# Patient Record
Sex: FEMALE | Race: WHITE | Employment: UNEMPLOYED | URBAN - METROPOLITAN AREA
[De-identification: names, ages, dates, MRNs, and addresses within clinical notes are randomized per-mention and may not be internally consistent; named-entity substitution may affect disease eponyms.]

---

## 2017-02-13 RX ORDER — MONTELUKAST SODIUM 10 MG/1
TABLET ORAL
Qty: 90 TABLET | Refills: 0 | Status: SHIPPED | OUTPATIENT
Start: 2017-02-13 | End: 2017-05-15

## 2017-03-06 ENCOUNTER — LAB ENCOUNTER (OUTPATIENT)
Dept: LAB | Age: 37
End: 2017-03-06
Attending: INTERNAL MEDICINE
Payer: COMMERCIAL

## 2017-03-06 ENCOUNTER — OFFICE VISIT (OUTPATIENT)
Dept: INTERNAL MEDICINE CLINIC | Facility: CLINIC | Age: 37
End: 2017-03-06

## 2017-03-06 VITALS
HEIGHT: 67.5 IN | OXYGEN SATURATION: 98 % | DIASTOLIC BLOOD PRESSURE: 86 MMHG | SYSTOLIC BLOOD PRESSURE: 128 MMHG | RESPIRATION RATE: 22 BRPM | WEIGHT: 293 LBS | HEART RATE: 98 BPM | TEMPERATURE: 99 F | BODY MASS INDEX: 45.45 KG/M2

## 2017-03-06 DIAGNOSIS — G43.709 CHRONIC MIGRAINE WITHOUT AURA WITHOUT STATUS MIGRAINOSUS, NOT INTRACTABLE: ICD-10-CM

## 2017-03-06 DIAGNOSIS — R73.03 PREDIABETES: ICD-10-CM

## 2017-03-06 DIAGNOSIS — E66.01 MORBID OBESITY WITH BMI OF 50.0-59.9, ADULT (HCC): ICD-10-CM

## 2017-03-06 DIAGNOSIS — Z00.00 ROUTINE GENERAL MEDICAL EXAMINATION AT A HEALTH CARE FACILITY: ICD-10-CM

## 2017-03-06 DIAGNOSIS — J45.30 MILD PERSISTENT ASTHMA WITHOUT COMPLICATION: ICD-10-CM

## 2017-03-06 DIAGNOSIS — K21.9 GASTROESOPHAGEAL REFLUX DISEASE WITHOUT ESOPHAGITIS: ICD-10-CM

## 2017-03-06 DIAGNOSIS — Z12.31 ENCOUNTER FOR SCREENING MAMMOGRAM FOR BREAST CANCER: ICD-10-CM

## 2017-03-06 DIAGNOSIS — J30.9 ALLERGIC RHINITIS, UNSPECIFIED ALLERGIC RHINITIS TRIGGER, UNSPECIFIED RHINITIS SEASONALITY: ICD-10-CM

## 2017-03-06 DIAGNOSIS — Z00.00 ROUTINE GENERAL MEDICAL EXAMINATION AT A HEALTH CARE FACILITY: Primary | ICD-10-CM

## 2017-03-06 DIAGNOSIS — F31.62 BIPOLAR DISORDER, CURRENT EPISODE MIXED, MODERATE (HCC): ICD-10-CM

## 2017-03-06 PROBLEM — F31.9 BIPOLAR AFFECTIVE DISORDER (HCC): Status: ACTIVE | Noted: 2017-03-06

## 2017-03-06 LAB
25-HYDROXYVITAMIN D (TOTAL): 9.1 NG/ML (ref 30–100)
ALBUMIN SERPL-MCNC: 3.3 G/DL (ref 3.5–4.8)
ALP LIVER SERPL-CCNC: 84 U/L (ref 37–98)
ALT SERPL-CCNC: 18 U/L (ref 14–54)
AST SERPL-CCNC: 11 U/L (ref 15–41)
BASOPHILS # BLD AUTO: 0.06 X10(3) UL (ref 0–0.1)
BASOPHILS NFR BLD AUTO: 0.6 %
BILIRUB SERPL-MCNC: 0.2 MG/DL (ref 0.1–2)
BILIRUB UR QL STRIP.AUTO: NEGATIVE
BUN BLD-MCNC: 13 MG/DL (ref 8–20)
CALCIUM BLD-MCNC: 8.9 MG/DL (ref 8.3–10.3)
CHLORIDE: 108 MMOL/L (ref 101–111)
CHOLEST SMN-MCNC: 159 MG/DL (ref ?–200)
CO2: 27 MMOL/L (ref 22–32)
COLOR UR AUTO: YELLOW
CREAT BLD-MCNC: 1.04 MG/DL (ref 0.55–1.02)
EOSINOPHIL # BLD AUTO: 0.07 X10(3) UL (ref 0–0.3)
EOSINOPHIL NFR BLD AUTO: 0.8 %
ERYTHROCYTE [DISTWIDTH] IN BLOOD BY AUTOMATED COUNT: 13.2 % (ref 11.5–16)
EST. AVERAGE GLUCOSE BLD GHB EST-MCNC: 114 MG/DL (ref 68–126)
GLUCOSE BLD-MCNC: 91 MG/DL (ref 70–99)
GLUCOSE UR STRIP.AUTO-MCNC: NEGATIVE MG/DL
HBA1C MFR BLD HPLC: 5.6 % (ref ?–5.7)
HCT VFR BLD AUTO: 39 % (ref 34–50)
HDLC SERPL-MCNC: 47 MG/DL (ref 45–?)
HDLC SERPL: 3.38 {RATIO} (ref ?–4.44)
HGB BLD-MCNC: 12.3 G/DL (ref 12–16)
IMMATURE GRANULOCYTE COUNT: 0.03 X10(3) UL (ref 0–1)
IMMATURE GRANULOCYTE RATIO %: 0.3 %
KETONES UR STRIP.AUTO-MCNC: NEGATIVE MG/DL
LDLC SERPL CALC-MCNC: 86 MG/DL (ref ?–130)
LEUKOCYTE ESTERASE UR QL STRIP.AUTO: NEGATIVE
LYMPHOCYTES # BLD AUTO: 2.64 X10(3) UL (ref 0.9–4)
LYMPHOCYTES NFR BLD AUTO: 28.6 %
M PROTEIN MFR SERPL ELPH: 7.4 G/DL (ref 6.1–8.3)
MCH RBC QN AUTO: 28.1 PG (ref 27–33.2)
MCHC RBC AUTO-ENTMCNC: 31.5 G/DL (ref 31–37)
MCV RBC AUTO: 89.2 FL (ref 81–100)
MONOCYTES # BLD AUTO: 0.59 X10(3) UL (ref 0.1–0.6)
MONOCYTES NFR BLD AUTO: 6.4 %
NEUTROPHIL ABS PRELIM: 5.85 X10 (3) UL (ref 1.3–6.7)
NEUTROPHILS # BLD AUTO: 5.85 X10(3) UL (ref 1.3–6.7)
NEUTROPHILS NFR BLD AUTO: 63.3 %
NITRITE UR QL STRIP.AUTO: NEGATIVE
NONHDLC SERPL-MCNC: 112 MG/DL (ref ?–130)
PH UR STRIP.AUTO: 5 [PH] (ref 4.5–8)
PLATELET # BLD AUTO: 249 10(3)UL (ref 150–450)
POTASSIUM SERPL-SCNC: 4.2 MMOL/L (ref 3.6–5.1)
PROT UR STRIP.AUTO-MCNC: NEGATIVE MG/DL
RBC # BLD AUTO: 4.37 X10(6)UL (ref 3.8–5.1)
RBC UR QL AUTO: NEGATIVE
RED CELL DISTRIBUTION WIDTH-SD: 42.8 FL (ref 35.1–46.3)
SODIUM SERPL-SCNC: 142 MMOL/L (ref 136–144)
SP GR UR STRIP.AUTO: 1.03 (ref 1–1.03)
TRIGLYCERIDES: 131 MG/DL (ref ?–150)
TSI SER-ACNC: 3.87 MIU/ML (ref 0.35–5.5)
UROBILINOGEN UR STRIP.AUTO-MCNC: <2 MG/DL
VLDL: 26 MG/DL (ref 5–40)
WBC # BLD AUTO: 9.2 X10(3) UL (ref 4–13)

## 2017-03-06 PROCEDURE — 80053 COMPREHEN METABOLIC PANEL: CPT

## 2017-03-06 PROCEDURE — 80061 LIPID PANEL: CPT

## 2017-03-06 PROCEDURE — 84443 ASSAY THYROID STIM HORMONE: CPT

## 2017-03-06 PROCEDURE — 99395 PREV VISIT EST AGE 18-39: CPT | Performed by: INTERNAL MEDICINE

## 2017-03-06 PROCEDURE — 36415 COLL VENOUS BLD VENIPUNCTURE: CPT

## 2017-03-06 PROCEDURE — 82306 VITAMIN D 25 HYDROXY: CPT

## 2017-03-06 PROCEDURE — 87624 HPV HI-RISK TYP POOLED RSLT: CPT | Performed by: INTERNAL MEDICINE

## 2017-03-06 PROCEDURE — 81003 URINALYSIS AUTO W/O SCOPE: CPT

## 2017-03-06 PROCEDURE — 83036 HEMOGLOBIN GLYCOSYLATED A1C: CPT

## 2017-03-06 PROCEDURE — 85025 COMPLETE CBC W/AUTO DIFF WBC: CPT

## 2017-03-06 RX ORDER — LORAZEPAM 1 MG/1
TABLET ORAL
Refills: 0 | Status: ON HOLD | COMMUNITY
Start: 2017-01-14 | End: 2017-04-27

## 2017-03-06 RX ORDER — CLONAZEPAM 1 MG/1
TABLET ORAL
Refills: 0 | COMMUNITY
Start: 2017-02-13 | End: 2017-07-24

## 2017-03-06 RX ORDER — SUMATRIPTAN 25 MG/1
25 TABLET, FILM COATED ORAL EVERY 2 HOUR PRN
Qty: 9 TABLET | Refills: 2 | Status: SHIPPED | OUTPATIENT
Start: 2017-03-06 | End: 2021-06-03 | Stop reason: ALTCHOICE

## 2017-03-06 RX ORDER — LURASIDONE HYDROCHLORIDE 40 MG/1
40 TABLET, FILM COATED ORAL NIGHTLY
Refills: 0 | COMMUNITY
Start: 2017-02-13 | End: 2017-07-24

## 2017-03-06 RX ORDER — FLUOXETINE HYDROCHLORIDE 20 MG/1
CAPSULE ORAL
Refills: 0 | Status: ON HOLD | COMMUNITY
Start: 2017-02-23 | End: 2017-04-27

## 2017-03-06 RX ORDER — FLUOXETINE HYDROCHLORIDE 40 MG/1
CAPSULE ORAL
Refills: 0 | Status: ON HOLD | COMMUNITY
Start: 2017-02-13 | End: 2017-04-27

## 2017-03-06 NOTE — PROGRESS NOTES
Patient presents with:  CPX: pap/fasting      HPI: The pt presents today for WWE + pap + CBE. Doing well. Due for updated wellness labs. She is compliant w/ all prescription medications and denies any significant SEs.     ROS: No CP, SOB, palps, abd pain Status: Former Smoker                   Packs/Day: 0.00  Years: 7         Types: Cigarettes      Quit date: 12/15/2008    Alcohol Use: No                Family History   Problem Relation Age of Onset   • Heart Disease Father    • Cancer Maternal Grandmothe pap results.   3. Morbid obesity - I advised the patient to strive to live an active and healthy lifestyle, one that encompasses strong  foundations of a mineral-dense and macronutritent-balanced diet, regular and dynamic exercise, maintenance of ideal body

## 2017-03-08 DIAGNOSIS — E55.9 VITAMIN D DEFICIENCY: Primary | ICD-10-CM

## 2017-03-08 RX ORDER — ERGOCALCIFEROL 1.25 MG/1
50000 CAPSULE ORAL
Qty: 12 CAPSULE | Refills: 0 | Status: SHIPPED | OUTPATIENT
Start: 2017-03-08 | End: 2017-07-24

## 2017-03-08 NOTE — PROGRESS NOTES
Script for Vitamin D sent to her pharmacy for very low levels on recent blood testing. Patient notified via [address] Jose Floyd. Ozzy Altamirano MD  Diplomate, American Board of Internal Medicine  [facility]  [address], [address], [place], [place]

## 2017-03-10 LAB — HPV I/H RISK 1 DNA SPEC QL NAA+PROBE: NEGATIVE

## 2017-03-13 ENCOUNTER — HOSPITAL ENCOUNTER (OUTPATIENT)
Dept: MAMMOGRAPHY | Age: 37
Discharge: HOME | End: 2017-03-13
Attending: INTERNAL MEDICINE
Payer: COMMERCIAL

## 2017-03-13 DIAGNOSIS — Z12.31 ENCOUNTER FOR SCREENING MAMMOGRAM FOR BREAST CANCER: ICD-10-CM

## 2017-03-13 PROCEDURE — 77063 BREAST TOMOSYNTHESIS BI: CPT

## 2017-03-13 PROCEDURE — 77067 SCR MAMMO BI INCL CAD: CPT

## 2017-03-14 ENCOUNTER — TELEPHONE (OUTPATIENT)
Dept: INTERNAL MEDICINE CLINIC | Facility: CLINIC | Age: 37
End: 2017-03-14

## 2017-03-14 DIAGNOSIS — B96.89 BV (BACTERIAL VAGINOSIS): Primary | ICD-10-CM

## 2017-03-14 DIAGNOSIS — N76.0 BV (BACTERIAL VAGINOSIS): Primary | ICD-10-CM

## 2017-03-14 RX ORDER — METRONIDAZOLE 7.5 MG/G
GEL VAGINAL
Qty: 1 TUBE | Refills: 0 | Status: ON HOLD | OUTPATIENT
Start: 2017-03-14 | End: 2017-04-24

## 2017-03-14 NOTE — TELEPHONE ENCOUNTER
1. Call patient. 2. Tell her the pap was normal.  3. Tell her that the report suggested that she has a bacterial infection of the vagina called bacterial vaginosis. Have her start taking Metrogel intravaginally for 5 nights.   I've sent a script to her ph

## 2017-03-20 PROBLEM — F33.2 MAJOR DEPRESSIVE DISORDER, RECURRENT EPISODE, SEVERE (HCC): Status: ACTIVE | Noted: 2017-03-20

## 2017-03-27 RX ORDER — PANTOPRAZOLE SODIUM 40 MG/1
40 TABLET, DELAYED RELEASE ORAL
Qty: 180 TABLET | Refills: 0 | Status: SHIPPED | OUTPATIENT
Start: 2017-03-27 | End: 2017-07-07

## 2017-03-27 NOTE — TELEPHONE ENCOUNTER
From: Marcelo Segura  To: Zora Garibay MD  Sent: 3/27/2017 4:39 PM CDT  Subject: Medication Renewal Request    Original authorizing provider: MD Marcelo Chase would like a refill of the following medications:  PANTOPRAZOLE SODIUM 40 MG Oral T

## 2017-04-24 PROBLEM — F32.9 MDD (MAJOR DEPRESSIVE DISORDER): Status: ACTIVE | Noted: 2017-04-24

## 2017-05-15 RX ORDER — MONTELUKAST SODIUM 10 MG/1
TABLET ORAL
Qty: 90 TABLET | Refills: 0 | Status: SHIPPED | OUTPATIENT
Start: 2017-05-15 | End: 2017-08-17

## 2017-07-07 RX ORDER — PANTOPRAZOLE SODIUM 40 MG/1
TABLET, DELAYED RELEASE ORAL
Qty: 180 TABLET | Refills: 0 | Status: SHIPPED | OUTPATIENT
Start: 2017-07-07 | End: 2017-10-04

## 2017-07-13 RX ORDER — ALBUTEROL SULFATE 90 UG/1
1-2 AEROSOL, METERED RESPIRATORY (INHALATION) EVERY 4 HOURS PRN
Qty: 1 INHALER | Refills: 1 | Status: CANCELLED
Start: 2017-07-13

## 2017-07-14 ENCOUNTER — TELEPHONE (OUTPATIENT)
Dept: INTERNAL MEDICINE CLINIC | Facility: CLINIC | Age: 37
End: 2017-07-14

## 2017-07-14 RX ORDER — ALBUTEROL SULFATE 90 UG/1
1-2 AEROSOL, METERED RESPIRATORY (INHALATION) EVERY 4 HOURS PRN
Qty: 1 INHALER | Refills: 1 | Status: SHIPPED | OUTPATIENT
Start: 2017-07-14 | End: 2019-05-02

## 2017-07-14 NOTE — TELEPHONE ENCOUNTER
From: Stephany Hidalgo  Sent: 7/13/2017 9:48 PM CDT  Subject: Medication Renewal Request    Stephany Hidalgo would like a refill of the following medications:  Albuterol Sulfate HFA (PROAIR HFA) 108 (90 BASE) MCG/ACT Inhalation Aero Soln Brenda Jones MD]

## 2017-07-14 NOTE — TELEPHONE ENCOUNTER
Pt asking for inhaler refill     Albuterol Sulfate HFA     [facility] [address] [place] [address]

## 2017-07-14 NOTE — TELEPHONE ENCOUNTER
LOV: 5/1/2015 at the [facility]    LF: 5/1/2015    She is not a pt here.  Her PCP is Dr. Chris Richardson    Please approve or deny rx refill request.  Thank you

## 2017-07-24 ENCOUNTER — APPOINTMENT (OUTPATIENT)
Dept: LAB | Age: 37
End: 2017-07-24
Attending: INTERNAL MEDICINE
Payer: COMMERCIAL

## 2017-07-24 ENCOUNTER — TELEPHONE (OUTPATIENT)
Dept: INTERNAL MEDICINE CLINIC | Facility: CLINIC | Age: 37
End: 2017-07-24

## 2017-07-24 DIAGNOSIS — E55.9 VITAMIN D DEFICIENCY: ICD-10-CM

## 2017-07-24 PROBLEM — F32.9 MDD (MAJOR DEPRESSIVE DISORDER): Status: RESOLVED | Noted: 2017-04-24 | Resolved: 2017-07-24

## 2017-07-24 LAB — 25-HYDROXYVITAMIN D (TOTAL): 26.6 NG/ML (ref 30–100)

## 2017-07-24 PROCEDURE — 36415 COLL VENOUS BLD VENIPUNCTURE: CPT | Performed by: INTERNAL MEDICINE

## 2017-07-24 PROCEDURE — 82306 VITAMIN D 25 HYDROXY: CPT | Performed by: INTERNAL MEDICINE

## 2017-07-24 NOTE — PROGRESS NOTES
Patient presents with:  Establish Care: mental health and medication      HPI: The patient presents today with the hopes of me taking over medical management of her mental health disorders. Specifically, she has MDD, Bipolar disorder, and FADY.   She is on clear  Lungs - CTAB  CV - RRR, nl s1, s2  Abd - soft, NABS, NT, ND  Ext - no c/c/e  Psych - nl mood/affect      A/P:  Severe episode of recurrent major depressive disorder, without psychotic features (hcc)  (primary encounter diagnosis)  Bipolar disorder,

## 2017-07-24 NOTE — TELEPHONE ENCOUNTER
I spoke with pt and she clarified that she was RX clonazepam 1 mg BID, but taking 1 mg QAM and 0.5 mg QHS. Dr Carroll Tobar, please advise dosing instructions.  TY

## 2017-07-24 NOTE — TELEPHONE ENCOUNTER
[facility] called in stating that pt was RX clonazepam 1 mg BID and new RX is for 0.5 mg qhs prn. Pharmacist wants to make sure that RX is for correct dose.      I contacted pt to confirm what dose she was preciously taking as she reported 0.5 mg qhs at OV

## 2017-07-24 NOTE — TELEPHONE ENCOUNTER
I'd like her to go ahead and move forward with the Clonazepam 0.5 mg q HS PRN by itself, since she's been making such good progress. I'm confident she'll do well on this routine. Edi Joy.  Cyrus Ash MD  Diplomate, [address] [place] [address] Board of Internal Medicine  [facility]

## 2017-07-25 NOTE — TELEPHONE ENCOUNTER
LMTCB for pt. Pharmacy notified that pt is to take clonazepam 0.5 mg qhs prn and RX was sent for correct dosing.

## 2017-08-17 RX ORDER — MONTELUKAST SODIUM 10 MG/1
TABLET ORAL
Qty: 90 TABLET | Refills: 0 | Status: SHIPPED | OUTPATIENT
Start: 2017-08-17 | End: 2017-11-11

## 2017-08-28 ENCOUNTER — TELEPHONE (OUTPATIENT)
Dept: INTERNAL MEDICINE CLINIC | Facility: CLINIC | Age: 37
End: 2017-08-28

## 2017-08-28 DIAGNOSIS — F41.1 GAD (GENERALIZED ANXIETY DISORDER): ICD-10-CM

## 2017-08-28 DIAGNOSIS — F31.62 BIPOLAR DISORDER, CURRENT EPISODE MIXED, MODERATE (HCC): ICD-10-CM

## 2017-08-28 NOTE — TELEPHONE ENCOUNTER
From: Sivakumar Toledo  Sent: 8/28/2017 9:19 AM CDT  Subject: Medication Renewal Request    Sivakumar Toledo would like a refill of the following medications:  ClonazePAM 0.5 MG Oral Tab Yany Valentine MD]  Lisdexamfetamine Dimesylate 30 MG Oral Cap Yany Valentine,

## 2017-08-28 NOTE — TELEPHONE ENCOUNTER
Called patient to notify of today's missed appointment. Notified of 24-hour cancellation and stated that this will be considered a NO SHOW.  Explained that after a second no show, there will be a $50 charge

## 2017-08-29 RX ORDER — CLONAZEPAM 0.5 MG/1
0.5 TABLET ORAL NIGHTLY PRN
Qty: 30 TABLET | Refills: 0 | Status: SHIPPED | OUTPATIENT
Start: 2017-08-29 | End: 2018-02-26 | Stop reason: ALTCHOICE

## 2017-08-31 ENCOUNTER — TELEPHONE (OUTPATIENT)
Dept: INTERNAL MEDICINE CLINIC | Facility: CLINIC | Age: 37
End: 2017-08-31

## 2017-09-08 ENCOUNTER — TELEPHONE (OUTPATIENT)
Dept: INTERNAL MEDICINE CLINIC | Facility: CLINIC | Age: 37
End: 2017-09-08

## 2017-09-08 NOTE — TELEPHONE ENCOUNTER
Call patient. I've been thinking about the complexity of her case and have spoken with both Dr. Josiane Green and Dr. Reshma Armenta. I want Crys to go back to seeing Dr. Reshma Armenta.   Her case is just so complex that it is absolutely in her best interest to continue seeing

## 2017-09-08 NOTE — TELEPHONE ENCOUNTER
I spoke w/ Dr. Divina Mathew this afternoon. Tianna Noel. Halina Alonso MD  Diplomate, American Board of Internal Medicine  [address] [facility]  [address] [place] [address],[address], [address], [facility], [address]  T: [phone]; F: Jenniferet[address] 5

## 2017-09-25 PROBLEM — F33.2 MAJOR DEPRESSIVE DISORDER, RECURRENT EPISODE, SEVERE (HCC): Status: RESOLVED | Noted: 2017-03-20 | Resolved: 2017-09-25

## 2017-09-25 PROBLEM — F41.1 GAD (GENERALIZED ANXIETY DISORDER): Status: ACTIVE | Noted: 2017-09-25

## 2017-09-25 PROBLEM — E66.01 MORBID OBESITY WITH BMI OF 45.0-49.9, ADULT (HCC): Status: ACTIVE | Noted: 2017-09-25

## 2017-09-25 PROBLEM — F33.1 MDD (MAJOR DEPRESSIVE DISORDER), RECURRENT EPISODE, MODERATE (HCC): Status: ACTIVE | Noted: 2017-09-25

## 2017-09-25 NOTE — PROGRESS NOTES
Patient presents with: Follow - Up: MDD, Bipolar, FADY      HPI: The patient presents today for 2-month f/u (she missed last month's appointment) for her history of MDD, Bipolar, and FADY.   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 , Disp: 60 tablet, Rfl: 2  •  Albuterol Sulfate HFA (PROAIR HFA) 108 (90 Base) MCG/ACT Inhalation Aero Soln, Inhale 1-2 puffs use of prescription medication. 4. Continue CBT/mindfulness/support and RTC in 3 months. Patient verbally agrees to and understands the plan as outlined above.   Patient was also afforded the time and opportunity to ask questions, which were then answered

## 2017-10-05 RX ORDER — PANTOPRAZOLE SODIUM 40 MG/1
TABLET, DELAYED RELEASE ORAL
Qty: 180 TABLET | Refills: 0 | Status: SHIPPED | OUTPATIENT
Start: 2017-10-05 | End: 2017-12-28

## 2017-10-15 ENCOUNTER — HOSPITAL ENCOUNTER (OUTPATIENT)
Age: 37
Discharge: HOME | End: 2017-10-15
Payer: COMMERCIAL

## 2017-10-15 ENCOUNTER — APPOINTMENT (OUTPATIENT)
Dept: GENERAL RADIOLOGY | Age: 37
End: 2017-10-15
Attending: PHYSICIAN ASSISTANT
Payer: COMMERCIAL

## 2017-10-15 VITALS
BODY MASS INDEX: 45.99 KG/M2 | SYSTOLIC BLOOD PRESSURE: 123 MMHG | DIASTOLIC BLOOD PRESSURE: 88 MMHG | WEIGHT: 293 LBS | RESPIRATION RATE: 20 BRPM | TEMPERATURE: 97 F | HEART RATE: 94 BPM | OXYGEN SATURATION: 97 % | HEIGHT: 67 IN

## 2017-10-15 DIAGNOSIS — M25.40 JOINT EFFUSION: ICD-10-CM

## 2017-10-15 DIAGNOSIS — M25.561 ACUTE PAIN OF RIGHT KNEE: Primary | ICD-10-CM

## 2017-10-15 PROCEDURE — 99213 OFFICE O/P EST LOW 20 MIN: CPT

## 2017-10-15 PROCEDURE — 73560 X-RAY EXAM OF KNEE 1 OR 2: CPT | Performed by: PHYSICIAN ASSISTANT

## 2017-10-15 NOTE — ED PROVIDER NOTES
Patient Seen in: [facility]    History   Patient presents with:  Knee Pain    Stated Complaint: RT KNEE PAIN X 1 WK    HPI    CHIEF COMPLAINT: Right knee pain     HISTORY OF PRESENT ILLNESS: Patient is a 56-year-old female who p OTHER      Comment: basal carcinoma removed from upper lid of left               eye  No date: TONSILLECTOMY    Family History   Problem Relation Age of Onset   • Heart Disease Father    • Cancer Maternal Grandmother      Lung CA   • Cancer Maternal Grandf laxity noted. Bilateral LE: +5/5 strength . 2+ patellar reflexes. Normal sensation. Skin:  warm and dry, no rashes.          ED Course   Labs Reviewed - No data to display  Xr Knee (1 Or 2 Views), Right (cpt=73560)    Result Date: 10/15/2017  PROCEDURE: [facility]  [phone]    In 1 week        Medications Prescribed:  Discharge Medication List as of 10/15/2017 12:01 PM

## 2017-10-15 NOTE — ED INITIAL ASSESSMENT (HPI)
Right knee- pain since last Sunday. Pt states she was walking in [facility] felt knee popped. takes aleve for  Pain also applied ice. Today felt knee popped again while waking down the stairs,  Feels tight.

## 2017-10-28 DIAGNOSIS — F31.62 BIPOLAR DISORDER, CURRENT EPISODE MIXED, MODERATE (HCC): ICD-10-CM

## 2017-10-29 DIAGNOSIS — F31.62 BIPOLAR DISORDER, CURRENT EPISODE MIXED, MODERATE (HCC): ICD-10-CM

## 2017-10-30 RX ORDER — LAMOTRIGINE 100 MG/1
TABLET ORAL
Qty: 60 TABLET | Refills: 2 | Status: SHIPPED | OUTPATIENT
Start: 2017-10-30 | End: 2018-01-19

## 2017-10-30 RX ORDER — LURASIDONE HYDROCHLORIDE 20 MG/1
TABLET, FILM COATED ORAL
Qty: 30 TABLET | Refills: 2 | Status: SHIPPED | OUTPATIENT
Start: 2017-10-30 | End: 2018-01-19

## 2017-11-02 DIAGNOSIS — F33.2 SEVERE EPISODE OF RECURRENT MAJOR DEPRESSIVE DISORDER, WITHOUT PSYCHOTIC FEATURES (HCC): ICD-10-CM

## 2017-11-02 RX ORDER — SERTRALINE HYDROCHLORIDE 100 MG/1
100 TABLET, FILM COATED ORAL EVERY MORNING
Qty: 30 TABLET | Refills: 2 | Status: SHIPPED | OUTPATIENT
Start: 2017-11-02 | End: 2018-01-19

## 2017-11-11 RX ORDER — MONTELUKAST SODIUM 10 MG/1
TABLET ORAL
Qty: 90 TABLET | Refills: 0 | Status: SHIPPED | OUTPATIENT
Start: 2017-11-11 | End: 2018-02-04

## 2017-11-11 NOTE — TELEPHONE ENCOUNTER
Requesting Rita   LOV: 7-24-17  RTC: 1 month   Last Relevant Labs: 3-6-17  Filled: 8-17-17#90 with 0 refills    No future appointments.   Due for OV

## 2017-12-08 ENCOUNTER — HOSPITAL ENCOUNTER (OUTPATIENT)
Age: 37
Discharge: HOME | End: 2017-12-08
Attending: FAMILY MEDICINE
Payer: COMMERCIAL

## 2017-12-08 VITALS
OXYGEN SATURATION: 97 % | RESPIRATION RATE: 16 BRPM | HEART RATE: 85 BPM | DIASTOLIC BLOOD PRESSURE: 78 MMHG | TEMPERATURE: 99 F | SYSTOLIC BLOOD PRESSURE: 122 MMHG

## 2017-12-08 DIAGNOSIS — H61.21 RIGHT EAR IMPACTED CERUMEN: ICD-10-CM

## 2017-12-08 DIAGNOSIS — H92.01 RIGHT EAR PAIN: Primary | ICD-10-CM

## 2017-12-08 PROCEDURE — 99213 OFFICE O/P EST LOW 20 MIN: CPT

## 2017-12-08 PROCEDURE — 69209 REMOVE IMPACTED EAR WAX UNI: CPT

## 2017-12-08 RX ORDER — FLUTICASONE PROPIONATE 50 MCG
2 SPRAY, SUSPENSION (ML) NASAL DAILY
Qty: 16 G | Refills: 0 | Status: SHIPPED | OUTPATIENT
Start: 2017-12-08 | End: 2018-01-07

## 2017-12-09 NOTE — ED INITIAL ASSESSMENT (HPI)
Right ear pain- started yesterday. Pt took a flight Monday. Took aleve 2 tabs  X 1 per day  and allegra.

## 2017-12-09 NOTE — ED PROVIDER NOTES
Patient Seen in: [facility]    History   Patient presents with:  Ear Pain    Stated Complaint: right ear pain    HPI    44year old female presents right ear pain. Patients states she has right ear pain since yesterday.  States s External ear normal.   Left Ear: External ear normal.   Nose: Nose normal.   Mouth/Throat: Oropharynx is clear and moist.   EAC with cerumen impaction. Eyes: Conjunctivae and EOM are normal. Pupils are equal, round, and reactive to light.    Neck: Normal

## 2017-12-29 RX ORDER — PANTOPRAZOLE SODIUM 40 MG/1
TABLET, DELAYED RELEASE ORAL
Qty: 180 TABLET | Refills: 0 | Status: SHIPPED | OUTPATIENT
Start: 2017-12-29 | End: 2018-03-24

## 2017-12-29 NOTE — TELEPHONE ENCOUNTER
Pantoprazole 40 mg filled 10-5-17 180 with 0 refills     LOV 9-25-17    Follow up in 3 months     No upcoming apt on file     Labs 3-6-17

## 2018-01-19 DIAGNOSIS — F33.2 SEVERE EPISODE OF RECURRENT MAJOR DEPRESSIVE DISORDER, WITHOUT PSYCHOTIC FEATURES (HCC): ICD-10-CM

## 2018-01-19 DIAGNOSIS — F31.62 BIPOLAR DISORDER, CURRENT EPISODE MIXED, MODERATE (HCC): ICD-10-CM

## 2018-01-19 RX ORDER — SERTRALINE HYDROCHLORIDE 100 MG/1
TABLET, FILM COATED ORAL
Qty: 30 TABLET | Refills: 0 | Status: SHIPPED | OUTPATIENT
Start: 2018-01-19 | End: 2018-03-26

## 2018-01-19 RX ORDER — LAMOTRIGINE 100 MG/1
TABLET ORAL
Qty: 60 TABLET | Refills: 0 | Status: SHIPPED | OUTPATIENT
Start: 2018-01-19 | End: 2018-03-24

## 2018-01-19 RX ORDER — LURASIDONE HYDROCHLORIDE 20 MG/1
TABLET, FILM COATED ORAL
Qty: 30 TABLET | Refills: 0 | Status: SHIPPED | OUTPATIENT
Start: 2018-01-19 | End: 2018-03-26

## 2018-01-19 NOTE — TELEPHONE ENCOUNTER
Lamotrigine 100 mg 1 tab bid filled 10-30-17 60 with 2 refills     Latuda 20 mg 1 tab nightly filled 10-30-17 30 with 2 refills     sertraline 100 mg 1 tab daily filled  11-2-17 30 with 2 refills     LOV 9-25-17      Return in about 3 months (around 12/25/

## 2018-02-05 RX ORDER — MONTELUKAST SODIUM 10 MG/1
TABLET ORAL
Qty: 90 TABLET | Refills: 0 | Status: SHIPPED | OUTPATIENT
Start: 2018-02-05 | End: 2018-03-24

## 2018-02-06 NOTE — TELEPHONE ENCOUNTER
Singulair 10 mg 1 tab nightly filled 11-11-17 90 with 0 refills     LOV 7-24-17     RTC 1 month for f/u    Last ACT 21 done 3-6-17

## 2018-02-17 ENCOUNTER — HOSPITAL ENCOUNTER (OUTPATIENT)
Age: 38
Discharge: HOME | End: 2018-02-17
Payer: COMMERCIAL

## 2018-02-17 VITALS
DIASTOLIC BLOOD PRESSURE: 86 MMHG | OXYGEN SATURATION: 100 % | RESPIRATION RATE: 16 BRPM | SYSTOLIC BLOOD PRESSURE: 142 MMHG | TEMPERATURE: 98 F | HEART RATE: 82 BPM

## 2018-02-17 DIAGNOSIS — M62.830 PARASPINAL MUSCLE SPASM: ICD-10-CM

## 2018-02-17 DIAGNOSIS — S29.012A STRAIN OF RHOMBOID MUSCLE, INITIAL ENCOUNTER: Primary | ICD-10-CM

## 2018-02-17 PROCEDURE — 99214 OFFICE O/P EST MOD 30 MIN: CPT

## 2018-02-17 PROCEDURE — 99213 OFFICE O/P EST LOW 20 MIN: CPT

## 2018-02-17 RX ORDER — CYCLOBENZAPRINE HCL 5 MG
5 TABLET ORAL 3 TIMES DAILY PRN
Qty: 15 TABLET | Refills: 0 | Status: SHIPPED | OUTPATIENT
Start: 2018-02-17 | End: 2018-02-24

## 2018-02-17 NOTE — ED PROVIDER NOTES
Patient Seen in: [facility]    History   Patient presents with:  Shoulder Pain    Stated Complaint: Left shoulder pain    79-year-old female who presents to the immediate care with complaints of left scapular/shoulder pain since HENT: Negative. Respiratory: Negative. Cardiovascular: Negative. Gastrointestinal: Negative. Musculoskeletal: Positive for back pain. Skin: Negative. Hematological: Negative. Psychiatric/Behavioral: Negative.     All other systems review ------------------------------------------------------------       MDM   I discussed the diagnosis and need for followup with their primary care physician for further evaluation and care.  Patient states they understand diagnosis, followup plan and agree wi

## 2018-02-17 NOTE — ED INITIAL ASSESSMENT (HPI)
Pt. Woke up Tuesday with left shoulder pain, with a little headache and nausea from the pain, unable to sleep well, No known injury, thought she slept on it funny

## 2018-02-26 ENCOUNTER — OFFICE VISIT (OUTPATIENT)
Dept: INTERNAL MEDICINE CLINIC | Facility: CLINIC | Age: 38
End: 2018-02-26

## 2018-02-26 VITALS
SYSTOLIC BLOOD PRESSURE: 128 MMHG | TEMPERATURE: 98 F | WEIGHT: 293 LBS | BODY MASS INDEX: 45.99 KG/M2 | HEART RATE: 88 BPM | RESPIRATION RATE: 16 BRPM | HEIGHT: 67 IN | DIASTOLIC BLOOD PRESSURE: 80 MMHG

## 2018-02-26 DIAGNOSIS — S29.012A STRAIN OF RHOMBOID MUSCLE, INITIAL ENCOUNTER: ICD-10-CM

## 2018-02-26 DIAGNOSIS — M75.42 IMPINGEMENT SYNDROME OF LEFT SHOULDER: Primary | ICD-10-CM

## 2018-02-26 DIAGNOSIS — J45.909 UNCOMPLICATED ASTHMA, UNSPECIFIED ASTHMA SEVERITY, UNSPECIFIED WHETHER PERSISTENT: ICD-10-CM

## 2018-02-26 PROCEDURE — 99214 OFFICE O/P EST MOD 30 MIN: CPT | Performed by: PHYSICIAN ASSISTANT

## 2018-02-26 RX ORDER — CYCLOBENZAPRINE HCL 10 MG
10 TABLET ORAL 3 TIMES DAILY PRN
COMMUNITY
End: 2018-04-09 | Stop reason: ALTCHOICE

## 2018-02-26 NOTE — PATIENT INSTRUCTIONS
Shoulder Impingement:  - may take 2 aleve twice a day WITH FOOD -- do this for 3-5 days, then as needed  - ice (10-15 minutes at a time, 3 times a day)  - start home exercises    Call if no improvement in the next 1-2 weeks (sooner if symptoms changing or

## 2018-03-24 DIAGNOSIS — F31.62 BIPOLAR DISORDER, CURRENT EPISODE MIXED, MODERATE (HCC): ICD-10-CM

## 2018-03-24 NOTE — TELEPHONE ENCOUNTER
Lamotrigine 100 mg 1 tab bid filled     Montelukast 10 mg 1 tab daily filled     Pantoprazole 40 mg 1 tab bid filled     LOv 9-25-17     RTC in 3 months.     Labs 3-6-17

## 2018-03-26 DIAGNOSIS — F33.2 SEVERE EPISODE OF RECURRENT MAJOR DEPRESSIVE DISORDER, WITHOUT PSYCHOTIC FEATURES (HCC): ICD-10-CM

## 2018-03-26 DIAGNOSIS — F31.62 BIPOLAR DISORDER, CURRENT EPISODE MIXED, MODERATE (HCC): ICD-10-CM

## 2018-03-26 RX ORDER — MONTELUKAST SODIUM 10 MG/1
TABLET ORAL
Qty: 90 TABLET | Refills: 0 | Status: SHIPPED | OUTPATIENT
Start: 2018-03-26 | End: 2018-08-14

## 2018-03-26 RX ORDER — PANTOPRAZOLE SODIUM 40 MG/1
TABLET, DELAYED RELEASE ORAL
Qty: 180 TABLET | Refills: 0 | Status: SHIPPED | OUTPATIENT
Start: 2018-03-26 | End: 2018-06-20

## 2018-03-26 RX ORDER — LAMOTRIGINE 100 MG/1
TABLET ORAL
Qty: 180 TABLET | Refills: 0 | Status: SHIPPED | OUTPATIENT
Start: 2018-03-26 | End: 2018-06-20

## 2018-03-27 RX ORDER — SERTRALINE HYDROCHLORIDE 100 MG/1
TABLET, FILM COATED ORAL
Qty: 30 TABLET | Refills: 0 | Status: SHIPPED | OUTPATIENT
Start: 2018-03-27 | End: 2018-04-23

## 2018-03-27 RX ORDER — LURASIDONE HYDROCHLORIDE 20 MG/1
TABLET, FILM COATED ORAL
Qty: 30 TABLET | Refills: 0 | Status: SHIPPED | OUTPATIENT
Start: 2018-03-27 | End: 2018-04-23

## 2018-03-27 NOTE — TELEPHONE ENCOUNTER
Latuda 20 mg 1 tab daily filled 1-19-18 90 with 0 refills     Sertaline 100 mg 1 tab daily filled 1-19-18 30 with 0 refills     LOV 9-25-17     her history of MDD, Bipolar, and FADY    MDD - Stable and improved on prescription medication.   2. Bipolar disord

## 2018-04-09 ENCOUNTER — OFFICE VISIT (OUTPATIENT)
Dept: INTERNAL MEDICINE CLINIC | Facility: CLINIC | Age: 38
End: 2018-04-09

## 2018-04-09 VITALS
SYSTOLIC BLOOD PRESSURE: 130 MMHG | DIASTOLIC BLOOD PRESSURE: 88 MMHG | TEMPERATURE: 99 F | BODY MASS INDEX: 45.99 KG/M2 | HEIGHT: 67 IN | WEIGHT: 293 LBS | HEART RATE: 80 BPM | RESPIRATION RATE: 12 BRPM

## 2018-04-09 DIAGNOSIS — Z30.09 BIRTH CONTROL COUNSELING: Primary | ICD-10-CM

## 2018-04-09 DIAGNOSIS — N92.1 MENOMETRORRHAGIA: ICD-10-CM

## 2018-04-09 PROCEDURE — 99213 OFFICE O/P EST LOW 20 MIN: CPT | Performed by: INTERNAL MEDICINE

## 2018-04-09 RX ORDER — NORGESTIMATE AND ETHINYL ESTRADIOL 7DAYSX3 28
1 KIT ORAL DAILY
Qty: 28 TABLET | Refills: 5 | Status: SHIPPED | OUTPATIENT
Start: 2018-04-09 | End: 2018-05-07

## 2018-04-09 NOTE — PROGRESS NOTES
Patient presents with: Other: birth control desire      HPI: The pt presents today to desire getting on birth control. She'd like to do this from a convenience factor. Menses are prolonged and irregular. Chronic hx. Worse when she's stressed out.   Not visit.        Smoking status: Former Smoker                                                              Packs/day: 0.00      Years: 7.00         Types: Cigarettes     Quit date: 12/15/2008  Smokeless tobacco: Never Used                      Alcohol use: No

## 2018-04-09 NOTE — PATIENT INSTRUCTIONS
How Birth Control Works  Birth control prevents pregnancy by preventing conception. Some methods prevent an egg from maturing. Some keep the sperm and egg from meeting. And some methods work in both ways.   Preventing ovulation  Certain hormones help prev

## 2018-04-23 DIAGNOSIS — F33.2 SEVERE EPISODE OF RECURRENT MAJOR DEPRESSIVE DISORDER, WITHOUT PSYCHOTIC FEATURES (HCC): ICD-10-CM

## 2018-04-23 DIAGNOSIS — F31.62 BIPOLAR DISORDER, CURRENT EPISODE MIXED, MODERATE (HCC): ICD-10-CM

## 2018-04-23 RX ORDER — LURASIDONE HYDROCHLORIDE 20 MG/1
TABLET, FILM COATED ORAL
Qty: 30 TABLET | Refills: 0 | Status: SHIPPED | OUTPATIENT
Start: 2018-04-23 | End: 2018-05-20

## 2018-04-23 RX ORDER — SERTRALINE HYDROCHLORIDE 100 MG/1
TABLET, FILM COATED ORAL
Qty: 30 TABLET | Refills: 0 | Status: SHIPPED | OUTPATIENT
Start: 2018-04-23 | End: 2018-05-20

## 2018-04-23 NOTE — TELEPHONE ENCOUNTER
Medication(s) to Refill:   Pending Prescriptions Disp Refills    LATUDA 20 MG Oral Tab [Pharmacy Med Name: LATUDA 20MG TABLETS] 30 tablet 0     Sig: TAKE 1 TABLET(20 MG) BY MOUTH EVERY NIGHT      SERTRALINE  MG Oral Tab [Pharmacy Med Name: Michael De Oliveira

## 2018-05-20 DIAGNOSIS — F33.2 SEVERE EPISODE OF RECURRENT MAJOR DEPRESSIVE DISORDER, WITHOUT PSYCHOTIC FEATURES (HCC): ICD-10-CM

## 2018-05-20 DIAGNOSIS — F31.62 BIPOLAR DISORDER, CURRENT EPISODE MIXED, MODERATE (HCC): ICD-10-CM

## 2018-05-21 RX ORDER — SERTRALINE HYDROCHLORIDE 100 MG/1
TABLET, FILM COATED ORAL
Qty: 30 TABLET | Refills: 5 | Status: SHIPPED | OUTPATIENT
Start: 2018-05-21 | End: 2018-10-24

## 2018-05-21 RX ORDER — LURASIDONE HYDROCHLORIDE 20 MG/1
TABLET, FILM COATED ORAL
Qty: 30 TABLET | Refills: 5 | Status: SHIPPED | OUTPATIENT
Start: 2018-05-21 | End: 2018-10-15 | Stop reason: ALTCHOICE

## 2018-05-21 NOTE — TELEPHONE ENCOUNTER
Medication(s) to Refill:   Pending Prescriptions Disp Refills    SERTRALINE  MG Oral Tab [Pharmacy Med Name: SERTRALINE 100MG TABLETS] 30 tablet 0     Sig: TAKE 1 TABLET(100 MG) BY MOUTH EVERY MORNING      LATUDA 20 MG Oral Tab [Pharmacy Med Name: Chloe Gouty 20MG TABLETS] 30 tablet 0     Sig: TAKE 1 TABLET(20 MG) BY MOUTH EVERY NIGHT             Reason for Medication Refill being sent to Provider / Reason Protocol Failed:  [] 90 day refill has already been granted  [] Blood Pressure out of range  [] Labs Abnormal  [] Medication not previously prescribed by Provider  [x] Non-Protocol Medication  [] Prescription needs to be printed at site and faxed to pharmacy  [] Controlled Substance - needs to be printed at the site, site to notify patient when ready    Last Time Medication was Filled:    SERTRALINE  MG Oral Tab 30 tablet 0 4/23/2018     LATUDA 20 MG Oral Tab 30 tablet 0 4/23/2018         Last Office Visit with PCP: 4/9/2018      Future Appointments:  No future appointments.       Last Blood Pressures:  BP Readings from Last 2 Encounters:  04/09/18 : 130/88  02/26/18 : 128/80

## 2018-06-04 ENCOUNTER — HOSPITAL ENCOUNTER (OUTPATIENT)
Age: 38
Discharge: HOME | End: 2018-06-04
Attending: FAMILY MEDICINE
Payer: COMMERCIAL

## 2018-06-04 VITALS
OXYGEN SATURATION: 98 % | RESPIRATION RATE: 20 BRPM | SYSTOLIC BLOOD PRESSURE: 159 MMHG | TEMPERATURE: 98 F | DIASTOLIC BLOOD PRESSURE: 95 MMHG | HEART RATE: 95 BPM

## 2018-06-04 DIAGNOSIS — H92.02 LEFT EAR PAIN: ICD-10-CM

## 2018-06-04 DIAGNOSIS — J01.90 ACUTE NON-RECURRENT SINUSITIS, UNSPECIFIED LOCATION: Primary | ICD-10-CM

## 2018-06-04 PROCEDURE — 87081 CULTURE SCREEN ONLY: CPT | Performed by: FAMILY MEDICINE

## 2018-06-04 PROCEDURE — 99214 OFFICE O/P EST MOD 30 MIN: CPT

## 2018-06-04 PROCEDURE — 87430 STREP A AG IA: CPT | Performed by: FAMILY MEDICINE

## 2018-06-04 RX ORDER — AMOXICILLIN AND CLAVULANATE POTASSIUM 875; 125 MG/1; MG/1
1 TABLET, FILM COATED ORAL 2 TIMES DAILY
Qty: 20 TABLET | Refills: 0 | Status: SHIPPED | OUTPATIENT
Start: 2018-06-04 | End: 2018-06-14

## 2018-06-04 RX ORDER — FLUTICASONE PROPIONATE 50 MCG
2 SPRAY, SUSPENSION (ML) NASAL DAILY
Qty: 16 G | Refills: 0 | Status: SHIPPED | OUTPATIENT
Start: 2018-06-04 | End: 2018-07-04

## 2018-06-04 NOTE — ED INITIAL ASSESSMENT (HPI)
Complains of head and nose congestion for the last three days ago. Also complaining of chest congestion.

## 2018-06-04 NOTE — ED PROVIDER NOTES
Patient Seen in: [facility] [place]    History   Patient presents with:  Nasal Congestion    Stated Complaint: ear pain sore throat congestion x 3 days    HPI    43 old female for sinus pressure, congestion, left ear pain and sore throa 20   LMP 05/31/2018 (Exact Date)   SpO2 98%         Physical Exam   Constitutional: She is oriented to person, place, and time. She appears well-developed and well-nourished. HENT:   Head: Normocephalic and atraumatic.    Right Ear: Tympanic membrane, ext MCG/ACT Nasal Suspension  2 sprays by Nasal route daily. , Normal, Disp-16 g, R-0

## 2018-06-20 DIAGNOSIS — F31.62 BIPOLAR DISORDER, CURRENT EPISODE MIXED, MODERATE (HCC): ICD-10-CM

## 2018-06-21 RX ORDER — PANTOPRAZOLE SODIUM 40 MG/1
40 TABLET, DELAYED RELEASE ORAL
Qty: 180 TABLET | Refills: 0 | Status: SHIPPED | OUTPATIENT
Start: 2018-06-21 | End: 2018-09-22

## 2018-06-21 RX ORDER — LAMOTRIGINE 100 MG/1
100 TABLET ORAL 2 TIMES DAILY
Qty: 180 TABLET | Refills: 0 | Status: SHIPPED | OUTPATIENT
Start: 2018-06-21 | End: 2018-09-22

## 2018-06-21 NOTE — TELEPHONE ENCOUNTER
Refill requested: Pantoprazole 40 mg + Lamotrigine 100 mg     Failed protocol      Last refill: 3/26/18 Lamotrigine 100 mg #180 NR + Pantoprazole #180 NR    Relevant Labs: NA   BP Readings from Last 3 Encounters:  06/04/18 : (!) 159/95  04/09/18 : 130/88

## 2018-08-14 RX ORDER — MONTELUKAST SODIUM 10 MG/1
10 TABLET ORAL EVERY EVENING
Qty: 90 TABLET | Refills: 0 | Status: SHIPPED | OUTPATIENT
Start: 2018-08-14 | End: 2019-01-14

## 2018-08-14 NOTE — TELEPHONE ENCOUNTER
Refill requested: Montelukast 10 mg     Passed protocol      Last refill: 3/26/18  #90 NR    Relevant Labs: AAP 2/26/18    Last OV / RTC advised: 4/9/18 MG RTC PRN  Appt Scheduled: No      *Refill passed protocol - sent to pharmacy*

## 2018-08-16 ENCOUNTER — TELEPHONE (OUTPATIENT)
Dept: INTERNAL MEDICINE CLINIC | Facility: CLINIC | Age: 38
End: 2018-08-16

## 2018-08-16 NOTE — TELEPHONE ENCOUNTER
[facility] but also sent email with contact information for Dr. Desire Kimbrough ,[address] [place] [address], [address] [place] [address]

## 2018-08-16 NOTE — TELEPHONE ENCOUNTER
I would have patient be seen by Psychiatry in the acute clinic with Dr. Alejandra Ho. I'll forward this message to Baylee Guerrero to help with assistance. Aron Ferreira. Mike Mahajan MD  Diplomate, American Board of Internal Medicine  [facility]  [address]  [facility]

## 2018-08-16 NOTE — TELEPHONE ENCOUNTER
To discuss with Siddharth Rivers about getting patient into the short-term med clinic or back in with another [facility] Provider within [address].   She kind of left on her own and returned to see me to manage her medications, but she clearly needs to be man

## 2018-08-20 PROBLEM — E66.01 MORBID OBESITY WITH BMI OF 45.0-49.9, ADULT (HCC): Status: RESOLVED | Noted: 2017-09-25 | Resolved: 2018-08-20

## 2018-08-20 NOTE — PROGRESS NOTES
Patient presents with:  Medication Follow-Up: bipolar medicaion/asthma med       HPI: Crys presents today for 2 issues:  1. Bipolar - Uncontrolled. She's been off of medication. Has been manic. Has racked up thousands of $$$ in credit card debt.   She w Wt Readings from Last 6 Encounters:  08/20/18 : (!) 353 lb 8 oz  04/09/18 : (!) 341 lb  02/26/18 : (!) 326 lb 4 oz  10/27/17 : (!) 302 lb  10/15/17 : (!) 305 lb  09/25/17 : (!) 306 lb  Gen - A&Ox3, NAD, morbidly obese  HEENT - PERRL, EOMI, OP is clear [place] [place], [address] [place] [address]  T: [phone]; F: Walker Galeas  49. for this Visit:  Signed Prescriptions Disp Refills    Fluticasone Propionate HFA (FLOVENT HFA) 110 MCG/ACT Inhalation Aerosol 3 Can 1      Sig: Inhale 1 puff into the lungs 2 (two) t

## 2018-08-31 NOTE — TELEPHONE ENCOUNTER
LATUDA 20 MG Oral Tab   Patient called stating that Dr Mague Fuentes discussed with her changing her to 40 mg for this medication.   She asked if we can call in refill at this dosage to [facility]

## 2018-08-31 NOTE — TELEPHONE ENCOUNTER
LOV 8-20-18      PLAN = Get back on prescription medication with the Latuda at the 40 mg dose and get back into Psychiatry.

## 2018-09-22 DIAGNOSIS — F31.62 BIPOLAR DISORDER, CURRENT EPISODE MIXED, MODERATE (HCC): ICD-10-CM

## 2018-09-24 RX ORDER — PANTOPRAZOLE SODIUM 40 MG/1
40 TABLET, DELAYED RELEASE ORAL
Qty: 180 TABLET | Refills: 0 | Status: SHIPPED | OUTPATIENT
Start: 2018-09-24 | End: 2018-12-22

## 2018-09-24 RX ORDER — LAMOTRIGINE 100 MG/1
100 TABLET ORAL 2 TIMES DAILY
Qty: 180 TABLET | Refills: 0 | Status: SHIPPED | OUTPATIENT
Start: 2018-09-24 | End: 2018-12-22

## 2018-09-24 NOTE — TELEPHONE ENCOUNTER
Refill requested:   Requested Prescriptions     Pending Prescriptions Disp Refills   • lamoTRIgine 100 MG Oral Tab [Pharmacy Med Name: LAMOTRIGINE 100MG TABLETS] 180 tablet 0     Sig: Take 1 tablet (100 mg total) by mouth 2 (two) times daily.    • Pantopraz

## 2018-10-02 RX ORDER — LURASIDONE HYDROCHLORIDE 40 MG/1
TABLET, FILM COATED ORAL
Qty: 30 TABLET | Refills: 0 | Status: SHIPPED | OUTPATIENT
Start: 2018-10-02 | End: 2018-10-15

## 2018-10-02 NOTE — TELEPHONE ENCOUNTER
Call pt. I'll refill the medication, but she was supposed to get back in to see Psychiatry after her last visit with me. Did she make an appointment? Kevin Dowling. Serene Cadena MD  Diplomate, American Board of Internal Medicine  [facility]  [address]  [facility],[address] Floor, [address], [facility], [address] [place] [address]  T: [phone]; F: [address]

## 2018-10-02 NOTE — TELEPHONE ENCOUNTER
Latuda 40 mg 1 tab daily filled 8-31-18 30 with 0 refills     LOV 8-20-18     PLAN = Get back on prescription medication with the Latuda at the 40 mg dose and get back into Psychiatry    RTC PRN or at next regularly scheduled appt w/ me    Next apt 10-22-18

## 2018-10-06 ENCOUNTER — HOSPITAL ENCOUNTER (OUTPATIENT)
Age: 38
Discharge: HOME | End: 2018-10-06
Attending: FAMILY MEDICINE
Payer: COMMERCIAL

## 2018-10-06 VITALS
SYSTOLIC BLOOD PRESSURE: 120 MMHG | OXYGEN SATURATION: 98 % | DIASTOLIC BLOOD PRESSURE: 68 MMHG | HEART RATE: 83 BPM | TEMPERATURE: 98 F | RESPIRATION RATE: 20 BRPM

## 2018-10-06 DIAGNOSIS — R42 DIZZINESS: Primary | ICD-10-CM

## 2018-10-06 PROCEDURE — 96360 HYDRATION IV INFUSION INIT: CPT

## 2018-10-06 PROCEDURE — 99214 OFFICE O/P EST MOD 30 MIN: CPT

## 2018-10-06 PROCEDURE — 85025 COMPLETE CBC W/AUTO DIFF WBC: CPT | Performed by: FAMILY MEDICINE

## 2018-10-06 PROCEDURE — 81002 URINALYSIS NONAUTO W/O SCOPE: CPT | Performed by: FAMILY MEDICINE

## 2018-10-06 PROCEDURE — 80047 BASIC METABLC PNL IONIZED CA: CPT

## 2018-10-06 RX ORDER — MECLIZINE HCL 12.5 MG/1
25 TABLET ORAL ONCE
Status: COMPLETED | OUTPATIENT
Start: 2018-10-06 | End: 2018-10-06

## 2018-10-06 RX ORDER — MECLIZINE HCL 12.5 MG/1
25 TABLET ORAL 3 TIMES DAILY PRN
Qty: 30 TABLET | Refills: 0 | Status: SHIPPED | OUTPATIENT
Start: 2018-10-06 | End: 2018-10-22

## 2018-10-06 RX ORDER — SODIUM CHLORIDE 9 MG/ML
1000 INJECTION, SOLUTION INTRAVENOUS ONCE
Status: COMPLETED | OUTPATIENT
Start: 2018-10-06 | End: 2018-10-06

## 2018-10-06 NOTE — ED PROVIDER NOTES
Patient Seen in: [facility]    History   Patient presents with:  Dizziness (neurologic)    Stated Complaint: DIZZINESS X 1 WK    HPI    This 39-year-old female presents to the office with complaint of dizziness which started int Systems    Positive for stated complaint: DIZZINESS X 1 WK  Other systems are as noted in HPI. Constitutional and vital signs reviewed. All other systems reviewed and negative except as noted above.     Physical Exam     ED Triage Vitals [10/06/18 160 exam.  Patient drinks very little water during the day. I encouraged her water intake. She is given Antivert 25 mg 1 tablet every 6 hours as needed for dizziness. Symptomatic treatment is discussed.   She is to go the emergency room for any worsening sym

## 2018-10-06 NOTE — ED INITIAL ASSESSMENT (HPI)
Pt has had on and off dizziness for about 2 weks, but this last week it has been constant and pt states it is making her nauseated.   It does not change or help to change positions, and she has no fever, some mild sneezing

## 2018-10-09 NOTE — TELEPHONE ENCOUNTER
Pt has appointment scheduled with Psychiatry on 10/15 at 11am.  Requesting refill of Rick Yancey, enough to get her through until appointment to [facility].

## 2018-10-22 ENCOUNTER — OFFICE VISIT (OUTPATIENT)
Dept: INTERNAL MEDICINE CLINIC | Facility: CLINIC | Age: 38
End: 2018-10-22
Payer: COMMERCIAL

## 2018-10-22 VITALS
SYSTOLIC BLOOD PRESSURE: 120 MMHG | OXYGEN SATURATION: 98 % | WEIGHT: 293 LBS | HEART RATE: 97 BPM | HEIGHT: 67 IN | RESPIRATION RATE: 16 BRPM | BODY MASS INDEX: 45.99 KG/M2 | TEMPERATURE: 100 F | DIASTOLIC BLOOD PRESSURE: 72 MMHG

## 2018-10-22 DIAGNOSIS — E55.9 VITAMIN D DEFICIENCY: ICD-10-CM

## 2018-10-22 DIAGNOSIS — Z23 FLU VACCINE NEED: ICD-10-CM

## 2018-10-22 DIAGNOSIS — Z00.00 ROUTINE GENERAL MEDICAL EXAMINATION AT A HEALTH CARE FACILITY: Primary | ICD-10-CM

## 2018-10-22 DIAGNOSIS — G47.30 SLEEP DISORDER BREATHING: ICD-10-CM

## 2018-10-22 DIAGNOSIS — Z12.31 SCREENING MAMMOGRAM, ENCOUNTER FOR: ICD-10-CM

## 2018-10-22 DIAGNOSIS — E53.8 VITAMIN B12 DEFICIENCY: ICD-10-CM

## 2018-10-22 PROCEDURE — 90471 IMMUNIZATION ADMIN: CPT | Performed by: INTERNAL MEDICINE

## 2018-10-22 PROCEDURE — 99395 PREV VISIT EST AGE 18-39: CPT | Performed by: INTERNAL MEDICINE

## 2018-10-22 PROCEDURE — 90686 IIV4 VACC NO PRSV 0.5 ML IM: CPT | Performed by: INTERNAL MEDICINE

## 2018-10-22 RX ORDER — TRAZODONE HYDROCHLORIDE 100 MG/1
100 TABLET ORAL NIGHTLY PRN
COMMUNITY
End: 2019-01-07

## 2018-10-22 RX ORDER — LORAZEPAM 2 MG/1
2 TABLET ORAL DAILY PRN
COMMUNITY
End: 2018-11-12

## 2018-10-22 NOTE — PROGRESS NOTES
Patient presents with:  CPX      HPI: Gilmer Antonio presents today for female CPX. Stable health. Due for wellness labs. Desires updated B12 and Vitamin D levels as well b/c of previous deficiency. Due for flu shot and mammo.   Compliant w/ prescription medicat Disp: 3 Can, Rfl: 1  •  Montelukast Sodium 10 MG Oral Tab, Take 1 tablet (10 mg total) by mouth every evening., Disp: 90 tablet, Rfl: 0  •  SERTRALINE  MG Oral Tab, TAKE 1 TABLET(100 MG) BY MOUTH EVERY MORNING, Disp: 30 tablet, Rfl: 5  •  Albuterol medical examination at a health care facility  (primary encounter diagnosis)  Flu vaccine need  Screening mammogram, encounter for  Vitamin d deficiency  Vitamin b12 deficiency  Sleep disorder breathing    1. Female CPX - Stable health.   Check wellness lab

## 2018-10-23 ENCOUNTER — LAB ENCOUNTER (OUTPATIENT)
Dept: LAB | Age: 38
End: 2018-10-23
Attending: INTERNAL MEDICINE
Payer: COMMERCIAL

## 2018-10-23 DIAGNOSIS — E53.8 VITAMIN B12 DEFICIENCY: ICD-10-CM

## 2018-10-23 DIAGNOSIS — Z00.00 ROUTINE GENERAL MEDICAL EXAMINATION AT A HEALTH CARE FACILITY: ICD-10-CM

## 2018-10-23 DIAGNOSIS — E55.9 VITAMIN D DEFICIENCY: ICD-10-CM

## 2018-10-23 PROCEDURE — 81001 URINALYSIS AUTO W/SCOPE: CPT | Performed by: INTERNAL MEDICINE

## 2018-10-23 PROCEDURE — 82306 VITAMIN D 25 HYDROXY: CPT | Performed by: INTERNAL MEDICINE

## 2018-10-23 PROCEDURE — 83036 HEMOGLOBIN GLYCOSYLATED A1C: CPT | Performed by: INTERNAL MEDICINE

## 2018-10-23 PROCEDURE — 80050 GENERAL HEALTH PANEL: CPT | Performed by: INTERNAL MEDICINE

## 2018-10-23 PROCEDURE — 36415 COLL VENOUS BLD VENIPUNCTURE: CPT | Performed by: INTERNAL MEDICINE

## 2018-10-23 PROCEDURE — 82607 VITAMIN B-12: CPT | Performed by: INTERNAL MEDICINE

## 2018-10-23 PROCEDURE — 80061 LIPID PANEL: CPT | Performed by: INTERNAL MEDICINE

## 2018-10-24 DIAGNOSIS — F33.2 SEVERE EPISODE OF RECURRENT MAJOR DEPRESSIVE DISORDER, WITHOUT PSYCHOTIC FEATURES (HCC): ICD-10-CM

## 2018-10-25 RX ORDER — SERTRALINE HYDROCHLORIDE 100 MG/1
100 TABLET, FILM COATED ORAL EVERY MORNING
Qty: 90 TABLET | Refills: 0 | Status: SHIPPED | OUTPATIENT
Start: 2018-10-25 | End: 2019-01-07

## 2018-11-07 ENCOUNTER — TELEPHONE (OUTPATIENT)
Dept: INTERNAL MEDICINE CLINIC | Facility: CLINIC | Age: 38
End: 2018-11-07

## 2018-11-12 ENCOUNTER — NURSE ONLY (OUTPATIENT)
Dept: INTERNAL MEDICINE CLINIC | Facility: CLINIC | Age: 38
End: 2018-11-12
Payer: COMMERCIAL

## 2018-11-12 PROBLEM — G47.00 INSOMNIA: Status: ACTIVE | Noted: 2018-11-12

## 2018-11-12 PROBLEM — F41.1 ANXIETY STATE: Status: ACTIVE | Noted: 2018-11-12

## 2018-11-12 PROCEDURE — 96372 THER/PROPH/DIAG INJ SC/IM: CPT | Performed by: INTERNAL MEDICINE

## 2018-11-12 RX ORDER — CYANOCOBALAMIN 1000 UG/ML
1000 INJECTION INTRAMUSCULAR; SUBCUTANEOUS ONCE
Status: COMPLETED | OUTPATIENT
Start: 2018-11-12 | End: 2018-11-12

## 2018-11-12 RX ADMIN — CYANOCOBALAMIN 1000 MCG: 1000 INJECTION INTRAMUSCULAR; SUBCUTANEOUS at 15:52:00

## 2018-11-13 ENCOUNTER — MED REC SCAN ONLY (OUTPATIENT)
Dept: INTERNAL MEDICINE CLINIC | Facility: CLINIC | Age: 38
End: 2018-11-13

## 2018-11-15 ENCOUNTER — HOSPITAL ENCOUNTER (OUTPATIENT)
Age: 38
Discharge: HOME | End: 2018-11-15
Attending: FAMILY MEDICINE
Payer: COMMERCIAL

## 2018-11-15 VITALS
OXYGEN SATURATION: 98 % | TEMPERATURE: 98 F | SYSTOLIC BLOOD PRESSURE: 137 MMHG | HEART RATE: 94 BPM | RESPIRATION RATE: 20 BRPM | DIASTOLIC BLOOD PRESSURE: 98 MMHG

## 2018-11-15 DIAGNOSIS — H92.01 ACUTE PAIN OF RIGHT EAR: Primary | ICD-10-CM

## 2018-11-15 DIAGNOSIS — L04.0 ACUTE CERVICAL ADENITIS: ICD-10-CM

## 2018-11-15 PROCEDURE — 99214 OFFICE O/P EST MOD 30 MIN: CPT

## 2018-11-15 PROCEDURE — 99213 OFFICE O/P EST LOW 20 MIN: CPT

## 2018-11-15 RX ORDER — AMOXICILLIN AND CLAVULANATE POTASSIUM 875; 125 MG/1; MG/1
1 TABLET, FILM COATED ORAL 2 TIMES DAILY
Qty: 20 TABLET | Refills: 0 | Status: SHIPPED | OUTPATIENT
Start: 2018-11-15 | End: 2018-11-25

## 2018-11-15 RX ORDER — OFLOXACIN 3 MG/ML
5 SOLUTION AURICULAR (OTIC) 2 TIMES DAILY
Qty: 1 BOTTLE | Refills: 0 | Status: SHIPPED | OUTPATIENT
Start: 2018-11-15 | End: 2018-11-22

## 2018-11-15 NOTE — ED INITIAL ASSESSMENT (HPI)
Patient presents with right ear pain x 2 days. Left side painful behind her ear. Radiates to neck and head. Denies numbness or tingling or neuro deficit. No drainage noted. No fever. Did get flu shot.

## 2018-11-15 NOTE — ED PROVIDER NOTES
Patient Seen in: [facility]    History   Patient presents with:  Ear Problem Pain (neurosensory)    Stated Complaint: ear pain/neck pain    HPI    41-year-old female presented chief complaint of bilateral ear pain more in the ri 11/03/2018 (Exact Date)   SpO2 98%         Physical Exam    Patient is alert oriented ×3 in no acute distress   conjunctiva clear no icterus  Right ear canal has mild erythema, some tragal tenderness. Normal tympanic membrane.   There is submandibular, ant Tab  Take 1 tablet by mouth 2 (two) times daily for 10 days. , Normal, Disp-20 tablet, R-0

## 2018-11-19 ENCOUNTER — NURSE ONLY (OUTPATIENT)
Dept: INTERNAL MEDICINE CLINIC | Facility: CLINIC | Age: 38
End: 2018-11-19
Payer: COMMERCIAL

## 2018-11-19 PROCEDURE — 96372 THER/PROPH/DIAG INJ SC/IM: CPT | Performed by: INTERNAL MEDICINE

## 2018-11-19 RX ORDER — CYANOCOBALAMIN 1000 UG/ML
1000 INJECTION INTRAMUSCULAR; SUBCUTANEOUS ONCE
Status: COMPLETED | OUTPATIENT
Start: 2018-11-19 | End: 2018-11-19

## 2018-11-19 RX ADMIN — CYANOCOBALAMIN 1000 MCG: 1000 INJECTION INTRAMUSCULAR; SUBCUTANEOUS at 16:05:00

## 2018-11-24 ENCOUNTER — OFFICE VISIT (OUTPATIENT)
Dept: SLEEP CENTER | Facility: HOSPITAL | Age: 38
End: 2018-11-24
Attending: INTERNAL MEDICINE
Payer: COMMERCIAL

## 2018-11-24 DIAGNOSIS — G47.30 SLEEP DISORDER BREATHING: ICD-10-CM

## 2018-11-24 PROCEDURE — 95810 POLYSOM 6/> YRS 4/> PARAM: CPT

## 2018-11-26 ENCOUNTER — NURSE ONLY (OUTPATIENT)
Dept: INTERNAL MEDICINE CLINIC | Facility: CLINIC | Age: 38
End: 2018-11-26
Payer: COMMERCIAL

## 2018-11-26 DIAGNOSIS — E53.8 VITAMIN B12 DEFICIENCY: Primary | ICD-10-CM

## 2018-11-26 PROCEDURE — 96372 THER/PROPH/DIAG INJ SC/IM: CPT | Performed by: INTERNAL MEDICINE

## 2018-11-26 RX ORDER — CYANOCOBALAMIN 1000 UG/ML
1000 INJECTION INTRAMUSCULAR; SUBCUTANEOUS ONCE
Status: COMPLETED | OUTPATIENT
Start: 2018-11-26 | End: 2018-11-26

## 2018-11-26 RX ADMIN — CYANOCOBALAMIN 1000 MCG: 1000 INJECTION INTRAMUSCULAR; SUBCUTANEOUS at 13:59:00

## 2018-11-28 NOTE — PROCEDURES
[address]       Accredited by the [facility] ([facility])    PATIENT'S NAME:        Jonathon Willams  ATTENDING PHYSICIAN:   Judith Whtifield M.D. REFERRING PHYSICIAN:   Cristofer Liz M.D.   PATIENT A patient slept exclusively in the lateral position. Most of these events occurred during rapid eye movement sleep, with a REM AHI of 84 events and non-REM AHI of 3.3.   The patient had brief oxyhemoglobin desaturation to 85%; however, she spent less than 1%

## 2018-12-03 ENCOUNTER — NURSE ONLY (OUTPATIENT)
Dept: INTERNAL MEDICINE CLINIC | Facility: CLINIC | Age: 38
End: 2018-12-03
Payer: COMMERCIAL

## 2018-12-03 DIAGNOSIS — E53.8 VITAMIN B12 DEFICIENCY: Primary | ICD-10-CM

## 2018-12-03 PROCEDURE — 96372 THER/PROPH/DIAG INJ SC/IM: CPT | Performed by: INTERNAL MEDICINE

## 2018-12-03 RX ORDER — CYANOCOBALAMIN 1000 UG/ML
1000 INJECTION INTRAMUSCULAR; SUBCUTANEOUS ONCE
Status: COMPLETED | OUTPATIENT
Start: 2018-12-03 | End: 2018-12-03

## 2018-12-03 RX ADMIN — CYANOCOBALAMIN 1000 MCG: 1000 INJECTION INTRAMUSCULAR; SUBCUTANEOUS at 13:45:00

## 2018-12-21 ENCOUNTER — HOSPITAL ENCOUNTER (OUTPATIENT)
Age: 38
Discharge: HOME | End: 2018-12-21
Attending: FAMILY MEDICINE
Payer: COMMERCIAL

## 2018-12-21 VITALS
WEIGHT: 293 LBS | RESPIRATION RATE: 18 BRPM | OXYGEN SATURATION: 100 % | DIASTOLIC BLOOD PRESSURE: 88 MMHG | HEART RATE: 97 BPM | TEMPERATURE: 98 F | HEIGHT: 67 IN | BODY MASS INDEX: 45.99 KG/M2 | SYSTOLIC BLOOD PRESSURE: 141 MMHG

## 2018-12-21 DIAGNOSIS — J06.9 VIRAL UPPER RESPIRATORY TRACT INFECTION: ICD-10-CM

## 2018-12-21 DIAGNOSIS — K12.2 UVULITIS: Primary | ICD-10-CM

## 2018-12-21 PROCEDURE — 99213 OFFICE O/P EST LOW 20 MIN: CPT

## 2018-12-21 PROCEDURE — 99214 OFFICE O/P EST MOD 30 MIN: CPT

## 2018-12-21 PROCEDURE — 87430 STREP A AG IA: CPT | Performed by: FAMILY MEDICINE

## 2018-12-21 PROCEDURE — 87081 CULTURE SCREEN ONLY: CPT | Performed by: FAMILY MEDICINE

## 2018-12-21 RX ORDER — MOMETASONE 50 UG/1
SPRAY, METERED NASAL
Qty: 17 G | Refills: 0 | Status: SHIPPED | OUTPATIENT
Start: 2018-12-21 | End: 2019-01-14

## 2018-12-21 RX ORDER — AZITHROMYCIN 250 MG/1
250 TABLET, FILM COATED ORAL DAILY
Qty: 1 PACKAGE | Refills: 0 | Status: SHIPPED | OUTPATIENT
Start: 2018-12-21 | End: 2019-01-14

## 2018-12-21 RX ORDER — PREDNISONE 20 MG/1
TABLET ORAL
Qty: 10 TABLET | Refills: 0 | Status: SHIPPED | OUTPATIENT
Start: 2018-12-21 | End: 2019-01-14

## 2018-12-21 NOTE — ED PROVIDER NOTES
Patient Seen in: [facility]    History   Patient presents with:  Sore Throat    Stated Complaint: sinus issue 1 wk    HPI    This 50-year-old female presents to the office with a 3-day history of sinus congestion and postnasal d Current:/88   Pulse 97   Temp 98.2 °F (36.8 °C) (Oral)   Resp 18   Ht 170.2 cm (5' 7\")   Wt (!) 158.8 kg   LMP 12/03/2018   SpO2 100%   BMI 54.82 kg/m²         Physical Exam    General: WH/obese/WD, in no respiratory distress or drooling noted medications    predniSONE 20 MG Oral Tab  Take 2 tablets once daily with breakfast for 5 days. Qty: 10 tablet Refills: 0    Mometasone Furoate 50 MCG/ACT Nasal Suspension  Use 1 spray to each nostril once daily after shower.   Qty: 17 g Refills: 0    azith

## 2018-12-21 NOTE — ED INITIAL ASSESSMENT (HPI)
2 days of sinus pressure and congestion with post nasal drip  Fever first night  Runny nose  Sore throat  One bout of emesis  Non productive cough

## 2018-12-22 DIAGNOSIS — F31.62 BIPOLAR DISORDER, CURRENT EPISODE MIXED, MODERATE (HCC): ICD-10-CM

## 2018-12-23 PROBLEM — M25.562 CHRONIC PAIN OF LEFT KNEE: Status: ACTIVE | Noted: 2018-12-23

## 2018-12-23 PROBLEM — G89.29 CHRONIC PAIN OF LEFT KNEE: Status: ACTIVE | Noted: 2018-12-23

## 2018-12-24 RX ORDER — PANTOPRAZOLE SODIUM 40 MG/1
TABLET, DELAYED RELEASE ORAL
Qty: 180 TABLET | Refills: 1 | Status: SHIPPED | OUTPATIENT
Start: 2018-12-24 | End: 2019-06-15

## 2018-12-24 RX ORDER — LAMOTRIGINE 100 MG/1
TABLET ORAL
Qty: 180 TABLET | Refills: 1 | Status: SHIPPED | OUTPATIENT
Start: 2018-12-24 | End: 2019-05-02

## 2018-12-24 NOTE — TELEPHONE ENCOUNTER
Lamotrigine last filled 9/24/18 #180    Pantoprazole last filled 9/24/18 #180    LOV 10/22/18     RTC - 1 year or PRN

## 2019-01-07 ENCOUNTER — NURSE ONLY (OUTPATIENT)
Dept: INTERNAL MEDICINE CLINIC | Facility: CLINIC | Age: 39
End: 2019-01-07
Payer: COMMERCIAL

## 2019-01-07 DIAGNOSIS — E53.8 VITAMIN B12 DEFICIENCY: Primary | ICD-10-CM

## 2019-01-07 PROCEDURE — 96372 THER/PROPH/DIAG INJ SC/IM: CPT | Performed by: INTERNAL MEDICINE

## 2019-01-07 RX ORDER — CYANOCOBALAMIN 1000 UG/ML
1000 INJECTION INTRAMUSCULAR; SUBCUTANEOUS ONCE
Status: COMPLETED | OUTPATIENT
Start: 2019-01-07 | End: 2019-01-07

## 2019-01-07 RX ADMIN — CYANOCOBALAMIN 1000 MCG: 1000 INJECTION INTRAMUSCULAR; SUBCUTANEOUS at 13:42:00

## 2019-01-12 NOTE — PROGRESS NOTES
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 Disp: 12 capsule Rfl: 0   Fluticasone Propionate HFA (FLOVENT HFA) 110 MCG/ACT Inhalation Aerosol Inhale 1 puff into the lungs 2 (two) times daily.  Disp: 3 Can Rfl: 1   Albuterol Sulfate HFA (PROAIR HFA) 108 (90 B EDUCATION INITIAL, DIET (INTERNAL)    Prediabetes  - reviewed lab in EMR, HgbA1c 5.8  - Metformin add on good option for weight loss and insulin resistance without impacting mood  -     ELECTROCARDIOGRAM, COMPLETE  -     TSH+FREE T4; Future  -     COMP MET (3 days minimum). See journal options below. 2.  Complete fasting (10-12 hours, water only) labs at [facility] [place] lab site prior to next office visit.   3.  Fill your prescribed medication and take as discussed and prescribed:  Start Metformin ER 1 tab daily with little time commitment. This Kaela can also help work on behavior change and improve sleep. Check out [email or web address] org blog for continued daily support and education along this weight loss journey!                 Return in about 1 month (around 2

## 2019-01-14 ENCOUNTER — OFFICE VISIT (OUTPATIENT)
Dept: INTERNAL MEDICINE CLINIC | Facility: CLINIC | Age: 39
End: 2019-01-14
Payer: COMMERCIAL

## 2019-01-14 VITALS
BODY MASS INDEX: 45.99 KG/M2 | DIASTOLIC BLOOD PRESSURE: 70 MMHG | HEIGHT: 67 IN | HEART RATE: 100 BPM | SYSTOLIC BLOOD PRESSURE: 110 MMHG | WEIGHT: 293 LBS | RESPIRATION RATE: 14 BRPM

## 2019-01-14 DIAGNOSIS — E55.9 VITAMIN D DEFICIENCY: ICD-10-CM

## 2019-01-14 DIAGNOSIS — G43.109 MIGRAINE AURA WITHOUT HEADACHE: ICD-10-CM

## 2019-01-14 DIAGNOSIS — R73.03 PREDIABETES: ICD-10-CM

## 2019-01-14 DIAGNOSIS — F31.60 BIPOLAR DISORDER, MIXED (HCC): ICD-10-CM

## 2019-01-14 DIAGNOSIS — E66.01 MORBID OBESITY WITH BMI OF 50.0-59.9, ADULT (HCC): ICD-10-CM

## 2019-01-14 DIAGNOSIS — Z51.81 ENCOUNTER FOR THERAPEUTIC DRUG MONITORING: Primary | ICD-10-CM

## 2019-01-14 DIAGNOSIS — E53.8 VITAMIN B12 DEFICIENCY: ICD-10-CM

## 2019-01-14 PROCEDURE — 99204 OFFICE O/P NEW MOD 45 MIN: CPT | Performed by: NURSE PRACTITIONER

## 2019-01-14 PROCEDURE — 93000 ELECTROCARDIOGRAM COMPLETE: CPT | Performed by: NURSE PRACTITIONER

## 2019-01-14 RX ORDER — METFORMIN HYDROCHLORIDE 750 MG/1
1500 TABLET, EXTENDED RELEASE ORAL
Qty: 60 TABLET | Refills: 5 | Status: SHIPPED | OUTPATIENT
Start: 2019-01-14 | End: 2019-04-08 | Stop reason: SINTOL

## 2019-01-14 NOTE — PATIENT INSTRUCTIONS
Welcome to the [facility]...your Lifestyle Renovation begins now! Thank you for placing your trust in our health care team, I look forward to working with you along this journey to better health!     Next steps:     1.  Sched begin with 1 day and progress as able with long-term goal of 30 minutes 5 days a week at a minimum. Meditation daily can help manage and control stress. Chronic stress can make weight loss difficult.   Exercising is one way to help with stress, but medit

## 2019-02-01 DIAGNOSIS — E55.9 VITAMIN D DEFICIENCY: ICD-10-CM

## 2019-02-01 NOTE — TELEPHONE ENCOUNTER
Vitamin d 50,000 filled 11-6-18 12 cap with 0 refills     Labs 1-21-19     1,25-DihydroxyVitamin D 19.9 - 79.3 pg/mL 30.9      improved Vitamin D- continue weekly supplement and please place refill if needed.

## 2019-02-04 ENCOUNTER — NURSE ONLY (OUTPATIENT)
Dept: INTERNAL MEDICINE CLINIC | Facility: CLINIC | Age: 39
End: 2019-02-04
Payer: COMMERCIAL

## 2019-02-04 DIAGNOSIS — E53.8 VITAMIN B12 DEFICIENCY: Primary | ICD-10-CM

## 2019-02-04 PROCEDURE — 96372 THER/PROPH/DIAG INJ SC/IM: CPT | Performed by: INTERNAL MEDICINE

## 2019-02-04 RX ORDER — CYANOCOBALAMIN 1000 UG/ML
1000 INJECTION INTRAMUSCULAR; SUBCUTANEOUS ONCE
Status: COMPLETED | OUTPATIENT
Start: 2019-02-04 | End: 2019-02-04

## 2019-02-04 RX ADMIN — CYANOCOBALAMIN 1000 MCG: 1000 INJECTION INTRAMUSCULAR; SUBCUTANEOUS at 09:12:00

## 2019-02-05 RX ORDER — ERGOCALCIFEROL 1.25 MG/1
CAPSULE ORAL
Qty: 12 CAPSULE | Refills: 0 | OUTPATIENT
Start: 2019-02-05

## 2019-02-11 ENCOUNTER — OFFICE VISIT (OUTPATIENT)
Dept: INTERNAL MEDICINE CLINIC | Facility: CLINIC | Age: 39
End: 2019-02-11
Payer: COMMERCIAL

## 2019-02-11 VITALS
HEIGHT: 67 IN | WEIGHT: 293 LBS | HEART RATE: 80 BPM | BODY MASS INDEX: 45.99 KG/M2 | RESPIRATION RATE: 14 BRPM | SYSTOLIC BLOOD PRESSURE: 118 MMHG | DIASTOLIC BLOOD PRESSURE: 74 MMHG

## 2019-02-11 DIAGNOSIS — F31.60 BIPOLAR DISORDER, MIXED (HCC): ICD-10-CM

## 2019-02-11 DIAGNOSIS — R73.03 PREDIABETES: ICD-10-CM

## 2019-02-11 DIAGNOSIS — E66.01 MORBID OBESITY WITH BMI OF 50.0-59.9, ADULT (HCC): ICD-10-CM

## 2019-02-11 DIAGNOSIS — E55.9 VITAMIN D DEFICIENCY: ICD-10-CM

## 2019-02-11 DIAGNOSIS — G43.109 MIGRAINE AURA WITHOUT HEADACHE: ICD-10-CM

## 2019-02-11 DIAGNOSIS — Z51.81 ENCOUNTER FOR THERAPEUTIC DRUG MONITORING: Primary | ICD-10-CM

## 2019-02-11 PROCEDURE — 99214 OFFICE O/P EST MOD 30 MIN: CPT | Performed by: NURSE PRACTITIONER

## 2019-02-11 RX ORDER — TOPIRAMATE 25 MG/1
25 TABLET ORAL NIGHTLY
Refills: 0 | COMMUNITY
Start: 2019-01-21 | End: 2019-02-11

## 2019-02-11 RX ORDER — ERGOCALCIFEROL 1.25 MG/1
50000 CAPSULE ORAL WEEKLY
Qty: 12 CAPSULE | Refills: 1 | Status: SHIPPED | OUTPATIENT
Start: 2019-02-11 | End: 2019-08-12 | Stop reason: ALTCHOICE

## 2019-02-11 RX ORDER — TOPIRAMATE 25 MG/1
25 TABLET ORAL 2 TIMES DAILY
Qty: 60 TABLET | Refills: 1 | Status: SHIPPED | OUTPATIENT
Start: 2019-02-11 | End: 2020-12-12

## 2019-02-11 RX ORDER — PHENTERMINE HYDROCHLORIDE 15 MG/1
15 CAPSULE ORAL EVERY MORNING
Qty: 30 CAPSULE | Refills: 0 | Status: SHIPPED | OUTPATIENT
Start: 2019-02-11 | End: 2019-04-08

## 2019-02-11 NOTE — PROGRESS NOTES
Evelina Nava is a 35year old female presents today for 1 month follow-up on medical weight loss program for the treatment of overweight, obesity, or morbid obesity with associated prediabetes, Vitamin D deficiency, SASHA.    S:  Current weight Wt Readings PERRLA  LUNGS: CTA in all fields, breathing non labored  CARDIO: RRR without murmur, normal S1 and S2 without clicks or gallops, no pedal edema.   GI: rotund, soft, +BS, no masses, HSM or tenderness  NEURO/MS: motor and sensory grossly intact  PSYCH: pleasa Vitamin D - maintain weekly Vitamin D supplementation. Medication Plan: Continue Metformin ER, move to breakfast for better tolerance or 1 with breakfast and 1 with dinner. Add phentermine in AM and increase Topamax to 1 tab twice a day.     Agreed upon of a main dish. Or eat only half of the main dish and take the rest home. · If you want a dessert, try fresh fruit, nonfat yogurt, or sorbet. Or share a dessert. Fast food tips  · Choose grilled chicken sandwiches and plain hamburgers.  Add vegetables to friends. · Make a meal out of a low-fat appetizer (such as shrimp cocktail or fresh pasta), bread, and salad as your main meal. Ask for an appetizer-size portion of an entree.     Foods to avoid  · Donuts, muffins, and pastries  · Coconut, vegetables with

## 2019-02-11 NOTE — PATIENT INSTRUCTIONS
Continue making lifestyle changes that focus on good nutrition, regular exercise and stress management. Today we reviewed your labs with findings of: low Vitamin D - maintain weekly Vitamin D supplementation.     Medication Plan: Continue Metformin charlie ROMERO salad.  · Look for fish, chicken, turkey, or meat that is broiled, roasted, poached, or steamed. · Order 1 or 2 low-fat appetizers or soup and a salad instead of a main dish. Or eat only half of the main dish and take the rest home.   · If you want a desse toppings, use salsa and lettuce, rather than sour cream and cheese. · For dessert, enjoy fruit, sorbet, or low-fat frozen yogurt. Share a rich dessert with friends.   · Make a meal out of a low-fat appetizer (such as shrimp cocktail or fresh pasta), bread,

## 2019-03-04 ENCOUNTER — NURSE ONLY (OUTPATIENT)
Dept: INTERNAL MEDICINE CLINIC | Facility: CLINIC | Age: 39
End: 2019-03-04
Payer: COMMERCIAL

## 2019-03-04 DIAGNOSIS — E53.8 B12 DEFICIENCY: Primary | ICD-10-CM

## 2019-03-04 DIAGNOSIS — E53.8 VITAMIN B12 DEFICIENCY: ICD-10-CM

## 2019-03-04 PROCEDURE — 96372 THER/PROPH/DIAG INJ SC/IM: CPT | Performed by: INTERNAL MEDICINE

## 2019-03-04 RX ORDER — CYANOCOBALAMIN 1000 UG/ML
1000 INJECTION INTRAMUSCULAR; SUBCUTANEOUS ONCE
Status: COMPLETED | OUTPATIENT
Start: 2019-03-04 | End: 2019-03-04

## 2019-03-04 RX ADMIN — CYANOCOBALAMIN 1000 MCG: 1000 INJECTION INTRAMUSCULAR; SUBCUTANEOUS at 09:01:00

## 2019-04-08 ENCOUNTER — OFFICE VISIT (OUTPATIENT)
Dept: INTERNAL MEDICINE CLINIC | Facility: CLINIC | Age: 39
End: 2019-04-08
Payer: COMMERCIAL

## 2019-04-08 VITALS
SYSTOLIC BLOOD PRESSURE: 124 MMHG | HEART RATE: 93 BPM | RESPIRATION RATE: 16 BRPM | TEMPERATURE: 99 F | HEIGHT: 67 IN | WEIGHT: 293 LBS | BODY MASS INDEX: 45.99 KG/M2 | OXYGEN SATURATION: 98 % | DIASTOLIC BLOOD PRESSURE: 86 MMHG

## 2019-04-08 DIAGNOSIS — R41.3 MEMORY CHANGES: Primary | ICD-10-CM

## 2019-04-08 DIAGNOSIS — F31.60 BIPOLAR DISORDER, MIXED (HCC): ICD-10-CM

## 2019-04-08 DIAGNOSIS — G43.109 MIGRAINE WITH AURA AND WITHOUT STATUS MIGRAINOSUS, NOT INTRACTABLE: ICD-10-CM

## 2019-04-08 DIAGNOSIS — E53.8 VITAMIN B12 DEFICIENCY: ICD-10-CM

## 2019-04-08 DIAGNOSIS — J45.30 MILD PERSISTENT ASTHMA WITHOUT COMPLICATION: ICD-10-CM

## 2019-04-08 DIAGNOSIS — F33.1 MDD (MAJOR DEPRESSIVE DISORDER), RECURRENT EPISODE, MODERATE (HCC): ICD-10-CM

## 2019-04-08 DIAGNOSIS — F41.1 GAD (GENERALIZED ANXIETY DISORDER): ICD-10-CM

## 2019-04-08 PROCEDURE — 99214 OFFICE O/P EST MOD 30 MIN: CPT | Performed by: PHYSICIAN ASSISTANT

## 2019-04-08 RX ORDER — SERTRALINE HYDROCHLORIDE 100 MG/1
1 TABLET, FILM COATED ORAL DAILY
Refills: 0 | COMMUNITY
Start: 2019-02-25 | End: 2019-11-25 | Stop reason: DRUGHIGH

## 2019-04-08 NOTE — PROGRESS NOTES
Gracie Khan is a 35year old female. HPI:   Patient presents to discuss memory issues. Feels like she's been forgetting things over the past year. Sig worsened in the past two month (right around the time she started topamax).   Difficulty rememberi Yes      Alcohol/week: 0.6 oz      Types: 1 Standard drinks or equivalent per week      Comment: social    Drug use: Yes      Types: Cannabis      Comment: 2-3 x per month       Family History   Problem Relation Age of Onset   • Heart Disease Father    • C likely coming from her mood and/or meds (including topamax). Will f/u with psych today to discuss. # Bipolar disorder, FADY, MDD: mood stable. Follows with psych for med mgmt and therapist weekly.   # Migraine HA with aura: much improved on topamax but ma

## 2019-04-08 NOTE — PATIENT INSTRUCTIONS
Repeat B12 level today. Discuss changes in memory and meds (specifically topamax) with Chrystal today.

## 2019-05-02 DIAGNOSIS — F31.62 BIPOLAR DISORDER, CURRENT EPISODE MIXED, MODERATE (HCC): ICD-10-CM

## 2019-05-02 RX ORDER — LAMOTRIGINE 100 MG/1
TABLET ORAL
Qty: 180 TABLET | Refills: 0 | Status: SHIPPED | OUTPATIENT
Start: 2019-05-02 | End: 2021-01-12

## 2019-05-02 NOTE — TELEPHONE ENCOUNTER
Proair inhaler filled 7-14-17 1 inhaler with 1 refill     Lamotrigine 100 mg 1 tab bid filled 12-24-18 180 with 1 refill     LOV 4-8-19     return here in 3-6 months with Dr. Perry Rasmussen upcoming apt on file     Labs 10-23-18     4/19 21 ACT score

## 2019-05-31 ENCOUNTER — HOSPITAL ENCOUNTER (OUTPATIENT)
Age: 39
Discharge: HOME | End: 2019-05-31
Attending: FAMILY MEDICINE
Payer: COMMERCIAL

## 2019-05-31 VITALS
SYSTOLIC BLOOD PRESSURE: 138 MMHG | WEIGHT: 293 LBS | RESPIRATION RATE: 24 BRPM | HEART RATE: 108 BPM | OXYGEN SATURATION: 98 % | DIASTOLIC BLOOD PRESSURE: 98 MMHG | TEMPERATURE: 99 F | BODY MASS INDEX: 47.09 KG/M2 | HEIGHT: 66 IN

## 2019-05-31 DIAGNOSIS — J02.9 VIRAL PHARYNGITIS: ICD-10-CM

## 2019-05-31 DIAGNOSIS — J06.9 UPPER RESPIRATORY TRACT INFECTION, UNSPECIFIED TYPE: Primary | ICD-10-CM

## 2019-05-31 PROCEDURE — 99214 OFFICE O/P EST MOD 30 MIN: CPT

## 2019-05-31 PROCEDURE — 87430 STREP A AG IA: CPT | Performed by: FAMILY MEDICINE

## 2019-05-31 PROCEDURE — 87081 CULTURE SCREEN ONLY: CPT | Performed by: FAMILY MEDICINE

## 2019-05-31 RX ORDER — PREDNISONE 20 MG/1
20 TABLET ORAL 2 TIMES DAILY
Qty: 10 TABLET | Refills: 0 | Status: SHIPPED | OUTPATIENT
Start: 2019-05-31 | End: 2019-06-05

## 2019-06-02 NOTE — ED PROVIDER NOTES
Patient Seen in: [facility] In [facility]    History   Patient presents with:  Cough/URI  Sore Throat    Stated Complaint: Shortness of Breath, Cough,Asthmatic    HPI    35year old female with history of Asthma presents for cough and congest 05/01/2019 (Exact Date)   SpO2 98%   BMI 58.11 kg/m²         Physical Exam   Constitutional: She is oriented to person, place, and time. She appears well-developed and well-nourished. HENT:   Head: Normocephalic and atraumatic.    Right Ear: Hearing, tymp days., Normal, Disp-10 tablet, R-0

## 2019-06-03 NOTE — ED NOTES
Called to patient, message left to voicemail to call [facility] back if with any questions.  (this is to notify of final strep culture result.)

## 2019-06-04 ENCOUNTER — OFFICE VISIT (OUTPATIENT)
Dept: INTERNAL MEDICINE CLINIC | Facility: CLINIC | Age: 39
End: 2019-06-04
Payer: COMMERCIAL

## 2019-06-04 VITALS
BODY MASS INDEX: 45.99 KG/M2 | OXYGEN SATURATION: 98 % | SYSTOLIC BLOOD PRESSURE: 120 MMHG | RESPIRATION RATE: 20 BRPM | HEIGHT: 67 IN | WEIGHT: 293 LBS | HEART RATE: 86 BPM | DIASTOLIC BLOOD PRESSURE: 86 MMHG | TEMPERATURE: 98 F

## 2019-06-04 DIAGNOSIS — J06.9 ACUTE URI: ICD-10-CM

## 2019-06-04 DIAGNOSIS — J45.901 PERSISTENT ASTHMA WITH ACUTE EXACERBATION, UNSPECIFIED ASTHMA SEVERITY: Primary | ICD-10-CM

## 2019-06-04 PROCEDURE — 99214 OFFICE O/P EST MOD 30 MIN: CPT | Performed by: PHYSICIAN ASSISTANT

## 2019-06-04 RX ORDER — ALBUTEROL SULFATE 2.5 MG/3ML
2.5 SOLUTION RESPIRATORY (INHALATION) EVERY 4 HOURS PRN
Qty: 1 BOX | Refills: 0 | Status: SHIPPED | OUTPATIENT
Start: 2019-06-04 | End: 2021-06-28

## 2019-06-04 RX ORDER — CODEINE PHOSPHATE AND GUAIFENESIN 10; 100 MG/5ML; MG/5ML
5 SOLUTION ORAL EVERY 6 HOURS PRN
Qty: 118 ML | Refills: 0 | Status: SHIPPED | OUTPATIENT
Start: 2019-06-04 | End: 2019-08-12 | Stop reason: ALTCHOICE

## 2019-06-04 NOTE — PATIENT INSTRUCTIONS
Upper Respiratory Infection / Asthma Exacerbation:  - continue Advair 1 puff twice a day  - start albuterol nebulizer treatment -- 3 mL every 4-6 hours as needed  - finish course of prednisone  - may use Mucinex-DM during the day and Cheratussin at night

## 2019-06-04 NOTE — PROGRESS NOTES
HPI:  Thomas Wiggins is a 35year old female who presents for f/u from recent [address] [place] [address] visit. Seen for acute URI symptoms and asthma exacerbation. Rx prednisone 20 mg BID x 5 days.   Currently c/o hoarse voice, sore throat, cough (mostly dry), chest congestion, headaches, dizziness, LH    EXAM:  /86 (BP Location: Left arm, Patient Position: Sitting, Cuff Size: large)   Pulse 86   Temp 98.3 °F (36.8 °C) (Oral)   Resp 20   Ht 67\"   Wt (!) 361 lb 8 oz   LMP 06/01/2019 (Exact Date)   SpO2 98%   Breastfeeding?

## 2019-06-12 ENCOUNTER — HOSPITAL ENCOUNTER (OUTPATIENT)
Age: 39
Discharge: HOME | End: 2019-06-12
Attending: FAMILY MEDICINE
Payer: COMMERCIAL

## 2019-06-12 VITALS
TEMPERATURE: 99 F | HEART RATE: 90 BPM | SYSTOLIC BLOOD PRESSURE: 126 MMHG | RESPIRATION RATE: 20 BRPM | DIASTOLIC BLOOD PRESSURE: 75 MMHG | OXYGEN SATURATION: 98 %

## 2019-06-12 DIAGNOSIS — R04.0 EPISTAXIS: ICD-10-CM

## 2019-06-12 DIAGNOSIS — R42 DIZZINESS: Primary | ICD-10-CM

## 2019-06-12 PROCEDURE — 99214 OFFICE O/P EST MOD 30 MIN: CPT

## 2019-06-12 PROCEDURE — 93005 ELECTROCARDIOGRAM TRACING: CPT

## 2019-06-12 PROCEDURE — 80047 BASIC METABLC PNL IONIZED CA: CPT

## 2019-06-12 PROCEDURE — 93010 ELECTROCARDIOGRAM REPORT: CPT

## 2019-06-12 PROCEDURE — 96374 THER/PROPH/DIAG INJ IV PUSH: CPT

## 2019-06-12 PROCEDURE — 96361 HYDRATE IV INFUSION ADD-ON: CPT

## 2019-06-12 PROCEDURE — 85025 COMPLETE CBC W/AUTO DIFF WBC: CPT | Performed by: FAMILY MEDICINE

## 2019-06-12 RX ORDER — SODIUM CHLORIDE 9 MG/ML
1000 INJECTION, SOLUTION INTRAVENOUS ONCE
Status: COMPLETED | OUTPATIENT
Start: 2019-06-12 | End: 2019-06-12

## 2019-06-12 RX ORDER — MECLIZINE HYDROCHLORIDE 25 MG/1
25 TABLET ORAL 3 TIMES DAILY PRN
Qty: 30 TABLET | Refills: 0 | Status: SHIPPED | OUTPATIENT
Start: 2019-06-12

## 2019-06-12 RX ORDER — MECLIZINE HCL 12.5 MG/1
25 TABLET ORAL ONCE
Status: COMPLETED | OUTPATIENT
Start: 2019-06-12 | End: 2019-06-12

## 2019-06-12 RX ORDER — ONDANSETRON 2 MG/ML
4 INJECTION INTRAMUSCULAR; INTRAVENOUS ONCE
Status: COMPLETED | OUTPATIENT
Start: 2019-06-12 | End: 2019-06-12

## 2019-06-12 NOTE — ED PROVIDER NOTES
Patient Seen in: [facility] In [facility]    History   Patient presents with:  Dizziness    Stated Complaint: dizzy, nausea, hot flash, nose bleeds, x1day     HPI    This 65-year-old female with a known history for asthma presents the office nose bleeds, x1day   Other systems are as noted in HPI. Constitutional and vital signs reviewed. All other systems reviewed and negative except as noted above.     Physical Exam     ED Triage Vitals [06/12/19 1816]   BP (!) 149/100   Pulse 101   Resp MDM     The patient is given a liter of 0.9 normal saline, Zofran 4 mg IV and Antivert 25 mg orally while in the office. Patient is feeling better after her medications and fluids.     I discussed the results of the laboratory and EKG with the patien hour, go to the closest emergency room.

## 2019-06-12 NOTE — ED INITIAL ASSESSMENT (HPI)
Complains of feeling dizzy on and off since last night. States today, felt flushed and felt hot with no activity. Was [facility] Exacerbation and completed course of Z-pack and Prednisone today.

## 2019-06-17 RX ORDER — PANTOPRAZOLE SODIUM 40 MG/1
TABLET, DELAYED RELEASE ORAL
Qty: 180 TABLET | Refills: 0 | Status: SHIPPED | OUTPATIENT
Start: 2019-06-17 | End: 2020-01-02

## 2019-06-17 NOTE — TELEPHONE ENCOUNTER
No protocol     Last refill:  12/24/2018 Pantroprazole 40 mg #180 1R    LOV:  6/4/2019 Mehran Whiting RTC October     No FOV scheduled

## 2019-08-12 ENCOUNTER — TELEPHONE (OUTPATIENT)
Dept: INTERNAL MEDICINE CLINIC | Facility: CLINIC | Age: 39
End: 2019-08-12

## 2019-08-12 ENCOUNTER — OFFICE VISIT (OUTPATIENT)
Dept: INTERNAL MEDICINE CLINIC | Facility: CLINIC | Age: 39
End: 2019-08-12
Payer: COMMERCIAL

## 2019-08-12 VITALS
TEMPERATURE: 99 F | SYSTOLIC BLOOD PRESSURE: 129 MMHG | WEIGHT: 293 LBS | HEIGHT: 66.5 IN | HEART RATE: 86 BPM | OXYGEN SATURATION: 98 % | BODY MASS INDEX: 46.53 KG/M2 | DIASTOLIC BLOOD PRESSURE: 83 MMHG | RESPIRATION RATE: 24 BRPM

## 2019-08-12 DIAGNOSIS — J45.40 MODERATE PERSISTENT ASTHMA WITHOUT COMPLICATION: Primary | ICD-10-CM

## 2019-08-12 PROBLEM — Z51.81 ENCOUNTER FOR THERAPEUTIC DRUG MONITORING: Status: RESOLVED | Noted: 2019-01-14 | Resolved: 2019-08-12

## 2019-08-12 PROCEDURE — 99214 OFFICE O/P EST MOD 30 MIN: CPT | Performed by: INTERNAL MEDICINE

## 2019-08-12 RX ORDER — BUDESONIDE AND FORMOTEROL FUMARATE DIHYDRATE 80; 4.5 UG/1; UG/1
2 AEROSOL RESPIRATORY (INHALATION) 2 TIMES DAILY
Qty: 10.2 INHALER | Refills: 1 | Status: SHIPPED | OUTPATIENT
Start: 2019-08-12 | End: 2019-11-25

## 2019-08-12 RX ORDER — ALBUTEROL SULFATE 90 UG/1
AEROSOL, METERED RESPIRATORY (INHALATION)
Qty: 8.5 G | Refills: 2 | Status: SHIPPED | OUTPATIENT
Start: 2019-08-12 | End: 2020-07-02

## 2019-08-12 NOTE — TELEPHONE ENCOUNTER
Pt dropped off Physician Results Form to be completed by Dr. Hugo Meadows. Needs to be completed ASAP (by the end of August). Placed in [employer].

## 2019-09-06 RX ORDER — BUDESONIDE AND FORMOTEROL FUMARATE DIHYDRATE 80; 4.5 UG/1; UG/1
AEROSOL RESPIRATORY (INHALATION)
Qty: 10.2 INHALER | Refills: 0 | OUTPATIENT
Start: 2019-09-06

## 2019-09-06 NOTE — TELEPHONE ENCOUNTER
Last OV: 8/12/19 with Dr. Sada Shine  Last refill date: 8/12/19     #/refills: #10.2 Inhaler, 1 refill  When pt was asked to return for OV: 3 months  Upcoming appt: 10/28/19 with Dr. Sada Shine  Last labs 1/21/19    Tushar 78 and spoke with Joe Wilkins.   Sta

## 2019-09-13 ENCOUNTER — HOSPITAL ENCOUNTER (OUTPATIENT)
Age: 39
Discharge: HOME | End: 2019-09-13
Attending: FAMILY MEDICINE
Payer: COMMERCIAL

## 2019-09-13 VITALS
OXYGEN SATURATION: 96 % | SYSTOLIC BLOOD PRESSURE: 155 MMHG | TEMPERATURE: 99 F | HEIGHT: 67 IN | RESPIRATION RATE: 18 BRPM | WEIGHT: 293 LBS | BODY MASS INDEX: 45.99 KG/M2 | HEART RATE: 107 BPM | DIASTOLIC BLOOD PRESSURE: 92 MMHG

## 2019-09-13 DIAGNOSIS — J01.90 ACUTE NON-RECURRENT SINUSITIS, UNSPECIFIED LOCATION: Primary | ICD-10-CM

## 2019-09-13 PROCEDURE — 99213 OFFICE O/P EST LOW 20 MIN: CPT

## 2019-09-13 PROCEDURE — 99212 OFFICE O/P EST SF 10 MIN: CPT

## 2019-09-13 RX ORDER — FLUTICASONE PROPIONATE 50 MCG
2 SPRAY, SUSPENSION (ML) NASAL DAILY
Qty: 16 G | Refills: 0 | Status: SHIPPED | OUTPATIENT
Start: 2019-09-13 | End: 2019-10-13

## 2019-09-13 NOTE — ED PROVIDER NOTES
Patient Seen in: [facility]    History   Patient presents with:  Cough/URI  Ear Problem Pain (neurosensory)    Stated Complaint: SINUS PRESSURE,EAR PAIN TODAY    HPI    35year old female with history of sinus pressure and left 155/92   Pulse 107   Temp 99 °F (37.2 °C) (Oral)   Resp 18   Ht 170.2 cm (5' 7\")   Wt (!) 167.4 kg   LMP 09/03/2019   SpO2 96%   BMI 57.79 kg/m²         Physical Exam   Constitutional: She is oriented to person, place, and time.  She appears well-developed

## 2019-09-13 NOTE — ED INITIAL ASSESSMENT (HPI)
Pt here w/ left sinus pain, left ear pain. Onset yesterday. Was concerned because last time both ears got really infected.  Fever of 100 this am.

## 2019-11-05 ENCOUNTER — PATIENT MESSAGE (OUTPATIENT)
Dept: INTERNAL MEDICINE CLINIC | Facility: CLINIC | Age: 39
End: 2019-11-05

## 2019-11-05 NOTE — TELEPHONE ENCOUNTER
[facility] office on patient's VM. Please inform patient we have disregarded the nasal spray request for Fluticasone Propionate 50 mcg Nasal Spray and informed [address] via fax.   Fluticasone Nasal Spray was prescribed to patient 9/13/19 at

## 2019-11-05 NOTE — TELEPHONE ENCOUNTER
From: Kar Oconnor  To: Trevor Phelps MD  Sent: 11/5/2019 9:55 AM CST  Subject: Prescription Question    Also regarding the [address]  home pharmacy. I just noticed they sent request for nasal spray. Not sure where that came from.  Please do not approve that as I

## 2019-11-25 ENCOUNTER — OFFICE VISIT (OUTPATIENT)
Dept: INTERNAL MEDICINE CLINIC | Facility: CLINIC | Age: 39
End: 2019-11-25
Payer: COMMERCIAL

## 2019-11-25 VITALS
DIASTOLIC BLOOD PRESSURE: 86 MMHG | WEIGHT: 293 LBS | SYSTOLIC BLOOD PRESSURE: 112 MMHG | HEART RATE: 84 BPM | BODY MASS INDEX: 44.92 KG/M2 | HEIGHT: 67.75 IN | TEMPERATURE: 99 F

## 2019-11-25 DIAGNOSIS — Z23 NEED FOR PNEUMOCOCCAL VACCINATION: ICD-10-CM

## 2019-11-25 DIAGNOSIS — Z00.00 ROUTINE GENERAL MEDICAL EXAMINATION AT A HEALTH CARE FACILITY: Primary | ICD-10-CM

## 2019-11-25 DIAGNOSIS — Z12.31 ENCOUNTER FOR SCREENING MAMMOGRAM FOR MALIGNANT NEOPLASM OF BREAST: ICD-10-CM

## 2019-11-25 DIAGNOSIS — Z23 FLU VACCINE NEED: ICD-10-CM

## 2019-11-25 PROCEDURE — 90471 IMMUNIZATION ADMIN: CPT | Performed by: INTERNAL MEDICINE

## 2019-11-25 PROCEDURE — 90686 IIV4 VACC NO PRSV 0.5 ML IM: CPT | Performed by: INTERNAL MEDICINE

## 2019-11-25 PROCEDURE — 99396 PREV VISIT EST AGE 40-64: CPT | Performed by: INTERNAL MEDICINE

## 2019-11-25 PROCEDURE — 90732 PPSV23 VACC 2 YRS+ SUBQ/IM: CPT | Performed by: INTERNAL MEDICINE

## 2019-11-25 PROCEDURE — 90472 IMMUNIZATION ADMIN EACH ADD: CPT | Performed by: INTERNAL MEDICINE

## 2019-11-25 RX ORDER — LORAZEPAM 1 MG/1
TABLET ORAL
Refills: 0 | COMMUNITY
Start: 2019-11-04 | End: 2021-01-12

## 2019-11-25 RX ORDER — BUDESONIDE AND FORMOTEROL FUMARATE DIHYDRATE 80; 4.5 UG/1; UG/1
2 AEROSOL RESPIRATORY (INHALATION) 2 TIMES DAILY
Qty: 10.2 INHALER | Refills: 2 | Status: SHIPPED | OUTPATIENT
Start: 2019-11-25 | End: 2020-07-02

## 2019-11-25 RX ORDER — TRAZODONE HYDROCHLORIDE 100 MG/1
TABLET ORAL
Refills: 0 | COMMUNITY
Start: 2019-09-06 | End: 2021-04-23 | Stop reason: DRUGHIGH

## 2019-11-25 NOTE — PROGRESS NOTES
Patient presents with:  CPX      HPI: Maria Luisa Manzano presents today for female CPX + CBE. Stable health. Continues to work w/ her Psychiatrist and therapist.  Due for wellness labs. Health goals continue to center around longevity, vitality, and QOL.     Review o albuterol sulfate (2.5 MG/3ML) 0.083% Inhalation Nebu Soln, Take 3 mL (2.5 mg total) by nebulization every 4 (four) hours as needed for Wheezing or Shortness of Breath., Disp: 1 Box, Rfl: 0  •  lurasidone HCl (LATUDA) 40 MG Oral Tab, Take 40 mg by mouth da 10/22/2018        PE:  /86 (BP Location: Left arm, Patient Position: Sitting, Cuff Size: adult)   Pulse 84   Temp 98.5 °F (36.9 °C) (Oral)   Ht 67.75\"   Wt (!) 369 lb 8 oz (167.6 kg)   LMP 11/04/2019   BMI 56.60 kg/m²   Wt Readings from Last 6 Encou (Pneumovax) (94633) (Dx V03.82/Z23)      Meds & Refills for this Visit:  Requested Prescriptions     Signed Prescriptions Disp Refills   • Budesonide-Formoterol Fumarate (SYMBICORT) 80-4.5 MCG/ACT Inhalation Aerosol 10.2 Inhaler 2     Sig: Inhale 2 puffs i

## 2020-01-02 ENCOUNTER — PATIENT MESSAGE (OUTPATIENT)
Dept: INTERNAL MEDICINE CLINIC | Facility: CLINIC | Age: 40
End: 2020-01-02

## 2020-01-02 RX ORDER — FAMOTIDINE 20 MG/1
20 TABLET ORAL 2 TIMES DAILY
Qty: 180 TABLET | Refills: 0 | Status: SHIPPED | OUTPATIENT
Start: 2020-01-02 | End: 2020-04-07

## 2020-01-02 RX ORDER — PANTOPRAZOLE SODIUM 40 MG/1
40 TABLET, DELAYED RELEASE ORAL
Qty: 90 TABLET | Refills: 0 | Status: SHIPPED | OUTPATIENT
Start: 2020-01-02 | End: 2020-04-07

## 2020-01-02 NOTE — TELEPHONE ENCOUNTER
From: Isai Jefferson  To: Shelby Gomez MD  Sent: 1/2/2020 10:03 AM CST  Subject: Prescription Question    My [employer] insurance will still not cover the Pantoprazole at my current dosage of 40mg twice a day.     You had also suggested omeprazole at 40mg twice a

## 2020-01-21 ENCOUNTER — HOSPITAL ENCOUNTER (OUTPATIENT)
Age: 40
Discharge: HOME | End: 2020-01-21
Attending: FAMILY MEDICINE
Payer: COMMERCIAL

## 2020-01-21 VITALS
RESPIRATION RATE: 20 BRPM | TEMPERATURE: 98 F | SYSTOLIC BLOOD PRESSURE: 154 MMHG | OXYGEN SATURATION: 97 % | HEART RATE: 103 BPM | DIASTOLIC BLOOD PRESSURE: 90 MMHG

## 2020-01-21 DIAGNOSIS — J04.0 ACUTE LARYNGITIS: ICD-10-CM

## 2020-01-21 DIAGNOSIS — J22 LOWER RESPIRATORY INFECTION (E.G., BRONCHITIS, PNEUMONIA, PNEUMONITIS, PULMONITIS): Primary | ICD-10-CM

## 2020-01-21 DIAGNOSIS — J45.901 ASTHMA EXACERBATION, MILD: ICD-10-CM

## 2020-01-21 DIAGNOSIS — R09.81 SINUS CONGESTION: ICD-10-CM

## 2020-01-21 PROCEDURE — 99213 OFFICE O/P EST LOW 20 MIN: CPT

## 2020-01-21 PROCEDURE — 99214 OFFICE O/P EST MOD 30 MIN: CPT

## 2020-01-21 RX ORDER — AMOXICILLIN AND CLAVULANATE POTASSIUM 875; 125 MG/1; MG/1
1 TABLET, FILM COATED ORAL 2 TIMES DAILY
Qty: 20 TABLET | Refills: 0 | Status: SHIPPED | OUTPATIENT
Start: 2020-01-21 | End: 2020-01-31

## 2020-01-21 RX ORDER — PREDNISONE 20 MG/1
40 TABLET ORAL DAILY
Qty: 10 TABLET | Refills: 0 | Status: SHIPPED | OUTPATIENT
Start: 2020-01-21 | End: 2020-01-26

## 2020-01-21 RX ORDER — BENZONATATE 200 MG/1
200 CAPSULE ORAL 3 TIMES DAILY PRN
Qty: 15 CAPSULE | Refills: 0 | Status: SHIPPED | OUTPATIENT
Start: 2020-01-21 | End: 2020-07-15 | Stop reason: ALTCHOICE

## 2020-01-21 NOTE — ED INITIAL ASSESSMENT (HPI)
Pt c/o one week of cold symptoms. Became much worse last night. Hoarse voice, strong cough, body aches. Afebrile.

## 2020-01-21 NOTE — ED PROVIDER NOTES
Patient presents with:  Cough/URI      HPI:   Tonia Moreira is a 38year old female who presents with complaints of chest congestion, productive cough with colored sputum for  1  weeks. Started with a dry hacking cough.  Cough is dry during the day becomes (LATUDA) 40 MG Oral Tab Take 40 mg by mouth daily with breakfast.     • LAMOTRIGINE 100 MG Oral Tab TAKE 1 TABLET(100 MG) BY MOUTH TWICE DAILY 180 tablet 0   • topiramate 25 MG Oral Tab Take 1 tablet (25 mg total) by mouth 2 (two) times daily.  60 tablet 1 Used    Alcohol use: Yes      Alcohol/week: 1.0 standard drinks      Types: 1 Standard drinks or equivalent per week      Comment: social    Drug use: Not Currently        Nursing note reviewed and I agree with documentation.     The patient's Medication Amy Lose Signed Prescriptions Disp Refills   • Amoxicillin-Pot Clavulanate (AUGMENTIN) 875-125 MG Oral Tab 20 tablet 0     Sig: Take 1 tablet by mouth 2 (two) times daily for 10 days.    • predniSONE 20 MG Oral Tab 10 tablet 0     Sig: Take 2 tablets (40 mg tota patient indicates understanding of these issues and agrees to the plan. All results reviewed and discussed with patient. See AVS for detailed discharge instructions for your condition today.     Follow Up with:  Rose Mary Olivier MD  [address]

## 2020-01-30 ENCOUNTER — OFFICE VISIT (OUTPATIENT)
Dept: INTERNAL MEDICINE CLINIC | Facility: CLINIC | Age: 40
End: 2020-01-30
Payer: COMMERCIAL

## 2020-01-30 VITALS
OXYGEN SATURATION: 98 % | BODY MASS INDEX: 45.99 KG/M2 | SYSTOLIC BLOOD PRESSURE: 120 MMHG | TEMPERATURE: 98 F | DIASTOLIC BLOOD PRESSURE: 83 MMHG | WEIGHT: 293 LBS | HEIGHT: 67 IN | HEART RATE: 107 BPM | RESPIRATION RATE: 20 BRPM

## 2020-01-30 DIAGNOSIS — J45.41 MODERATE PERSISTENT ASTHMA WITH ACUTE EXACERBATION: Primary | ICD-10-CM

## 2020-01-30 PROCEDURE — 99214 OFFICE O/P EST MOD 30 MIN: CPT | Performed by: PHYSICIAN ASSISTANT

## 2020-01-30 RX ORDER — PREDNISONE 10 MG/1
TABLET ORAL
Qty: 20 TABLET | Refills: 0 | Status: SHIPPED | OUTPATIENT
Start: 2020-01-30 | End: 2020-03-18 | Stop reason: ALTCHOICE

## 2020-01-30 RX ORDER — CODEINE PHOSPHATE AND GUAIFENESIN 10; 100 MG/5ML; MG/5ML
5 SOLUTION ORAL NIGHTLY PRN
Qty: 118 ML | Refills: 0 | Status: SHIPPED | OUTPATIENT
Start: 2020-01-30 | End: 2020-03-18 | Stop reason: ALTCHOICE

## 2020-01-30 NOTE — PROGRESS NOTES
HPI:  Ewa Person is a 38year old female who presents for f/u from recent IC visit. Dx with lower resp tract infection and acute asthma exacerbation. Treated with augmentin, prednisone, tessalon.   Cough is no longer productive but very dry and persist SYSTEMS:  10 point ROS performed and negative other than that noted in HPI.     EXAM:  /83   Pulse 107   Temp 98.1 °F (36.7 °C) (Oral)   Resp 20   Ht 67\"   Wt (!) 375 lb 8 oz (170.3 kg)   LMP 01/07/2020 (Exact Date)   SpO2 98%   BMI 58.81 kg/m²   GEN

## 2020-01-30 NOTE — PATIENT INSTRUCTIONS
Cough:  - start cheratussin - 5 mL before bed  - start prednisone:   -- take 4 tabs daily x 2 days, then 3 tabs daily x 2 days, then 2 tabs daily x 2 days, then 1 tab daily x 2 days  - continue albuterol inhaler / neb as needed    Call if no improvement by

## 2020-03-17 ENCOUNTER — TELEPHONE (OUTPATIENT)
Dept: INTERNAL MEDICINE CLINIC | Facility: CLINIC | Age: 40
End: 2020-03-17

## 2020-03-17 NOTE — TELEPHONE ENCOUNTER
Pt calling LL back. I spoke with LL via telephone to see if I could assist this pt. Tamiko James stated that she will call pt directly in 10-15 mins. Pt notified that LL will call her back and verbalized understanding.      Tamiko James, please call pt at [phone]

## 2020-03-18 ENCOUNTER — OFFICE VISIT (OUTPATIENT)
Dept: INTERNAL MEDICINE CLINIC | Facility: CLINIC | Age: 40
End: 2020-03-18

## 2020-03-18 VITALS
SYSTOLIC BLOOD PRESSURE: 110 MMHG | HEIGHT: 67 IN | HEART RATE: 110 BPM | BODY MASS INDEX: 45.99 KG/M2 | WEIGHT: 293 LBS | TEMPERATURE: 98 F | DIASTOLIC BLOOD PRESSURE: 80 MMHG | OXYGEN SATURATION: 98 % | RESPIRATION RATE: 24 BRPM

## 2020-03-18 DIAGNOSIS — S81.801A OPEN WOUND OF RIGHT LOWER LEG, INITIAL ENCOUNTER: Primary | ICD-10-CM

## 2020-03-18 DIAGNOSIS — J45.41 MODERATE PERSISTENT ASTHMA WITH ACUTE EXACERBATION: ICD-10-CM

## 2020-03-18 DIAGNOSIS — R05.9 COUGH: ICD-10-CM

## 2020-03-18 PROCEDURE — 99214 OFFICE O/P EST MOD 30 MIN: CPT | Performed by: PHYSICIAN ASSISTANT

## 2020-03-18 RX ORDER — CODEINE PHOSPHATE AND GUAIFENESIN 10; 100 MG/5ML; MG/5ML
5 SOLUTION ORAL NIGHTLY PRN
Qty: 118 ML | Refills: 0 | Status: SHIPPED | OUTPATIENT
Start: 2020-03-18 | End: 2020-07-15 | Stop reason: ALTCHOICE

## 2020-03-18 NOTE — TELEPHONE ENCOUNTER
Spoke with patient on 3/17/20 re upcoming appt. Coming in for suture removal.  States one edge of laceration seems to be opening a bit. Denies fever, chills, sweats. Does have mild cough but feels this is c/w her usual asthma.   No SOB, chest tightness,

## 2020-03-18 NOTE — PATIENT INSTRUCTIONS
Wound:   - change dressing every 2-3 days (continue to pack with absorbent dressing in the wound and foam padding OR absorbent gauze pad over the top, then covered by tegaderm)  - keep dressing clean and dry  - send [employer] with an update
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
## 2020-03-23 ENCOUNTER — TELEPHONE (OUTPATIENT)
Dept: INTERNAL MEDICINE CLINIC | Facility: CLINIC | Age: 40
End: 2020-03-23

## 2020-03-23 DIAGNOSIS — S81.801D UNSPECIFIED OPEN WOUND, RIGHT LOWER LEG, SUBSEQUENT ENCOUNTER: Primary | ICD-10-CM

## 2020-03-23 PROCEDURE — 99441 PHONE E/M BY PHYS 5-10 MIN: CPT | Performed by: PHYSICIAN ASSISTANT

## 2020-03-23 NOTE — TELEPHONE ENCOUNTER
Virtual/Telephone Check-In    [employer] verbally consents to a [employer] on 03/23/20. Patient understands and accepts financial responsibility for any deductible, co-insurance and/or co-pays associated with this service.     [employer]

## 2020-04-07 RX ORDER — PANTOPRAZOLE SODIUM 40 MG/1
TABLET, DELAYED RELEASE ORAL
Qty: 90 TABLET | Refills: 3 | Status: SHIPPED | OUTPATIENT
Start: 2020-04-07 | End: 2020-07-10

## 2020-04-07 RX ORDER — FAMOTIDINE 20 MG/1
TABLET ORAL
Qty: 180 TABLET | Refills: 3 | Status: SHIPPED | OUTPATIENT
Start: 2020-04-07 | End: 2020-07-09

## 2020-04-07 NOTE — TELEPHONE ENCOUNTER
Pantoprazole last filled 1/21/2020 #90    Famotidine last filled 1/2/20 #180     LOV 3/18/2020 - Wound check     Last labs 12/9/2019

## 2020-07-03 RX ORDER — FAMOTIDINE 20 MG/1
20 TABLET, FILM COATED ORAL 2 TIMES DAILY
Qty: 180 TABLET | Refills: 3 | OUTPATIENT
Start: 2020-07-03

## 2020-07-03 RX ORDER — PANTOPRAZOLE SODIUM 40 MG/1
40 TABLET, DELAYED RELEASE ORAL
Qty: 90 TABLET | Refills: 3 | OUTPATIENT
Start: 2020-07-03

## 2020-07-03 NOTE — TELEPHONE ENCOUNTER
LOV: 3/18/2020 with Rica Jean-Baptiste PA-C  RTC: 3 months  Last Relevant Labs: 12/9/19   Filled: 11/25/19  #10.2 Inhaler with 2 refills (Symbicort 80-4.5 mcg Inhaler)    Proair Inhaler filled 8/12/19 with #8.5 g, 2 refills    Famotidine and Pantoprazole refilled 4/7/2020 to [address] with #3-month supply, 3 refills. Patient wants medications refilled through [facility]. No future appointments.

## 2020-07-04 RX ORDER — ALBUTEROL SULFATE 90 UG/1
AEROSOL, METERED RESPIRATORY (INHALATION)
Qty: 8.5 G | Refills: 2 | Status: SHIPPED | OUTPATIENT
Start: 2020-07-04 | End: 2020-07-09

## 2020-07-04 RX ORDER — FAMOTIDINE 20 MG/1
20 TABLET, FILM COATED ORAL 2 TIMES DAILY
Qty: 180 TABLET | Refills: 2 | OUTPATIENT
Start: 2020-07-04

## 2020-07-04 RX ORDER — PANTOPRAZOLE SODIUM 40 MG/1
40 TABLET, DELAYED RELEASE ORAL
Qty: 90 TABLET | Refills: 2 | OUTPATIENT
Start: 2020-07-04

## 2020-07-04 RX ORDER — BUDESONIDE AND FORMOTEROL FUMARATE DIHYDRATE 80; 4.5 UG/1; UG/1
2 AEROSOL RESPIRATORY (INHALATION) 2 TIMES DAILY
Qty: 10.2 INHALER | Refills: 2 | Status: SHIPPED | OUTPATIENT
Start: 2020-07-04 | End: 2020-07-21

## 2020-07-07 ENCOUNTER — PATIENT MESSAGE (OUTPATIENT)
Dept: INTERNAL MEDICINE CLINIC | Facility: CLINIC | Age: 40
End: 2020-07-07

## 2020-07-09 ENCOUNTER — PATIENT MESSAGE (OUTPATIENT)
Dept: INTERNAL MEDICINE CLINIC | Facility: CLINIC | Age: 40
End: 2020-07-09

## 2020-07-09 ENCOUNTER — TELEPHONE (OUTPATIENT)
Dept: INTERNAL MEDICINE CLINIC | Facility: CLINIC | Age: 40
End: 2020-07-09

## 2020-07-09 RX ORDER — ALBUTEROL SULFATE 90 UG/1
AEROSOL, METERED RESPIRATORY (INHALATION)
Qty: 3 INHALER | Refills: 1 | Status: SHIPPED | OUTPATIENT
Start: 2020-07-09

## 2020-07-09 NOTE — TELEPHONE ENCOUNTER
From: Emrey Murillo  To: Fela Aguilar MD  Sent: 7/7/2020 1:58 PM CDT  Subject: Prescription Question    I sent a request to refill 4 medications.  My mail order pharmacy is only showing 2 (the inhalers) can you please tell me if the two heartburn/acid reflux

## 2020-07-09 NOTE — TELEPHONE ENCOUNTER
From: Isai Jefferson  To:  Shelby Gomez MD  Sent: 7/9/2020 12:49 PM CDT  Subject: Prescription Question    I still do not see a confirmation from my [address] mail order pharmacy nor a message back from anyone in [address] [place] [address] regarding the refill request for the two he

## 2020-07-10 RX ORDER — PANTOPRAZOLE SODIUM 40 MG/1
40 TABLET, DELAYED RELEASE ORAL
Qty: 90 TABLET | Refills: 3 | Status: SHIPPED | OUTPATIENT
Start: 2020-07-10 | End: 2021-02-11

## 2020-07-10 RX ORDER — FAMOTIDINE 20 MG/1
20 TABLET ORAL 2 TIMES DAILY
Qty: 180 TABLET | Refills: 3 | Status: SHIPPED | OUTPATIENT
Start: 2020-07-10 | End: 2021-02-11

## 2020-07-10 NOTE — TELEPHONE ENCOUNTER
Pantoprazole 40 mg  1 tab daily filed 4-7-20 90 with 3 refills   Famotidine 20 mg 1 tab bid filed 4-7-20 180 with 3 refills     LOV 11-25-19   RTC 1 year or PRN.     No upcoming apt on file   Labs 12-9-19     New mail order

## 2020-07-13 NOTE — TELEPHONE ENCOUNTER
Pt. Called and stated she has been trying to get refills on her medications for several days and has not received a response. Pt. Also stated the pharmacy was trying to reach us in order to get clarification on the albuterol inhaler.     Pt informed [employer]

## 2020-07-15 ENCOUNTER — OFFICE VISIT (OUTPATIENT)
Dept: INTERNAL MEDICINE CLINIC | Facility: CLINIC | Age: 40
End: 2020-07-15
Payer: COMMERCIAL

## 2020-07-15 ENCOUNTER — LAB ENCOUNTER (OUTPATIENT)
Dept: LAB | Age: 40
End: 2020-07-15
Attending: PHYSICIAN ASSISTANT
Payer: COMMERCIAL

## 2020-07-15 VITALS
BODY MASS INDEX: 45.99 KG/M2 | WEIGHT: 293 LBS | SYSTOLIC BLOOD PRESSURE: 106 MMHG | HEIGHT: 67 IN | RESPIRATION RATE: 18 BRPM | TEMPERATURE: 99 F | HEART RATE: 110 BPM | OXYGEN SATURATION: 98 % | DIASTOLIC BLOOD PRESSURE: 70 MMHG

## 2020-07-15 DIAGNOSIS — G47.33 OSA (OBSTRUCTIVE SLEEP APNEA): ICD-10-CM

## 2020-07-15 DIAGNOSIS — R53.83 FATIGUE, UNSPECIFIED TYPE: ICD-10-CM

## 2020-07-15 DIAGNOSIS — E66.01 MORBID OBESITY WITH BMI OF 50.0-59.9, ADULT (HCC): ICD-10-CM

## 2020-07-15 DIAGNOSIS — Z11.59 NEED FOR HEPATITIS C SCREENING TEST: ICD-10-CM

## 2020-07-15 DIAGNOSIS — R73.03 PREDIABETES: ICD-10-CM

## 2020-07-15 DIAGNOSIS — Z86.39 HISTORY OF VITAMIN D DEFICIENCY: ICD-10-CM

## 2020-07-15 DIAGNOSIS — F31.60 BIPOLAR DISORDER, MIXED (HCC): ICD-10-CM

## 2020-07-15 DIAGNOSIS — J45.40 MODERATE PERSISTENT ASTHMA WITHOUT COMPLICATION: ICD-10-CM

## 2020-07-15 DIAGNOSIS — R53.83 FATIGUE, UNSPECIFIED TYPE: Primary | ICD-10-CM

## 2020-07-15 LAB
ALBUMIN SERPL-MCNC: 3.4 G/DL (ref 3.4–5)
ALBUMIN/GLOB SERPL: 0.8 {RATIO} (ref 1–2)
ALP LIVER SERPL-CCNC: 79 U/L (ref 37–98)
ALT SERPL-CCNC: 32 U/L (ref 13–56)
ANION GAP SERPL CALC-SCNC: 4 MMOL/L (ref 0–18)
AST SERPL-CCNC: 19 U/L (ref 15–37)
BASOPHILS # BLD AUTO: 0.08 X10(3) UL (ref 0–0.2)
BASOPHILS NFR BLD AUTO: 0.8 %
BILIRUB SERPL-MCNC: 0.3 MG/DL (ref 0.1–2)
BUN BLD-MCNC: 8 MG/DL (ref 7–18)
BUN/CREAT SERPL: 6.7 (ref 10–20)
CALCIUM BLD-MCNC: 8.8 MG/DL (ref 8.5–10.1)
CHLORIDE SERPL-SCNC: 109 MMOL/L (ref 98–112)
CHOLEST SMN-MCNC: 177 MG/DL (ref ?–200)
CO2 SERPL-SCNC: 26 MMOL/L (ref 21–32)
CREAT BLD-MCNC: 1.19 MG/DL (ref 0.55–1.02)
DEPRECATED RDW RBC AUTO: 44.8 FL (ref 35.1–46.3)
EOSINOPHIL # BLD AUTO: 0.09 X10(3) UL (ref 0–0.7)
EOSINOPHIL NFR BLD AUTO: 0.9 %
ERYTHROCYTE [DISTWIDTH] IN BLOOD BY AUTOMATED COUNT: 14.1 % (ref 11–15)
EST. AVERAGE GLUCOSE BLD GHB EST-MCNC: 126 MG/DL (ref 68–126)
FOLATE SERPL-MCNC: 11 NG/ML (ref 8.7–?)
GLOBULIN PLAS-MCNC: 4.4 G/DL (ref 2.8–4.4)
GLUCOSE BLD-MCNC: 88 MG/DL (ref 70–99)
HBA1C MFR BLD HPLC: 6 % (ref ?–5.7)
HCT VFR BLD AUTO: 40.3 % (ref 35–48)
HCV AB SERPL QL IA: NONREACTIVE
HDLC SERPL-MCNC: 43 MG/DL (ref 40–59)
HGB BLD-MCNC: 12.3 G/DL (ref 12–16)
IMM GRANULOCYTES # BLD AUTO: 0.06 X10(3) UL (ref 0–1)
IMM GRANULOCYTES NFR BLD: 0.6 %
LDLC SERPL CALC-MCNC: 109 MG/DL (ref ?–100)
LYMPHOCYTES # BLD AUTO: 2.95 X10(3) UL (ref 1–4)
LYMPHOCYTES NFR BLD AUTO: 30.5 %
M PROTEIN MFR SERPL ELPH: 7.8 G/DL (ref 6.4–8.2)
MCH RBC QN AUTO: 26.7 PG (ref 26–34)
MCHC RBC AUTO-ENTMCNC: 30.5 G/DL (ref 31–37)
MCV RBC AUTO: 87.4 FL (ref 80–100)
MONOCYTES # BLD AUTO: 0.55 X10(3) UL (ref 0.1–1)
MONOCYTES NFR BLD AUTO: 5.7 %
NEUTROPHILS # BLD AUTO: 5.94 X10 (3) UL (ref 1.5–7.7)
NEUTROPHILS # BLD AUTO: 5.94 X10(3) UL (ref 1.5–7.7)
NEUTROPHILS NFR BLD AUTO: 61.5 %
NONHDLC SERPL-MCNC: 134 MG/DL (ref ?–130)
OSMOLALITY SERPL CALC.SUM OF ELEC: 286 MOSM/KG (ref 275–295)
PATIENT FASTING Y/N/NP: YES
PATIENT FASTING Y/N/NP: YES
PLATELET # BLD AUTO: 281 10(3)UL (ref 150–450)
POTASSIUM SERPL-SCNC: 4.1 MMOL/L (ref 3.5–5.1)
RBC # BLD AUTO: 4.61 X10(6)UL (ref 3.8–5.3)
SODIUM SERPL-SCNC: 139 MMOL/L (ref 136–145)
T4 FREE SERPL-MCNC: 0.8 NG/DL (ref 0.8–1.7)
TRIGL SERPL-MCNC: 126 MG/DL (ref 30–149)
TSI SER-ACNC: 6.4 MIU/ML (ref 0.36–3.74)
VIT B12 SERPL-MCNC: 378 PG/ML (ref 193–986)
VIT D+METAB SERPL-MCNC: 26.2 NG/ML (ref 30–100)
VLDLC SERPL CALC-MCNC: 25 MG/DL (ref 0–30)
WBC # BLD AUTO: 9.7 X10(3) UL (ref 4–11)

## 2020-07-15 PROCEDURE — 80050 GENERAL HEALTH PANEL: CPT | Performed by: PHYSICIAN ASSISTANT

## 2020-07-15 PROCEDURE — 84439 ASSAY OF FREE THYROXINE: CPT | Performed by: PHYSICIAN ASSISTANT

## 2020-07-15 PROCEDURE — 82746 ASSAY OF FOLIC ACID SERUM: CPT | Performed by: PHYSICIAN ASSISTANT

## 2020-07-15 PROCEDURE — 99214 OFFICE O/P EST MOD 30 MIN: CPT | Performed by: PHYSICIAN ASSISTANT

## 2020-07-15 PROCEDURE — 36415 COLL VENOUS BLD VENIPUNCTURE: CPT | Performed by: PHYSICIAN ASSISTANT

## 2020-07-15 PROCEDURE — 82306 VITAMIN D 25 HYDROXY: CPT | Performed by: PHYSICIAN ASSISTANT

## 2020-07-15 PROCEDURE — 86803 HEPATITIS C AB TEST: CPT | Performed by: PHYSICIAN ASSISTANT

## 2020-07-15 PROCEDURE — 82607 VITAMIN B-12: CPT | Performed by: PHYSICIAN ASSISTANT

## 2020-07-15 PROCEDURE — 80061 LIPID PANEL: CPT | Performed by: PHYSICIAN ASSISTANT

## 2020-07-15 PROCEDURE — 83036 HEMOGLOBIN GLYCOSYLATED A1C: CPT | Performed by: PHYSICIAN ASSISTANT

## 2020-07-15 RX ORDER — GUANFACINE 1 MG/1
1 TABLET, EXTENDED RELEASE ORAL NIGHTLY
COMMUNITY
Start: 2020-07-14 | End: 2021-01-12

## 2020-07-15 NOTE — PROGRESS NOTES
Kar Oconnor is a 38year old female. HPI:   Patient presents to discuss weight and overall energy. Sleeping well (8-9 hrs) but c/o very poor energy. Feels like she can't keep her eyes open.   States symptoms are c/w when her B12 levels have been low Comment: occ    Drug use: Yes      Frequency: 1.0 times per week      Types: Cannabis      Comment: edibles once a week      Family History   Problem Relation Age of Onset   • Heart Disease Father    • Cancer Maternal Grandmother         Lung CA   • Cancer metformin (GI upset). # Hx of vit D deficiency: check level now. # Pre-DM: check a1c now. # Migraine HAs: stable on Topamax. # Bipolar disorder: mood stable, follows with psych. # Hep C screening: AB ordered.     The patient indicates understanding of

## 2020-07-15 NOTE — PATIENT INSTRUCTIONS
Keep up the great work with exercise - aim for at least 30 minutes each day. Focus on a plant based diet (fruits, veggies, whole grains, beans, legumes, etc).     Pending lab results we may start Saxenda:  - start at 0.6 mg injected once a day  - may inc

## 2020-07-21 ENCOUNTER — OFFICE VISIT (OUTPATIENT)
Dept: INTERNAL MEDICINE CLINIC | Facility: CLINIC | Age: 40
End: 2020-07-21
Payer: COMMERCIAL

## 2020-07-21 VITALS
OXYGEN SATURATION: 98 % | BODY MASS INDEX: 45.99 KG/M2 | HEIGHT: 67 IN | HEART RATE: 100 BPM | SYSTOLIC BLOOD PRESSURE: 112 MMHG | RESPIRATION RATE: 16 BRPM | DIASTOLIC BLOOD PRESSURE: 66 MMHG | WEIGHT: 293 LBS | TEMPERATURE: 99 F

## 2020-07-21 DIAGNOSIS — E53.8 VITAMIN B12 DEFICIENCY: ICD-10-CM

## 2020-07-21 DIAGNOSIS — E66.01 MORBID OBESITY WITH BMI OF 50.0-59.9, ADULT (HCC): Primary | ICD-10-CM

## 2020-07-21 DIAGNOSIS — E55.9 VITAMIN D DEFICIENCY: ICD-10-CM

## 2020-07-21 DIAGNOSIS — E03.9 HYPOTHYROIDISM, UNSPECIFIED TYPE: ICD-10-CM

## 2020-07-21 PROCEDURE — 3078F DIAST BP <80 MM HG: CPT | Performed by: PHYSICIAN ASSISTANT

## 2020-07-21 PROCEDURE — 3074F SYST BP LT 130 MM HG: CPT | Performed by: PHYSICIAN ASSISTANT

## 2020-07-21 PROCEDURE — 3008F BODY MASS INDEX DOCD: CPT | Performed by: PHYSICIAN ASSISTANT

## 2020-07-21 PROCEDURE — 99214 OFFICE O/P EST MOD 30 MIN: CPT | Performed by: PHYSICIAN ASSISTANT

## 2020-07-21 RX ORDER — CYANOCOBALAMIN (VITAMIN B-12) 1000 MCG
1000 TABLET ORAL DAILY
COMMUNITY
Start: 2020-07-21

## 2020-07-21 RX ORDER — PEN NEEDLE, DIABETIC 30 GX3/16"
1 NEEDLE, DISPOSABLE MISCELLANEOUS DAILY
Qty: 90 EACH | Refills: 2 | Status: SHIPPED | OUTPATIENT
Start: 2020-07-21 | End: 2020-08-20

## 2020-07-21 RX ORDER — FLUTICASONE PROPIONATE AND SALMETEROL 250; 50 UG/1; UG/1
1 POWDER RESPIRATORY (INHALATION) EVERY 12 HOURS SCHEDULED
Qty: 180 EACH | Refills: 1 | Status: SHIPPED | OUTPATIENT
Start: 2020-07-21 | End: 2021-12-27

## 2020-07-21 RX ORDER — LEVOTHYROXINE SODIUM 0.05 MG/1
50 TABLET ORAL
Qty: 90 TABLET | Refills: 0 | Status: SHIPPED | OUTPATIENT
Start: 2020-07-21 | End: 2021-05-19 | Stop reason: DRUGHIGH

## 2020-07-21 NOTE — PATIENT INSTRUCTIONS
Vitamin D:  - 5,000 IU once a day Monday-Friday  - 10,000 IU daily on Sat & Sun    Vitamin B12:  - 1,000 mcg daily    Hypothyroidism:  - start levothyroxine 50 mcg - take 1 tablet on an empty stomach (at least 2 hours after a meal and 1 hour before eating)

## 2020-07-21 NOTE — PROGRESS NOTES
Huyen Snow is a 38year old female. HPI:   Patient presents for f/u of recent labs:  TSH slightly elevated  B12 low normal  Vit D low    Weight:  Cut out meat, dairy, eggs one week ago. Now vegan. Feeling great.   Less joint pain, overall better en 112/66   Pulse 100   Temp 99 °F (37.2 °C) (Oral)   Resp 16   Ht 67\"   Wt (!) 370 lb (167.8 kg)   LMP 03/06/2020 (Exact Date)   SpO2 98%   BMI 57.95 kg/m²   GENERAL: well developed, well nourished, in no acute distress  SKIN: no rashes, no suspicious lesio

## 2020-07-28 ENCOUNTER — TELEPHONE (OUTPATIENT)
Dept: INTERNAL MEDICINE CLINIC | Facility: CLINIC | Age: 40
End: 2020-07-28

## 2020-07-28 NOTE — TELEPHONE ENCOUNTER
Your information has been submitted to [employer]. [employer] is reviewing the PA request and you will receive an electronic response.  You may check for the updated outcome later by reopening this request. The standard fax determination will also be sen

## 2020-08-29 ENCOUNTER — OFFICE VISIT (OUTPATIENT)
Dept: INTERNAL MEDICINE CLINIC | Facility: CLINIC | Age: 40
End: 2020-08-29
Payer: COMMERCIAL

## 2020-08-29 VITALS
RESPIRATION RATE: 16 BRPM | TEMPERATURE: 98 F | HEIGHT: 67 IN | WEIGHT: 293 LBS | DIASTOLIC BLOOD PRESSURE: 78 MMHG | SYSTOLIC BLOOD PRESSURE: 100 MMHG | HEART RATE: 104 BPM | BODY MASS INDEX: 45.99 KG/M2

## 2020-08-29 DIAGNOSIS — Z51.81 THERAPEUTIC DRUG MONITORING: ICD-10-CM

## 2020-08-29 DIAGNOSIS — G47.33 OSA (OBSTRUCTIVE SLEEP APNEA): ICD-10-CM

## 2020-08-29 DIAGNOSIS — E66.01 MORBID OBESITY WITH BMI OF 50.0-59.9, ADULT (HCC): Primary | ICD-10-CM

## 2020-08-29 DIAGNOSIS — Z78.9 VEGAN DIET: ICD-10-CM

## 2020-08-29 DIAGNOSIS — E55.9 VITAMIN D DEFICIENCY: ICD-10-CM

## 2020-08-29 DIAGNOSIS — E53.8 VITAMIN B12 DEFICIENCY: ICD-10-CM

## 2020-08-29 DIAGNOSIS — E03.9 HYPOTHYROIDISM, UNSPECIFIED TYPE: ICD-10-CM

## 2020-08-29 PROCEDURE — 3008F BODY MASS INDEX DOCD: CPT | Performed by: PHYSICIAN ASSISTANT

## 2020-08-29 PROCEDURE — 3074F SYST BP LT 130 MM HG: CPT | Performed by: PHYSICIAN ASSISTANT

## 2020-08-29 PROCEDURE — 99214 OFFICE O/P EST MOD 30 MIN: CPT | Performed by: PHYSICIAN ASSISTANT

## 2020-08-29 PROCEDURE — 3078F DIAST BP <80 MM HG: CPT | Performed by: PHYSICIAN ASSISTANT

## 2020-08-29 NOTE — PATIENT INSTRUCTIONS
Continue Saxenda 3 mg daily. Continue to focus on a plant based diet including beans, legumes, tofu, fruits, veggies, whole grains. Exercise 20-30 minutes each day. Follow up visit in 1 month.     Please schedule a lab appointment for right after y

## 2020-08-29 NOTE — PROGRESS NOTES
Kittie Ahumada is a 38year old female. HPI:   Patient presents for f/u of weight. Began [place] one month ago. Tolerating med well, has been able to titrate up to 3 mg dose. Total of 16 lb weight loss on one month of Saxenda. Diet: vegan.   Marceil Rangel Maternal Aunt 65        in her 63's   • Stroke Neg         REVIEW OF SYSTEMS:   GENERAL HEALTH: denies fever, chills, night sweats  SKIN: denies any rashes  RESPIRATORY: denies SOB, LUCERO  CARDIOVASCULAR: denies chest pain, palpitations  GI: denies abdominal lifestyle changes. # Migraine HAs: stable on Topamax. # Bipolar disorder: mood stable, follows with psych.

## 2020-10-17 ENCOUNTER — OFFICE VISIT (OUTPATIENT)
Dept: INTERNAL MEDICINE CLINIC | Facility: CLINIC | Age: 40
End: 2020-10-17
Payer: COMMERCIAL

## 2020-10-17 VITALS
DIASTOLIC BLOOD PRESSURE: 78 MMHG | OXYGEN SATURATION: 99 % | HEIGHT: 67 IN | HEART RATE: 92 BPM | BODY MASS INDEX: 45.99 KG/M2 | TEMPERATURE: 98 F | RESPIRATION RATE: 18 BRPM | SYSTOLIC BLOOD PRESSURE: 118 MMHG | WEIGHT: 293 LBS

## 2020-10-17 DIAGNOSIS — G89.29 CHRONIC PAIN OF RIGHT KNEE: ICD-10-CM

## 2020-10-17 DIAGNOSIS — M25.561 CHRONIC PAIN OF RIGHT KNEE: ICD-10-CM

## 2020-10-17 DIAGNOSIS — Z51.81 THERAPEUTIC DRUG MONITORING: ICD-10-CM

## 2020-10-17 DIAGNOSIS — E03.9 HYPOTHYROIDISM, UNSPECIFIED TYPE: ICD-10-CM

## 2020-10-17 DIAGNOSIS — E66.01 MORBID OBESITY WITH BMI OF 50.0-59.9, ADULT (HCC): ICD-10-CM

## 2020-10-17 DIAGNOSIS — R73.03 PREDIABETES: ICD-10-CM

## 2020-10-17 DIAGNOSIS — E53.8 LOW SERUM VITAMIN B12: ICD-10-CM

## 2020-10-17 DIAGNOSIS — J45.40 MODERATE PERSISTENT ASTHMA WITHOUT COMPLICATION: Primary | ICD-10-CM

## 2020-10-17 PROCEDURE — 3074F SYST BP LT 130 MM HG: CPT | Performed by: PHYSICIAN ASSISTANT

## 2020-10-17 PROCEDURE — 90471 IMMUNIZATION ADMIN: CPT | Performed by: PHYSICIAN ASSISTANT

## 2020-10-17 PROCEDURE — 99214 OFFICE O/P EST MOD 30 MIN: CPT | Performed by: PHYSICIAN ASSISTANT

## 2020-10-17 PROCEDURE — 3078F DIAST BP <80 MM HG: CPT | Performed by: PHYSICIAN ASSISTANT

## 2020-10-17 PROCEDURE — 3008F BODY MASS INDEX DOCD: CPT | Performed by: PHYSICIAN ASSISTANT

## 2020-10-17 PROCEDURE — 90686 IIV4 VACC NO PRSV 0.5 ML IM: CPT | Performed by: PHYSICIAN ASSISTANT

## 2020-10-17 NOTE — PATIENT INSTRUCTIONS
Blood work today. Continue Saxenda 3 mg daily.     Continue to focus on a plant based diet including beans, legumes, tofu, fruits, veggies, whole grains.     Exercise 30 minutes each day.     Enroll in [address] [place] [address] -- [employer] Kaela   [email or web address]

## 2020-10-17 NOTE — PROGRESS NOTES
Ewa Person is a 38year old female. HPI:   Patient presents for f/u of weight. Began Saxenda 2.5 months ago. Tolerating med well, no SEs. Has gained 7 lbs in the past 6 weeks. Total of 9 lb weight loss on 2.5 months of Saxenda.     Attributes rec History   Problem Relation Age of Onset   • Heart Disease Father    • Cancer Maternal Grandmother         Lung CA   • Cancer Maternal Grandfather         Prostate CA   • Cancer Paternal Grandfather         Lung CA   • Diabetes Mother    • Obesity Mother and AAP updated on 10/17/20. # Hx of vit D deficiency: on 5,000 IU M-F and 10,000 IU Sat & Sun. # Low B12: on B12 1,000 mcg daily. Recheck now. # R knee pain: improved with cortisone shot 9/2020 - follows with ortho. Cont to focus on wt loss.   # Vegan

## 2020-10-28 ENCOUNTER — LAB ENCOUNTER (OUTPATIENT)
Dept: LAB | Age: 40
End: 2020-10-28
Attending: PHYSICIAN ASSISTANT
Payer: COMMERCIAL

## 2020-10-28 DIAGNOSIS — E53.8 LOW SERUM VITAMIN B12: ICD-10-CM

## 2020-10-28 DIAGNOSIS — J45.40 MODERATE PERSISTENT ASTHMA WITHOUT COMPLICATION: ICD-10-CM

## 2020-10-28 DIAGNOSIS — Z51.81 THERAPEUTIC DRUG MONITORING: ICD-10-CM

## 2020-10-28 DIAGNOSIS — E66.01 MORBID OBESITY WITH BMI OF 50.0-59.9, ADULT (HCC): ICD-10-CM

## 2020-10-28 DIAGNOSIS — R73.03 PREDIABETES: ICD-10-CM

## 2020-10-28 DIAGNOSIS — E03.9 HYPOTHYROIDISM, UNSPECIFIED TYPE: ICD-10-CM

## 2020-10-28 PROCEDURE — 80048 BASIC METABOLIC PNL TOTAL CA: CPT | Performed by: PHYSICIAN ASSISTANT

## 2020-10-28 PROCEDURE — 84443 ASSAY THYROID STIM HORMONE: CPT | Performed by: PHYSICIAN ASSISTANT

## 2020-10-28 PROCEDURE — 83036 HEMOGLOBIN GLYCOSYLATED A1C: CPT | Performed by: PHYSICIAN ASSISTANT

## 2020-10-28 PROCEDURE — 36415 COLL VENOUS BLD VENIPUNCTURE: CPT | Performed by: PHYSICIAN ASSISTANT

## 2020-10-28 PROCEDURE — 82607 VITAMIN B-12: CPT | Performed by: PHYSICIAN ASSISTANT

## 2020-10-28 PROCEDURE — 80061 LIPID PANEL: CPT | Performed by: PHYSICIAN ASSISTANT

## 2020-12-12 ENCOUNTER — OFFICE VISIT (OUTPATIENT)
Dept: INTERNAL MEDICINE CLINIC | Facility: CLINIC | Age: 40
End: 2020-12-12
Payer: COMMERCIAL

## 2020-12-12 VITALS
RESPIRATION RATE: 16 BRPM | DIASTOLIC BLOOD PRESSURE: 76 MMHG | HEART RATE: 89 BPM | SYSTOLIC BLOOD PRESSURE: 118 MMHG | OXYGEN SATURATION: 99 % | BODY MASS INDEX: 45.99 KG/M2 | TEMPERATURE: 98 F | HEIGHT: 67 IN | WEIGHT: 293 LBS

## 2020-12-12 DIAGNOSIS — E66.01 MORBID OBESITY WITH BMI OF 50.0-59.9, ADULT (HCC): ICD-10-CM

## 2020-12-12 DIAGNOSIS — Z00.00 ANNUAL PHYSICAL EXAM: Primary | ICD-10-CM

## 2020-12-12 DIAGNOSIS — Z12.31 VISIT FOR SCREENING MAMMOGRAM: ICD-10-CM

## 2020-12-12 DIAGNOSIS — G43.109 MIGRAINE WITH AURA AND WITHOUT STATUS MIGRAINOSUS, NOT INTRACTABLE: ICD-10-CM

## 2020-12-12 DIAGNOSIS — Z51.81 THERAPEUTIC DRUG MONITORING: ICD-10-CM

## 2020-12-12 PROCEDURE — 99396 PREV VISIT EST AGE 40-64: CPT | Performed by: PHYSICIAN ASSISTANT

## 2020-12-12 PROCEDURE — 3008F BODY MASS INDEX DOCD: CPT | Performed by: PHYSICIAN ASSISTANT

## 2020-12-12 PROCEDURE — 99214 OFFICE O/P EST MOD 30 MIN: CPT | Performed by: PHYSICIAN ASSISTANT

## 2020-12-12 PROCEDURE — 3074F SYST BP LT 130 MM HG: CPT | Performed by: PHYSICIAN ASSISTANT

## 2020-12-12 PROCEDURE — 3078F DIAST BP <80 MM HG: CPT | Performed by: PHYSICIAN ASSISTANT

## 2020-12-12 RX ORDER — PEN NEEDLE, DIABETIC 30 GX3/16"
1 NEEDLE, DISPOSABLE MISCELLANEOUS DAILY
Qty: 90 EACH | Refills: 2 | Status: SHIPPED | OUTPATIENT
Start: 2020-12-12 | End: 2021-01-11

## 2020-12-12 RX ORDER — TOPIRAMATE 50 MG/1
50 TABLET, FILM COATED ORAL 2 TIMES DAILY
Qty: 180 TABLET | Refills: 1 | Status: SHIPPED | OUTPATIENT
Start: 2020-12-12 | End: 2021-06-03

## 2020-12-12 RX ORDER — LIRAGLUTIDE 6 MG/ML
3 INJECTION, SOLUTION SUBCUTANEOUS DAILY
Qty: 5 PEN | Refills: 2 | Status: SHIPPED | OUTPATIENT
Start: 2020-12-12 | End: 2021-01-11

## 2020-12-12 RX ORDER — TOPIRAMATE 25 MG/1
TABLET ORAL
COMMUNITY
Start: 2020-12-12 | End: 2020-12-12

## 2020-12-12 NOTE — PROGRESS NOTES
Michael Fenton is a 38year old female who presents for a complete physical exam.   HPI:   Pt presents for f/u of weight. Began Saxenda 4.5 months ago.  Tolerating med well, no SEs. Weight has not changed in the last two months.   Total of 9 lb weight loss (two) times daily.  180 tablet 3   • Pantoprazole Sodium 40 MG Oral Tab EC Take 1 tablet (40 mg total) by mouth every morning before breakfast. 90 tablet 3   • Albuterol Sulfate HFA (PROAIR HFA) 108 (90 Base) MCG/ACT Inhalation Aero Soln SHAKE WELL AND INHA Maternal Grandfather         Prostate CA   • Cancer Paternal Grandfather         Lung CA   • Diabetes Mother    • Obesity Mother    • Depression Mother    • Breast Cancer Maternal Aunt 72        in her 63's   • Stroke Neg       Social History:  Social Hist breathing rate and effort, clear to auscultation bilaterally  CARDIO: RRR, normal S1 & S2, no murmur  GI: soft, non-tender, non-distended, no hepatoplenomegaly  : deferred  BREAST: skin unremarkable, no lumps palpated, no nipple changes or discharge, or ordered. # Cervical CA screening: pap cytology normal and HPV neg 3/2017 - repeat in 5 years. # Colon CA screening: c-scope at age 38. # Bone density screening: counseled on dietary ca, vit d, regular WB exercise.   # Vaccines: rec yearly flu shot, TdaP

## 2020-12-12 NOTE — PATIENT INSTRUCTIONS
Please use [employer] or call Carlos Mari at [phone] to set up the following tests:  - mammogram    Weight:  - continue Saxenda 3 mg daily  - 150 minutes of moderate to intense physical activity each week (aim for at least 30 minut

## 2021-01-28 ENCOUNTER — TELEPHONE (OUTPATIENT)
Dept: INTERNAL MEDICINE CLINIC | Facility: CLINIC | Age: 41
End: 2021-01-28

## 2021-02-11 NOTE — TELEPHONE ENCOUNTER
Last refill:  7/10/2020 Famotidine 20 mg #180 3R  7/10/2020 Pantoprazole 40 mg #90 3R    LOV:   12/12/2020 Judith Batista RTC 1 month  The patient is asked to return here in 1 month for f/u of weight and migraines.     No FOV scheduled -

## 2021-02-13 RX ORDER — PANTOPRAZOLE SODIUM 40 MG/1
40 TABLET, DELAYED RELEASE ORAL
Qty: 90 TABLET | Refills: 0 | Status: SHIPPED | OUTPATIENT
Start: 2021-02-13 | End: 2021-06-02

## 2021-02-13 RX ORDER — FAMOTIDINE 20 MG/1
20 TABLET ORAL 2 TIMES DAILY
Qty: 180 TABLET | Refills: 0 | Status: SHIPPED | OUTPATIENT
Start: 2021-02-13

## 2021-03-21 ENCOUNTER — IMMUNIZATION (OUTPATIENT)
Dept: LAB | Age: 41
End: 2021-03-21
Attending: HOSPITALIST
Payer: COMMERCIAL

## 2021-03-21 DIAGNOSIS — Z23 NEED FOR VACCINATION: Primary | ICD-10-CM

## 2021-03-21 PROCEDURE — 0001A SARSCOV2 VAC 30MCG/0.3ML IM: CPT

## 2021-04-11 ENCOUNTER — IMMUNIZATION (OUTPATIENT)
Dept: LAB | Age: 41
End: 2021-04-11
Attending: HOSPITALIST
Payer: COMMERCIAL

## 2021-04-11 DIAGNOSIS — Z23 NEED FOR VACCINATION: Primary | ICD-10-CM

## 2021-04-11 PROCEDURE — 0002A SARSCOV2 VAC 30MCG/0.3ML IM: CPT

## 2021-04-17 ENCOUNTER — LAB ENCOUNTER (OUTPATIENT)
Dept: LAB | Age: 41
End: 2021-04-17
Attending: PHYSICIAN ASSISTANT
Payer: COMMERCIAL

## 2021-04-17 ENCOUNTER — HOSPITAL ENCOUNTER (OUTPATIENT)
Dept: MAMMOGRAPHY | Age: 41
Discharge: HOME | End: 2021-04-17
Attending: PHYSICIAN ASSISTANT
Payer: COMMERCIAL

## 2021-04-17 DIAGNOSIS — F31.62 BIPOLAR DISORDER, CURRENT EPISODE MIXED, MODERATE (HCC): ICD-10-CM

## 2021-04-17 DIAGNOSIS — Z12.31 VISIT FOR SCREENING MAMMOGRAM: ICD-10-CM

## 2021-04-17 PROCEDURE — 77063 BREAST TOMOSYNTHESIS BI: CPT | Performed by: PHYSICIAN ASSISTANT

## 2021-04-17 PROCEDURE — 36415 COLL VENOUS BLD VENIPUNCTURE: CPT

## 2021-04-17 PROCEDURE — 77067 SCR MAMMO BI INCL CAD: CPT | Performed by: PHYSICIAN ASSISTANT

## 2021-04-17 PROCEDURE — 80178 ASSAY OF LITHIUM: CPT

## 2021-05-11 NOTE — PROGRESS NOTES
Ryan Edwards is a 42year old female. HPI:   Patient presents for f/u of several issues. Mood - Stopped lithium 4/24 when she was in the ED for 2nd kidney stone. This had recently been increased by psych d/t a hypomanic episode per pt.   Sertraline Pantoprazole Sodium 40 MG Oral Tab EC Take 1 tablet (40 mg total) by mouth every morning before breakfast. 90 tablet 0   • topiramate 50 MG Oral Tab Take 1 tablet (50 mg total) by mouth 2 (two) times daily.  180 tablet 1   • fluticasone-salmeterol 250-50 MC 1.00        Years: 13.00        Pack years: 15        Types: Cigarettes        Quit date: 12/15/2008        Years since quittin.4      Smokeless tobacco: Never Used    Vaping Use      Vaping Use: Never used    Alcohol use:  Yes      Alcohol/week: 0.0 - feels her depression was improved on higher dose of sertraline -- discussed increasing to 75 mg daily but monitor for manic type symptoms as she is now off of lithium 2/2 recurrent kidney stones. Also on latuda & lamictal per psych.   Cont to follow with t

## 2021-05-13 ENCOUNTER — LAB ENCOUNTER (OUTPATIENT)
Dept: LAB | Age: 41
End: 2021-05-13
Attending: PHYSICIAN ASSISTANT
Payer: COMMERCIAL

## 2021-05-13 DIAGNOSIS — E03.9 HYPOTHYROIDISM, UNSPECIFIED TYPE: ICD-10-CM

## 2021-05-13 DIAGNOSIS — F33.1 MDD (MAJOR DEPRESSIVE DISORDER), RECURRENT EPISODE, MODERATE (HCC): ICD-10-CM

## 2021-05-13 DIAGNOSIS — N92.1 MENORRHAGIA WITH IRREGULAR CYCLE: ICD-10-CM

## 2021-05-13 PROCEDURE — 80048 BASIC METABOLIC PNL TOTAL CA: CPT | Performed by: PHYSICIAN ASSISTANT

## 2021-05-13 PROCEDURE — 83540 ASSAY OF IRON: CPT | Performed by: PHYSICIAN ASSISTANT

## 2021-05-13 PROCEDURE — 84439 ASSAY OF FREE THYROXINE: CPT | Performed by: PHYSICIAN ASSISTANT

## 2021-05-13 PROCEDURE — 85025 COMPLETE CBC W/AUTO DIFF WBC: CPT | Performed by: PHYSICIAN ASSISTANT

## 2021-05-13 PROCEDURE — 83550 IRON BINDING TEST: CPT | Performed by: PHYSICIAN ASSISTANT

## 2021-05-13 PROCEDURE — 82728 ASSAY OF FERRITIN: CPT | Performed by: PHYSICIAN ASSISTANT

## 2021-05-13 PROCEDURE — 84443 ASSAY THYROID STIM HORMONE: CPT | Performed by: PHYSICIAN ASSISTANT

## 2021-05-17 DIAGNOSIS — E03.9 HYPOTHYROIDISM, UNSPECIFIED TYPE: Primary | ICD-10-CM

## 2021-05-17 DIAGNOSIS — E61.1 IRON DEFICIENCY: ICD-10-CM

## 2021-05-17 RX ORDER — FERROUS SULFATE 325(65) MG
325 TABLET ORAL DAILY
Refills: 0 | COMMUNITY
Start: 2021-05-17 | End: 2021-09-20

## 2021-05-17 RX ORDER — LEVOTHYROXINE SODIUM 0.07 MG/1
75 TABLET ORAL
Qty: 90 TABLET | Refills: 0 | Status: SHIPPED | OUTPATIENT
Start: 2021-05-17 | End: 2021-09-08

## 2021-05-19 ENCOUNTER — OFFICE VISIT (OUTPATIENT)
Dept: INTERNAL MEDICINE CLINIC | Facility: CLINIC | Age: 41
End: 2021-05-19
Payer: COMMERCIAL

## 2021-05-19 VITALS
RESPIRATION RATE: 18 BRPM | WEIGHT: 293 LBS | SYSTOLIC BLOOD PRESSURE: 118 MMHG | HEART RATE: 92 BPM | DIASTOLIC BLOOD PRESSURE: 70 MMHG | OXYGEN SATURATION: 98 % | BODY MASS INDEX: 45.99 KG/M2 | HEIGHT: 67 IN

## 2021-05-19 VITALS — WEIGHT: 293 LBS | BODY MASS INDEX: 45.99 KG/M2 | HEIGHT: 67 IN

## 2021-05-19 DIAGNOSIS — R73.03 PREDIABETES: ICD-10-CM

## 2021-05-19 DIAGNOSIS — E66.01 MORBID OBESITY WITH BMI OF 50.0-59.9, ADULT (HCC): ICD-10-CM

## 2021-05-19 DIAGNOSIS — E66.01 CLASS 3 SEVERE OBESITY DUE TO EXCESS CALORIES WITH SERIOUS COMORBIDITY AND BODY MASS INDEX (BMI) OF 50.0 TO 59.9 IN ADULT (HCC): Primary | ICD-10-CM

## 2021-05-19 DIAGNOSIS — E53.8 VITAMIN B12 DEFICIENCY: ICD-10-CM

## 2021-05-19 DIAGNOSIS — M25.561 BILATERAL CHRONIC KNEE PAIN: ICD-10-CM

## 2021-05-19 DIAGNOSIS — G89.29 BILATERAL CHRONIC KNEE PAIN: ICD-10-CM

## 2021-05-19 DIAGNOSIS — G47.33 OSA (OBSTRUCTIVE SLEEP APNEA): ICD-10-CM

## 2021-05-19 DIAGNOSIS — M25.562 BILATERAL CHRONIC KNEE PAIN: ICD-10-CM

## 2021-05-19 DIAGNOSIS — Z51.81 THERAPEUTIC DRUG MONITORING: Primary | ICD-10-CM

## 2021-05-19 DIAGNOSIS — E55.9 VITAMIN D DEFICIENCY: ICD-10-CM

## 2021-05-19 PROCEDURE — 3074F SYST BP LT 130 MM HG: CPT | Performed by: INTERNAL MEDICINE

## 2021-05-19 PROCEDURE — 3008F BODY MASS INDEX DOCD: CPT | Performed by: INTERNAL MEDICINE

## 2021-05-19 PROCEDURE — 3078F DIAST BP <80 MM HG: CPT | Performed by: INTERNAL MEDICINE

## 2021-05-19 PROCEDURE — 97802 MEDICAL NUTRITION INDIV IN: CPT | Performed by: DIETITIAN, REGISTERED

## 2021-05-19 PROCEDURE — 99204 OFFICE O/P NEW MOD 45 MIN: CPT | Performed by: INTERNAL MEDICINE

## 2021-05-19 PROCEDURE — 3008F BODY MASS INDEX DOCD: CPT | Performed by: DIETITIAN, REGISTERED

## 2021-05-19 RX ORDER — SEMAGLUTIDE 1.34 MG/ML
0.5 INJECTION, SOLUTION SUBCUTANEOUS WEEKLY
Qty: 1 PEN | Refills: 1 | Status: SHIPPED | OUTPATIENT
Start: 2021-05-19 | End: 2021-05-31

## 2021-05-19 NOTE — PROGRESS NOTES
HISTORY OF PRESENT ILLNESS  Patient presents with:  New Patient: Having knee issues, have tried multiple trends out there.  Even went vegan on her own and dropped 50lbs and then gained that all back      Pro Summers is a 42year old female new to our [facility] disorders:negative    Depression/anxiety: YES   Glaucoma: negative   Kidney stones: negative   Eating disorder: negative   Migraines: negative   Seizures: negative   Joint-related conditions: YES   Liver disease: negative   Renal disease: negative   Diabet 27.6 05/13/2021    MCHC 31.6 05/13/2021    RDW 14.2 05/13/2021    .0 05/13/2021     Lab Results   Component Value Date    GLU 97 05/13/2021    BUN 10 05/13/2021    BUNCREA 9.0 (L) 05/13/2021    CREATSERUM 1.11 (H) 05/13/2021    ANIONGAP 7 05/13/2021 total) by mouth daily with breakfast., Disp: 90 tablet, Rfl: 0  lamoTRIgine 100 MG Oral Tab, Take 1 tablet (100 mg total) by mouth 2 (two) times daily. , Disp: 180 tablet, Rfl: 0  guanFACINE HCl ER 1 MG Oral Tablet 24 Hr, Take 1 tablet (1 mg total) by mouth orders for this visit:    Therapeutic drug monitoring  -     LIPID PANEL;  Future  -     VITAMIN D, 25-HYDROXY; Future  -     VITAMIN B12; Future  -     DIETITIAN EDUCATION INITIAL, DIET (INTERNAL)    Morbid obesity with BMI of 50.0-59.9, adult ([facility] [address]  -

## 2021-05-19 NOTE — PROGRESS NOTES
Patient teaching on Ozempic  performed. Patient demonstrated back, all questions were answered and patient verbalized understanding.  MT

## 2021-05-19 NOTE — PROGRESS NOTES
INITIAL OUTPATIENT NUTRITION CONSULTATION    Nutrition Assessment    Medical Diagnosis: Obesity, SASHA    Physical Findings: knee pain, impaired sleep    Client Age and Gender: 42year old female    Marital Status and Occupation: Single, employed in [employer] Final   12/09/2019 13 6 - 29 U/L Final         Height:  Ht Readings from Last 1 Encounters:  05/19/21 : 5' 7\" (1.702 m)      Weight:   Wt Readings from Last 2 Encounters:  05/19/21 : (!) 357 lb (161.9 kg)  05/19/21 : (!) 357 lb (161.9 kg)      BMI Jim Alexis knee pain, reviewed low impact exercise options. Patient agreed to goals below, will f/u 6 weeks.     Goals:   • Plate method at meals  • Eliminate soda  • 64 oz fluids  • Sit and Be Fit or walk away the pounds or bike or slow walking or swimming    Monitor

## 2021-05-24 ENCOUNTER — TELEPHONE (OUTPATIENT)
Dept: INTERNAL MEDICINE CLINIC | Facility: CLINIC | Age: 41
End: 2021-05-24

## 2021-05-24 NOTE — TELEPHONE ENCOUNTER
PA request for Ozempic 0.5 mg from [employer]  Call [phone]  ID [id number]  Morbid obesity with BMI of 50.0-59.9, adult (HCC)  E66.01,      Prediabetes  R73.03     SASHA (obstructive sleep apnea)           Details  Code: G47.33        I called [employer] and gregory

## 2021-05-27 NOTE — TELEPHONE ENCOUNTER
Insurance denied coverage of Ozempic since she does not have DM Type 2. How do you want to proceed?     Case #[id number]

## 2021-05-29 NOTE — TELEPHONE ENCOUNTER
Requesting Pantoprazole Sodium 40 MG Oral Tab EC  LOV: 5/11/21  RTC: 4-6 weeks  Last Relevant Labs: 5/13/21  Filled: 2/13/21 #90 with 0 refills    Future Appointments   Date Time Provider Gris Brewster   6/3/2021 11:00 AM RUTH Newman Konrad

## 2021-05-31 RX ORDER — LIRAGLUTIDE 6 MG/ML
3 INJECTION, SOLUTION SUBCUTANEOUS DAILY
Qty: 15 ML | Refills: 0 | Status: SHIPPED | OUTPATIENT
Start: 2021-05-31 | End: 2021-06-28

## 2021-05-31 NOTE — TELEPHONE ENCOUNTER
Trial of saxenda  Saxenda dosing     Week 1- 0.6mg daily. Week 2- 1.2mg daily. Week 3- 1.8mg daily. Week 4- 2.4mg daily. Week 5- 3mg daily.

## 2021-06-02 RX ORDER — PANTOPRAZOLE SODIUM 40 MG/1
TABLET, DELAYED RELEASE ORAL
Qty: 90 TABLET | Refills: 0 | Status: SHIPPED | OUTPATIENT
Start: 2021-06-02 | End: 2021-10-19

## 2021-06-05 ENCOUNTER — LAB ENCOUNTER (OUTPATIENT)
Dept: LAB | Age: 41
End: 2021-06-05
Attending: INTERNAL MEDICINE
Payer: COMMERCIAL

## 2021-06-05 DIAGNOSIS — Z51.81 THERAPEUTIC DRUG MONITORING: ICD-10-CM

## 2021-06-05 PROCEDURE — 82607 VITAMIN B-12: CPT

## 2021-06-05 PROCEDURE — 80061 LIPID PANEL: CPT

## 2021-06-05 PROCEDURE — 82306 VITAMIN D 25 HYDROXY: CPT

## 2021-06-05 PROCEDURE — 36415 COLL VENOUS BLD VENIPUNCTURE: CPT

## 2021-06-26 ENCOUNTER — LAB ENCOUNTER (OUTPATIENT)
Dept: LAB | Age: 41
End: 2021-06-26
Attending: EMERGENCY RESPONSE ATTENDANT
Payer: COMMERCIAL

## 2021-06-26 DIAGNOSIS — E03.9 HYPOTHYROIDISM, UNSPECIFIED TYPE: ICD-10-CM

## 2021-06-26 DIAGNOSIS — E61.1 IRON DEFICIENCY: ICD-10-CM

## 2021-06-26 PROCEDURE — 82728 ASSAY OF FERRITIN: CPT

## 2021-06-26 PROCEDURE — 85025 COMPLETE CBC W/AUTO DIFF WBC: CPT

## 2021-06-26 PROCEDURE — 84443 ASSAY THYROID STIM HORMONE: CPT

## 2021-06-26 PROCEDURE — 83540 ASSAY OF IRON: CPT

## 2021-06-26 PROCEDURE — 83550 IRON BINDING TEST: CPT

## 2021-06-26 PROCEDURE — 36415 COLL VENOUS BLD VENIPUNCTURE: CPT

## 2021-06-28 PROBLEM — F90.9 ATTENTION DEFICIT HYPERACTIVITY DISORDER (ADHD): Status: ACTIVE | Noted: 2021-06-28

## 2021-08-25 ENCOUNTER — DOCUMENTATION ONLY (OUTPATIENT)
Dept: SURGERY | Facility: CLINIC | Age: 41
End: 2021-08-25

## 2021-08-25 ENCOUNTER — OFFICE VISIT (OUTPATIENT)
Dept: SURGERY | Facility: CLINIC | Age: 41
End: 2021-08-25
Payer: COMMERCIAL

## 2021-08-25 VITALS
DIASTOLIC BLOOD PRESSURE: 81 MMHG | HEART RATE: 96 BPM | HEIGHT: 67 IN | BODY MASS INDEX: 45.99 KG/M2 | SYSTOLIC BLOOD PRESSURE: 117 MMHG | WEIGHT: 293 LBS | OXYGEN SATURATION: 100 %

## 2021-08-25 DIAGNOSIS — G47.33 OSA (OBSTRUCTIVE SLEEP APNEA): Primary | ICD-10-CM

## 2021-08-25 DIAGNOSIS — R73.03 PREDIABETES: ICD-10-CM

## 2021-08-25 DIAGNOSIS — E66.01 MORBID OBESITY WITH BMI OF 50.0-59.9, ADULT (HCC): ICD-10-CM

## 2021-08-25 DIAGNOSIS — K21.9 GASTROESOPHAGEAL REFLUX DISEASE WITHOUT ESOPHAGITIS: ICD-10-CM

## 2021-08-25 NOTE — PROGRESS NOTES
Oriented pt to the bariatric program; provided/reviewed bariatric packet of info, time line, referrals, etc; pt agreed and verbalized understanding; attended seminar on 7/19/21.

## 2021-08-27 NOTE — H&P
[address], [address] [facility] [place]  [address] [facility] [address]  Dept: [phone]    8/26/2021  Bariatric Surgery Patient Evaluation    Chief Complaint:  morbid obesity     History of Present Neg        Social History:  Social History    Socioeconomic History      Marital status: Single      Spouse name: Not on file      Number of children: Not on file      Years of education: Not on file      Highest education level: Not on file    Tobacco Use Levothyroxine Sodium 75 MCG Oral Tab, Take 1 tablet (75 mcg total) by mouth before breakfast., Disp: 90 tablet, Rfl: 0  •  Ferrous Sulfate 325 (65 Fe) MG Oral Tab, Take 1 tablet (325 mg total) by mouth daily. , Disp: , Rfl: 0  •  Ondansetron HCl (ZOFRAN) 4 (Approximate)   SpO2 100%   Breastfeeding No   BMI 57.01 kg/m²   BMI:  Body mass index is 57.01 kg/m².   General: no acute distress, well-nourished  HENT: normocephalic, atraumatic  Lung: breathing comfortably, symmetrical expansion, no wheezing  Heart: reg complete.       Herman Mclaughlin MD, 08/26/21, 8:35 PM

## 2021-09-07 NOTE — TELEPHONE ENCOUNTER
Name from pharmacy: LEVOTHYROXINE 0.075MG (75MCG) TABS         Will file in chart as: LEVOTHYROXINE 75 MCG Oral Tab    Sig: TAKE 1 TABLET(75 MCG) BY MOUTH EVERY MORNING BEFORE BREAKFAST    Disp:  90 tablet    Refills:  0 (Pharmacy requested: Not specified

## 2021-09-08 RX ORDER — LEVOTHYROXINE SODIUM 0.07 MG/1
TABLET ORAL
Qty: 90 TABLET | Refills: 1 | Status: SHIPPED | OUTPATIENT
Start: 2021-09-08

## 2021-09-25 ENCOUNTER — PATIENT MESSAGE (OUTPATIENT)
Dept: INTERNAL MEDICINE CLINIC | Facility: CLINIC | Age: 41
End: 2021-09-25

## 2021-09-27 NOTE — TELEPHONE ENCOUNTER
From: Sue Zee  To: DIANE Odom  Sent: 9/25/2021 10:39 AM CDT  Subject: Flu shot    Hi! My flu shot is due Oct 1st but there are no appts for you. Can a nurse give the shot or another doctor?

## 2021-09-29 ENCOUNTER — OFFICE VISIT (OUTPATIENT)
Dept: SURGERY | Facility: CLINIC | Age: 41
End: 2021-09-29
Payer: COMMERCIAL

## 2021-09-29 ENCOUNTER — TELEPHONE (OUTPATIENT)
Dept: SURGERY | Facility: CLINIC | Age: 41
End: 2021-09-29

## 2021-09-29 VITALS
SYSTOLIC BLOOD PRESSURE: 128 MMHG | HEIGHT: 67 IN | WEIGHT: 293 LBS | HEART RATE: 90 BPM | DIASTOLIC BLOOD PRESSURE: 80 MMHG | BODY MASS INDEX: 45.99 KG/M2 | OXYGEN SATURATION: 100 %

## 2021-09-29 DIAGNOSIS — K21.9 GASTROESOPHAGEAL REFLUX DISEASE WITHOUT ESOPHAGITIS: ICD-10-CM

## 2021-09-29 DIAGNOSIS — E66.01 MORBID OBESITY WITH BMI OF 50.0-59.9, ADULT (HCC): ICD-10-CM

## 2021-09-29 DIAGNOSIS — R73.03 PREDIABETES: ICD-10-CM

## 2021-09-29 DIAGNOSIS — G47.33 OSA (OBSTRUCTIVE SLEEP APNEA): Primary | ICD-10-CM

## 2021-09-29 NOTE — PROGRESS NOTES
[address]  [facility]  Dept: [phone]    9/29/2021   Bariatric Surgery Patient Evaluation    Chief Complaint:  morbid obesity     History of Preschantal drinks      Drug use: Not Currently        Types: Cannabis, Ecstasy      Sexual activity: Yes        Partners: Male    Other Topics      Concerns:        Caffeine Concern: Yes          1 LARGE CAR DONUT COFFEE DAILY AND 1 CAN OF RED BULL DAILY         E 10,000 IU on Sat & Sun, Disp: , Rfl:   •  Cyanocobalamin (B-12) 1000 MCG Oral Tab, Take 1,000 mcg by mouth daily. , Disp: , Rfl:   •  Albuterol Sulfate HFA (PROAIR HFA) 108 (90 Base) MCG/ACT Inhalation Aero Soln, SHAKE WELL AND INHALE 1 TO 2 PUFFS INTO THE -11/1/2021  Cardiology -scheduled in November  Pulmonology -10/1/2021  Psych -11/9/2021  Dorsey Lesch -Dr. Renny Fortune -reviewed from May and June 2021, low iron        Plan: The patient appears to be an excellent candidate for bariatric surgery.   She is in

## 2021-09-30 ENCOUNTER — OFFICE VISIT (OUTPATIENT)
Dept: INTERNAL MEDICINE CLINIC | Facility: CLINIC | Age: 41
End: 2021-09-30
Payer: COMMERCIAL

## 2021-09-30 VITALS
HEIGHT: 67 IN | HEART RATE: 80 BPM | SYSTOLIC BLOOD PRESSURE: 128 MMHG | DIASTOLIC BLOOD PRESSURE: 78 MMHG | WEIGHT: 293 LBS | BODY MASS INDEX: 45.99 KG/M2 | RESPIRATION RATE: 16 BRPM

## 2021-09-30 DIAGNOSIS — E55.9 VITAMIN D DEFICIENCY: ICD-10-CM

## 2021-09-30 DIAGNOSIS — R73.03 PREDIABETES: ICD-10-CM

## 2021-09-30 DIAGNOSIS — E53.8 VITAMIN B12 DEFICIENCY: ICD-10-CM

## 2021-09-30 DIAGNOSIS — Z51.81 THERAPEUTIC DRUG MONITORING: Primary | ICD-10-CM

## 2021-09-30 DIAGNOSIS — E03.9 HYPOTHYROIDISM, UNSPECIFIED TYPE: ICD-10-CM

## 2021-09-30 DIAGNOSIS — E66.01 MORBID OBESITY WITH BMI OF 50.0-59.9, ADULT (HCC): ICD-10-CM

## 2021-09-30 DIAGNOSIS — G47.33 OSA (OBSTRUCTIVE SLEEP APNEA): ICD-10-CM

## 2021-09-30 PROCEDURE — 3078F DIAST BP <80 MM HG: CPT | Performed by: INTERNAL MEDICINE

## 2021-09-30 PROCEDURE — 3008F BODY MASS INDEX DOCD: CPT | Performed by: INTERNAL MEDICINE

## 2021-09-30 PROCEDURE — 99214 OFFICE O/P EST MOD 30 MIN: CPT | Performed by: INTERNAL MEDICINE

## 2021-09-30 PROCEDURE — 3074F SYST BP LT 130 MM HG: CPT | Performed by: INTERNAL MEDICINE

## 2021-09-30 RX ORDER — NAPROXEN SODIUM 220 MG
1-2 TABLET ORAL DAILY PRN
COMMUNITY
End: 2021-09-30

## 2021-09-30 NOTE — PATIENT INSTRUCTIONS
Outpatient Encounter Medications as of 9/30/2021   Medication Sig Note   • prazosin 1 MG Oral Cap Take 1 capsule (1 mg total) by mouth daily.  9/30/2021: Preop: Hold day of surgery   Postop: Ok to resume after surgery, no change needed    • lamoTRIgine 100 surgery, no change needed   • fluticasone-salmeterol 250-50 MCG/DOSE Inhalation Aerosol Powder, Breath Activated Inhale 1 puff into the lungs every 12 (twelve) hours.  9/30/2021: No change needed   • Vitamin D3, Cholecalciferol, 125 MCG (5000 UT) Oral Cap T

## 2021-09-30 NOTE — PROGRESS NOTES
HISTORY OF PRESENT ILLNESS  Patient presents with:  Weight Check: up 8 lb, med check, pre-op      Rozinandgael Parks is a 42year old female here for follow up in medical weight loss program.     Denies chest pain, shortness of breath, dizziness, blurred vision 05/13/2021    BUN 10 05/13/2021    BUNCREA 9.0 (L) 05/13/2021    CREATSERUM 1.11 (H) 05/13/2021    ANIONGAP 7 05/13/2021    GFR 68 03/06/2017    GFRNAA 61 05/13/2021    GFRAA 71 05/13/2021    CA 8.6 05/13/2021    OSMOCALC 291 05/13/2021    ALKPHO 79 07/15/ Tab, Take 1 tablet (1 mg total) by mouth daily. Can take up to 2 additional tablets (1mg) daily due to anxiety. , Disp: 45 tablet, Rfl: 0  PANTOPRAZOLE SODIUM 40 MG Oral Tab EC, TAKE 1 TABLET(40 MG) BY MOUTH EVERY MORNING BEFORE BREAKFAST, Disp: 90 tablet, psychologist as recommended. · Discussed the role of sleep and stress in weight management. · Counseled on comprehensive weight loss plan including attention to nutrition, exercise and behavior/stress management for success.  See patient instruction below mouth daily. Can take up to 2 additional tablets (1mg) daily due to anxiety.  9/30/2021: Preop: Hold day of surgery   Postop: Ok to resume after surgery, no change needed    • PANTOPRAZOLE SODIUM 40 MG Oral Tab EC TAKE 1 TABLET(40 MG) BY MOUTH EVERY MORNING

## 2021-10-05 ENCOUNTER — IMMUNIZATION (OUTPATIENT)
Dept: INTERNAL MEDICINE CLINIC | Facility: CLINIC | Age: 41
End: 2021-10-05
Payer: COMMERCIAL

## 2021-10-05 DIAGNOSIS — Z23 NEED FOR VACCINATION: Primary | ICD-10-CM

## 2021-10-05 PROCEDURE — 90471 IMMUNIZATION ADMIN: CPT | Performed by: INTERNAL MEDICINE

## 2021-10-05 PROCEDURE — 90686 IIV4 VACC NO PRSV 0.5 ML IM: CPT | Performed by: INTERNAL MEDICINE

## 2021-10-16 ENCOUNTER — IMMUNIZATION (OUTPATIENT)
Dept: LAB | Facility: HOSPITAL | Age: 41
End: 2021-10-16
Attending: EMERGENCY MEDICINE
Payer: COMMERCIAL

## 2021-10-16 DIAGNOSIS — Z23 NEED FOR VACCINATION: Primary | ICD-10-CM

## 2021-10-16 PROCEDURE — 0003A SARSCOV2 VAC 30MCG/0.3ML IM: CPT

## 2021-10-19 RX ORDER — PANTOPRAZOLE SODIUM 40 MG/1
TABLET, DELAYED RELEASE ORAL
Qty: 90 TABLET | Refills: 0 | Status: SHIPPED | OUTPATIENT
Start: 2021-10-19 | End: 2022-01-17

## 2021-10-19 NOTE — TELEPHONE ENCOUNTER
Medication(s) to Refill:   Requested Prescriptions     Pending Prescriptions Disp Refills   • PANTOPRAZOLE 40 MG Oral Tab EC [Pharmacy Med Name: PANTOPRAZOLE 40MG TABLETS] 90 tablet 0     Sig: TAKE 1 TABLET(40 MG) BY MOUTH EVERY MORNING BEFORE BREAKFAST

## 2021-11-01 ENCOUNTER — OFFICE VISIT (OUTPATIENT)
Dept: INTERNAL MEDICINE CLINIC | Facility: CLINIC | Age: 41
End: 2021-11-01
Payer: COMMERCIAL

## 2021-11-01 VITALS — WEIGHT: 293 LBS | HEIGHT: 67 IN | BODY MASS INDEX: 45.99 KG/M2

## 2021-11-01 DIAGNOSIS — E66.01 CLASS 3 SEVERE OBESITY DUE TO EXCESS CALORIES WITH SERIOUS COMORBIDITY AND BODY MASS INDEX (BMI) OF 50.0 TO 59.9 IN ADULT (HCC): Primary | ICD-10-CM

## 2021-11-01 PROCEDURE — 97802 MEDICAL NUTRITION INDIV IN: CPT | Performed by: DIETITIAN, REGISTERED

## 2021-11-01 PROCEDURE — 3008F BODY MASS INDEX DOCD: CPT | Performed by: DIETITIAN, REGISTERED

## 2021-11-01 NOTE — PATIENT INSTRUCTIONS
Goals: 1. Track in [employer] or [employer]  2. Eliminate caffeine  3. 60g protein, <150g CHO per day  4.  Continue Sit and Be Fit and walking

## 2021-11-01 NOTE — PROGRESS NOTES
[address] [facility] BARIATRIC AND WEIGHT LOSS CLINIC  [address] [place] [address]  Dept: [phone]  Loc: [phone]    11/01/21    Bariatric Initial Nutrition Assessment    Crys Mcdowell is a 42year old female.     Referri topamax, vyvanse, metformin    Patient has received these behavioral treatments for weight loss: None    Patient is being followed by Dr. Roz Cevallos for medical weight loss.     Patient has completed medical weight management Yes    Patient has support from: GLOBULIN 4.4 07/15/2020    ALBGLOBRAT 1.2 12/09/2019     05/13/2021    K 4.5 05/13/2021     05/13/2021    CO2 23.0 05/13/2021     No results found for: MG  No results found for: PHOS    Thyroid:    Lab Results   Component Value Date    TSH 1.84 tablet nightly, increase to 2 tablets nightly for PMS., Disp: 40 tablet, Rfl: 2  •  topiramate 50 MG Oral Tab, Take 2 tablets (100 mg total) by mouth 2 (two) times daily. , Disp: 180 tablet, Rfl: 1  •  lurasidone (LATUDA) 60 MG Oral Tab, Take 1 tablet (60 m Estimated Kcal Needs (Maria Guadalupe Guard): 2250 kcal/day   Estimated Protein Needs:  60+ grams/day  Estimated Fluid Needs: Aim for 64 ozs per day- currently drinks 64 oz per day    Diet history:       Breakfast      AM Snack       Lunch     PM Snack control, Reviewed Bariatric Diet Stages 1-4, Discussed goals and Obtain MVI  2. Coordination of care: attend support group prior to surgery; reminder for importance of multidisciplinary consultations.   3.  Materials given: [facility] Bariatric Manual and Goals

## 2021-11-05 ENCOUNTER — APPOINTMENT (OUTPATIENT)
Dept: CT IMAGING | Age: 41
End: 2021-11-05
Attending: EMERGENCY MEDICINE
Payer: COMMERCIAL

## 2021-11-05 ENCOUNTER — HOSPITAL ENCOUNTER (OUTPATIENT)
Age: 41
Discharge: HOME | End: 2021-11-05
Attending: EMERGENCY MEDICINE
Payer: COMMERCIAL

## 2021-11-05 VITALS
WEIGHT: 293 LBS | SYSTOLIC BLOOD PRESSURE: 141 MMHG | BODY MASS INDEX: 45.99 KG/M2 | RESPIRATION RATE: 20 BRPM | TEMPERATURE: 97 F | DIASTOLIC BLOOD PRESSURE: 85 MMHG | HEART RATE: 105 BPM | OXYGEN SATURATION: 100 % | HEIGHT: 67 IN

## 2021-11-05 DIAGNOSIS — N94.6 DYSMENORRHEA: Primary | ICD-10-CM

## 2021-11-05 PROCEDURE — 81002 URINALYSIS NONAUTO W/O SCOPE: CPT | Performed by: EMERGENCY MEDICINE

## 2021-11-05 PROCEDURE — 81025 URINE PREGNANCY TEST: CPT

## 2021-11-05 PROCEDURE — 80047 BASIC METABLC PNL IONIZED CA: CPT

## 2021-11-05 PROCEDURE — 99214 OFFICE O/P EST MOD 30 MIN: CPT

## 2021-11-05 PROCEDURE — 36415 COLL VENOUS BLD VENIPUNCTURE: CPT

## 2021-11-05 PROCEDURE — 74176 CT ABD & PELVIS W/O CONTRAST: CPT | Performed by: EMERGENCY MEDICINE

## 2021-11-05 PROCEDURE — 85025 COMPLETE CBC W/AUTO DIFF WBC: CPT | Performed by: EMERGENCY MEDICINE

## 2021-11-05 RX ORDER — DICYCLOMINE HCL 20 MG
20 TABLET ORAL 4 TIMES DAILY PRN
Qty: 30 TABLET | Refills: 0 | Status: SHIPPED | OUTPATIENT
Start: 2021-11-05 | End: 2021-12-05

## 2021-11-05 RX ORDER — TRAMADOL HYDROCHLORIDE 50 MG/1
TABLET ORAL EVERY 6 HOURS PRN
Qty: 10 TABLET | Refills: 0 | Status: SHIPPED | OUTPATIENT
Start: 2021-11-05 | End: 2021-11-12

## 2021-11-05 RX ORDER — DICYCLOMINE HCL 20 MG
20 TABLET ORAL ONCE
Status: COMPLETED | OUTPATIENT
Start: 2021-11-05 | End: 2021-11-05

## 2021-11-05 NOTE — ED INITIAL ASSESSMENT (HPI)
Patient reports irregular menstrual cycle for past 17 days, today developed severe cramps. Pain is across lower abdomen and into lower back. Reports bleeding light today. Normal menstrual cycle last month. OTC meds not working. No urinary complaints.  No fe

## 2021-11-06 NOTE — ED PROVIDER NOTES
Patient Seen in: [facility]      History   Patient presents with:  Arturo    Stated Complaint: Stomach cramps    Subjective:   HPI    50-year-old female presents to the immediate care with complaints of menstrual cramps.   Patient states t Vitals [11/05/21 1827]   /85   Pulse 105   Resp 20   Temp 97.3 °F (36.3 °C)   Temp src Temporal   SpO2 100 %   O2 Device None (Room air)       Current:/85   Pulse 105   Temp 97.3 °F (36.3 °C) (Temporal)   Resp 20   Ht 170.2 cm (5' 7\")   Wt Reba Lara ) components:    WBC IC 11.4 (*)     All other components within normal limits   POCT ISTAT CHEM8 CARTRIDGE - Abnormal; Notable for the following components:    ISTAT TCO2 19 (*)     All other components within normal limits   POCT PREGNANCY URINE - Normal pelvic process. Dictated by (CST): Andrew García MD on 11/05/2021 at 7:32 PM     Finalized by (CST): Andrew García MD on 11/05/2021 at 7:35 PM            MDM      Patient had some baseline blood work obtained.   She had driven herself to the immediate car

## 2021-11-10 ENCOUNTER — OFFICE VISIT (OUTPATIENT)
Dept: INTERNAL MEDICINE CLINIC | Facility: CLINIC | Age: 41
End: 2021-11-10
Payer: COMMERCIAL

## 2021-11-10 VITALS
OXYGEN SATURATION: 98 % | HEART RATE: 105 BPM | WEIGHT: 293 LBS | DIASTOLIC BLOOD PRESSURE: 70 MMHG | SYSTOLIC BLOOD PRESSURE: 134 MMHG | RESPIRATION RATE: 16 BRPM | BODY MASS INDEX: 58 KG/M2 | TEMPERATURE: 98 F

## 2021-11-10 DIAGNOSIS — H66.91 RIGHT OTITIS MEDIA, UNSPECIFIED OTITIS MEDIA TYPE: Primary | ICD-10-CM

## 2021-11-10 PROCEDURE — 3075F SYST BP GE 130 - 139MM HG: CPT | Performed by: INTERNAL MEDICINE

## 2021-11-10 PROCEDURE — 99213 OFFICE O/P EST LOW 20 MIN: CPT | Performed by: INTERNAL MEDICINE

## 2021-11-10 PROCEDURE — 3078F DIAST BP <80 MM HG: CPT | Performed by: INTERNAL MEDICINE

## 2021-11-10 RX ORDER — AMOXICILLIN AND CLAVULANATE POTASSIUM 875; 125 MG/1; MG/1
1 TABLET, FILM COATED ORAL 2 TIMES DAILY
Qty: 14 TABLET | Refills: 0 | Status: SHIPPED | OUTPATIENT
Start: 2021-11-10 | End: 2021-11-17

## 2021-11-10 NOTE — PATIENT INSTRUCTIONS
Keep taking the meclizine    Take the antibiotics for  7 days    Let me know if your symptoms do not improve

## 2021-11-10 NOTE — PROGRESS NOTES
Patient Office Visit    ASSESSMENT AND PLAN:   (H66.91) Right otitis media, unspecified otitis media type  (primary encounter diagnosis)  Plan: amoxicillin clavulanate 875-125 MG Oral Tab  Note: will start antibiotics as above.  Continue meclizine as needed were      Prescribed   - since then symptoms are worsening, she     Is having a crackling sound from the     Right ear   -  She is also having some sinus congestion     On the right side   - dizziness has also not improved much   - no fevers or chills mg total) by mouth 4 (four) times daily as needed. , Disp: 30 tablet, Rfl: 0  traMADol 50 MG Oral Tab, Take 1-2 tablets ( mg total) by mouth every 6 (six) hours as needed for Pain., Disp: 10 tablet, Rfl: 0  PANTOPRAZOLE 40 MG Oral Tab EC, TAKE 1 TABLE INTO THE LUNGS EVERY 4 HOURS AS NEEDED FOR WHEEZING( COUGH), Disp: 3 Inhaler, Rfl: 1  Meclizine HCl 25 MG Oral Tab, Take 1 tablet (25 mg total) by mouth 3 (three) times daily as needed for Dizziness. , Disp: 30 tablet, Rfl: 0    No current facility-administ

## 2021-11-24 ENCOUNTER — TELEPHONE (OUTPATIENT)
Dept: SURGERY | Facility: CLINIC | Age: 41
End: 2021-11-24

## 2021-11-24 ENCOUNTER — OFFICE VISIT (OUTPATIENT)
Dept: SURGERY | Facility: CLINIC | Age: 41
End: 2021-11-24
Payer: COMMERCIAL

## 2021-11-24 VITALS
HEIGHT: 67 IN | BODY MASS INDEX: 45.99 KG/M2 | WEIGHT: 293 LBS | SYSTOLIC BLOOD PRESSURE: 119 MMHG | HEART RATE: 94 BPM | OXYGEN SATURATION: 97 % | DIASTOLIC BLOOD PRESSURE: 77 MMHG

## 2021-11-24 DIAGNOSIS — R73.03 PREDIABETES: ICD-10-CM

## 2021-11-24 DIAGNOSIS — E53.8 VITAMIN B12 DEFICIENCY: ICD-10-CM

## 2021-11-24 DIAGNOSIS — K21.9 GASTROESOPHAGEAL REFLUX DISEASE WITHOUT ESOPHAGITIS: ICD-10-CM

## 2021-11-24 DIAGNOSIS — E55.9 VITAMIN D DEFICIENCY: Primary | ICD-10-CM

## 2021-11-24 NOTE — PROGRESS NOTES
[address], [address]  [facility]  Dept: [phone]    11/24/2021     Bariatric Surgery Patient Evaluation    Chief Complaint:  morbid obesity     History of Pre standard drinks      Drug use: Not Currently        Types: Cannabis, Ecstasy      Sexual activity: Yes        Partners: Male    Other Topics      Concerns:        Caffeine Concern: Yes          1 LARGE CAR DONUT COFFEE DAILY AND 1 CAN OF RED BULL DAILY Cyanocobalamin (B-12) 1000 MCG Oral Tab, Take 1,000 mcg by mouth daily. , Disp: , Rfl:   •  Albuterol Sulfate HFA (PROAIR HFA) 108 (90 Base) MCG/ACT Inhalation Aero Soln, SHAKE WELL AND INHALE 1 TO 2 PUFFS INTO THE LUNGS EVERY 4 HOURS AS NEEDED FOR WHEEZING done  Cardiology -seen, testing pending  Pulmonology -recommended CPAP/APAP for 4 wks   Psych -seen, provisionally cleared, needs to talk to pt's therapist first  Wendi Merced -Dr. Roselyn Cruz -reviewed from May and June 2021, low iron        Plan: The blas

## 2021-11-27 ENCOUNTER — LAB ENCOUNTER (OUTPATIENT)
Dept: LAB | Age: 41
End: 2021-11-27
Attending: INTERNAL MEDICINE
Payer: COMMERCIAL

## 2021-11-27 ENCOUNTER — HOSPITAL ENCOUNTER (OUTPATIENT)
Dept: GENERAL RADIOLOGY | Age: 41
Discharge: HOME | End: 2021-11-27
Attending: INTERNAL MEDICINE
Payer: COMMERCIAL

## 2021-11-27 DIAGNOSIS — E55.9 VITAMIN D DEFICIENCY: ICD-10-CM

## 2021-11-27 DIAGNOSIS — R73.03 PREDIABETES: ICD-10-CM

## 2021-11-27 DIAGNOSIS — R06.00 DOE (DYSPNEA ON EXERTION): ICD-10-CM

## 2021-11-27 DIAGNOSIS — Z01.818 PREOPERATIVE CLEARANCE: ICD-10-CM

## 2021-11-27 DIAGNOSIS — J45.909 ASTHMATIC BRONCHITIS: ICD-10-CM

## 2021-11-27 DIAGNOSIS — G47.33 OBSTRUCTIVE SLEEP APNEA (ADULT) (PEDIATRIC): Primary | ICD-10-CM

## 2021-11-27 DIAGNOSIS — K21.9 GASTROESOPHAGEAL REFLUX DISEASE WITHOUT ESOPHAGITIS: ICD-10-CM

## 2021-11-27 DIAGNOSIS — E53.8 VITAMIN B12 DEFICIENCY: ICD-10-CM

## 2021-11-27 PROCEDURE — 84425 ASSAY OF VITAMIN B-1: CPT

## 2021-11-27 PROCEDURE — 82746 ASSAY OF FOLIC ACID SERUM: CPT

## 2021-11-27 PROCEDURE — 84443 ASSAY THYROID STIM HORMONE: CPT

## 2021-11-27 PROCEDURE — 83036 HEMOGLOBIN GLYCOSYLATED A1C: CPT

## 2021-11-27 PROCEDURE — 80053 COMPREHEN METABOLIC PANEL: CPT

## 2021-11-27 PROCEDURE — 71046 X-RAY EXAM CHEST 2 VIEWS: CPT | Performed by: INTERNAL MEDICINE

## 2021-11-27 PROCEDURE — 36415 COLL VENOUS BLD VENIPUNCTURE: CPT

## 2021-11-29 ENCOUNTER — TELEPHONE (OUTPATIENT)
Dept: SURGERY | Facility: CLINIC | Age: 41
End: 2021-11-29

## 2021-11-29 RX ORDER — MELATONIN
800 DAILY
Qty: 60 TABLET | Refills: 1 | Status: SHIPPED | OUTPATIENT
Start: 2021-11-29

## 2021-12-06 ENCOUNTER — OFFICE VISIT (OUTPATIENT)
Dept: INTERNAL MEDICINE CLINIC | Facility: CLINIC | Age: 41
End: 2021-12-06
Payer: COMMERCIAL

## 2021-12-06 VITALS — WEIGHT: 293 LBS | BODY MASS INDEX: 45.99 KG/M2 | HEIGHT: 67 IN

## 2021-12-06 DIAGNOSIS — E66.01 CLASS 3 SEVERE OBESITY DUE TO EXCESS CALORIES WITH SERIOUS COMORBIDITY AND BODY MASS INDEX (BMI) OF 50.0 TO 59.9 IN ADULT (HCC): Primary | ICD-10-CM

## 2021-12-06 PROCEDURE — 3008F BODY MASS INDEX DOCD: CPT | Performed by: DIETITIAN, REGISTERED

## 2021-12-06 PROCEDURE — 97803 MED NUTRITION INDIV SUBSEQ: CPT | Performed by: DIETITIAN, REGISTERED

## 2021-12-06 NOTE — PROGRESS NOTES
[address] [facility] AND WEIGHT LOSS CLINIC  [address] [place] [address]  Dept: [phone]  Loc: [phone]    12/06/21      Bariatric Follow-up Nutrition Session    Isai Jefferson is a 42year old female.      Christina 11/27/2021       Lipid Panel:  Lab Results   Component Value Date    CHOLEST 176 06/05/2021    TRIG 100 06/05/2021    HDL 42 06/05/2021     (H) 06/05/2021    VLDL 17 06/05/2021    TCHDLRATIO 4.2 12/09/2019    NONHDLC 134 (H) 06/05/2021        Vitami Rfl: 0  •  fluticasone-salmeterol 250-50 MCG/DOSE Inhalation Aerosol Powder, Breath Activated, Inhale 1 puff into the lungs every 12 (twelve) hours. , Disp: 180 each, Rfl: 1  •  Vitamin D3, Cholecalciferol, 125 MCG (5000 UT) Oral Cap, Take 5,000 IU M-F and supplements:  Barilife powder 1 scoop/day + folic acid qd  Protein supplements:  Premier Protein     Activity Level: Sit and Be Fit, recumbent bike- 10 min per session, increasing by 5 min per session. Other:  Pt doing well w/ diet changes.  Not eating m

## 2021-12-17 ENCOUNTER — DOCUMENTATION ONLY (OUTPATIENT)
Dept: SURGERY | Facility: CLINIC | Age: 41
End: 2021-12-17

## 2021-12-17 NOTE — PROGRESS NOTES
Please review the information regarding my recent evaluation of our mutual patient, Pro Summers, 1/4/1980.       Surgical Clearance from [facility]  Evaluation Completed on: 12/17/21  Clearance information for: Bariatric  Referring Anderson

## 2021-12-23 NOTE — PROGRESS NOTES
Patient presents with:  Medication Follow-Up: Discuss possibly changing asthma medication. HPI: Saida Gonzalez presents today for uncontrolled asthma. She has a hx of mild persistent asthma, on Singulair daily and Albuterol PRN.   This combination is not enou AND INHALE 1 TO 2 PUFFS INTO THE LUNGS EVERY 4 HOURS AS NEEDED FOR WHEEZING( COUGH), Disp: 8.5 g, Rfl: 2  •  Sertraline HCl 100 MG Oral Tab, Take 1 tablet by mouth daily. , Disp: , Rfl: 0  •  topiramate 25 MG Oral Tab, Take 1 tablet (25 mg total) by mouth 2 Anthony Maravilla MD  Diplomate, American Board of Internal Medicine  [address] [address] [address]  [address] [place] [address],[address], [address], [facility], [address]  T: [id number]; F: [address]     Meds & Refills for this Visit:  Requested Prescriptions     Signed Prescriptions Disp - - -

## 2021-12-27 ENCOUNTER — OFFICE VISIT (OUTPATIENT)
Dept: INTERNAL MEDICINE CLINIC | Facility: CLINIC | Age: 41
End: 2021-12-27
Payer: COMMERCIAL

## 2021-12-27 VITALS
OXYGEN SATURATION: 100 % | HEART RATE: 83 BPM | SYSTOLIC BLOOD PRESSURE: 120 MMHG | BODY MASS INDEX: 45.99 KG/M2 | WEIGHT: 293 LBS | DIASTOLIC BLOOD PRESSURE: 70 MMHG | TEMPERATURE: 98 F | HEIGHT: 67 IN | RESPIRATION RATE: 18 BRPM

## 2021-12-27 DIAGNOSIS — Z12.4 CERVICAL CANCER SCREENING: ICD-10-CM

## 2021-12-27 DIAGNOSIS — Z00.00 ANNUAL PHYSICAL EXAM: Primary | ICD-10-CM

## 2021-12-27 DIAGNOSIS — N92.6 IRREGULAR MENSES: ICD-10-CM

## 2021-12-27 DIAGNOSIS — Z12.31 VISIT FOR SCREENING MAMMOGRAM: ICD-10-CM

## 2021-12-27 PROCEDURE — 3078F DIAST BP <80 MM HG: CPT | Performed by: PHYSICIAN ASSISTANT

## 2021-12-27 PROCEDURE — 87624 HPV HI-RISK TYP POOLED RSLT: CPT | Performed by: PHYSICIAN ASSISTANT

## 2021-12-27 PROCEDURE — 99396 PREV VISIT EST AGE 40-64: CPT | Performed by: PHYSICIAN ASSISTANT

## 2021-12-27 PROCEDURE — 3008F BODY MASS INDEX DOCD: CPT | Performed by: PHYSICIAN ASSISTANT

## 2021-12-27 PROCEDURE — 3074F SYST BP LT 130 MM HG: CPT | Performed by: PHYSICIAN ASSISTANT

## 2021-12-27 RX ORDER — MELATONIN
COMMUNITY
Start: 2021-12-26 | End: 2021-12-27

## 2021-12-27 NOTE — PATIENT INSTRUCTIONS
Please use [employer] or call Carlos Mari at [phone] to set up the following tests:  - pelvic ultrasound  - mammogram (do this in April)    Follow up visit with Faizan Garcia in 6 months.

## 2021-12-27 NOTE — PROGRESS NOTES
Ewa Person is a 42year old female who presents for a complete physical exam.   HPI:   Pt presents for annual wellness screening. C/o irregular menstrual cycle past couple months. Began after getting COVID booster.   Very light bleeding/spotting for Cholecalciferol, 125 MCG (5000 UT) Oral Cap Take 5,000 IU M-F and 10,000 IU on Sat & Sun     • Cyanocobalamin (B-12) 1000 MCG Oral Tab Take 1,000 mcg by mouth daily.      • Albuterol Sulfate HFA (PROAIR HFA) 108 (90 Base) MCG/ACT Inhalation Aero Soln SHAKE lesions or rashes  EYES: denies changes in vision  HEENT: denies nasal congestion, drainage, sore throat  LUNGS: denies shortness of breath, LUCERO  CARDIOVASCULAR: denies chest pain, SOB, LUCERO, palpitations  GI: denies abdominal pain, nausea, vomiting, diarrh ordered. Could be related to COVID vaccine. # Morbid obesity, BMI 57: working with bariatrics with plan for gastric sleeve surgery spring 2022. # Migraine HA with aura: stable on topamax. # Hypothyroidism: TSH normal 11/2021.   # Moderate persistent ast

## 2022-01-05 LAB — HPV I/H RISK 1 DNA SPEC QL NAA+PROBE: NEGATIVE

## 2022-01-06 ENCOUNTER — OFFICE VISIT (OUTPATIENT)
Dept: INTERNAL MEDICINE CLINIC | Facility: CLINIC | Age: 42
End: 2022-01-06
Payer: COMMERCIAL

## 2022-01-06 VITALS — BODY MASS INDEX: 45.99 KG/M2 | WEIGHT: 293 LBS | HEIGHT: 67 IN

## 2022-01-06 DIAGNOSIS — Z78.9 VEGAN DIET: ICD-10-CM

## 2022-01-06 DIAGNOSIS — E66.01 MORBID OBESITY WITH BMI OF 50.0-59.9, ADULT (HCC): ICD-10-CM

## 2022-01-06 DIAGNOSIS — R73.03 PREDIABETES: ICD-10-CM

## 2022-01-06 DIAGNOSIS — G47.33 OSA (OBSTRUCTIVE SLEEP APNEA): ICD-10-CM

## 2022-01-06 PROCEDURE — 3008F BODY MASS INDEX DOCD: CPT

## 2022-01-06 PROCEDURE — 97802 MEDICAL NUTRITION INDIV IN: CPT

## 2022-01-06 NOTE — PROGRESS NOTES
[address] [place] [address] AND WEIGHT LOSS CLINIC  [address] [place] [address]  Dept: [phone]  Loc: [phone]    01/06/22      Bariatric Follow-up Nutrition Session    Sveta Sharp is a 36year old female.      Christina 11/27/2021       Lipid Panel:  Lab Results   Component Value Date    CHOLEST 176 06/05/2021    TRIG 100 06/05/2021    HDL 42 06/05/2021     (H) 06/05/2021    VLDL 17 06/05/2021    TCHDLRATIO 4.2 12/09/2019    NONHDLC 134 (H) 06/05/2021        Vitami BREAKFAST, Disp: 90 tablet, Rfl: 1  •  LORazepam 1 MG Oral Tab, Take 1 tablet (1 mg total) by mouth daily. Can take up to 2 additional tablets (1mg) daily due to anxiety. , Disp: 45 tablet, Rfl: 0  •  famoTIDine 20 MG Oral Tab, Take 1 tablet (20 mg total) b butter-vomits  Vitamin/mineral supplements:  Other: [employer] powder vitamin  Protein supplements:  Premier Protein     Activity Level: moderate  · Type: bike  · Duration: 105 minutes  · Frequency: per week    Other:  Met with pt. For pre-op appt.   Pt. has keeping consistent food records? yes  Patient ready for Liquid Protein Education?  yes      Angelic Her, RD, LDN  Face-to-face time spent with pt: 45 minutes

## 2022-01-06 NOTE — PATIENT INSTRUCTIONS
Recommendations/goals:    1.  Con't food records, MFP  2.  Con't. Eating 4-6 times per day. Including protein and produce at each time. Aim for 60 grams of protein each day and <150 gm of carbohydrate. 3.  Con't to aim for 64 oz. Water each day.   4.  Co

## 2022-01-12 ENCOUNTER — OFFICE VISIT (OUTPATIENT)
Dept: SURGERY | Facility: CLINIC | Age: 42
End: 2022-01-12
Payer: COMMERCIAL

## 2022-01-12 VITALS
OXYGEN SATURATION: 97 % | BODY MASS INDEX: 47.09 KG/M2 | HEIGHT: 66.2 IN | WEIGHT: 293 LBS | HEART RATE: 101 BPM | DIASTOLIC BLOOD PRESSURE: 70 MMHG | SYSTOLIC BLOOD PRESSURE: 114 MMHG

## 2022-01-12 DIAGNOSIS — G47.33 OSA (OBSTRUCTIVE SLEEP APNEA): Primary | ICD-10-CM

## 2022-01-12 DIAGNOSIS — K21.9 GASTROESOPHAGEAL REFLUX DISEASE WITHOUT ESOPHAGITIS: ICD-10-CM

## 2022-01-12 DIAGNOSIS — E66.01 MORBID OBESITY WITH BMI OF 50.0-59.9, ADULT (HCC): ICD-10-CM

## 2022-01-12 NOTE — PROGRESS NOTES
[address]  [facility]  Dept: [phone]    1/12/2022     Bariatric Surgery Patient Evaluation    Chief Complaint:  morbid obesity, preop 368     Hist Never used    Substance and Sexual Activity      Alcohol use:  Yes        Alcohol/week: 0.0 - 1.0 standard drinks        Comment: rarely       Drug use: Not Currently        Types: Cannabis, Ecstasy      Sexual activity: Yes        Partners: Male    Other T 0  •  famoTIDine 20 MG Oral Tab, Take 1 tablet (20 mg total) by mouth 2 (two) times daily. , Disp: 180 tablet, Rfl: 0  •  Vitamin D3, Cholecalciferol, 125 MCG (5000 UT) Oral Cap, Take 5,000 IU M-F and 10,000 IU on Sat & Sun, Disp: , Rfl:   •  Cyanocobalamin intact  Psych: alert and oriented, normal affect  Skin: warm, dry    Assessment: 36year old female with chronic morbid obesity who would benefit from significant and sustained weight loss, pursuing sleeve gastrectomy.     Dietitian -liquid protein schedule 4  Open RYGB - 6    Age <30 - 1  30-39 - 2  40-49 - 3  52-63 - 4  60+ - 5    BMI - <40 - 1  40-49 - 2  52-63 - 3  60+ - 4    Male - 2  Smoking history - 2  OR time > 3 hours - 2  Prior history of VTE - 5    Total Score  10    0-14 --> Low risk --> standard

## 2022-01-13 ENCOUNTER — HOSPITAL ENCOUNTER (OUTPATIENT)
Dept: ULTRASOUND IMAGING | Age: 42
Discharge: HOME | End: 2022-01-13
Attending: PHYSICIAN ASSISTANT
Payer: COMMERCIAL

## 2022-01-13 DIAGNOSIS — N92.6 IRREGULAR MENSES: ICD-10-CM

## 2022-01-13 PROCEDURE — 93975 VASCULAR STUDY: CPT | Performed by: PHYSICIAN ASSISTANT

## 2022-01-13 PROCEDURE — 76830 TRANSVAGINAL US NON-OB: CPT | Performed by: PHYSICIAN ASSISTANT

## 2022-01-13 PROCEDURE — 76856 US EXAM PELVIC COMPLETE: CPT | Performed by: PHYSICIAN ASSISTANT

## 2022-01-17 RX ORDER — PANTOPRAZOLE SODIUM 40 MG/1
TABLET, DELAYED RELEASE ORAL
Qty: 90 TABLET | Refills: 0 | Status: SHIPPED | OUTPATIENT
Start: 2022-01-17

## 2022-01-19 ENCOUNTER — OFFICE VISIT (OUTPATIENT)
Dept: SURGERY | Facility: CLINIC | Age: 42
End: 2022-01-19
Payer: COMMERCIAL

## 2022-01-19 VITALS — WEIGHT: 293 LBS | BODY MASS INDEX: 47.09 KG/M2 | HEIGHT: 66.3 IN

## 2022-01-19 DIAGNOSIS — G47.33 OSA (OBSTRUCTIVE SLEEP APNEA): ICD-10-CM

## 2022-01-19 DIAGNOSIS — E66.01 MORBID OBESITY WITH BMI OF 50.0-59.9, ADULT (HCC): ICD-10-CM

## 2022-01-19 PROCEDURE — 3008F BODY MASS INDEX DOCD: CPT

## 2022-01-19 PROCEDURE — 97803 MED NUTRITION INDIV SUBSEQ: CPT

## 2022-01-19 NOTE — PATIENT INSTRUCTIONS
Keep it going goals:   Continue keeping food records, MFP of [employer]. Continue eating 4-6 times per day. Include protein and produce at each time. Aim for 60 grams of protein per day and <150 grams of CHO per day.   Drink at least 64 oz  Water per [employer]

## 2022-01-19 NOTE — PROGRESS NOTES
Body Composition Analysis #1     1. Weight 361.3 lbs     2. BMI 57.8     3. BMR 1909 kcal     4. Percent body fat 56.5% (204.4 lbs)     5. Visceral fat level 20 (goal for 10)     6. ECW/TBW . 381     7.  SMM (skeletal muscle mass) 89.5 lbs                  L Encounters:  01/12/22 : 5' 6.2\" (1.681 m)    Weight:  Wt Readings from Last 6 Encounters:  01/12/22 : (!) 368 lb 4 oz  01/06/22 : (!) 367 lb 6.4 oz  12/27/21 : (!) 365 lb 6.4 oz  12/06/21 : (!) 368 lb 6.4 oz  11/24/21 : (!) 371 lb 1.6 oz  11/10/21 : (!) 3 07/15/2020     11/27/2021       Lipid Panel:  Lab Results   Component Value Date    CHOLEST 176 06/05/2021    TRIG 100 06/05/2021    HDL 42 06/05/2021     (H) 06/05/2021    VLDL 17 06/05/2021    TCHDLRATIO 4.2 12/09/2019    NONHDLC 134 (H) 06 EVERY MORNING BEFORE BREAKFAST, Disp: 90 tablet, Rfl: 1  •  LORazepam 1 MG Oral Tab, Take 1 tablet (1 mg total) by mouth daily. Can take up to 2 additional tablets (1mg) daily due to anxiety. , Disp: 45 tablet, Rfl: 0  •  famoTIDine 20 MG Oral Tab, Take 1 t week    Other:  Met with pt for liquid diet protein instruction. Pt being followed by Dr. Fausto Gunn for medical weight management. Pt currently not taking any medication for weight loss at this time.   Per pt has been on topiramate for years for management of Water per day. Continue PA 30 minutes 5 days per week. Continue stretching 10-15 minutes 2-3x/week. Work in progress goals:   Buy Ensure pre-surgery, 2 bottles. Buy CHG soap, 1 bottle.       Monitor/Evaluate     Anthropometric measurements, Food/flu

## 2022-01-28 PROBLEM — F31.77 BIPOLAR DISORDER, IN PARTIAL REMISSION, MOST RECENT EPISODE MIXED (HCC): Status: ACTIVE | Noted: 2017-03-06

## 2022-02-04 ENCOUNTER — HOSPITAL ENCOUNTER (OUTPATIENT)
Age: 42
Discharge: HOME | End: 2022-02-04
Payer: COMMERCIAL

## 2022-02-04 ENCOUNTER — APPOINTMENT (OUTPATIENT)
Dept: GENERAL RADIOLOGY | Age: 42
End: 2022-02-04
Attending: NURSE PRACTITIONER
Payer: COMMERCIAL

## 2022-02-04 VITALS
SYSTOLIC BLOOD PRESSURE: 142 MMHG | HEART RATE: 104 BPM | DIASTOLIC BLOOD PRESSURE: 83 MMHG | TEMPERATURE: 98 F | RESPIRATION RATE: 18 BRPM | OXYGEN SATURATION: 97 %

## 2022-02-04 DIAGNOSIS — M77.8 RIGHT ELBOW TENDONITIS: Primary | ICD-10-CM

## 2022-02-04 PROCEDURE — 99213 OFFICE O/P EST LOW 20 MIN: CPT

## 2022-02-04 PROCEDURE — 73080 X-RAY EXAM OF ELBOW: CPT | Performed by: NURSE PRACTITIONER

## 2022-02-04 RX ORDER — NAPROXEN 500 MG/1
500 TABLET ORAL 2 TIMES DAILY PRN
Qty: 20 TABLET | Refills: 0 | Status: SHIPPED | OUTPATIENT
Start: 2022-02-04 | End: 2022-02-11

## 2022-02-04 NOTE — ED INITIAL ASSESSMENT (HPI)
C/O right elbow pain since Monday radiates to upper arm and forearm. Denies injury. Today hand shaking for 15 minutes. Arm with limited movements. On and off sensation of pins and needles. Taking Aleve with some relief. Radha

## 2022-02-15 ENCOUNTER — TELEPHONE (OUTPATIENT)
Dept: SURGERY | Facility: CLINIC | Age: 42
End: 2022-02-15

## 2022-03-02 RX ORDER — METOCLOPRAMIDE 10 MG/1
10 TABLET ORAL ONCE
Status: CANCELLED | OUTPATIENT
Start: 2022-03-02 | End: 2022-03-02

## 2022-03-05 ENCOUNTER — LAB ENCOUNTER (OUTPATIENT)
Dept: LAB | Age: 42
End: 2022-03-05
Attending: SINGLE SPECIALTY
Payer: COMMERCIAL

## 2022-03-05 DIAGNOSIS — Z01.818 PREOP TESTING: ICD-10-CM

## 2022-03-05 PROCEDURE — 86901 BLOOD TYPING SEROLOGIC RH(D): CPT

## 2022-03-05 PROCEDURE — 86850 RBC ANTIBODY SCREEN: CPT

## 2022-03-05 PROCEDURE — 86900 BLOOD TYPING SEROLOGIC ABO: CPT

## 2022-03-06 LAB
ANTIBODY SCREEN: NEGATIVE
RH BLOOD TYPE: NEGATIVE
RH BLOOD TYPE: NEGATIVE
SARS-COV-2 RNA RESP QL NAA+PROBE: NOT DETECTED

## 2022-03-07 ENCOUNTER — DOCUMENTATION ONLY (OUTPATIENT)
Dept: SURGERY | Facility: CLINIC | Age: 42
End: 2022-03-07

## 2022-03-07 ENCOUNTER — ANESTHESIA EVENT (OUTPATIENT)
Dept: SURGERY | Facility: HOSPITAL | Age: 42
DRG: 621 | End: 2022-03-07
Payer: COMMERCIAL

## 2022-03-07 ENCOUNTER — ANESTHESIA (OUTPATIENT)
Dept: SURGERY | Facility: HOSPITAL | Age: 42
DRG: 621 | End: 2022-03-07
Payer: COMMERCIAL

## 2022-03-07 ENCOUNTER — HOSPITAL ENCOUNTER (INPATIENT)
Facility: HOSPITAL | Age: 42
LOS: 2 days | Discharge: HOME | DRG: 621 | End: 2022-03-09
Attending: SINGLE SPECIALTY | Admitting: SINGLE SPECIALTY
Payer: COMMERCIAL

## 2022-03-07 DIAGNOSIS — Z01.818 PREOP TESTING: Primary | ICD-10-CM

## 2022-03-07 PROBLEM — E66.01 MORBID (SEVERE) OBESITY DUE TO EXCESS CALORIES (HCC): Status: ACTIVE | Noted: 2022-03-07

## 2022-03-07 LAB — B-HCG UR QL: NEGATIVE

## 2022-03-07 PROCEDURE — 99232 SBSQ HOSP IP/OBS MODERATE 35: CPT | Performed by: HOSPITALIST

## 2022-03-07 PROCEDURE — 3E0T3BZ INTRODUCTION OF ANESTHETIC AGENT INTO PERIPHERAL NERVES AND PLEXI, PERCUTANEOUS APPROACH: ICD-10-PCS | Performed by: STUDENT IN AN ORGANIZED HEALTH CARE EDUCATION/TRAINING PROGRAM

## 2022-03-07 PROCEDURE — 0DB64Z3 EXCISION OF STOMACH, PERCUTANEOUS ENDOSCOPIC APPROACH, VERTICAL: ICD-10-PCS | Performed by: SINGLE SPECIALTY

## 2022-03-07 RX ORDER — HEPARIN SODIUM 5000 [USP'U]/ML
5000 INJECTION, SOLUTION INTRAVENOUS; SUBCUTANEOUS ONCE
Status: COMPLETED | OUTPATIENT
Start: 2022-03-07 | End: 2022-03-07

## 2022-03-07 RX ORDER — MORPHINE SULFATE 4 MG/ML
2 INJECTION, SOLUTION INTRAMUSCULAR; INTRAVENOUS EVERY 10 MIN PRN
Status: DISCONTINUED | OUTPATIENT
Start: 2022-03-07 | End: 2022-03-07 | Stop reason: HOSPADM

## 2022-03-07 RX ORDER — KETOROLAC TROMETHAMINE 30 MG/ML
30 INJECTION, SOLUTION INTRAMUSCULAR; INTRAVENOUS EVERY 6 HOURS
Status: CANCELLED | OUTPATIENT
Start: 2022-03-07 | End: 2022-03-09

## 2022-03-07 RX ORDER — HYDROCODONE BITARTRATE AND ACETAMINOPHEN 5; 325 MG/1; MG/1
2 TABLET ORAL AS NEEDED
Status: DISCONTINUED | OUTPATIENT
Start: 2022-03-07 | End: 2022-03-07 | Stop reason: HOSPADM

## 2022-03-07 RX ORDER — TOPIRAMATE 25 MG/1
100 TABLET ORAL 2 TIMES DAILY
Status: DISCONTINUED | OUTPATIENT
Start: 2022-03-07 | End: 2022-03-09

## 2022-03-07 RX ORDER — MORPHINE SULFATE 4 MG/ML
4 INJECTION, SOLUTION INTRAMUSCULAR; INTRAVENOUS EVERY 2 HOUR PRN
Status: DISCONTINUED | OUTPATIENT
Start: 2022-03-07 | End: 2022-03-09

## 2022-03-07 RX ORDER — HALOPERIDOL 5 MG/ML
0.25 INJECTION INTRAMUSCULAR ONCE AS NEEDED
Status: DISCONTINUED | OUTPATIENT
Start: 2022-03-07 | End: 2022-03-07 | Stop reason: HOSPADM

## 2022-03-07 RX ORDER — GABAPENTIN 300 MG/1
300 CAPSULE ORAL ONCE
Status: COMPLETED | OUTPATIENT
Start: 2022-03-07 | End: 2022-03-07

## 2022-03-07 RX ORDER — HYDROCODONE BITARTRATE AND ACETAMINOPHEN 5; 325 MG/1; MG/1
1 TABLET ORAL AS NEEDED
Status: DISCONTINUED | OUTPATIENT
Start: 2022-03-07 | End: 2022-03-07 | Stop reason: HOSPADM

## 2022-03-07 RX ORDER — ACETAMINOPHEN 160 MG/5ML
1000 SOLUTION ORAL EVERY 8 HOURS
Status: DISCONTINUED | OUTPATIENT
Start: 2022-03-07 | End: 2022-03-09

## 2022-03-07 RX ORDER — MECLIZINE HYDROCHLORIDE 25 MG/1
25 TABLET ORAL 3 TIMES DAILY PRN
Status: DISCONTINUED | OUTPATIENT
Start: 2022-03-07 | End: 2022-03-09

## 2022-03-07 RX ORDER — LORAZEPAM 1 MG/1
1 TABLET ORAL 2 TIMES DAILY PRN
Status: DISCONTINUED | OUTPATIENT
Start: 2022-03-07 | End: 2022-03-09

## 2022-03-07 RX ORDER — PANTOPRAZOLE SODIUM 40 MG/1
40 TABLET, DELAYED RELEASE ORAL
Status: DISCONTINUED | OUTPATIENT
Start: 2022-03-08 | End: 2022-03-09

## 2022-03-07 RX ORDER — ENOXAPARIN SODIUM 100 MG/ML
40 INJECTION SUBCUTANEOUS DAILY
Status: DISCONTINUED | OUTPATIENT
Start: 2022-03-08 | End: 2022-03-09

## 2022-03-07 RX ORDER — ACETAMINOPHEN 500 MG
1000 TABLET ORAL ONCE
Status: COMPLETED | OUTPATIENT
Start: 2022-03-07 | End: 2022-03-07

## 2022-03-07 RX ORDER — MORPHINE SULFATE 2 MG/ML
1 INJECTION, SOLUTION INTRAMUSCULAR; INTRAVENOUS EVERY 2 HOUR PRN
Status: DISCONTINUED | OUTPATIENT
Start: 2022-03-07 | End: 2022-03-09

## 2022-03-07 RX ORDER — OXYCODONE HYDROCHLORIDE 5 MG/1
5 TABLET ORAL EVERY 6 HOURS PRN
Status: DISCONTINUED | OUTPATIENT
Start: 2022-03-07 | End: 2022-03-09

## 2022-03-07 RX ORDER — MORPHINE SULFATE 4 MG/ML
4 INJECTION, SOLUTION INTRAMUSCULAR; INTRAVENOUS EVERY 10 MIN PRN
Status: DISCONTINUED | OUTPATIENT
Start: 2022-03-07 | End: 2022-03-07 | Stop reason: HOSPADM

## 2022-03-07 RX ORDER — SODIUM CHLORIDE, SODIUM LACTATE, POTASSIUM CHLORIDE, CALCIUM CHLORIDE 600; 310; 30; 20 MG/100ML; MG/100ML; MG/100ML; MG/100ML
INJECTION, SOLUTION INTRAVENOUS CONTINUOUS
Status: DISCONTINUED | OUTPATIENT
Start: 2022-03-07 | End: 2022-03-09

## 2022-03-07 RX ORDER — PROCHLORPERAZINE EDISYLATE 5 MG/ML
5 INJECTION INTRAMUSCULAR; INTRAVENOUS ONCE AS NEEDED
Status: COMPLETED | OUTPATIENT
Start: 2022-03-07 | End: 2022-03-07

## 2022-03-07 RX ORDER — LIDOCAINE HYDROCHLORIDE 10 MG/ML
INJECTION, SOLUTION EPIDURAL; INFILTRATION; INTRACAUDAL; PERINEURAL AS NEEDED
Status: DISCONTINUED | OUTPATIENT
Start: 2022-03-07 | End: 2022-03-07 | Stop reason: SURG

## 2022-03-07 RX ORDER — SCOLOPAMINE TRANSDERMAL SYSTEM 1 MG/1
1 PATCH, EXTENDED RELEASE TRANSDERMAL
Status: DISCONTINUED | OUTPATIENT
Start: 2022-03-07 | End: 2022-03-09

## 2022-03-07 RX ORDER — LAMOTRIGINE 25 MG/1
25 TABLET ORAL 2 TIMES DAILY
Status: DISCONTINUED | OUTPATIENT
Start: 2022-03-07 | End: 2022-03-09

## 2022-03-07 RX ORDER — ALBUTEROL SULFATE 90 UG/1
1 AEROSOL, METERED RESPIRATORY (INHALATION) EVERY 4 HOURS PRN
Status: DISCONTINUED | OUTPATIENT
Start: 2022-03-07 | End: 2022-03-09

## 2022-03-07 RX ORDER — MORPHINE SULFATE 10 MG/ML
6 INJECTION, SOLUTION INTRAMUSCULAR; INTRAVENOUS EVERY 10 MIN PRN
Status: DISCONTINUED | OUTPATIENT
Start: 2022-03-07 | End: 2022-03-07 | Stop reason: HOSPADM

## 2022-03-07 RX ORDER — LEVOTHYROXINE SODIUM 0.07 MG/1
75 TABLET ORAL
Status: DISCONTINUED | OUTPATIENT
Start: 2022-03-08 | End: 2022-03-09

## 2022-03-07 RX ORDER — HYDROMORPHONE HYDROCHLORIDE 1 MG/ML
0.6 INJECTION, SOLUTION INTRAMUSCULAR; INTRAVENOUS; SUBCUTANEOUS EVERY 5 MIN PRN
Status: DISCONTINUED | OUTPATIENT
Start: 2022-03-07 | End: 2022-03-07 | Stop reason: HOSPADM

## 2022-03-07 RX ORDER — PANTOPRAZOLE SODIUM 40 MG/1
40 TABLET, DELAYED RELEASE ORAL
Status: DISCONTINUED | OUTPATIENT
Start: 2022-03-08 | End: 2022-03-07

## 2022-03-07 RX ORDER — NALOXONE HYDROCHLORIDE 0.4 MG/ML
80 INJECTION, SOLUTION INTRAMUSCULAR; INTRAVENOUS; SUBCUTANEOUS AS NEEDED
Status: DISCONTINUED | OUTPATIENT
Start: 2022-03-07 | End: 2022-03-07 | Stop reason: HOSPADM

## 2022-03-07 RX ORDER — BUPIVACAINE HYDROCHLORIDE AND EPINEPHRINE 2.5; 5 MG/ML; UG/ML
INJECTION, SOLUTION INFILTRATION; PERINEURAL AS NEEDED
Status: DISCONTINUED | OUTPATIENT
Start: 2022-03-07 | End: 2022-03-07 | Stop reason: HOSPADM

## 2022-03-07 RX ORDER — HYDROMORPHONE HYDROCHLORIDE 1 MG/ML
0.4 INJECTION, SOLUTION INTRAMUSCULAR; INTRAVENOUS; SUBCUTANEOUS EVERY 5 MIN PRN
Status: DISCONTINUED | OUTPATIENT
Start: 2022-03-07 | End: 2022-03-07 | Stop reason: HOSPADM

## 2022-03-07 RX ORDER — SODIUM CHLORIDE, SODIUM LACTATE, POTASSIUM CHLORIDE, CALCIUM CHLORIDE 600; 310; 30; 20 MG/100ML; MG/100ML; MG/100ML; MG/100ML
INJECTION, SOLUTION INTRAVENOUS CONTINUOUS
Status: DISCONTINUED | OUTPATIENT
Start: 2022-03-07 | End: 2022-03-07 | Stop reason: HOSPADM

## 2022-03-07 RX ORDER — SERTRALINE HYDROCHLORIDE 100 MG/1
100 TABLET, FILM COATED ORAL DAILY
Status: DISCONTINUED | OUTPATIENT
Start: 2022-03-08 | End: 2022-03-09

## 2022-03-07 RX ORDER — ENOXAPARIN SODIUM 100 MG/ML
0.5 INJECTION SUBCUTANEOUS DAILY
Status: DISCONTINUED | OUTPATIENT
Start: 2022-03-08 | End: 2022-03-07

## 2022-03-07 RX ORDER — MORPHINE SULFATE 2 MG/ML
2 INJECTION, SOLUTION INTRAMUSCULAR; INTRAVENOUS EVERY 2 HOUR PRN
Status: DISCONTINUED | OUTPATIENT
Start: 2022-03-07 | End: 2022-03-09

## 2022-03-07 RX ORDER — ONDANSETRON 2 MG/ML
4 INJECTION INTRAMUSCULAR; INTRAVENOUS EVERY 6 HOURS PRN
Status: DISCONTINUED | OUTPATIENT
Start: 2022-03-07 | End: 2022-03-08

## 2022-03-07 RX ORDER — FAMOTIDINE 20 MG/1
20 TABLET, FILM COATED ORAL ONCE
Status: DISCONTINUED | OUTPATIENT
Start: 2022-03-07 | End: 2022-03-07 | Stop reason: HOSPADM

## 2022-03-07 RX ORDER — LAMOTRIGINE 100 MG/1
100 TABLET ORAL 2 TIMES DAILY
Status: DISCONTINUED | OUTPATIENT
Start: 2022-03-07 | End: 2022-03-09

## 2022-03-07 RX ORDER — ONDANSETRON 2 MG/ML
4 INJECTION INTRAMUSCULAR; INTRAVENOUS ONCE AS NEEDED
Status: COMPLETED | OUTPATIENT
Start: 2022-03-07 | End: 2022-03-07

## 2022-03-07 RX ORDER — HYDROMORPHONE HYDROCHLORIDE 1 MG/ML
0.2 INJECTION, SOLUTION INTRAMUSCULAR; INTRAVENOUS; SUBCUTANEOUS EVERY 5 MIN PRN
Status: DISCONTINUED | OUTPATIENT
Start: 2022-03-07 | End: 2022-03-07 | Stop reason: HOSPADM

## 2022-03-07 RX ORDER — DEXAMETHASONE SODIUM PHOSPHATE 4 MG/ML
VIAL (ML) INJECTION AS NEEDED
Status: DISCONTINUED | OUTPATIENT
Start: 2022-03-07 | End: 2022-03-07 | Stop reason: SURG

## 2022-03-07 RX ORDER — ROCURONIUM BROMIDE 10 MG/ML
INJECTION, SOLUTION INTRAVENOUS AS NEEDED
Status: DISCONTINUED | OUTPATIENT
Start: 2022-03-07 | End: 2022-03-07 | Stop reason: SURG

## 2022-03-07 RX ORDER — ONDANSETRON 2 MG/ML
INJECTION INTRAMUSCULAR; INTRAVENOUS AS NEEDED
Status: DISCONTINUED | OUTPATIENT
Start: 2022-03-07 | End: 2022-03-07 | Stop reason: SURG

## 2022-03-07 RX ORDER — CELECOXIB 200 MG/1
400 CAPSULE ORAL ONCE
Status: COMPLETED | OUTPATIENT
Start: 2022-03-07 | End: 2022-03-07

## 2022-03-07 RX ADMIN — SODIUM CHLORIDE, SODIUM LACTATE, POTASSIUM CHLORIDE, CALCIUM CHLORIDE: 600; 310; 30; 20 INJECTION, SOLUTION INTRAVENOUS at 10:51:00

## 2022-03-07 RX ADMIN — ROCURONIUM BROMIDE 10 MG: 10 INJECTION, SOLUTION INTRAVENOUS at 10:02:00

## 2022-03-07 RX ADMIN — ROCURONIUM BROMIDE 20 MG: 10 INJECTION, SOLUTION INTRAVENOUS at 10:17:00

## 2022-03-07 RX ADMIN — ONDANSETRON 4 MG: 2 INJECTION INTRAMUSCULAR; INTRAVENOUS at 09:37:00

## 2022-03-07 RX ADMIN — LIDOCAINE HYDROCHLORIDE 50 MG: 10 INJECTION, SOLUTION EPIDURAL; INFILTRATION; INTRACAUDAL; PERINEURAL at 09:37:00

## 2022-03-07 RX ADMIN — DEXAMETHASONE SODIUM PHOSPHATE 4 MG: 4 MG/ML VIAL (ML) INJECTION at 09:37:00

## 2022-03-07 RX ADMIN — ROCURONIUM BROMIDE 40 MG: 10 INJECTION, SOLUTION INTRAVENOUS at 09:46:00

## 2022-03-07 NOTE — PLAN OF CARE
Problem: Patient Centered Care  Goal: Patient preferences are identified and integrated in the patient's plan of care  Description: Interventions:  - What would you like us to know as we care for you?  I have sleep apnea  - Provide timely, complete, and accurate information to patient/family  - Incorporate patient and family knowledge, values, beliefs, and cultural backgrounds into the planning and delivery of care  - Encourage patient/family to participate in care and decision-making at the level they choose  - Honor patient and family perspectives and choices  Outcome: Progressing     Problem: Patient/Family Goals  Goal: Patient/Family Long Term Goal  Description: Patient's Long Term Goal: go home    Interventions:  - surgery  -medications  -ice  - See additional Care Plan goals for specific interventions  Outcome: Progressing  Goal: Patient/Family Short Term Goal  Description: Patient's Short Term Goal: pain management    Interventions:   - medications  -ice  -splinting  - See additional Care Plan goals for specific interventions  Outcome: Progressing     Problem: PAIN - ADULT  Goal: Verbalizes/displays adequate comfort level or patient's stated pain goal  Description: INTERVENTIONS:  - Encourage pt to monitor pain and request assistance  - Assess pain using appropriate pain scale  - Administer analgesics based on type and severity of pain and evaluate response  - Implement non-pharmacological measures as appropriate and evaluate response  - Consider cultural and social influences on pain and pain management  - Manage/alleviate anxiety  - Utilize distraction and/or relaxation techniques  - Monitor for opioid side effects  - Notify MD/LIP if interventions unsuccessful or patient reports new pain  - Anticipate increased pain with activity and pre-medicate as appropriate  Outcome: Progressing     Problem: DISCHARGE PLANNING  Goal: Discharge to home or other facility with appropriate resources  Description: INTERVENTIONS:  - Identify barriers to discharge w/pt and caregiver  - Include patient/family/discharge partner in discharge planning  - Arrange for needed discharge resources and transportation as appropriate  - Identify discharge learning needs (meds, wound care, etc)  - Arrange for interpreters to assist at discharge as needed  - Consider post-discharge preferences of patient/family/discharge partner  - Complete POLST form as appropriate  - Assess patient's ability to be responsible for managing their own health  - Refer to Case Management Department for coordinating discharge planning if the patient needs post-hospital services based on physician/LIP order or complex needs related to functional status, cognitive ability or social support system  Outcome: Progressing     Problem: GASTROINTESTINAL - ADULT  Goal: Achieves appropriate nutritional intake (bariatric)  Description: INTERVENTIONS:  - Monitor for over-consumption  - Identify factors contributing to increased intake, treat as appropriate  - Monitor I&O, WT and lab values  - Obtain nutritional consult as needed  - Evaluate psychosocial factors contributing to over-consumption  Outcome: Progressing     Problem: SKIN/TISSUE INTEGRITY - ADULT  Goal: Incision(s), wounds(s) or drain site(s) healing without S/S of infection  Description: INTERVENTIONS:  - Assess and document risk factors for pressure ulcer development  - Assess and document skin integrity  - Assess and document dressing/incision, wound bed, drain sites and surrounding tissue  - Implement wound care per orders  - Initiate isolation precautions as appropriate  - Initiate Pressure Ulcer prevention bundle as indicated  Outcome: Progressing  Resting in bed. Scopalamine patch in place. Denies nausea. Voiding up in bathroom. Standby assist. Oral intake encouraged. Able to verbalize needs, call light in reach, continue to monitor.

## 2022-03-07 NOTE — RESPIRATORY THERAPY NOTE
SSAHA ASSESSMENT:    Pt does have a previous diagnosis of SASHA. Pt does routinely use a CPAP device at home. This pt is suspected to be at high risk for SASHA     CPAP INITIATION:    Pt to be placed on CPAP: yes  RECOMENDATIONS:    Patient bring own tubing and mask.

## 2022-03-07 NOTE — ANESTHESIA PROCEDURE NOTES
Airway  Date/Time: 3/7/2022 9:38 AM  Urgency: Elective      General Information and Staff    Patient location during procedure: OR  Anesthesiologist: Wang Esposito MD  Resident/CRNA: Jovon Ray CRNA  Performed: CRNA     Indications and Patient Condition  Indications for airway management: anesthesia  Spontaneous ventilation: present  Sedation level: deep  Preoxygenated: yes  Patient position: sniffing  Mask difficulty assessment: 0 - not attempted    Final Airway Details  Final airway type: endotracheal airway      Successful airway: ETT  Cuffed: yes   Successful intubation technique: direct laryngoscopy  Endotracheal tube insertion site: oral  Blade: Radha  Blade size: #3  ETT size (mm): 7.5    Cormack-Lehane Classification: grade I - full view of glottis  Placement verified by: chest auscultation and capnometry   Measured from: teeth  ETT to teeth (cm): 22  Number of attempts at approach: 1    Additional Comments  Atraumatic / bite block in place

## 2022-03-07 NOTE — PROGRESS NOTES
Provided/reviewed bariatric post-op discharge instructions; stressed importance of fluids aim for 64 ozs/day, pt drank ~ 4 ozs so far; caring for incisions, activities/allowed/avoid; meds to take/avoid; importance of bariatric vits; pt planning to take Bariatric Choice; managing constipation; post-op fu; pt has appt next week w/ surgeon; signs and symptoms of concern; contact info; pt agreed and verbalized understanding.

## 2022-03-07 NOTE — OPERATIVE REPORT
Shilpa Schneider / Liliya Gongora / Juancho Aleman / Blake Ly / Liza Omer / Roxane Millard - [phone]  Answering Service [phone]        Date: 3/7/2022  Preop Diagnosis: Morbid Obesity secondary to excess calories   Postop Diagnosis: Morbid Obesity secondary to excess calories  Procedure: Laparoscopic sleeve gastrectomy  Surgeon: Bebo Nevarez  Assistant: Blake Ly  Anesthesia: GETA  EBL: 20 ml  Complications: None    Indications: Pt is a 36year old female who presents for elective bariatric surgery. she has been counseled regarding the treatment of severe obesity, the options of behavioral, medical, and surgical treatment plans, and the potential pros and cons of each. she understands the multidisciplinary approach to the treatment of obesity, and the need for long-term followup. Through a lengthy preoperative counseling process, she has elected for the sleeve gastrectomy as her procedure of choice. she understands the different options for bariatric surgery, the laparoscopic approach, the possibility conversion to open, the anticipated pain and recovery time, the anticipated weight loss, the pros and cons of each operation, and the risks of bariatric surgery associated, including but not limited to universal risks such as bleeding, infection, DVT, PE, cardiac and pulmonary complications, and the small risk of suffering a fatal complication/death, as well as risks specific to the staple line leak, and understands the risk of postoperative reflux. I discussed the possibility of encountering a hiatal hernia during the procedure in which case we may fix it in addition to the sleeve gastrectomy. she also understands the potential long term sequelae and complications such as marginal ulcer, stricture, postoperative reflux. she also understands the need for lifelong nutritional supplementation, and long-term follow-up, and agrees to proceed.     Operative Summary: Patient was brought to the operating room and placed under general anesthesia. Preoperative antibiotics and heparin were given and was secured to the operating room table. Prepped and draped in a sterile fashion. An Optiview technique was used to insert an 11 mm left paramedian trocar and the abdomen was insufflated and the patient tolerated the insufflation well throughout the entire case. Cursory examination of the abdomen was unremarkable on insertion of the laparoscope. The patient was then placed in steep reverse Trendelenburg position. A 5 mm left lateral trocar, a 5 mm right upper quadrant trocar, and a 12 mm right paramedian trocar were all inserted under direct vision. A Azam liver retractor was inserted through an epigastric port. 0.25% Marcaine was injected for local anesthesia during trocar placement as well as a bilateral TAP block. We began by mobilizing the greater curvature of the stomach using an Enseal device. We took this dissection all the way up along the greater curve and mobilize the entire fundus. When I encountered the spleen I could see that the stomach was quite closely adherent to it. Great care was taken to seal all the short gastrics in this region however despite that there was one area of significant bleeding. I initially held pressure and then later using the spatula cautery was able to stop that. I was pleased with the hemostasis however a piece of Surgicel was used to add additional security. The angle of His was carefully dissected out identifying the anterior border of the left aden. The posterior fundus was carefully mobilized off of the retroperitoneum. she had quite a significant amount of posterior fundic tissue that required mobilization. There was no hiatal hernia noted. Once the fundus was completely mobilized we continued our dissection distally along the greater curvature towards the antrum.   We stopped the dissection at the starting point of our sleeve gastrectomy, which was about 6 cm proximal to the pylorus. We then advanced a 40 Western Ara Visi G bougie that was inserted by anesthesia and guided along the lesser curve towards the antrum. We then performed our sleeve gastrectomy starting with sequential fires of the [place] stapler with Seamguard reinforcements. Along our sleeve, we ensured that we did not narrow the sleeve at the incisura and that we did not cause any twisting or corkscrewing of the sleeve along its path. We completed our sleeve with a total of 2 black loads and 3 green loads, taking care to ensure that we did not leave a significant posterior fundic pouch. The final seam guard was cut sharply, completing the sleeve gastrectomy. We ensured that there was excellent hemostasis along all dissection lines. The Visi G was pulled back a few centimeters and the antrum was occluded with atraumatic grasper. A leak test was performed by insufflating the VISI G under irrigated water. There was good distention of the sleeve. There is no evidence of any leak. There is no evidence of any bleeding along the staple lines. The irrigation was suctioned out. The [facility] liver retractor was removed under direct vision. The patient was flattened out. The sleeve gastrectomy was extracted from the right paramedian trocar site. The fascia at the extraction site was closed with 0 PDS suture passed with a Guardado Banter. Trocars were removed under direct vision. The skin of all incisions was closed with 4-0 Vicryl and Dermabond. Patient tolerated the procedure well and was brought to the postanesthesia recovery unit in stable condition.

## 2022-03-08 LAB
ANION GAP SERPL CALC-SCNC: 6 MMOL/L (ref 0–18)
BASOPHILS # BLD AUTO: 0.02 X10(3) UL (ref 0–0.2)
BASOPHILS NFR BLD AUTO: 0.1 %
BUN BLD-MCNC: 9 MG/DL (ref 7–18)
BUN/CREAT SERPL: 10.6 (ref 10–20)
CALCIUM BLD-MCNC: 8.6 MG/DL (ref 8.5–10.1)
CHLORIDE SERPL-SCNC: 111 MMOL/L (ref 98–112)
CO2 SERPL-SCNC: 21 MMOL/L (ref 21–32)
CREAT BLD-MCNC: 0.85 MG/DL
DEPRECATED RDW RBC AUTO: 46 FL (ref 35.1–46.3)
EOSINOPHIL # BLD AUTO: 0.01 X10(3) UL (ref 0–0.7)
EOSINOPHIL NFR BLD AUTO: 0.1 %
ERYTHROCYTE [DISTWIDTH] IN BLOOD BY AUTOMATED COUNT: 14.9 % (ref 11–15)
GLUCOSE BLD-MCNC: 110 MG/DL (ref 70–99)
HCT VFR BLD AUTO: 34.1 %
HGB BLD-MCNC: 10.5 G/DL
IMM GRANULOCYTES # BLD AUTO: 0.06 X10(3) UL (ref 0–1)
IMM GRANULOCYTES NFR BLD: 0.4 %
LYMPHOCYTES # BLD AUTO: 2.36 X10(3) UL (ref 1–4)
LYMPHOCYTES NFR BLD AUTO: 17.2 %
MCH RBC QN AUTO: 26 PG (ref 26–34)
MCHC RBC AUTO-ENTMCNC: 30.8 G/DL (ref 31–37)
MCV RBC AUTO: 84.4 FL
MONOCYTES # BLD AUTO: 0.94 X10(3) UL (ref 0.1–1)
MONOCYTES NFR BLD AUTO: 6.8 %
NEUTROPHILS # BLD AUTO: 10.34 X10 (3) UL (ref 1.5–7.7)
NEUTROPHILS # BLD AUTO: 10.34 X10(3) UL (ref 1.5–7.7)
NEUTROPHILS NFR BLD AUTO: 75.4 %
OSMOLALITY SERPL CALC.SUM OF ELEC: 285 MOSM/KG (ref 275–295)
PLATELET # BLD AUTO: 283 10(3)UL (ref 150–450)
POTASSIUM SERPL-SCNC: 3.9 MMOL/L (ref 3.5–5.1)
RBC # BLD AUTO: 4.04 X10(6)UL
SODIUM SERPL-SCNC: 138 MMOL/L (ref 136–145)
WBC # BLD AUTO: 13.7 X10(3) UL (ref 4–11)

## 2022-03-08 PROCEDURE — 99232 SBSQ HOSP IP/OBS MODERATE 35: CPT | Performed by: HOSPITALIST

## 2022-03-08 RX ORDER — DEXAMETHASONE SODIUM PHOSPHATE 4 MG/ML
4 VIAL (ML) INJECTION ONCE
Status: COMPLETED | OUTPATIENT
Start: 2022-03-08 | End: 2022-03-08

## 2022-03-08 RX ORDER — ONDANSETRON 2 MG/ML
4 INJECTION INTRAMUSCULAR; INTRAVENOUS EVERY 4 HOURS PRN
Status: DISCONTINUED | OUTPATIENT
Start: 2022-03-08 | End: 2022-03-09

## 2022-03-08 RX ORDER — HALOPERIDOL 5 MG/ML
0.5 INJECTION INTRAMUSCULAR EVERY 6 HOURS PRN
Status: DISCONTINUED | OUTPATIENT
Start: 2022-03-08 | End: 2022-03-09

## 2022-03-08 RX ORDER — OXYCODONE HYDROCHLORIDE 5 MG/1
5 TABLET ORAL EVERY 6 HOURS PRN
Qty: 10 TABLET | Refills: 0 | Status: SHIPPED | OUTPATIENT
Start: 2022-03-08

## 2022-03-08 RX ORDER — PANTOPRAZOLE SODIUM 40 MG/1
40 TABLET, DELAYED RELEASE ORAL
Qty: 90 TABLET | Refills: 0 | Status: SHIPPED | OUTPATIENT
Start: 2022-03-09

## 2022-03-08 RX ORDER — ACETAMINOPHEN 160 MG/5ML
1000 SOLUTION ORAL EVERY 8 HOURS
Qty: 473 ML | Refills: 2 | Status: SHIPPED | OUTPATIENT
Start: 2022-03-08

## 2022-03-08 NOTE — PLAN OF CARE
POD 1. A&O x4. CPAP on overnight. Remote tele. IVF infusing. Bariatric diet, not tolerating well. Nausea and vomiting managed with zofran PRN. Pain managed with morphine PRN. Up ambulating independently. Voiding freely. Dressing CDI. Call light within reach, safety measures in place. Problem: Patient Centered Care  Goal: Patient preferences are identified and integrated in the patient's plan of care  Description: Interventions:  - What would you like us to know as we care for you?  I have sleep apnea  - Provide timely, complete, and accurate information to patient/family  - Incorporate patient and family knowledge, values, beliefs, and cultural backgrounds into the planning and delivery of care  - Encourage patient/family to participate in care and decision-making at the level they choose  - Honor patient and family perspectives and choices  Outcome: Progressing     Problem: Patient/Family Goals  Goal: Patient/Family Long Term Goal  Description: Patient's Long Term Goal: go home    Interventions:  - surgery  -medications  -ice  - See additional Care Plan goals for specific interventions  Outcome: Progressing  Goal: Patient/Family Short Term Goal  Description: Patient's Short Term Goal: pain management    Interventions:   - medications  -ice  -splinting  - See additional Care Plan goals for specific interventions  Outcome: Progressing     Problem: PAIN - ADULT  Goal: Verbalizes/displays adequate comfort level or patient's stated pain goal  Description: INTERVENTIONS:  - Encourage pt to monitor pain and request assistance  - Assess pain using appropriate pain scale  - Administer analgesics based on type and severity of pain and evaluate response  - Implement non-pharmacological measures as appropriate and evaluate response  - Consider cultural and social influences on pain and pain management  - Manage/alleviate anxiety  - Utilize distraction and/or relaxation techniques  - Monitor for opioid side effects  - Notify MD/LIP if interventions unsuccessful or patient reports new pain  - Anticipate increased pain with activity and pre-medicate as appropriate  Outcome: Progressing     Problem: DISCHARGE PLANNING  Goal: Discharge to home or other facility with appropriate resources  Description: INTERVENTIONS:  - Identify barriers to discharge w/pt and caregiver  - Include patient/family/discharge partner in discharge planning  - Arrange for needed discharge resources and transportation as appropriate  - Identify discharge learning needs (meds, wound care, etc)  - Arrange for interpreters to assist at discharge as needed  - Consider post-discharge preferences of patient/family/discharge partner  - Complete POLST form as appropriate  - Assess patient's ability to be responsible for managing their own health  - Refer to Case Management Department for coordinating discharge planning if the patient needs post-hospital services based on physician/LIP order or complex needs related to functional status, cognitive ability or social support system  Outcome: Progressing     Problem: GASTROINTESTINAL - ADULT  Goal: Achieves appropriate nutritional intake (bariatric)  Description: INTERVENTIONS:  - Monitor for over-consumption  - Identify factors contributing to increased intake, treat as appropriate  - Monitor I&O, WT and lab values  - Obtain nutritional consult as needed  - Evaluate psychosocial factors contributing to over-consumption  Outcome: Progressing     Problem: SKIN/TISSUE INTEGRITY - ADULT  Goal: Incision(s), wounds(s) or drain site(s) healing without S/S of infection  Description: INTERVENTIONS:  - Assess and document risk factors for pressure ulcer development  - Assess and document skin integrity  - Assess and document dressing/incision, wound bed, drain sites and surrounding tissue  - Implement wound care per orders  - Initiate isolation precautions as appropriate  - Initiate Pressure Ulcer prevention bundle as indicated  Outcome: Progressing

## 2022-03-09 VITALS
SYSTOLIC BLOOD PRESSURE: 136 MMHG | HEIGHT: 66 IN | HEART RATE: 77 BPM | TEMPERATURE: 99 F | BODY MASS INDEX: 47.09 KG/M2 | OXYGEN SATURATION: 97 % | WEIGHT: 293 LBS | RESPIRATION RATE: 18 BRPM | DIASTOLIC BLOOD PRESSURE: 89 MMHG

## 2022-03-09 PROBLEM — Z01.818 PREOP TESTING: Status: ACTIVE | Noted: 2019-01-14

## 2022-03-09 LAB
ANION GAP SERPL CALC-SCNC: 6 MMOL/L (ref 0–18)
BASOPHILS NFR BLD AUTO: 0.5 %
BUN BLD-MCNC: 10 MG/DL (ref 7–18)
BUN/CREAT SERPL: 11.4 (ref 10–20)
CALCIUM BLD-MCNC: 8.5 MG/DL (ref 8.5–10.1)
CHLORIDE SERPL-SCNC: 112 MMOL/L (ref 98–112)
CO2 SERPL-SCNC: 24 MMOL/L (ref 21–32)
CREAT BLD-MCNC: 0.88 MG/DL
DEPRECATED RDW RBC AUTO: 47.4 FL (ref 35.1–46.3)
EOSINOPHIL # BLD AUTO: 0.02 X10(3) UL (ref 0–0.7)
EOSINOPHIL NFR BLD AUTO: 0.2 %
ERYTHROCYTE [DISTWIDTH] IN BLOOD BY AUTOMATED COUNT: 15.1 % (ref 11–15)
GLUCOSE BLD-MCNC: 92 MG/DL (ref 70–99)
HCT VFR BLD AUTO: 32.7 %
HGB BLD-MCNC: 10 G/DL
IMM GRANULOCYTES NFR BLD: 0.4 %
LYMPHOCYTES # BLD AUTO: 3.45 X10(3) UL (ref 1–4)
LYMPHOCYTES NFR BLD AUTO: 32.5 %
MCH RBC QN AUTO: 26.2 PG (ref 26–34)
MCHC RBC AUTO-ENTMCNC: 30.6 G/DL (ref 31–37)
MCV RBC AUTO: 85.6 FL
MONOCYTES # BLD AUTO: 0.66 X10(3) UL (ref 0.1–1)
MONOCYTES NFR BLD AUTO: 6.2 %
NEUTROPHILS # BLD AUTO: 6.39 X10 (3) UL (ref 1.5–7.7)
NEUTROPHILS # BLD AUTO: 6.39 X10(3) UL (ref 1.5–7.7)
NEUTROPHILS NFR BLD AUTO: 60.2 %
OSMOLALITY SERPL CALC.SUM OF ELEC: 293 MOSM/KG (ref 275–295)
PLATELET # BLD AUTO: 235 10(3)UL (ref 150–450)
POTASSIUM SERPL-SCNC: 3.8 MMOL/L (ref 3.5–5.1)
RBC # BLD AUTO: 3.82 X10(6)UL
SODIUM SERPL-SCNC: 142 MMOL/L (ref 136–145)
WBC # BLD AUTO: 10.6 X10(3) UL (ref 4–11)

## 2022-03-09 PROCEDURE — 99239 HOSP IP/OBS DSCHRG MGMT >30: CPT | Performed by: INTERNAL MEDICINE

## 2022-03-09 NOTE — PLAN OF CARE
POD 2. A&O x4. CPAP on overnight. Remote tele. IVF infusing. Bariatric diet. Nausea managed with zofran PRN. Pain managed with morphine PRN. Up ambulating independently. Voiding freely. Dressing CDI. Call light within reach, safety measures in place. Problem: Patient Centered Care  Goal: Patient preferences are identified and integrated in the patient's plan of care  Description: Interventions:  - What would you like us to know as we care for you?  I have sleep apnea  - Provide timely, complete, and accurate information to patient/family  - Incorporate patient and family knowledge, values, beliefs, and cultural backgrounds into the planning and delivery of care  - Encourage patient/family to participate in care and decision-making at the level they choose  - Honor patient and family perspectives and choices  Outcome: Progressing     Problem: Patient/Family Goals  Goal: Patient/Family Long Term Goal  Description: Patient's Long Term Goal: go home    Interventions:  - surgery  -medications  -ice  - See additional Care Plan goals for specific interventions  Outcome: Progressing  Goal: Patient/Family Short Term Goal  Description: Patient's Short Term Goal: pain management    Interventions:   - medications  -ice  -splinting  - See additional Care Plan goals for specific interventions  Outcome: Progressing     Problem: PAIN - ADULT  Goal: Verbalizes/displays adequate comfort level or patient's stated pain goal  Description: INTERVENTIONS:  - Encourage pt to monitor pain and request assistance  - Assess pain using appropriate pain scale  - Administer analgesics based on type and severity of pain and evaluate response  - Implement non-pharmacological measures as appropriate and evaluate response  - Consider cultural and social influences on pain and pain management  - Manage/alleviate anxiety  - Utilize distraction and/or relaxation techniques  - Monitor for opioid side effects  - Notify MD/LIP if interventions unsuccessful or patient reports new pain  - Anticipate increased pain with activity and pre-medicate as appropriate  Outcome: Progressing     Problem: DISCHARGE PLANNING  Goal: Discharge to home or other facility with appropriate resources  Description: INTERVENTIONS:  - Identify barriers to discharge w/pt and caregiver  - Include patient/family/discharge partner in discharge planning  - Arrange for needed discharge resources and transportation as appropriate  - Identify discharge learning needs (meds, wound care, etc)  - Arrange for interpreters to assist at discharge as needed  - Consider post-discharge preferences of patient/family/discharge partner  - Complete POLST form as appropriate  - Assess patient's ability to be responsible for managing their own health  - Refer to Case Management Department for coordinating discharge planning if the patient needs post-hospital services based on physician/LIP order or complex needs related to functional status, cognitive ability or social support system  Outcome: Progressing     Problem: GASTROINTESTINAL - ADULT  Goal: Achieves appropriate nutritional intake (bariatric)  Description: INTERVENTIONS:  - Monitor for over-consumption  - Identify factors contributing to increased intake, treat as appropriate  - Monitor I&O, WT and lab values  - Obtain nutritional consult as needed  - Evaluate psychosocial factors contributing to over-consumption  Outcome: Progressing     Problem: SKIN/TISSUE INTEGRITY - ADULT  Goal: Incision(s), wounds(s) or drain site(s) healing without S/S of infection  Description: INTERVENTIONS:  - Assess and document risk factors for pressure ulcer development  - Assess and document skin integrity  - Assess and document dressing/incision, wound bed, drain sites and surrounding tissue  - Implement wound care per orders  - Initiate isolation precautions as appropriate  - Initiate Pressure Ulcer prevention bundle as indicated  Outcome: Progressing

## 2022-03-09 NOTE — PLAN OF CARE
Patient has been ambulating w/ stand by assist. Has been tolerating diet today, no nausea or vomiting reported. Patient is voiding freely. Pain is being controlled w/oxycodone per pt she does not tolerate Tylenol. Lap sites to abdomen clean and intact. Has tele in place, uses CPAP at night. Plan is for patient to discharge home today. Prescriptions and discharge/bariatric diet instructions given to patient. Problem: Patient Centered Care  Goal: Patient preferences are identified and integrated in the patient's plan of care  Description: Interventions:  - What would you like us to know as we care for you?  I have sleep apnea  - Provide timely, complete, and accurate information to patient/family  - Incorporate patient and family knowledge, values, beliefs, and cultural backgrounds into the planning and delivery of care  - Encourage patient/family to participate in care and decision-making at the level they choose  - Honor patient and family perspectives and choices  Outcome: Adequate for Discharge     Problem: Patient/Family Goals  Goal: Patient/Family Long Term Goal  Description: Patient's Long Term Goal: go home    Interventions:  - surgery  -medications  -ice  - See additional Care Plan goals for specific interventions  Outcome: Adequate for Discharge  Goal: Patient/Family Short Term Goal  Description: Patient's Short Term Goal: pain management    Interventions:   - medications  -ice  -splinting  - See additional Care Plan goals for specific interventions  Outcome: Adequate for Discharge     Problem: PAIN - ADULT  Goal: Verbalizes/displays adequate comfort level or patient's stated pain goal  Description: INTERVENTIONS:  - Encourage pt to monitor pain and request assistance  - Assess pain using appropriate pain scale  - Administer analgesics based on type and severity of pain and evaluate response  - Implement non-pharmacological measures as appropriate and evaluate response  - Consider cultural and social influences on pain and pain management  - Manage/alleviate anxiety  - Utilize distraction and/or relaxation techniques  - Monitor for opioid side effects  - Notify MD/LIP if interventions unsuccessful or patient reports new pain  - Anticipate increased pain with activity and pre-medicate as appropriate  Outcome: Adequate for Discharge     Problem: DISCHARGE PLANNING  Goal: Discharge to home or other facility with appropriate resources  Description: INTERVENTIONS:  - Identify barriers to discharge w/pt and caregiver  - Include patient/family/discharge partner in discharge planning  - Arrange for needed discharge resources and transportation as appropriate  - Identify discharge learning needs (meds, wound care, etc)  - Arrange for interpreters to assist at discharge as needed  - Consider post-discharge preferences of patient/family/discharge partner  - Complete POLST form as appropriate  - Assess patient's ability to be responsible for managing their own health  - Refer to Case Management Department for coordinating discharge planning if the patient needs post-hospital services based on physician/LIP order or complex needs related to functional status, cognitive ability or social support system  Outcome: Adequate for Discharge     Problem: GASTROINTESTINAL - ADULT  Goal: Achieves appropriate nutritional intake (bariatric)  Description: INTERVENTIONS:  - Monitor for over-consumption  - Identify factors contributing to increased intake, treat as appropriate  - Monitor I&O, WT and lab values  - Obtain nutritional consult as needed  - Evaluate psychosocial factors contributing to over-consumption  Outcome: Adequate for Discharge     Problem: SKIN/TISSUE INTEGRITY - ADULT  Goal: Incision(s), wounds(s) or drain site(s) healing without S/S of infection  Description: INTERVENTIONS:  - Assess and document risk factors for pressure ulcer development  - Assess and document skin integrity  - Assess and document dressing/incision, wound bed, drain sites and surrounding tissue  - Implement wound care per orders  - Initiate isolation precautions as appropriate  - Initiate Pressure Ulcer prevention bundle as indicated  Outcome: Adequate for Discharge

## 2022-03-10 ENCOUNTER — TELEPHONE (OUTPATIENT)
Dept: SURGERY | Facility: CLINIC | Age: 42
End: 2022-03-10

## 2022-03-10 NOTE — TELEPHONE ENCOUNTER
1. Any major problems? Is the abdominal pain to the point where you still need narcotic pain medication? A. If YES,**How much and how often are you taking it? **Describe the pain- location,type,etc.    3. Any of the below? Fever/ Trouble breathing/ Chest pain/ Cough/ Calf pain / Heart racing      Check heart rate-if patient able to do ______  -If heart rate> 110--page the surgeon     4. Any redness or drainage from the incision that is more than a dab or fluid on the band-aid? If YES, Does the redness extend past the incision more than cm? Is there tenderness or extreme sensitivity of the area around the incision? If YES, to either-- Page surgeon    5. How much fluid are you taking in daily? Any vomiting or retching with fluids? If any major issues--page surgeon     6. What stage of the diet are you up to? Any major issues with diet? If YES--page surgeon     7. How much protein are you taking in daily? 8. Are you walking around ok? 9. Do you have your follow-up appointment schedule?

## 2022-03-16 ENCOUNTER — OFFICE VISIT (OUTPATIENT)
Dept: SURGERY | Facility: CLINIC | Age: 42
End: 2022-03-16
Payer: COMMERCIAL

## 2022-03-16 ENCOUNTER — DOCUMENTATION ONLY (OUTPATIENT)
Dept: SURGERY | Facility: CLINIC | Age: 42
End: 2022-03-16

## 2022-03-16 VITALS
BODY MASS INDEX: 47.09 KG/M2 | HEART RATE: 111 BPM | DIASTOLIC BLOOD PRESSURE: 74 MMHG | SYSTOLIC BLOOD PRESSURE: 120 MMHG | OXYGEN SATURATION: 97 % | HEIGHT: 66.2 IN | WEIGHT: 293 LBS

## 2022-03-16 DIAGNOSIS — Z98.84 S/P LAPAROSCOPIC SLEEVE GASTRECTOMY: Primary | ICD-10-CM

## 2022-03-16 NOTE — PROGRESS NOTES
Provided/reviewed bariatric post-op orientation and Bariatric HELP card; instructed pt to aim for 64 ozs fluids/day; pt drinking ~ 55 ozs/day; taking Bariatric Choice 4x/day; meds to take/avoid; activities/allowed/avoid; signs and symptoms of concern; contact info; also instructed pt to keep card in wallet and in the unlikely event pt ends up int ED/IC/UC to present card to MD and inform bariatric staff; pt agreed and verbalized understanding.

## 2022-03-29 RX ORDER — LEVOTHYROXINE SODIUM 0.07 MG/1
TABLET ORAL
Qty: 90 TABLET | Refills: 0 | Status: SHIPPED | OUTPATIENT
Start: 2022-03-29

## 2022-04-05 ENCOUNTER — OFFICE VISIT (OUTPATIENT)
Dept: SURGERY | Facility: CLINIC | Age: 42
End: 2022-04-05
Payer: COMMERCIAL

## 2022-04-05 VITALS — HEIGHT: 66.2 IN | BODY MASS INDEX: 47.09 KG/M2 | WEIGHT: 293 LBS

## 2022-04-05 DIAGNOSIS — Z98.84 S/P LAPAROSCOPIC SLEEVE GASTRECTOMY: ICD-10-CM

## 2022-04-05 DIAGNOSIS — E66.01 MORBID OBESITY WITH BMI OF 50.0-59.9, ADULT (HCC): ICD-10-CM

## 2022-04-05 DIAGNOSIS — G47.33 OSA (OBSTRUCTIVE SLEEP APNEA): ICD-10-CM

## 2022-04-05 PROCEDURE — 97803 MED NUTRITION INDIV SUBSEQ: CPT

## 2022-04-05 PROCEDURE — 3008F BODY MASS INDEX DOCD: CPT

## 2022-04-05 NOTE — PATIENT INSTRUCTIONS
Recommendations/goals:    1. Continue keeping the food records, MND.  2.  Continue getting in the protein, experiment more with high protein food rely less on protein shakes. 3. Strive to get in 64 oz of water or low franki beverage per day. ([employer] at work)  4. Continue the exercise. 5.  Look at Bariatric Choice MV 4 per day.

## 2022-04-07 RX ORDER — PANTOPRAZOLE SODIUM 40 MG/1
TABLET, DELAYED RELEASE ORAL
Qty: 90 TABLET | Refills: 0 | OUTPATIENT
Start: 2022-04-07

## 2022-04-16 ENCOUNTER — LAB ENCOUNTER (OUTPATIENT)
Dept: LAB | Age: 42
End: 2022-04-16
Attending: PHYSICIAN ASSISTANT
Payer: COMMERCIAL

## 2022-04-16 DIAGNOSIS — R73.03 PREDIABETES: ICD-10-CM

## 2022-04-16 DIAGNOSIS — Z98.84 STATUS POST BARIATRIC SURGERY: ICD-10-CM

## 2022-04-16 DIAGNOSIS — E03.9 HYPOTHYROIDISM, UNSPECIFIED TYPE: ICD-10-CM

## 2022-04-16 DIAGNOSIS — R53.83 FATIGUE, UNSPECIFIED TYPE: ICD-10-CM

## 2022-04-16 LAB
ALBUMIN SERPL-MCNC: 3.5 G/DL (ref 3.4–5)
ALBUMIN/GLOB SERPL: 1 {RATIO} (ref 1–2)
ALP LIVER SERPL-CCNC: 83 U/L
ALT SERPL-CCNC: 18 U/L
ANION GAP SERPL CALC-SCNC: 6 MMOL/L (ref 0–18)
AST SERPL-CCNC: 13 U/L (ref 15–37)
BASOPHILS # BLD AUTO: 0.06 X10(3) UL (ref 0–0.2)
BASOPHILS NFR BLD AUTO: 0.7 %
BILIRUB SERPL-MCNC: 0.2 MG/DL (ref 0.1–2)
BUN BLD-MCNC: 9 MG/DL (ref 7–18)
CALCIUM BLD-MCNC: 9.1 MG/DL (ref 8.5–10.1)
CHLORIDE SERPL-SCNC: 112 MMOL/L (ref 98–112)
CO2 SERPL-SCNC: 23 MMOL/L (ref 21–32)
CREAT BLD-MCNC: 0.99 MG/DL
DEPRECATED HBV CORE AB SER IA-ACNC: 33 NG/ML
EOSINOPHIL # BLD AUTO: 0.2 X10(3) UL (ref 0–0.7)
EOSINOPHIL NFR BLD AUTO: 2.2 %
ERYTHROCYTE [DISTWIDTH] IN BLOOD BY AUTOMATED COUNT: 16 %
EST. AVERAGE GLUCOSE BLD GHB EST-MCNC: 120 MG/DL (ref 68–126)
FASTING STATUS PATIENT QL REPORTED: YES
FOLATE SERPL-MCNC: 18.1 NG/ML (ref 8.7–?)
GLOBULIN PLAS-MCNC: 3.6 G/DL (ref 2.8–4.4)
GLUCOSE BLD-MCNC: 85 MG/DL (ref 70–99)
HBA1C MFR BLD: 5.8 % (ref ?–5.7)
HCT VFR BLD AUTO: 40.4 %
HGB BLD-MCNC: 12.3 G/DL
IMM GRANULOCYTES # BLD AUTO: 0.02 X10(3) UL (ref 0–1)
IMM GRANULOCYTES NFR BLD: 0.2 %
IRON SATN MFR SERPL: 12 %
IRON SERPL-MCNC: 36 UG/DL
LYMPHOCYTES # BLD AUTO: 2.61 X10(3) UL (ref 1–4)
LYMPHOCYTES NFR BLD AUTO: 28.3 %
MCH RBC QN AUTO: 26.1 PG (ref 26–34)
MCHC RBC AUTO-ENTMCNC: 30.4 G/DL (ref 31–37)
MCV RBC AUTO: 85.6 FL
MONOCYTES # BLD AUTO: 0.46 X10(3) UL (ref 0.1–1)
MONOCYTES NFR BLD AUTO: 5 %
NEUTROPHILS # BLD AUTO: 5.88 X10 (3) UL (ref 1.5–7.7)
NEUTROPHILS # BLD AUTO: 5.88 X10(3) UL (ref 1.5–7.7)
NEUTROPHILS NFR BLD AUTO: 63.6 %
OSMOLALITY SERPL CALC.SUM OF ELEC: 290 MOSM/KG (ref 275–295)
PLATELET # BLD AUTO: 293 10(3)UL (ref 150–450)
POTASSIUM SERPL-SCNC: 4 MMOL/L (ref 3.5–5.1)
PROT SERPL-MCNC: 7.1 G/DL (ref 6.4–8.2)
RBC # BLD AUTO: 4.72 X10(6)UL
SODIUM SERPL-SCNC: 141 MMOL/L (ref 136–145)
TIBC SERPL-MCNC: 313 UG/DL (ref 240–450)
TRANSFERRIN SERPL-MCNC: 210 MG/DL (ref 200–360)
TSI SER-ACNC: 2.32 MIU/ML (ref 0.36–3.74)
VIT B12 SERPL-MCNC: 1014 PG/ML (ref 193–986)
VIT D+METAB SERPL-MCNC: 44.5 NG/ML (ref 30–100)
WBC # BLD AUTO: 9.2 X10(3) UL (ref 4–11)

## 2022-04-16 PROCEDURE — 36415 COLL VENOUS BLD VENIPUNCTURE: CPT

## 2022-04-16 PROCEDURE — 83550 IRON BINDING TEST: CPT

## 2022-04-16 PROCEDURE — 85025 COMPLETE CBC W/AUTO DIFF WBC: CPT

## 2022-04-16 PROCEDURE — 83036 HEMOGLOBIN GLYCOSYLATED A1C: CPT

## 2022-04-16 PROCEDURE — 82306 VITAMIN D 25 HYDROXY: CPT

## 2022-04-16 PROCEDURE — 82728 ASSAY OF FERRITIN: CPT

## 2022-04-16 PROCEDURE — 82607 VITAMIN B-12: CPT

## 2022-04-16 PROCEDURE — 84425 ASSAY OF VITAMIN B-1: CPT

## 2022-04-16 PROCEDURE — 83540 ASSAY OF IRON: CPT

## 2022-04-16 PROCEDURE — 84443 ASSAY THYROID STIM HORMONE: CPT

## 2022-04-16 PROCEDURE — 80053 COMPREHEN METABOLIC PANEL: CPT

## 2022-04-16 PROCEDURE — 82746 ASSAY OF FOLIC ACID SERUM: CPT

## 2022-04-18 ENCOUNTER — OFFICE VISIT (OUTPATIENT)
Dept: INTERNAL MEDICINE CLINIC | Facility: CLINIC | Age: 42
End: 2022-04-18
Payer: COMMERCIAL

## 2022-04-18 VITALS
HEIGHT: 66.2 IN | TEMPERATURE: 99 F | SYSTOLIC BLOOD PRESSURE: 104 MMHG | RESPIRATION RATE: 16 BRPM | BODY MASS INDEX: 47.09 KG/M2 | OXYGEN SATURATION: 97 % | WEIGHT: 293 LBS | HEART RATE: 92 BPM | DIASTOLIC BLOOD PRESSURE: 72 MMHG

## 2022-04-18 DIAGNOSIS — F31.77 BIPOLAR DISORDER, IN PARTIAL REMISSION, MOST RECENT EPISODE MIXED (HCC): ICD-10-CM

## 2022-04-18 DIAGNOSIS — R53.83 FATIGUE, UNSPECIFIED TYPE: ICD-10-CM

## 2022-04-18 DIAGNOSIS — E03.9 HYPOTHYROIDISM, UNSPECIFIED TYPE: ICD-10-CM

## 2022-04-18 DIAGNOSIS — E66.01 MORBID OBESITY WITH BMI OF 50.0-59.9, ADULT (HCC): ICD-10-CM

## 2022-04-18 DIAGNOSIS — G47.33 OSA (OBSTRUCTIVE SLEEP APNEA): ICD-10-CM

## 2022-04-18 DIAGNOSIS — E61.1 IRON DEFICIENCY: ICD-10-CM

## 2022-04-18 DIAGNOSIS — R73.03 PREDIABETES: ICD-10-CM

## 2022-04-18 DIAGNOSIS — Z98.84 STATUS POST BARIATRIC SURGERY: Primary | ICD-10-CM

## 2022-04-18 DIAGNOSIS — J45.40 MODERATE PERSISTENT ASTHMA WITHOUT COMPLICATION: ICD-10-CM

## 2022-04-18 PROCEDURE — 99214 OFFICE O/P EST MOD 30 MIN: CPT | Performed by: PHYSICIAN ASSISTANT

## 2022-04-18 PROCEDURE — 3008F BODY MASS INDEX DOCD: CPT | Performed by: PHYSICIAN ASSISTANT

## 2022-04-18 PROCEDURE — 3074F SYST BP LT 130 MM HG: CPT | Performed by: PHYSICIAN ASSISTANT

## 2022-04-18 PROCEDURE — 3078F DIAST BP <80 MM HG: CPT | Performed by: PHYSICIAN ASSISTANT

## 2022-04-18 NOTE — PATIENT INSTRUCTIONS
Discuss iron dose with surgeon / dietician. Follow up with pulm / sleep med regarding CPAP settings (need for titration study due to weight loss?). Continue to follow with psychiatry.     Follow up visit with Jeane Coffman in December for your wellness exam.

## 2022-04-20 ENCOUNTER — OFFICE VISIT (OUTPATIENT)
Dept: SURGERY | Facility: CLINIC | Age: 42
End: 2022-04-20
Payer: COMMERCIAL

## 2022-04-20 VITALS
OXYGEN SATURATION: 97 % | HEART RATE: 97 BPM | SYSTOLIC BLOOD PRESSURE: 122 MMHG | HEIGHT: 66.2 IN | WEIGHT: 293 LBS | DIASTOLIC BLOOD PRESSURE: 80 MMHG | BODY MASS INDEX: 47.09 KG/M2

## 2022-04-20 DIAGNOSIS — Z98.84 S/P LAPAROSCOPIC SLEEVE GASTRECTOMY: ICD-10-CM

## 2022-04-20 DIAGNOSIS — E55.9 VITAMIN D DEFICIENCY: ICD-10-CM

## 2022-04-20 DIAGNOSIS — E66.01 MORBID (SEVERE) OBESITY DUE TO EXCESS CALORIES (HCC): ICD-10-CM

## 2022-04-20 DIAGNOSIS — E53.8 VITAMIN B12 DEFICIENCY: ICD-10-CM

## 2022-04-20 DIAGNOSIS — G47.33 OSA (OBSTRUCTIVE SLEEP APNEA): Primary | ICD-10-CM

## 2022-04-22 LAB — VITAMIN B1 (THIAMINE), WHOLE B: 73 NMOL/L

## 2022-04-23 ENCOUNTER — HOSPITAL ENCOUNTER (OUTPATIENT)
Dept: MAMMOGRAPHY | Age: 42
Discharge: HOME | End: 2022-04-23
Attending: PHYSICIAN ASSISTANT
Payer: COMMERCIAL

## 2022-04-23 DIAGNOSIS — Z12.31 VISIT FOR SCREENING MAMMOGRAM: ICD-10-CM

## 2022-04-23 PROCEDURE — 77067 SCR MAMMO BI INCL CAD: CPT | Performed by: PHYSICIAN ASSISTANT

## 2022-04-23 PROCEDURE — 77063 BREAST TOMOSYNTHESIS BI: CPT | Performed by: PHYSICIAN ASSISTANT

## 2022-05-04 ENCOUNTER — APPOINTMENT (OUTPATIENT)
Dept: GENERAL RADIOLOGY | Age: 42
End: 2022-05-04
Attending: EMERGENCY MEDICINE
Payer: COMMERCIAL

## 2022-05-04 ENCOUNTER — HOSPITAL ENCOUNTER (OUTPATIENT)
Age: 42
Discharge: HOME | End: 2022-05-04
Attending: EMERGENCY MEDICINE
Payer: COMMERCIAL

## 2022-05-04 VITALS
SYSTOLIC BLOOD PRESSURE: 113 MMHG | WEIGHT: 293 LBS | BODY MASS INDEX: 47.09 KG/M2 | DIASTOLIC BLOOD PRESSURE: 84 MMHG | HEART RATE: 107 BPM | OXYGEN SATURATION: 97 % | TEMPERATURE: 97 F | RESPIRATION RATE: 20 BRPM | HEIGHT: 66 IN

## 2022-05-04 DIAGNOSIS — M19.90 OSTEOARTHRITIS, UNSPECIFIED OSTEOARTHRITIS TYPE, UNSPECIFIED SITE: Primary | ICD-10-CM

## 2022-05-04 PROCEDURE — 73562 X-RAY EXAM OF KNEE 3: CPT | Performed by: EMERGENCY MEDICINE

## 2022-05-04 PROCEDURE — 99213 OFFICE O/P EST LOW 20 MIN: CPT

## 2022-05-04 RX ORDER — HYDROCODONE BITARTRATE AND ACETAMINOPHEN 5; 325 MG/1; MG/1
1-2 TABLET ORAL EVERY 6 HOURS PRN
Qty: 10 TABLET | Refills: 0 | Status: SHIPPED | OUTPATIENT
Start: 2022-05-04 | End: 2022-05-11

## 2022-05-05 NOTE — ED INITIAL ASSESSMENT (HPI)
Pt with pain to R knee since Sat - pt felt a lump to R lateral knee today and states that is where the pain is; no injury/calf pain

## 2022-05-25 ENCOUNTER — OFFICE VISIT (OUTPATIENT)
Dept: SURGERY | Facility: CLINIC | Age: 42
End: 2022-05-25
Payer: COMMERCIAL

## 2022-05-25 VITALS
BODY MASS INDEX: 47.09 KG/M2 | OXYGEN SATURATION: 97 % | SYSTOLIC BLOOD PRESSURE: 112 MMHG | WEIGHT: 293 LBS | HEART RATE: 96 BPM | HEIGHT: 66.2 IN | DIASTOLIC BLOOD PRESSURE: 70 MMHG

## 2022-05-25 DIAGNOSIS — Z98.84 S/P LAPAROSCOPIC SLEEVE GASTRECTOMY: Primary | ICD-10-CM

## 2022-05-26 ENCOUNTER — OFFICE VISIT (OUTPATIENT)
Dept: INTERNAL MEDICINE CLINIC | Facility: CLINIC | Age: 42
End: 2022-05-26
Payer: COMMERCIAL

## 2022-05-26 VITALS
WEIGHT: 293 LBS | BODY MASS INDEX: 45.99 KG/M2 | RESPIRATION RATE: 16 BRPM | DIASTOLIC BLOOD PRESSURE: 72 MMHG | HEART RATE: 98 BPM | HEIGHT: 67 IN | SYSTOLIC BLOOD PRESSURE: 112 MMHG

## 2022-05-26 DIAGNOSIS — E66.01 MORBID OBESITY WITH BMI OF 50.0-59.9, ADULT (HCC): ICD-10-CM

## 2022-05-26 DIAGNOSIS — E03.9 HYPOTHYROIDISM, UNSPECIFIED TYPE: ICD-10-CM

## 2022-05-26 DIAGNOSIS — G47.33 OSA (OBSTRUCTIVE SLEEP APNEA): ICD-10-CM

## 2022-05-26 DIAGNOSIS — E61.1 IRON DEFICIENCY: ICD-10-CM

## 2022-05-26 DIAGNOSIS — Z51.81 THERAPEUTIC DRUG MONITORING: Primary | ICD-10-CM

## 2022-05-26 PROCEDURE — 3008F BODY MASS INDEX DOCD: CPT | Performed by: INTERNAL MEDICINE

## 2022-05-26 PROCEDURE — 3074F SYST BP LT 130 MM HG: CPT | Performed by: INTERNAL MEDICINE

## 2022-05-26 PROCEDURE — 99214 OFFICE O/P EST MOD 30 MIN: CPT | Performed by: INTERNAL MEDICINE

## 2022-05-26 PROCEDURE — 3078F DIAST BP <80 MM HG: CPT | Performed by: INTERNAL MEDICINE

## 2022-06-29 ENCOUNTER — OFFICE VISIT (OUTPATIENT)
Dept: INTERNAL MEDICINE CLINIC | Facility: CLINIC | Age: 42
End: 2022-06-29
Payer: COMMERCIAL

## 2022-06-29 VITALS
DIASTOLIC BLOOD PRESSURE: 72 MMHG | RESPIRATION RATE: 16 BRPM | BODY MASS INDEX: 46.53 KG/M2 | WEIGHT: 293 LBS | HEIGHT: 66.5 IN | SYSTOLIC BLOOD PRESSURE: 108 MMHG | OXYGEN SATURATION: 100 % | HEART RATE: 80 BPM | TEMPERATURE: 99 F

## 2022-06-29 DIAGNOSIS — R06.6 HICCUPS: ICD-10-CM

## 2022-06-29 DIAGNOSIS — G43.109 MIGRAINE WITH AURA AND WITHOUT STATUS MIGRAINOSUS, NOT INTRACTABLE: Primary | ICD-10-CM

## 2022-06-29 DIAGNOSIS — M94.0 COSTOCHONDRITIS: ICD-10-CM

## 2022-06-29 DIAGNOSIS — E66.01 MORBID OBESITY WITH BMI OF 45.0-49.9, ADULT (HCC): ICD-10-CM

## 2022-06-29 PROCEDURE — 3008F BODY MASS INDEX DOCD: CPT | Performed by: PHYSICIAN ASSISTANT

## 2022-06-29 PROCEDURE — 3074F SYST BP LT 130 MM HG: CPT | Performed by: PHYSICIAN ASSISTANT

## 2022-06-29 PROCEDURE — 99214 OFFICE O/P EST MOD 30 MIN: CPT | Performed by: PHYSICIAN ASSISTANT

## 2022-06-29 PROCEDURE — 3078F DIAST BP <80 MM HG: CPT | Performed by: PHYSICIAN ASSISTANT

## 2022-06-29 RX ORDER — SUMATRIPTAN 50 MG/1
TABLET, FILM COATED ORAL
Qty: 18 TABLET | Refills: 0 | Status: SHIPPED | OUTPATIENT
Start: 2022-06-29

## 2022-06-29 NOTE — PATIENT INSTRUCTIONS
Migraines:  - continue topamax 100 mg twice a day  - start Imitrex (sumatriptan) 50 mg - take 1 tablet at onset of headache. May take 2nd dose in 2 hours if needed. Do not exceed 2 tablets per 24 hours. Hiccups:    ? Breath holding for 5 to 10 seconds (or as tolerated). ? Performing Valsalva maneuver, holding for five seconds. ?Sipping on or gargling with very cold water. ? Biting into a lemon. ?Pulling on the tongue. ?Pressing gently but firmly on the eyeballs. ?While sitting, pulling the knees up to the chest (or leaning forward to compress the chest); hold the position for 30 seconds to one minute if possible. See Nesha Liz in January for your wellness visit.

## 2022-07-21 ENCOUNTER — TELEPHONE (OUTPATIENT)
Dept: SURGERY | Facility: CLINIC | Age: 42
End: 2022-07-21

## 2022-07-21 NOTE — TELEPHONE ENCOUNTER
Appointment audit/chart review phone call per protocol. Per records, patient has appointments as follows:  Surgeon 05/25/2022  Dietitian    Colonel Stewart 05/26/2022  Patient returned call and she did go to ED on 6/22/22 at [facility] for Costochondritis since surgery.

## 2022-07-28 ENCOUNTER — TELEPHONE (OUTPATIENT)
Dept: INTERNAL MEDICINE CLINIC | Facility: CLINIC | Age: 42
End: 2022-07-28

## 2022-07-28 DIAGNOSIS — G47.33 OSA (OBSTRUCTIVE SLEEP APNEA): ICD-10-CM

## 2022-07-28 DIAGNOSIS — R73.03 PREDIABETES: Primary | ICD-10-CM

## 2022-07-28 DIAGNOSIS — E66.01 MORBID OBESITY WITH BMI OF 45.0-49.9, ADULT (HCC): ICD-10-CM

## 2022-07-28 NOTE — TELEPHONE ENCOUNTER
Li Robles, [place],     Can you please enter a Dietitian referral for patient as she is seeing Leonarda Puckett next Tuesday. Reminder to include any cardiovascular risk factor dx in addition to obesity dx. No cardiac diagnosis to add.    DONE

## 2022-08-01 ENCOUNTER — OFFICE VISIT (OUTPATIENT)
Dept: INTERNAL MEDICINE CLINIC | Facility: CLINIC | Age: 42
End: 2022-08-01
Payer: COMMERCIAL

## 2022-08-01 VITALS
HEIGHT: 66.5 IN | TEMPERATURE: 98 F | WEIGHT: 288 LBS | RESPIRATION RATE: 18 BRPM | BODY MASS INDEX: 45.74 KG/M2 | OXYGEN SATURATION: 99 % | DIASTOLIC BLOOD PRESSURE: 66 MMHG | SYSTOLIC BLOOD PRESSURE: 100 MMHG | HEART RATE: 76 BPM

## 2022-08-01 DIAGNOSIS — R12 HEARTBURN: ICD-10-CM

## 2022-08-01 DIAGNOSIS — N93.0 POSTCOITAL BLEEDING: ICD-10-CM

## 2022-08-01 DIAGNOSIS — N93.9 ABNORMAL UTERINE BLEEDING: Primary | ICD-10-CM

## 2022-08-01 DIAGNOSIS — E66.01 MORBID OBESITY WITH BMI OF 45.0-49.9, ADULT (HCC): ICD-10-CM

## 2022-08-01 PROCEDURE — 3008F BODY MASS INDEX DOCD: CPT | Performed by: PHYSICIAN ASSISTANT

## 2022-08-01 PROCEDURE — 3074F SYST BP LT 130 MM HG: CPT | Performed by: PHYSICIAN ASSISTANT

## 2022-08-01 PROCEDURE — 3078F DIAST BP <80 MM HG: CPT | Performed by: PHYSICIAN ASSISTANT

## 2022-08-01 PROCEDURE — 99214 OFFICE O/P EST MOD 30 MIN: CPT | Performed by: PHYSICIAN ASSISTANT

## 2022-08-01 RX ORDER — PANTOPRAZOLE SODIUM 20 MG/1
20 TABLET, DELAYED RELEASE ORAL
Qty: 90 TABLET | Refills: 1 | Status: SHIPPED | OUTPATIENT
Start: 2022-08-01

## 2022-08-02 ENCOUNTER — OFFICE VISIT (OUTPATIENT)
Dept: SURGERY | Facility: CLINIC | Age: 42
End: 2022-08-02
Payer: COMMERCIAL

## 2022-08-02 VITALS — WEIGHT: 287.63 LBS | BODY MASS INDEX: 45.68 KG/M2 | HEIGHT: 66.5 IN

## 2022-08-02 DIAGNOSIS — E55.9 VITAMIN D DEFICIENCY: ICD-10-CM

## 2022-08-02 DIAGNOSIS — K21.9 GASTROESOPHAGEAL REFLUX DISEASE WITHOUT ESOPHAGITIS: ICD-10-CM

## 2022-08-02 DIAGNOSIS — Z98.84 S/P LAPAROSCOPIC SLEEVE GASTRECTOMY: ICD-10-CM

## 2022-08-02 DIAGNOSIS — G47.33 OSA (OBSTRUCTIVE SLEEP APNEA): ICD-10-CM

## 2022-08-02 DIAGNOSIS — E53.8 VITAMIN B12 DEFICIENCY: ICD-10-CM

## 2022-08-02 DIAGNOSIS — R73.03 PREDIABETES: ICD-10-CM

## 2022-08-02 DIAGNOSIS — E66.01 MORBID OBESITY WITH BMI OF 45.0-49.9, ADULT (HCC): ICD-10-CM

## 2022-08-02 PROCEDURE — 3008F BODY MASS INDEX DOCD: CPT

## 2022-08-02 PROCEDURE — 97803 MED NUTRITION INDIV SUBSEQ: CPT

## 2022-08-02 NOTE — PATIENT INSTRUCTIONS
Recommendations/goals:      1. Continue to keep food records, MND.  2.  Continue consuming protein and produce. 3.  Continue to drink 64 oz water per day. 4.  Continue the exercise and strength training.

## 2022-08-15 RX ORDER — LEVOTHYROXINE SODIUM 0.07 MG/1
TABLET ORAL
Qty: 90 TABLET | Refills: 0 | Status: SHIPPED | OUTPATIENT
Start: 2022-08-15

## 2022-08-27 ENCOUNTER — HOSPITAL ENCOUNTER (EMERGENCY)
Age: 42
Discharge: HOME | End: 2022-08-27
Attending: EMERGENCY MEDICINE
Payer: COMMERCIAL

## 2022-08-27 VITALS
DIASTOLIC BLOOD PRESSURE: 85 MMHG | WEIGHT: 279 LBS | BODY MASS INDEX: 44 KG/M2 | OXYGEN SATURATION: 98 % | HEART RATE: 70 BPM | TEMPERATURE: 98 F | RESPIRATION RATE: 17 BRPM | SYSTOLIC BLOOD PRESSURE: 112 MMHG

## 2022-08-27 DIAGNOSIS — G43.109 MIGRAINE WITH AURA AND WITHOUT STATUS MIGRAINOSUS, NOT INTRACTABLE: Primary | ICD-10-CM

## 2022-08-27 PROCEDURE — 96375 TX/PRO/DX INJ NEW DRUG ADDON: CPT

## 2022-08-27 PROCEDURE — 99284 EMERGENCY DEPT VISIT MOD MDM: CPT

## 2022-08-27 PROCEDURE — 96361 HYDRATE IV INFUSION ADD-ON: CPT

## 2022-08-27 PROCEDURE — 96374 THER/PROPH/DIAG INJ IV PUSH: CPT

## 2022-08-27 RX ORDER — ONDANSETRON 4 MG/1
4 TABLET, ORALLY DISINTEGRATING ORAL EVERY 4 HOURS PRN
Qty: 10 TABLET | Refills: 0 | Status: SHIPPED | OUTPATIENT
Start: 2022-08-27

## 2022-08-27 RX ORDER — KETOROLAC TROMETHAMINE 15 MG/ML
15 INJECTION, SOLUTION INTRAMUSCULAR; INTRAVENOUS ONCE
Status: COMPLETED | OUTPATIENT
Start: 2022-08-27 | End: 2022-08-27

## 2022-08-27 RX ORDER — DEXAMETHASONE SODIUM PHOSPHATE 4 MG/ML
10 VIAL (ML) INJECTION ONCE
Status: COMPLETED | OUTPATIENT
Start: 2022-08-27 | End: 2022-08-27

## 2022-08-27 RX ORDER — DIPHENHYDRAMINE HYDROCHLORIDE 50 MG/ML
50 INJECTION INTRAMUSCULAR; INTRAVENOUS ONCE
Status: COMPLETED | OUTPATIENT
Start: 2022-08-27 | End: 2022-08-27

## 2022-08-27 RX ORDER — METOCLOPRAMIDE HYDROCHLORIDE 5 MG/ML
10 INJECTION INTRAMUSCULAR; INTRAVENOUS ONCE
Status: COMPLETED | OUTPATIENT
Start: 2022-08-27 | End: 2022-08-27

## 2022-09-01 ENCOUNTER — HOSPITAL ENCOUNTER (OUTPATIENT)
Age: 42
Discharge: HOME | End: 2022-09-01
Payer: COMMERCIAL

## 2022-09-01 VITALS
SYSTOLIC BLOOD PRESSURE: 113 MMHG | DIASTOLIC BLOOD PRESSURE: 65 MMHG | BODY MASS INDEX: 44.2 KG/M2 | OXYGEN SATURATION: 98 % | TEMPERATURE: 99 F | WEIGHT: 275 LBS | RESPIRATION RATE: 18 BRPM | HEART RATE: 106 BPM | HEIGHT: 66 IN

## 2022-09-01 DIAGNOSIS — U07.1 COVID-19: Primary | ICD-10-CM

## 2022-09-01 LAB — SARS-COV-2 RNA RESP QL NAA+PROBE: DETECTED

## 2022-09-01 PROCEDURE — 99213 OFFICE O/P EST LOW 20 MIN: CPT

## 2022-09-02 NOTE — ED INITIAL ASSESSMENT (HPI)
Patient presents to IC with 4 day h/o cough,sinus congestion and sore throat. Fully vaxxed and one booster.

## 2022-09-14 ENCOUNTER — OFFICE VISIT (OUTPATIENT)
Dept: INTERNAL MEDICINE CLINIC | Facility: CLINIC | Age: 42
End: 2022-09-14
Payer: COMMERCIAL

## 2022-09-14 VITALS
HEIGHT: 66 IN | RESPIRATION RATE: 16 BRPM | HEART RATE: 89 BPM | DIASTOLIC BLOOD PRESSURE: 70 MMHG | BODY MASS INDEX: 44.22 KG/M2 | OXYGEN SATURATION: 99 % | WEIGHT: 275.19 LBS | SYSTOLIC BLOOD PRESSURE: 110 MMHG | TEMPERATURE: 99 F

## 2022-09-14 DIAGNOSIS — J30.9 ALLERGIC RHINITIS, UNSPECIFIED SEASONALITY, UNSPECIFIED TRIGGER: ICD-10-CM

## 2022-09-14 DIAGNOSIS — E66.01 MORBID OBESITY WITH BMI OF 40.0-44.9, ADULT (HCC): ICD-10-CM

## 2022-09-14 DIAGNOSIS — G43.109 MIGRAINE WITH AURA AND WITHOUT STATUS MIGRAINOSUS, NOT INTRACTABLE: Primary | ICD-10-CM

## 2022-09-14 PROCEDURE — 3078F DIAST BP <80 MM HG: CPT | Performed by: PHYSICIAN ASSISTANT

## 2022-09-14 PROCEDURE — 3008F BODY MASS INDEX DOCD: CPT | Performed by: PHYSICIAN ASSISTANT

## 2022-09-14 PROCEDURE — 99214 OFFICE O/P EST MOD 30 MIN: CPT | Performed by: PHYSICIAN ASSISTANT

## 2022-09-14 PROCEDURE — 3074F SYST BP LT 130 MM HG: CPT | Performed by: PHYSICIAN ASSISTANT

## 2022-09-14 RX ORDER — SUMATRIPTAN 100 MG/1
100 TABLET, FILM COATED ORAL EVERY 2 HOUR PRN
Qty: 9 TABLET | Refills: 1 | Status: SHIPPED | OUTPATIENT
Start: 2022-09-14

## 2022-09-14 NOTE — PATIENT INSTRUCTIONS
Migraine headache:  - continue topamax  - start sumatriptan 100 mg at onset of headache (can take 2nd dose in 2 hours if needed)  - follow up with neurology
Statement Selected

## 2022-10-26 ENCOUNTER — TELEPHONE (OUTPATIENT)
Dept: INTERNAL MEDICINE CLINIC | Facility: CLINIC | Age: 42
End: 2022-10-26

## 2022-10-26 NOTE — TELEPHONE ENCOUNTER
Spoke with patient-has been having confusion, difficulty w/word finding, dizziness, disorientation and depression with SI thoughts. She spoke with the pharmacisit and they said it could be from the dose of topiramate she is currently taking. Pt stated she was having SI thoughts last night. Denies any current SI. Was on the phone with her therapist last night. Symptoms started within the last week. Increase in headaches. Taking Imitrex-no relief this past weekend. Still on topiramate 50mg 2 tabs PO BID. Patient at work, with others-not alone. Stated she has a co-worker close by if needed. Advised to call 911 if s/s worsen. Verbalized understanding. CSSR completed-score of 4. Pt would like to speak with LL regarding decreasing topiramate medication. Can do VV but prefers telephone call today if LL can squeeze her in.

## 2022-10-26 NOTE — TELEPHONE ENCOUNTER
Spoke with patient. She has had thoughts of \"maybe I'd be better off dead\" but denies any plan for self harm. Currently at work, feels safe. Rec she call her psychiatry office Aileen Bran) today. Will send staff message as well. Pt instructed to seek immediate attention if symptoms persist or worsen. [place] [facility] [place] and we discussed going to ED as well.

## 2022-10-27 ENCOUNTER — PATIENT MESSAGE (OUTPATIENT)
Dept: INTERNAL MEDICINE CLINIC | Facility: CLINIC | Age: 42
End: 2022-10-27

## 2022-10-27 DIAGNOSIS — F43.22 ADJUSTMENT DISORDER WITH ANXIOUS MOOD: ICD-10-CM

## 2022-10-28 NOTE — TELEPHONE ENCOUNTER
From: Beatrice Church  To: DIANE Garcia  Sent: 10/27/2022 4:09 PM CDT  Subject: Toprimate    I have followed up with both my therapist and psychiatrist's offices in addressing the mental health concerns. I still am however, respectfully asking you start the safe process of tapering down/off the topiramate. I no longer consent to taking this medication (at least at this high dosage) for my migraines as the side effects outweigh the benefits. I understand that this medication cannot just be stopped and requires a taper. Since you are the provider, I need you to provide a safe tapering protocol for me.     Thank you

## 2022-10-29 ENCOUNTER — PATIENT MESSAGE (OUTPATIENT)
Dept: INTERNAL MEDICINE CLINIC | Facility: CLINIC | Age: 42
End: 2022-10-29

## 2022-10-29 RX ORDER — TOPIRAMATE 50 MG/1
TABLET, FILM COATED ORAL
COMMUNITY
Start: 2022-10-29

## 2022-10-29 RX ORDER — TOPIRAMATE 25 MG/1
TABLET ORAL
Qty: 42 TABLET | Refills: 0 | Status: SHIPPED | OUTPATIENT
Start: 2022-10-29

## 2022-10-31 NOTE — TELEPHONE ENCOUNTER
From: Andie Drake  To: DIANE Tobar  Sent: 10/27/2022 4:09 PM CDT  Subject: Toprimate    I have followed up with both my therapist and psychiatrist's offices in addressing the mental health concerns. I still am however, respectfully asking you start the safe process of tapering down/off the topiramate. I no longer consent to taking this medication (at least at this high dosage) for my migraines as the side effects outweigh the benefits. I understand that this medication cannot just be stopped and requires a taper. Since you are the provider, I need you to provide a safe tapering protocol for me.     Thank you

## 2022-11-03 ENCOUNTER — OFFICE VISIT (OUTPATIENT)
Dept: NEUROLOGY | Facility: CLINIC | Age: 42
End: 2022-11-03
Payer: COMMERCIAL

## 2022-11-03 VITALS
WEIGHT: 275 LBS | BODY MASS INDEX: 44.2 KG/M2 | HEART RATE: 71 BPM | RESPIRATION RATE: 16 BRPM | DIASTOLIC BLOOD PRESSURE: 60 MMHG | SYSTOLIC BLOOD PRESSURE: 120 MMHG | HEIGHT: 66 IN

## 2022-11-03 DIAGNOSIS — G43.909 EPISODIC MIGRAINE: Primary | ICD-10-CM

## 2022-11-03 PROCEDURE — 3074F SYST BP LT 130 MM HG: CPT | Performed by: OTHER

## 2022-11-03 PROCEDURE — 3078F DIAST BP <80 MM HG: CPT | Performed by: OTHER

## 2022-11-03 PROCEDURE — 3008F BODY MASS INDEX DOCD: CPT | Performed by: OTHER

## 2022-11-03 PROCEDURE — 99244 OFF/OP CNSLTJ NEW/EST MOD 40: CPT | Performed by: OTHER

## 2022-11-03 RX ORDER — ATOGEPANT 30 MG/1
30 TABLET ORAL DAILY
Qty: 30 TABLET | Refills: 2 | Status: SHIPPED | OUTPATIENT
Start: 2022-11-03

## 2022-11-07 ENCOUNTER — TELEPHONE (OUTPATIENT)
Dept: NEUROLOGY | Facility: CLINIC | Age: 42
End: 2022-11-07

## 2022-11-07 NOTE — TELEPHONE ENCOUNTER
PA for Quilipta 30mg  Tablet Approved from 11/3//2022 through 11/3/2023. RN faxed to pharmacy @ [phone]. Fax confirmation received.

## 2022-11-17 RX ORDER — LEVOTHYROXINE SODIUM 0.07 MG/1
TABLET ORAL
Qty: 90 TABLET | Refills: 0 | Status: SHIPPED | OUTPATIENT
Start: 2022-11-17

## 2022-11-23 ENCOUNTER — HOSPITAL ENCOUNTER (OUTPATIENT)
Age: 42
Discharge: HOME | End: 2022-11-23
Payer: COMMERCIAL

## 2022-11-23 VITALS
WEIGHT: 275 LBS | SYSTOLIC BLOOD PRESSURE: 111 MMHG | BODY MASS INDEX: 44 KG/M2 | HEART RATE: 70 BPM | DIASTOLIC BLOOD PRESSURE: 78 MMHG | RESPIRATION RATE: 16 BRPM | TEMPERATURE: 98 F | OXYGEN SATURATION: 100 %

## 2022-11-23 DIAGNOSIS — H81.10 BENIGN PAROXYSMAL POSITIONAL VERTIGO, UNSPECIFIED LATERALITY: Primary | ICD-10-CM

## 2022-11-23 LAB
#MXD IC: 0.8 X10ˆ3/UL (ref 0.1–1)
B-HCG UR QL: NEGATIVE
BUN BLD-MCNC: 11 MG/DL (ref 7–18)
CHLORIDE BLD-SCNC: 103 MMOL/L (ref 98–112)
CO2 BLD-SCNC: 27 MMOL/L (ref 21–32)
CREAT BLD-MCNC: 0.9 MG/DL
GFR SERPLBLD BASED ON 1.73 SQ M-ARVRAT: 81 ML/MIN/1.73M2 (ref 60–?)
GLUCOSE BLD-MCNC: 93 MG/DL (ref 70–99)
HCT VFR BLD AUTO: 41.1 %
HCT VFR BLD CALC: 39 %
HGB BLD-MCNC: 12.6 G/DL
ISTAT IONIZED CALCIUM FOR CHEM 8: 1.18 MMOL/L (ref 1.12–1.32)
LYMPHOCYTES # BLD AUTO: 3.7 X10ˆ3/UL (ref 1–4)
LYMPHOCYTES NFR BLD AUTO: 31.8 %
MCH RBC QN AUTO: 27.1 PG (ref 26–34)
MCHC RBC AUTO-ENTMCNC: 30.7 G/DL (ref 31–37)
MCV RBC AUTO: 88.4 FL (ref 80–100)
MIXED CELL %: 6.8 %
NEUTROPHILS # BLD AUTO: 7 X10ˆ3/UL (ref 1.5–7.7)
NEUTROPHILS NFR BLD AUTO: 61.4 %
PLATELET # BLD AUTO: 264 X10ˆ3/UL (ref 150–450)
POCT BILIRUBIN URINE: NEGATIVE
POCT BLOOD URINE: NEGATIVE
POCT GLUCOSE URINE: NEGATIVE MG/DL
POCT KETONE URINE: NEGATIVE MG/DL
POCT LEUKOCYTE ESTERASE URINE: NEGATIVE
POCT NITRITE URINE: NEGATIVE
POCT PH URINE: 7 (ref 5–8)
POCT PROTEIN URINE: NEGATIVE MG/DL
POCT SPECIFIC GRAVITY URINE: 1.03
POCT URINE COLOR: YELLOW
POCT UROBILINOGEN URINE: 0.2 MG/DL
POTASSIUM BLD-SCNC: 4.3 MMOL/L (ref 3.6–5.1)
RBC # BLD AUTO: 4.65 X10ˆ6/UL
SODIUM BLD-SCNC: 140 MMOL/L (ref 136–145)
WBC # BLD AUTO: 11.5 X10ˆ3/UL (ref 4–11)

## 2022-11-23 PROCEDURE — 81025 URINE PREGNANCY TEST: CPT

## 2022-11-23 PROCEDURE — 81002 URINALYSIS NONAUTO W/O SCOPE: CPT | Performed by: NURSE PRACTITIONER

## 2022-11-23 PROCEDURE — 96361 HYDRATE IV INFUSION ADD-ON: CPT

## 2022-11-23 PROCEDURE — 99214 OFFICE O/P EST MOD 30 MIN: CPT

## 2022-11-23 PROCEDURE — 80047 BASIC METABLC PNL IONIZED CA: CPT

## 2022-11-23 PROCEDURE — 85025 COMPLETE CBC W/AUTO DIFF WBC: CPT | Performed by: NURSE PRACTITIONER

## 2022-11-23 PROCEDURE — 96374 THER/PROPH/DIAG INJ IV PUSH: CPT

## 2022-11-23 PROCEDURE — 99215 OFFICE O/P EST HI 40 MIN: CPT

## 2022-11-23 RX ORDER — ONDANSETRON 2 MG/ML
4 INJECTION INTRAMUSCULAR; INTRAVENOUS ONCE
Status: COMPLETED | OUTPATIENT
Start: 2022-11-23 | End: 2022-11-23

## 2022-11-23 RX ORDER — MECLIZINE HCL 12.5 MG/1
50 TABLET ORAL ONCE
Status: COMPLETED | OUTPATIENT
Start: 2022-11-23 | End: 2022-11-23

## 2022-11-23 RX ORDER — MECLIZINE HYDROCHLORIDE 25 MG/1
25 TABLET ORAL 3 TIMES DAILY PRN
Qty: 20 TABLET | Refills: 0 | Status: SHIPPED | OUTPATIENT
Start: 2022-11-23

## 2022-11-23 RX ORDER — SODIUM CHLORIDE 9 MG/ML
1000 INJECTION, SOLUTION INTRAVENOUS ONCE
Status: COMPLETED | OUTPATIENT
Start: 2022-11-23 | End: 2022-11-23

## 2023-01-31 ENCOUNTER — HOSPITAL ENCOUNTER (OUTPATIENT)
Age: 43
Discharge: HOME | End: 2023-01-31
Payer: COMMERCIAL

## 2023-01-31 ENCOUNTER — APPOINTMENT (OUTPATIENT)
Dept: ULTRASOUND IMAGING | Age: 43
End: 2023-01-31
Attending: NURSE PRACTITIONER
Payer: COMMERCIAL

## 2023-01-31 ENCOUNTER — TELEPHONE (OUTPATIENT)
Dept: INTERNAL MEDICINE CLINIC | Facility: CLINIC | Age: 43
End: 2023-01-31

## 2023-01-31 VITALS
RESPIRATION RATE: 20 BRPM | SYSTOLIC BLOOD PRESSURE: 111 MMHG | OXYGEN SATURATION: 100 % | HEIGHT: 66 IN | DIASTOLIC BLOOD PRESSURE: 74 MMHG | HEART RATE: 66 BPM | WEIGHT: 272 LBS | TEMPERATURE: 97 F | BODY MASS INDEX: 43.71 KG/M2

## 2023-01-31 DIAGNOSIS — N93.9 EPISODE OF HEAVY VAGINAL BLEEDING: Primary | ICD-10-CM

## 2023-01-31 LAB
#MXD IC: 0.7 X10ˆ3/UL (ref 0.1–1)
B-HCG UR QL: NEGATIVE
BUN BLD-MCNC: 8 MG/DL (ref 7–18)
CHLORIDE BLD-SCNC: 101 MMOL/L (ref 98–112)
CO2 BLD-SCNC: 26 MMOL/L (ref 21–32)
CREAT BLD-MCNC: 1 MG/DL
GFR SERPLBLD BASED ON 1.73 SQ M-ARVRAT: 71 ML/MIN/1.73M2 (ref 60–?)
GLUCOSE BLD-MCNC: 88 MG/DL (ref 70–99)
HCT VFR BLD AUTO: 41.5 %
HCT VFR BLD CALC: 40 %
HGB BLD-MCNC: 13 G/DL
ISTAT IONIZED CALCIUM FOR CHEM 8: 1.17 MMOL/L (ref 1.12–1.32)
LYMPHOCYTES # BLD AUTO: 2.3 X10ˆ3/UL (ref 1–4)
LYMPHOCYTES NFR BLD AUTO: 31.8 %
MCH RBC QN AUTO: 27.6 PG (ref 26–34)
MCHC RBC AUTO-ENTMCNC: 31.3 G/DL (ref 31–37)
MCV RBC AUTO: 88.1 FL (ref 80–100)
MIXED CELL %: 10.1 %
NEUTROPHILS # BLD AUTO: 4.1 X10ˆ3/UL (ref 1.5–7.7)
NEUTROPHILS NFR BLD AUTO: 58.1 %
PLATELET # BLD AUTO: 272 X10ˆ3/UL (ref 150–450)
POCT GLUCOSE URINE: NEGATIVE MG/DL
POCT LEUKOCYTE ESTERASE URINE: NEGATIVE
POCT NITRITE URINE: NEGATIVE
POCT PH URINE: 6 (ref 5–8)
POCT PROTEIN URINE: NEGATIVE MG/DL
POCT SPECIFIC GRAVITY URINE: 1.02
POCT URINE COLOR: YELLOW
POCT UROBILINOGEN URINE: 0.2 MG/DL
POTASSIUM BLD-SCNC: 3.9 MMOL/L (ref 3.6–5.1)
RBC # BLD AUTO: 4.71 X10ˆ6/UL
SODIUM BLD-SCNC: 137 MMOL/L (ref 136–145)
WBC # BLD AUTO: 7.1 X10ˆ3/UL (ref 4–11)

## 2023-01-31 PROCEDURE — 85025 COMPLETE CBC W/AUTO DIFF WBC: CPT | Performed by: NURSE PRACTITIONER

## 2023-01-31 PROCEDURE — 76856 US EXAM PELVIC COMPLETE: CPT | Performed by: NURSE PRACTITIONER

## 2023-01-31 PROCEDURE — 99214 OFFICE O/P EST MOD 30 MIN: CPT

## 2023-01-31 PROCEDURE — 80047 BASIC METABLC PNL IONIZED CA: CPT

## 2023-01-31 PROCEDURE — 81025 URINE PREGNANCY TEST: CPT

## 2023-01-31 PROCEDURE — 99215 OFFICE O/P EST HI 40 MIN: CPT

## 2023-01-31 PROCEDURE — 81002 URINALYSIS NONAUTO W/O SCOPE: CPT | Performed by: NURSE PRACTITIONER

## 2023-01-31 PROCEDURE — 93975 VASCULAR STUDY: CPT | Performed by: NURSE PRACTITIONER

## 2023-01-31 PROCEDURE — 36415 COLL VENOUS BLD VENIPUNCTURE: CPT

## 2023-01-31 PROCEDURE — 76830 TRANSVAGINAL US NON-OB: CPT | Performed by: NURSE PRACTITIONER

## 2023-01-31 NOTE — ED QUICK NOTES
Pt states that she did not notice any clots when she went to the bathroom. Pt denies any cramping at this time.  Vitals echecked

## 2023-01-31 NOTE — DISCHARGE INSTRUCTIONS
Follow-up with gynecology. Referral provided. Call today for follow-up appointment. Tylenol and Motrin as needed for cramping. If you are bleeding through 2 pads an hour for 3-4 consecutive hours or you feel lightheaded/dizzy, or any syncope seek evaluation in the emergency room.

## 2023-01-31 NOTE — TELEPHONE ENCOUNTER
Pt called c/o heavy menstrual bleeding. Pt states heavy bleeding began last night. Pt also states she is experiencing cramping. Pt concerned because it is not normal for the bleeding to be so heavy and unusual clotting. No appts until Thursday, pt looking for recommendations.

## 2023-02-03 ENCOUNTER — OFFICE VISIT (OUTPATIENT)
Dept: OBGYN CLINIC | Facility: CLINIC | Age: 43
End: 2023-02-03
Payer: COMMERCIAL

## 2023-02-03 ENCOUNTER — LAB ENCOUNTER (OUTPATIENT)
Dept: LAB | Age: 43
End: 2023-02-03
Attending: NURSE PRACTITIONER
Payer: COMMERCIAL

## 2023-02-03 VITALS
SYSTOLIC BLOOD PRESSURE: 116 MMHG | HEART RATE: 82 BPM | HEIGHT: 66 IN | BODY MASS INDEX: 45.19 KG/M2 | DIASTOLIC BLOOD PRESSURE: 80 MMHG | WEIGHT: 281.19 LBS

## 2023-02-03 DIAGNOSIS — N92.0 MENORRHAGIA WITH REGULAR CYCLE: Primary | ICD-10-CM

## 2023-02-03 DIAGNOSIS — E03.9 HYPOTHYROIDISM, UNSPECIFIED TYPE: ICD-10-CM

## 2023-02-03 LAB — TSI SER-ACNC: 1.61 MIU/ML (ref 0.36–3.74)

## 2023-02-03 PROCEDURE — 84443 ASSAY THYROID STIM HORMONE: CPT | Performed by: NURSE PRACTITIONER

## 2023-02-03 PROCEDURE — 3074F SYST BP LT 130 MM HG: CPT | Performed by: NURSE PRACTITIONER

## 2023-02-03 PROCEDURE — 99202 OFFICE O/P NEW SF 15 MIN: CPT | Performed by: NURSE PRACTITIONER

## 2023-02-03 PROCEDURE — 3079F DIAST BP 80-89 MM HG: CPT | Performed by: NURSE PRACTITIONER

## 2023-02-03 PROCEDURE — 3008F BODY MASS INDEX DOCD: CPT | Performed by: NURSE PRACTITIONER

## 2023-02-03 RX ORDER — SERTRALINE HYDROCHLORIDE 100 MG/1
100 TABLET, FILM COATED ORAL DAILY
COMMUNITY
Start: 2023-01-24

## 2023-02-03 RX ORDER — HYDROCODONE BITARTRATE AND ACETAMINOPHEN 5; 325 MG/1; MG/1
TABLET ORAL
COMMUNITY
Start: 2023-01-29

## 2023-02-04 DIAGNOSIS — G43.909 EPISODIC MIGRAINE: ICD-10-CM

## 2023-02-06 RX ORDER — ATOGEPANT 30 MG/1
TABLET ORAL
Qty: 30 TABLET | Refills: 2 | Status: SHIPPED | OUTPATIENT
Start: 2023-02-06

## 2023-02-14 ENCOUNTER — OFFICE VISIT (OUTPATIENT)
Dept: INTERNAL MEDICINE CLINIC | Facility: CLINIC | Age: 43
End: 2023-02-14
Payer: COMMERCIAL

## 2023-02-14 VITALS
TEMPERATURE: 99 F | HEART RATE: 83 BPM | HEIGHT: 66.13 IN | RESPIRATION RATE: 16 BRPM | SYSTOLIC BLOOD PRESSURE: 100 MMHG | DIASTOLIC BLOOD PRESSURE: 62 MMHG | BODY MASS INDEX: 44.2 KG/M2 | OXYGEN SATURATION: 100 % | WEIGHT: 275 LBS

## 2023-02-14 DIAGNOSIS — Z12.31 VISIT FOR SCREENING MAMMOGRAM: ICD-10-CM

## 2023-02-14 DIAGNOSIS — Z98.84 STATUS POST BARIATRIC SURGERY: ICD-10-CM

## 2023-02-14 DIAGNOSIS — Z00.00 ANNUAL PHYSICAL EXAM: Primary | ICD-10-CM

## 2023-02-14 DIAGNOSIS — R13.10 DYSPHAGIA, UNSPECIFIED TYPE: ICD-10-CM

## 2023-02-14 DIAGNOSIS — J45.20 MILD INTERMITTENT ASTHMA WITHOUT COMPLICATION: ICD-10-CM

## 2023-02-14 DIAGNOSIS — E66.01 MORBID OBESITY WITH BMI OF 40.0-44.9, ADULT (HCC): ICD-10-CM

## 2023-02-14 PROCEDURE — 90471 IMMUNIZATION ADMIN: CPT | Performed by: PHYSICIAN ASSISTANT

## 2023-02-14 PROCEDURE — 3008F BODY MASS INDEX DOCD: CPT | Performed by: PHYSICIAN ASSISTANT

## 2023-02-14 PROCEDURE — 99396 PREV VISIT EST AGE 40-64: CPT | Performed by: PHYSICIAN ASSISTANT

## 2023-02-14 PROCEDURE — 3078F DIAST BP <80 MM HG: CPT | Performed by: PHYSICIAN ASSISTANT

## 2023-02-14 PROCEDURE — 99214 OFFICE O/P EST MOD 30 MIN: CPT | Performed by: PHYSICIAN ASSISTANT

## 2023-02-14 PROCEDURE — 90677 PCV20 VACCINE IM: CPT | Performed by: PHYSICIAN ASSISTANT

## 2023-02-14 PROCEDURE — 3074F SYST BP LT 130 MM HG: CPT | Performed by: PHYSICIAN ASSISTANT

## 2023-02-14 RX ORDER — GUANFACINE 1 MG/1
2 TABLET, EXTENDED RELEASE ORAL DAILY
Qty: 30 TABLET | Refills: 2 | COMMUNITY
Start: 2023-02-14

## 2023-02-14 RX ORDER — ARIPIPRAZOLE 5 MG/1
10 TABLET ORAL DAILY
Qty: 45 TABLET | Refills: 2 | COMMUNITY
Start: 2023-02-14

## 2023-02-14 NOTE — PATIENT INSTRUCTIONS
Please use [employer] or call Carlos Mari at [phone] to set up the following tests:  - fasting blood work  - mammogram (after 4/23/23)    Please focus on a diet consisting of lean or plant based proteins, fruits, veggies, and whole grains, as well as regular exercise (30 minutes daily).

## 2023-02-15 ENCOUNTER — LAB ENCOUNTER (OUTPATIENT)
Dept: LAB | Age: 43
End: 2023-02-15
Attending: PHYSICIAN ASSISTANT
Payer: COMMERCIAL

## 2023-02-15 DIAGNOSIS — Z98.84 STATUS POST BARIATRIC SURGERY: ICD-10-CM

## 2023-02-15 DIAGNOSIS — E66.01 MORBID OBESITY WITH BMI OF 40.0-44.9, ADULT (HCC): ICD-10-CM

## 2023-02-15 DIAGNOSIS — Z00.00 ANNUAL PHYSICAL EXAM: ICD-10-CM

## 2023-02-15 LAB
ALBUMIN SERPL-MCNC: 3.5 G/DL (ref 3.4–5)
ALBUMIN/GLOB SERPL: 0.9 {RATIO} (ref 1–2)
ALP LIVER SERPL-CCNC: 81 U/L
ALT SERPL-CCNC: 19 U/L
ANION GAP SERPL CALC-SCNC: 1 MMOL/L (ref 0–18)
AST SERPL-CCNC: 19 U/L (ref 15–37)
BASOPHILS # BLD AUTO: 0.07 X10(3) UL (ref 0–0.2)
BASOPHILS NFR BLD AUTO: 0.8 %
BILIRUB SERPL-MCNC: 0.3 MG/DL (ref 0.1–2)
BUN BLD-MCNC: 13 MG/DL (ref 7–18)
CALCIUM BLD-MCNC: 9.1 MG/DL (ref 8.5–10.1)
CHLORIDE SERPL-SCNC: 108 MMOL/L (ref 98–112)
CHOLEST SERPL-MCNC: 195 MG/DL (ref ?–200)
CO2 SERPL-SCNC: 27 MMOL/L (ref 21–32)
CREAT BLD-MCNC: 0.99 MG/DL
DEPRECATED HBV CORE AB SER IA-ACNC: 21 NG/ML
EOSINOPHIL # BLD AUTO: 0.14 X10(3) UL (ref 0–0.7)
EOSINOPHIL NFR BLD AUTO: 1.5 %
ERYTHROCYTE [DISTWIDTH] IN BLOOD BY AUTOMATED COUNT: 13.2 %
EST. AVERAGE GLUCOSE BLD GHB EST-MCNC: 117 MG/DL (ref 68–126)
FASTING PATIENT LIPID ANSWER: YES
FASTING STATUS PATIENT QL REPORTED: YES
FOLATE SERPL-MCNC: 17.8 NG/ML (ref 8.7–?)
GFR SERPLBLD BASED ON 1.73 SQ M-ARVRAT: 72 ML/MIN/1.73M2 (ref 60–?)
GLOBULIN PLAS-MCNC: 4 G/DL (ref 2.8–4.4)
GLUCOSE BLD-MCNC: 85 MG/DL (ref 70–99)
HBA1C MFR BLD: 5.7 % (ref ?–5.7)
HCT VFR BLD AUTO: 40.3 %
HDLC SERPL-MCNC: 47 MG/DL (ref 40–59)
HGB BLD-MCNC: 13 G/DL
IMM GRANULOCYTES # BLD AUTO: 0.02 X10(3) UL (ref 0–1)
IMM GRANULOCYTES NFR BLD: 0.2 %
IRON SATN MFR SERPL: 14 %
IRON SERPL-MCNC: 60 UG/DL
LDLC SERPL CALC-MCNC: 127 MG/DL (ref ?–100)
LYMPHOCYTES # BLD AUTO: 3.5 X10(3) UL (ref 1–4)
LYMPHOCYTES NFR BLD AUTO: 38.3 %
MCH RBC QN AUTO: 28.4 PG (ref 26–34)
MCHC RBC AUTO-ENTMCNC: 32.3 G/DL (ref 31–37)
MCV RBC AUTO: 88.2 FL
MONOCYTES # BLD AUTO: 0.53 X10(3) UL (ref 0.1–1)
MONOCYTES NFR BLD AUTO: 5.8 %
NEUTROPHILS # BLD AUTO: 4.89 X10 (3) UL (ref 1.5–7.7)
NEUTROPHILS # BLD AUTO: 4.89 X10(3) UL (ref 1.5–7.7)
NEUTROPHILS NFR BLD AUTO: 53.4 %
NONHDLC SERPL-MCNC: 148 MG/DL (ref ?–130)
OSMOLALITY SERPL CALC.SUM OF ELEC: 281 MOSM/KG (ref 275–295)
PLATELET # BLD AUTO: 268 10(3)UL (ref 150–450)
POTASSIUM SERPL-SCNC: 4.6 MMOL/L (ref 3.5–5.1)
PROT SERPL-MCNC: 7.5 G/DL (ref 6.4–8.2)
RBC # BLD AUTO: 4.57 X10(6)UL
SODIUM SERPL-SCNC: 136 MMOL/L (ref 136–145)
TIBC SERPL-MCNC: 438 UG/DL (ref 240–450)
TRANSFERRIN SERPL-MCNC: 294 MG/DL (ref 200–360)
TRIGL SERPL-MCNC: 118 MG/DL (ref 30–149)
VIT B12 SERPL-MCNC: 636 PG/ML (ref 193–986)
VIT D+METAB SERPL-MCNC: 26.6 NG/ML (ref 30–100)
VLDLC SERPL CALC-MCNC: 21 MG/DL (ref 0–30)
WBC # BLD AUTO: 9.2 X10(3) UL (ref 4–11)

## 2023-02-15 PROCEDURE — 80061 LIPID PANEL: CPT

## 2023-02-15 PROCEDURE — 82746 ASSAY OF FOLIC ACID SERUM: CPT

## 2023-02-15 PROCEDURE — 85025 COMPLETE CBC W/AUTO DIFF WBC: CPT

## 2023-02-15 PROCEDURE — 82728 ASSAY OF FERRITIN: CPT

## 2023-02-15 PROCEDURE — 82306 VITAMIN D 25 HYDROXY: CPT

## 2023-02-15 PROCEDURE — 80053 COMPREHEN METABOLIC PANEL: CPT

## 2023-02-15 PROCEDURE — 36415 COLL VENOUS BLD VENIPUNCTURE: CPT

## 2023-02-15 PROCEDURE — 83550 IRON BINDING TEST: CPT

## 2023-02-15 PROCEDURE — 82607 VITAMIN B-12: CPT

## 2023-02-15 PROCEDURE — 83540 ASSAY OF IRON: CPT

## 2023-02-15 PROCEDURE — 84425 ASSAY OF VITAMIN B-1: CPT

## 2023-02-15 PROCEDURE — 83036 HEMOGLOBIN GLYCOSYLATED A1C: CPT

## 2023-02-16 NOTE — TELEPHONE ENCOUNTER
Levothyroxine 75 mcg  Filled 11-17-22  Qty 90  0 refills  No upcoming appt. LOV 2-14-23   RTC 6-12 mo. Labs 2-3-23 TSH W REFLEX    Pantoprazole 20 mg  Filled 8-1-22  Qty 90  1 refill  No upcoming appt. LOV 2-14-23   RTC 6-12 mo.

## 2023-02-17 RX ORDER — LEVOTHYROXINE SODIUM 0.07 MG/1
TABLET ORAL
Qty: 90 TABLET | Refills: 1 | Status: SHIPPED | OUTPATIENT
Start: 2023-02-17

## 2023-02-17 RX ORDER — PANTOPRAZOLE SODIUM 20 MG/1
TABLET, DELAYED RELEASE ORAL
Qty: 90 TABLET | Refills: 1 | Status: SHIPPED | OUTPATIENT
Start: 2023-02-17

## 2023-02-19 LAB — VITAMIN B1 (THIAMINE), WHOLE B: 127 NMOL/L

## 2023-03-06 ENCOUNTER — HOSPITAL ENCOUNTER (OUTPATIENT)
Age: 43
Discharge: HOME | End: 2023-03-06
Payer: COMMERCIAL

## 2023-03-06 VITALS
HEIGHT: 66 IN | WEIGHT: 275 LBS | HEART RATE: 90 BPM | RESPIRATION RATE: 18 BRPM | DIASTOLIC BLOOD PRESSURE: 77 MMHG | TEMPERATURE: 98 F | SYSTOLIC BLOOD PRESSURE: 106 MMHG | OXYGEN SATURATION: 100 % | BODY MASS INDEX: 44.2 KG/M2

## 2023-03-06 DIAGNOSIS — R19.7 NAUSEA VOMITING AND DIARRHEA: Primary | ICD-10-CM

## 2023-03-06 DIAGNOSIS — R68.89 FLU-LIKE SYMPTOMS: ICD-10-CM

## 2023-03-06 DIAGNOSIS — R11.2 NAUSEA VOMITING AND DIARRHEA: Primary | ICD-10-CM

## 2023-03-06 LAB
B-HCG UR QL: NEGATIVE
POCT BILIRUBIN URINE: NEGATIVE
POCT GLUCOSE URINE: NEGATIVE MG/DL
POCT INFLUENZA A: NEGATIVE
POCT INFLUENZA B: NEGATIVE
POCT KETONE URINE: NEGATIVE MG/DL
POCT NITRITE URINE: NEGATIVE
POCT PH URINE: 7.5 (ref 5–8)
POCT PROTEIN URINE: NEGATIVE MG/DL
POCT SPECIFIC GRAVITY URINE: 1.02
POCT URINE CLARITY: CLEAR
POCT URINE COLOR: YELLOW
POCT UROBILINOGEN URINE: 0.2 MG/DL
SARS-COV-2 RNA RESP QL NAA+PROBE: NOT DETECTED

## 2023-03-06 PROCEDURE — 99214 OFFICE O/P EST MOD 30 MIN: CPT

## 2023-03-06 PROCEDURE — 81002 URINALYSIS NONAUTO W/O SCOPE: CPT | Performed by: PHYSICIAN ASSISTANT

## 2023-03-06 PROCEDURE — 87086 URINE CULTURE/COLONY COUNT: CPT | Performed by: PHYSICIAN ASSISTANT

## 2023-03-06 PROCEDURE — 81025 URINE PREGNANCY TEST: CPT

## 2023-03-06 PROCEDURE — 87502 INFLUENZA DNA AMP PROBE: CPT | Performed by: PHYSICIAN ASSISTANT

## 2023-03-06 RX ORDER — ONDANSETRON 4 MG/1
4 TABLET, ORALLY DISINTEGRATING ORAL ONCE
Status: COMPLETED | OUTPATIENT
Start: 2023-03-06 | End: 2023-03-06

## 2023-03-06 RX ORDER — ONDANSETRON 4 MG/1
4 TABLET, ORALLY DISINTEGRATING ORAL EVERY 4 HOURS PRN
Qty: 20 TABLET | Refills: 0 | Status: SHIPPED | OUTPATIENT
Start: 2023-03-06

## 2023-03-06 NOTE — ED INITIAL ASSESSMENT (HPI)
Pt states since yesterday, she is having nasal congestion, diarrhea, abd pain, chills, vomiting today. States she got off a cruise on Fri morning, friends are testing positive for covid. Also c/o feeling dizzy. Denies dizzy sitting down.

## 2023-03-06 NOTE — DISCHARGE INSTRUCTIONS
Please return to the ER/clinic if symptoms worsen. Follow-up with your PCP in 24-48 hours as needed. Push fluids. Take the Zofran as needed. Follow the BRAT dietary plan. Take Motrin and/or Tylenol for discomfort. If symptoms persist or worsen i.e. increasing fevers or abdominal pain go directly to the emergency room. Otherwise follow-up with your primary care physician for further evaluation and treatment.

## 2023-03-30 ENCOUNTER — OFFICE VISIT (OUTPATIENT)
Dept: INTERNAL MEDICINE CLINIC | Facility: CLINIC | Age: 43
End: 2023-03-30
Payer: COMMERCIAL

## 2023-03-30 VITALS
SYSTOLIC BLOOD PRESSURE: 114 MMHG | TEMPERATURE: 98 F | DIASTOLIC BLOOD PRESSURE: 72 MMHG | BODY MASS INDEX: 45.48 KG/M2 | WEIGHT: 283 LBS | OXYGEN SATURATION: 100 % | HEIGHT: 66 IN | HEART RATE: 73 BPM

## 2023-03-30 DIAGNOSIS — G43.109 MIGRAINE WITH AURA AND WITHOUT STATUS MIGRAINOSUS, NOT INTRACTABLE: Primary | ICD-10-CM

## 2023-03-30 DIAGNOSIS — H53.9 VISUAL CHANGES: ICD-10-CM

## 2023-03-30 PROCEDURE — 3078F DIAST BP <80 MM HG: CPT | Performed by: PHYSICIAN ASSISTANT

## 2023-03-30 PROCEDURE — 3008F BODY MASS INDEX DOCD: CPT | Performed by: PHYSICIAN ASSISTANT

## 2023-03-30 PROCEDURE — 3074F SYST BP LT 130 MM HG: CPT | Performed by: PHYSICIAN ASSISTANT

## 2023-03-30 PROCEDURE — 99213 OFFICE O/P EST LOW 20 MIN: CPT | Performed by: PHYSICIAN ASSISTANT

## 2023-03-30 RX ORDER — KETOROLAC TROMETHAMINE 10 MG/1
TABLET, FILM COATED ORAL
Qty: 4 TABLET | Refills: 0 | Status: SHIPPED | OUTPATIENT
Start: 2023-03-30

## 2023-04-05 ENCOUNTER — TELEPHONE (OUTPATIENT)
Dept: INTERNAL MEDICINE CLINIC | Facility: CLINIC | Age: 43
End: 2023-04-05

## 2023-04-05 NOTE — TELEPHONE ENCOUNTER
LVM message to congratulate for one year post op and reminder to schedule follow up visits with providers.

## 2023-04-06 ENCOUNTER — HOSPITAL ENCOUNTER (EMERGENCY)
Facility: HOSPITAL | Age: 43
Discharge: HOME | End: 2023-04-06
Attending: EMERGENCY MEDICINE
Payer: COMMERCIAL

## 2023-04-06 VITALS
RESPIRATION RATE: 16 BRPM | OXYGEN SATURATION: 98 % | HEART RATE: 66 BPM | SYSTOLIC BLOOD PRESSURE: 121 MMHG | WEIGHT: 275 LBS | BODY MASS INDEX: 44.2 KG/M2 | HEIGHT: 66 IN | DIASTOLIC BLOOD PRESSURE: 76 MMHG | TEMPERATURE: 97 F

## 2023-04-06 DIAGNOSIS — G43.001 MIGRAINE WITHOUT AURA AND WITH STATUS MIGRAINOSUS, NOT INTRACTABLE: Primary | ICD-10-CM

## 2023-04-06 PROCEDURE — 96374 THER/PROPH/DIAG INJ IV PUSH: CPT

## 2023-04-06 PROCEDURE — 99284 EMERGENCY DEPT VISIT MOD MDM: CPT

## 2023-04-06 PROCEDURE — 96375 TX/PRO/DX INJ NEW DRUG ADDON: CPT

## 2023-04-06 RX ORDER — DIPHENHYDRAMINE HYDROCHLORIDE 50 MG/ML
50 INJECTION INTRAMUSCULAR; INTRAVENOUS ONCE
Status: COMPLETED | OUTPATIENT
Start: 2023-04-06 | End: 2023-04-06

## 2023-04-06 RX ORDER — KETOROLAC TROMETHAMINE 15 MG/ML
15 INJECTION, SOLUTION INTRAMUSCULAR; INTRAVENOUS ONCE
Status: COMPLETED | OUTPATIENT
Start: 2023-04-06 | End: 2023-04-06

## 2023-04-06 RX ORDER — METOCLOPRAMIDE HYDROCHLORIDE 5 MG/ML
10 INJECTION INTRAMUSCULAR; INTRAVENOUS ONCE
Status: COMPLETED | OUTPATIENT
Start: 2023-04-06 | End: 2023-04-06

## 2023-04-07 NOTE — ED INITIAL ASSESSMENT (HPI)
Headache starting at 1915. \"stabbing pain behind left eye\" Visual changes to left eye \"like the curtain affect\".

## 2023-04-12 ENCOUNTER — HOSPITAL ENCOUNTER (OUTPATIENT)
Age: 43
Discharge: HOME | End: 2023-04-12
Attending: EMERGENCY MEDICINE
Payer: COMMERCIAL

## 2023-04-12 VITALS
OXYGEN SATURATION: 99 % | WEIGHT: 275 LBS | HEIGHT: 66 IN | HEART RATE: 71 BPM | SYSTOLIC BLOOD PRESSURE: 123 MMHG | DIASTOLIC BLOOD PRESSURE: 71 MMHG | BODY MASS INDEX: 44.2 KG/M2 | TEMPERATURE: 99 F | RESPIRATION RATE: 16 BRPM

## 2023-04-12 DIAGNOSIS — G43.109 MIGRAINE WITH AURA AND WITHOUT STATUS MIGRAINOSUS, NOT INTRACTABLE: Primary | ICD-10-CM

## 2023-04-12 PROCEDURE — 99214 OFFICE O/P EST MOD 30 MIN: CPT

## 2023-04-12 PROCEDURE — 96375 TX/PRO/DX INJ NEW DRUG ADDON: CPT

## 2023-04-12 PROCEDURE — 96374 THER/PROPH/DIAG INJ IV PUSH: CPT

## 2023-04-12 RX ORDER — METOCLOPRAMIDE HYDROCHLORIDE 5 MG/ML
10 INJECTION INTRAMUSCULAR; INTRAVENOUS ONCE
Status: COMPLETED | OUTPATIENT
Start: 2023-04-12 | End: 2023-04-12

## 2023-04-12 RX ORDER — KETOROLAC TROMETHAMINE 30 MG/ML
30 INJECTION, SOLUTION INTRAMUSCULAR; INTRAVENOUS ONCE
Status: COMPLETED | OUTPATIENT
Start: 2023-04-12 | End: 2023-04-12

## 2023-04-12 RX ORDER — DIPHENHYDRAMINE HYDROCHLORIDE 50 MG/ML
25 INJECTION INTRAMUSCULAR; INTRAVENOUS ONCE
Status: COMPLETED | OUTPATIENT
Start: 2023-04-12 | End: 2023-04-12

## 2023-04-12 NOTE — ED INITIAL ASSESSMENT (HPI)
Patient reported a migraine since 11:30am.  Patient reports this one is worse and it's the 4 or 5th headache she has had this week. Patient also describes her vision in the left eye is getting dark and both are blurry.

## 2023-04-13 ENCOUNTER — OFFICE VISIT (OUTPATIENT)
Dept: NEUROLOGY | Facility: CLINIC | Age: 43
End: 2023-04-13
Payer: COMMERCIAL

## 2023-04-13 VITALS
BODY MASS INDEX: 44.2 KG/M2 | WEIGHT: 275 LBS | SYSTOLIC BLOOD PRESSURE: 114 MMHG | RESPIRATION RATE: 16 BRPM | HEIGHT: 66 IN | HEART RATE: 82 BPM | DIASTOLIC BLOOD PRESSURE: 69 MMHG

## 2023-04-13 DIAGNOSIS — G44.039 EPISODIC PAROXYSMAL HEMICRANIA, NOT INTRACTABLE: Primary | ICD-10-CM

## 2023-04-13 DIAGNOSIS — G43.909 EPISODIC MIGRAINE: ICD-10-CM

## 2023-04-13 PROCEDURE — 3074F SYST BP LT 130 MM HG: CPT | Performed by: OTHER

## 2023-04-13 PROCEDURE — 3008F BODY MASS INDEX DOCD: CPT | Performed by: OTHER

## 2023-04-13 PROCEDURE — 3078F DIAST BP <80 MM HG: CPT | Performed by: OTHER

## 2023-04-13 PROCEDURE — 99214 OFFICE O/P EST MOD 30 MIN: CPT | Performed by: OTHER

## 2023-04-13 RX ORDER — INDOMETHACIN 25 MG/1
CAPSULE ORAL
Qty: 54 CAPSULE | Refills: 0 | Status: SHIPPED | OUTPATIENT
Start: 2023-04-13

## 2023-04-13 NOTE — DISCHARGE INSTRUCTIONS
Follow-up with your primary care doctor and neurology.   Continue your home medications as prescribed  Return if any worsening symptoms or new concerns

## 2023-05-02 ENCOUNTER — HOSPITAL ENCOUNTER (OUTPATIENT)
Age: 43
Discharge: HOME | End: 2023-05-02
Payer: COMMERCIAL

## 2023-05-02 ENCOUNTER — APPOINTMENT (OUTPATIENT)
Dept: GENERAL RADIOLOGY | Age: 43
End: 2023-05-02
Attending: PHYSICIAN ASSISTANT
Payer: COMMERCIAL

## 2023-05-02 VITALS
OXYGEN SATURATION: 100 % | RESPIRATION RATE: 20 BRPM | DIASTOLIC BLOOD PRESSURE: 80 MMHG | SYSTOLIC BLOOD PRESSURE: 107 MMHG | TEMPERATURE: 98 F | HEART RATE: 80 BPM

## 2023-05-02 DIAGNOSIS — M77.31 CALCANEAL SPUR OF RIGHT FOOT: Primary | ICD-10-CM

## 2023-05-02 DIAGNOSIS — M76.61 ACHILLES TENDINITIS OF RIGHT LOWER EXTREMITY: ICD-10-CM

## 2023-05-02 PROCEDURE — 99214 OFFICE O/P EST MOD 30 MIN: CPT

## 2023-05-02 PROCEDURE — 73650 X-RAY EXAM OF HEEL: CPT | Performed by: PHYSICIAN ASSISTANT

## 2023-05-02 PROCEDURE — 99213 OFFICE O/P EST LOW 20 MIN: CPT

## 2023-05-02 RX ORDER — NAPROXEN 500 MG/1
500 TABLET ORAL 2 TIMES DAILY PRN
Qty: 20 TABLET | Refills: 0 | Status: SHIPPED | OUTPATIENT
Start: 2023-05-02 | End: 2023-05-09

## 2023-05-02 NOTE — DISCHARGE INSTRUCTIONS
Ace wrap or stirrup splint during the day. Recommend crutches. Progress activity as tolerated. Take naproxen twice daily for inflammation. Epsom salt soaks. Elevate and ice. Podiatry for failure to improve.

## 2023-05-20 ENCOUNTER — HOSPITAL ENCOUNTER (OUTPATIENT)
Age: 43
Discharge: HOME | End: 2023-05-20
Attending: EMERGENCY MEDICINE
Payer: COMMERCIAL

## 2023-05-20 VITALS
OXYGEN SATURATION: 100 % | WEIGHT: 281 LBS | HEIGHT: 66 IN | SYSTOLIC BLOOD PRESSURE: 120 MMHG | DIASTOLIC BLOOD PRESSURE: 76 MMHG | BODY MASS INDEX: 45.16 KG/M2 | RESPIRATION RATE: 16 BRPM | TEMPERATURE: 98 F | HEART RATE: 61 BPM

## 2023-05-20 DIAGNOSIS — G43.009 MIGRAINE WITHOUT AURA AND WITHOUT STATUS MIGRAINOSUS, NOT INTRACTABLE: Primary | ICD-10-CM

## 2023-05-20 PROCEDURE — 96361 HYDRATE IV INFUSION ADD-ON: CPT

## 2023-05-20 PROCEDURE — 96375 TX/PRO/DX INJ NEW DRUG ADDON: CPT

## 2023-05-20 PROCEDURE — 99214 OFFICE O/P EST MOD 30 MIN: CPT

## 2023-05-20 PROCEDURE — 96374 THER/PROPH/DIAG INJ IV PUSH: CPT

## 2023-05-20 RX ORDER — DIPHENHYDRAMINE HYDROCHLORIDE 50 MG/ML
25 INJECTION INTRAMUSCULAR; INTRAVENOUS ONCE
Status: COMPLETED | OUTPATIENT
Start: 2023-05-20 | End: 2023-05-20

## 2023-05-20 RX ORDER — METOCLOPRAMIDE HYDROCHLORIDE 5 MG/ML
10 INJECTION INTRAMUSCULAR; INTRAVENOUS ONCE
Status: COMPLETED | OUTPATIENT
Start: 2023-05-20 | End: 2023-05-20

## 2023-05-20 RX ORDER — KETOROLAC TROMETHAMINE 30 MG/ML
30 INJECTION, SOLUTION INTRAMUSCULAR; INTRAVENOUS ONCE
Status: COMPLETED | OUTPATIENT
Start: 2023-05-20 | End: 2023-05-20

## 2023-05-20 NOTE — DISCHARGE INSTRUCTIONS
Follow-up with your primary care physician  Continue your home medications  Return if any worsening symptoms or new concerns

## 2023-06-05 ENCOUNTER — TELEPHONE (OUTPATIENT)
Dept: INTERNAL MEDICINE CLINIC | Facility: CLINIC | Age: 43
End: 2023-06-05

## 2023-06-05 NOTE — TELEPHONE ENCOUNTER
Incoming fax from [employer] of Laboratory reports. Placed in Dr Edi Zazueta in basket for her to review.

## 2023-06-09 ENCOUNTER — HOSPITAL ENCOUNTER (EMERGENCY)
Age: 43
Discharge: HOME | End: 2023-06-09
Attending: EMERGENCY MEDICINE
Payer: COMMERCIAL

## 2023-06-09 ENCOUNTER — APPOINTMENT (OUTPATIENT)
Dept: GENERAL RADIOLOGY | Age: 43
End: 2023-06-09
Attending: EMERGENCY MEDICINE
Payer: COMMERCIAL

## 2023-06-09 VITALS
DIASTOLIC BLOOD PRESSURE: 48 MMHG | TEMPERATURE: 98 F | BODY MASS INDEX: 45.32 KG/M2 | HEART RATE: 90 BPM | WEIGHT: 282 LBS | OXYGEN SATURATION: 100 % | HEIGHT: 66 IN | RESPIRATION RATE: 18 BRPM | SYSTOLIC BLOOD PRESSURE: 101 MMHG

## 2023-06-09 DIAGNOSIS — S39.012A LUMBAR STRAIN, INITIAL ENCOUNTER: Primary | ICD-10-CM

## 2023-06-09 DIAGNOSIS — S76.011A HIP STRAIN, RIGHT, INITIAL ENCOUNTER: ICD-10-CM

## 2023-06-09 PROCEDURE — 99284 EMERGENCY DEPT VISIT MOD MDM: CPT

## 2023-06-09 PROCEDURE — 73502 X-RAY EXAM HIP UNI 2-3 VIEWS: CPT | Performed by: EMERGENCY MEDICINE

## 2023-06-09 PROCEDURE — 72110 X-RAY EXAM L-2 SPINE 4/>VWS: CPT | Performed by: EMERGENCY MEDICINE

## 2023-06-10 NOTE — DISCHARGE INSTRUCTIONS
Use walker as needed to get around  Tylenol for pain. Avoid NSAIDs as it you have a gastric sleeve  Ice 20 minutes after activity if painful  Follow with your primary care physician on Monday you may need to get MRI of your hip if the pain is not improving to assess for an occult fracture.   Return anytime if you feel worse

## 2023-06-19 ENCOUNTER — LAB ENCOUNTER (OUTPATIENT)
Dept: LAB | Age: 43
End: 2023-06-19
Attending: PHYSICIAN ASSISTANT
Payer: COMMERCIAL

## 2023-06-19 ENCOUNTER — OFFICE VISIT (OUTPATIENT)
Dept: INTERNAL MEDICINE CLINIC | Facility: CLINIC | Age: 43
End: 2023-06-19
Payer: COMMERCIAL

## 2023-06-19 VITALS
HEART RATE: 115 BPM | DIASTOLIC BLOOD PRESSURE: 60 MMHG | SYSTOLIC BLOOD PRESSURE: 90 MMHG | RESPIRATION RATE: 16 BRPM | OXYGEN SATURATION: 97 % | WEIGHT: 293 LBS | BODY MASS INDEX: 47.09 KG/M2 | TEMPERATURE: 98 F | HEIGHT: 66 IN

## 2023-06-19 DIAGNOSIS — F31.9 BIPOLAR AFFECTIVE DISORDER, REMISSION STATUS UNSPECIFIED (HCC): ICD-10-CM

## 2023-06-19 DIAGNOSIS — N93.9 ABNORMAL UTERINE BLEEDING (AUB): ICD-10-CM

## 2023-06-19 DIAGNOSIS — N93.9 ABNORMAL UTERINE BLEEDING (AUB): Primary | ICD-10-CM

## 2023-06-19 LAB
BASOPHILS # BLD AUTO: 0.06 X10(3) UL (ref 0–0.2)
BASOPHILS NFR BLD AUTO: 0.8 %
DEPRECATED HBV CORE AB SER IA-ACNC: 20.6 NG/ML
EOSINOPHIL # BLD AUTO: 0.17 X10(3) UL (ref 0–0.7)
EOSINOPHIL NFR BLD AUTO: 2.2 %
ERYTHROCYTE [DISTWIDTH] IN BLOOD BY AUTOMATED COUNT: 13.5 %
HCT VFR BLD AUTO: 40.8 %
HGB BLD-MCNC: 12.9 G/DL
IMM GRANULOCYTES # BLD AUTO: 0.02 X10(3) UL (ref 0–1)
IMM GRANULOCYTES NFR BLD: 0.3 %
IRON SATN MFR SERPL: 19 %
IRON SERPL-MCNC: 61 UG/DL
LYMPHOCYTES # BLD AUTO: 3.14 X10(3) UL (ref 1–4)
LYMPHOCYTES NFR BLD AUTO: 40.7 %
MCH RBC QN AUTO: 27.9 PG (ref 26–34)
MCHC RBC AUTO-ENTMCNC: 31.6 G/DL (ref 31–37)
MCV RBC AUTO: 88.3 FL
MONOCYTES # BLD AUTO: 0.39 X10(3) UL (ref 0.1–1)
MONOCYTES NFR BLD AUTO: 5.1 %
NEUTROPHILS # BLD AUTO: 3.93 X10 (3) UL (ref 1.5–7.7)
NEUTROPHILS # BLD AUTO: 3.93 X10(3) UL (ref 1.5–7.7)
NEUTROPHILS NFR BLD AUTO: 50.9 %
PLATELET # BLD AUTO: 267 10(3)UL (ref 150–450)
RBC # BLD AUTO: 4.62 X10(6)UL
TIBC SERPL-MCNC: 328 UG/DL (ref 240–450)
TRANSFERRIN SERPL-MCNC: 220 MG/DL (ref 200–360)
TSI SER-ACNC: 3.19 MIU/ML (ref 0.36–3.74)
WBC # BLD AUTO: 7.7 X10(3) UL (ref 4–11)

## 2023-06-19 PROCEDURE — 3008F BODY MASS INDEX DOCD: CPT | Performed by: PHYSICIAN ASSISTANT

## 2023-06-19 PROCEDURE — 83550 IRON BINDING TEST: CPT | Performed by: PHYSICIAN ASSISTANT

## 2023-06-19 PROCEDURE — 3074F SYST BP LT 130 MM HG: CPT | Performed by: PHYSICIAN ASSISTANT

## 2023-06-19 PROCEDURE — 83540 ASSAY OF IRON: CPT | Performed by: PHYSICIAN ASSISTANT

## 2023-06-19 PROCEDURE — 84443 ASSAY THYROID STIM HORMONE: CPT | Performed by: PHYSICIAN ASSISTANT

## 2023-06-19 PROCEDURE — 3078F DIAST BP <80 MM HG: CPT | Performed by: PHYSICIAN ASSISTANT

## 2023-06-19 PROCEDURE — 85025 COMPLETE CBC W/AUTO DIFF WBC: CPT | Performed by: PHYSICIAN ASSISTANT

## 2023-06-19 PROCEDURE — 99213 OFFICE O/P EST LOW 20 MIN: CPT | Performed by: PHYSICIAN ASSISTANT

## 2023-06-19 PROCEDURE — 82728 ASSAY OF FERRITIN: CPT | Performed by: PHYSICIAN ASSISTANT

## 2023-06-19 RX ORDER — ARIPIPRAZOLE 10 MG/1
1 TABLET ORAL DAILY
COMMUNITY
Start: 2023-05-16

## 2023-06-19 RX ORDER — LORAZEPAM 1 MG/1
1 TABLET ORAL 2 TIMES DAILY PRN
COMMUNITY
Start: 2023-05-16

## 2023-06-20 ENCOUNTER — PATIENT MESSAGE (OUTPATIENT)
Dept: OBGYN CLINIC | Facility: CLINIC | Age: 43
End: 2023-06-20

## 2023-06-24 ENCOUNTER — LAB ENCOUNTER (OUTPATIENT)
Dept: LAB | Age: 43
End: 2023-06-24
Attending: PHYSICIAN ASSISTANT
Payer: COMMERCIAL

## 2023-06-24 DIAGNOSIS — N93.9 ABNORMAL UTERINE BLEEDING (AUB): ICD-10-CM

## 2023-06-24 LAB
ESTRADIOL SERPL-MCNC: 103.4 PG/ML
FSH SERPL-ACNC: 6.2 MIU/ML
LH SERPL-ACNC: 6.5 MIU/ML

## 2023-06-24 PROCEDURE — 83002 ASSAY OF GONADOTROPIN (LH): CPT

## 2023-06-24 PROCEDURE — 83001 ASSAY OF GONADOTROPIN (FSH): CPT

## 2023-06-24 PROCEDURE — 82670 ASSAY OF TOTAL ESTRADIOL: CPT

## 2023-06-24 PROCEDURE — 36415 COLL VENOUS BLD VENIPUNCTURE: CPT

## 2023-06-27 ENCOUNTER — HOSPITAL ENCOUNTER (OUTPATIENT)
Dept: MAMMOGRAPHY | Age: 43
Discharge: HOME | End: 2023-06-27
Attending: PHYSICIAN ASSISTANT
Payer: COMMERCIAL

## 2023-06-27 DIAGNOSIS — Z12.31 VISIT FOR SCREENING MAMMOGRAM: ICD-10-CM

## 2023-06-27 PROCEDURE — 77067 SCR MAMMO BI INCL CAD: CPT | Performed by: PHYSICIAN ASSISTANT

## 2023-06-27 PROCEDURE — 77063 BREAST TOMOSYNTHESIS BI: CPT | Performed by: PHYSICIAN ASSISTANT

## 2023-07-01 ENCOUNTER — HOSPITAL ENCOUNTER (OUTPATIENT)
Age: 43
Discharge: HOME | End: 2023-07-01
Attending: EMERGENCY MEDICINE
Payer: COMMERCIAL

## 2023-07-01 ENCOUNTER — APPOINTMENT (OUTPATIENT)
Dept: CT IMAGING | Age: 43
End: 2023-07-01
Attending: EMERGENCY MEDICINE
Payer: COMMERCIAL

## 2023-07-01 DIAGNOSIS — N30.00 ACUTE CYSTITIS WITHOUT HEMATURIA: Primary | ICD-10-CM

## 2023-07-01 DIAGNOSIS — K59.00 CONSTIPATION, UNSPECIFIED CONSTIPATION TYPE: ICD-10-CM

## 2023-07-01 LAB
POCT BILIRUBIN URINE: NEGATIVE
POCT GLUCOSE URINE: NEGATIVE MG/DL
POCT KETONE URINE: NEGATIVE MG/DL
POCT NITRITE URINE: NEGATIVE
POCT PH URINE: 6 (ref 5–8)
POCT SPECIFIC GRAVITY URINE: 1.02
POCT URINE COLOR: YELLOW
POCT UROBILINOGEN URINE: 0.2 MG/DL

## 2023-07-01 PROCEDURE — 99214 OFFICE O/P EST MOD 30 MIN: CPT

## 2023-07-01 PROCEDURE — 74176 CT ABD & PELVIS W/O CONTRAST: CPT | Performed by: EMERGENCY MEDICINE

## 2023-07-01 PROCEDURE — 96374 THER/PROPH/DIAG INJ IV PUSH: CPT

## 2023-07-01 PROCEDURE — 87086 URINE CULTURE/COLONY COUNT: CPT | Performed by: EMERGENCY MEDICINE

## 2023-07-01 PROCEDURE — 81002 URINALYSIS NONAUTO W/O SCOPE: CPT | Performed by: EMERGENCY MEDICINE

## 2023-07-01 RX ORDER — KETOROLAC TROMETHAMINE 30 MG/ML
15 INJECTION, SOLUTION INTRAMUSCULAR; INTRAVENOUS ONCE
Status: DISCONTINUED | OUTPATIENT
Start: 2023-07-01 | End: 2023-07-01

## 2023-07-01 RX ORDER — ONDANSETRON 2 MG/ML
4 INJECTION INTRAMUSCULAR; INTRAVENOUS ONCE
Status: COMPLETED | OUTPATIENT
Start: 2023-07-01 | End: 2023-07-01

## 2023-07-01 RX ORDER — SODIUM CHLORIDE 9 MG/ML
INJECTION, SOLUTION INTRAVENOUS ONCE
Status: COMPLETED | OUTPATIENT
Start: 2023-07-01 | End: 2023-07-01

## 2023-07-01 RX ORDER — CEFADROXIL 500 MG/1
500 CAPSULE ORAL 2 TIMES DAILY
Qty: 20 CAPSULE | Refills: 0 | Status: SHIPPED | OUTPATIENT
Start: 2023-07-01 | End: 2023-07-11

## 2023-07-01 RX ORDER — KETOROLAC TROMETHAMINE 15 MG/ML
15 INJECTION, SOLUTION INTRAMUSCULAR; INTRAVENOUS ONCE
Status: COMPLETED | OUTPATIENT
Start: 2023-07-01 | End: 2023-07-01

## 2023-07-01 NOTE — ED PROVIDER NOTES
Patient Seen in: [facility]      History   Patient presents with:  Abdominal Pain  Flank Pain    Stated Complaint: Abdominal Pain    Subjective:   HPI    48-year-old female presents to the emergency department complaining of intermittent left lower quadrant abdominal pain radiating to the left flank that began today associated with urinary frequency. Some nausea but no vomiting. Last bowel movement was yesterday and was normal.  History of gastric bypass surgery. Took Tylenol without symptom relief. No fever. Last period was last week and was normal.  Denies pregnancy. Objective:   Past Medical History:   Diagnosis Date    Allergic rhinitis     Anxiety     Arthritis     Asthma     Bipolar disorder ([facility]     Calculus of kidney 2021    Cancer ([facility]     Basal cell carcinoma over the left eyebrow    Depression     Esophageal reflux     Hypothyroidism     Migraine     Migraines     Obesity, unspecified     S/P laparoscopic sleeve gastrectomy 2022    Sleep apnea     history of, improved with weight loss surgery, no longer needs cpap              Past Surgical History:   Procedure Laterality Date    OTHER      benign tumor reomoved  from left inner thigh    2014    basal carcinoma removed from upper lid of left eye    OTHER SURGICAL HISTORY  2022    Gastric sleeve    SKIN SURGERY  Oct 2014    tumor removed from eyebrow ridge    TONSILLECTOMY                  Social History     Socioeconomic History    Marital status: Single   Tobacco Use    Smoking status: Former     Packs/day: 1.00     Years: 7.00     Pack years: 7.00     Types: Cigarettes     Start date: 1985     Quit date: 12/15/2008     Years since quittin.5    Smokeless tobacco: Never   Vaping Use    Vaping Use: Never used   Substance and Sexual Activity    Alcohol use: Not Currently     Comment: last drink 2022    Drug use: Not Currently     Types: Cannabis     Comment: Last use10/18/22.  Edible gummies typically 1-2 per week. Last use of hydrocodone and Norflex last night 11/3/22. 2 100mg of Norflex, no hydrocodone. Sexual activity: Yes     Partners: Male   Other Topics Concern    Caffeine Concern No    Stress Concern No    Weight Concern Yes    Special Diet Yes     Comment: Bariatric    Exercise Yes    Seat Belt Yes              Review of Systems    Positive for stated complaint: Abdominal Pain  Other systems are as noted in HPI. Constitutional and vital signs reviewed. All other systems reviewed and negative except as noted above. Physical Exam     ED Triage Vitals   BP    Pulse    Resp    Temp    Temp src    SpO2    O2 Device        Current:LMP 06/18/2023 (Approximate)         Physical Exam    General appearance: This is a young adult female sitting on a gurney. Vital signs were reviewed per nurses notes. HEENT: Normocephalic atraumatic. Anicteric sclera. Oral mucosa is moist.  Oropharynx is normal.  Neck: No adenopathy or thyromegaly. Lungs are clear to auscultation. Heart exam: Normal S1-S2 without extra sounds or murmurs. Regular rate and rhythm. Back: There is left CVA tenderness. Abdomen is soft and nontender without masses or rebound. Skin is dry without rashes or lesions. Extremities are atraumatic. Neuro exam: Awake and moving all 4 extremities well.     ED Course     Labs Reviewed   POCT URINALYSIS DIPSTICK - Abnormal; Notable for the following components:       Result Value    Urine Clarity Turbid (*)     Blood, Urine Moderate (*)     Protein urine Trace (*)     Leukocyte esterase urine Small (*)     All other components within normal limits   URINE CULTURE, ROUTINE          CT ABDOMEN+PELVIS KIDNEYSTONE 2D RNDR(NO IV,NO ORAL)(CPT=74176)    Result Date: 7/1/2023  PROCEDURE:  CT ABDOMEN+PELVIS KIDNEYSTONE 2D RNDR(NO IV,NO ORAL)(CPT=74176)  COMPARISON:  [facility], CT ABDOMEN+PELVIS KIDNEYSTONE 2D RNDR(NO IV,NO ORAL)(CPT=74176), 11/05/2021, 7:13 PM.  INDICATIONS: Abdominal Pain  TECHNIQUE:  Unenhanced multislice CT scanning from above the kidneys to below the urinary bladder. 2D rendering are generated on the CT scanner workstation to localize potential stones in the cranio-caudal plane. Dose reduction techniques were used. Dose information is transmitted to the [address] of Radiology) NRDR ([address]) which includes the Dose Index Registry. PATIENT STATED HISTORY: (As transcribed by Technologist)  Patient presents with left groin/left flank pain and frequency for 2 hrs prior to arrival with nausea. No bowel movement since yesterday. Hx Kidney stones. FINDINGS:  KIDNEYS:  Stable. Normal anatomic positions. No renal or ureteral calculus. No hydronephrosis or new perinephric stranding. BLADDER:  Nondistended. No calculus within. ADRENALS:  Stable. Not enlarged. LIVER:  Stable. Uniform parenchyma. BILIARY:  Stable. Nondistended gallbladder. No biliary dilatation. PANCREAS:  Stable. Uniform parenchyma. No ductal dilatation. SPLEEN:  Stable. Not enlarged. AORTA/VASCULAR:  Stable. Smooth tapered RETROPERITONEUM:  Stable. No adenopathy. BOWEL/MESENTERY:  Stable. Normal bowel caliber. No colonic inflammation. Moderate to large amount of stool scattered throughout. Normal appendix. No free air or ascites. ABDOMINAL WALL:  Stable. Small fat containing umbilical hernia. BONES:  Stable. Severe degenerative changes L5-S1. Much less pronounced degenerative changes elsewhere. PELVIC ORGANS:  Stable. No uterine or ovarian abnormality. LUNG BASES:  Stable. No consolidation or pleural effusion. OTHER:  None. CONCLUSION:  1. Negative for obstructing or nonobstructing renal calculus disease. No hydronephrosis. 2. No additional specific abnormality. 3. Details as above. Continued clinical correlation recommended.        LOCATION:  [facility]   Dictated by (CST): Pasquale Mckeon MD on 7/01/2023 at 3:11 PM     Finalized by (CST): Hiro Ahuja MD on 7/01/2023 at 3:15 PM       I personally reviewed the images myself and went over results with patient. I viewed the CTA kidney stone protocol myself and there is no evidence of kidney stones obstruction or otherwise. Some stool in the colon. No other intra-abdominal or retroperitoneal pathology. Test results and treatment plan were discussed with the patient. She was treated with IV fluids, ibuprofen and Zofran. MDM      #1. Urinary tract infection. Leukocytes and blood on urine dipstick. Urine culture pending. No obstructing stone. Antibiotics initiated with over-the-counter analgesics. No other intra-abdominal or retroperitoneal pathology including but not limited to ovarian cyst, diverticulitis or appendicitis. 2.  Constipation. Laxative recommended. Medical Decision Making  Amount and/or Complexity of Data Reviewed  Labs: ordered. Radiology: independent interpretation performed. Disposition and Plan     Clinical Impression:  Acute cystitis without hematuria  (primary encounter diagnosis)  Constipation, unspecified constipation type     Disposition:  Discharge  7/1/2023  3:24 pm    Follow-up:  DIANE Lira  [address] [place] [address],[address]  [address] [place] [id number]    Call   As needed          Medications Prescribed:  Discharge Medication List as of 7/1/2023  3:27 PM    START taking these medications    cefadroxil 500 MG Oral Cap  Take 1 capsule (500 mg total) by mouth 2 (two) times daily for 10 days. , Normal, Disp-20 capsule, R-0

## 2023-07-01 NOTE — DISCHARGE INSTRUCTIONS
You may take ibuprofen up to 600 mg every 6 hours for pain. Take a mild laxative such as MiraLAX or milk of magnesia to facilitate evacuation.

## 2023-07-01 NOTE — ED INITIAL ASSESSMENT (HPI)
Left lower abdominal pain - started today radiating to flank area. Also c/o with nausea  denies vomiting. Denies fever . Took tylenol at  145 pm  Urinary frequency started today.

## 2023-07-05 ENCOUNTER — TELEPHONE (OUTPATIENT)
Dept: INTERNAL MEDICINE CLINIC | Facility: CLINIC | Age: 43
End: 2023-07-05

## 2023-07-05 NOTE — TELEPHONE ENCOUNTER
Incoming fax from [employer] of patient's Laboratory report    Placed in Taylor's in basket for her to review.

## 2023-07-06 ENCOUNTER — OFFICE VISIT (OUTPATIENT)
Dept: OBGYN CLINIC | Facility: CLINIC | Age: 43
End: 2023-07-06
Payer: COMMERCIAL

## 2023-07-06 VITALS
SYSTOLIC BLOOD PRESSURE: 108 MMHG | DIASTOLIC BLOOD PRESSURE: 72 MMHG | WEIGHT: 293 LBS | BODY MASS INDEX: 49 KG/M2 | HEART RATE: 89 BPM

## 2023-07-06 DIAGNOSIS — N84.1 ENDOCERVICAL POLYP: Primary | ICD-10-CM

## 2023-07-06 DIAGNOSIS — N92.6 IRREGULAR BLEEDING: ICD-10-CM

## 2023-07-06 DIAGNOSIS — N83.209 CYST OF OVARY, UNSPECIFIED LATERALITY: ICD-10-CM

## 2023-07-06 PROCEDURE — 3074F SYST BP LT 130 MM HG: CPT | Performed by: NURSE PRACTITIONER

## 2023-07-06 PROCEDURE — 99214 OFFICE O/P EST MOD 30 MIN: CPT | Performed by: NURSE PRACTITIONER

## 2023-07-06 PROCEDURE — 3078F DIAST BP <80 MM HG: CPT | Performed by: NURSE PRACTITIONER

## 2023-07-06 RX ORDER — BUPROPION HYDROCHLORIDE 150 MG/1
150 TABLET ORAL DAILY
COMMUNITY
Start: 2023-06-19

## 2023-07-06 RX ORDER — KETOROLAC TROMETHAMINE 10 MG/1
TABLET, FILM COATED ORAL
COMMUNITY
Start: 2023-07-04

## 2023-07-06 NOTE — PROGRESS NOTES
Subjective:  39year old    Patient presents with: Follow - Up: Pt states she is still having irregular cycles  Other: Pt was in the ER a week ago was told she has a Cyst    Pt here today with complaints of irregular menstrual cycles  Notes that cycles are not as heavy as they were 2/3/2023 however can be anywhere from 14 days to 40 days  Bleeding amount also varies  Notes some intermenstrual spotting as well - usually old brown blood per patient    Also was seen at [facility] ER over the weekend due to lower abdominal pain and was told she has a cyst.    The pain has improved since the ER visit    Review of Systems:  Pertinent items are noted in the HPI.     Objective:  /72 (BP Location: Left arm, Patient Position: Sitting, Cuff Size: large)   Pulse 89   Wt (!) 302 lb (137 kg)   LMP 2023 (Exact Date)     Physical Examination:  General appearance: Well dressed, well nourished in no apparent distress  Neurologic/Psychiatric: Alert and oriented to person, place and time, mood normal, affect appropriate  Abdomen: Soft, non-tender, non-distended, no masses, no hepatosplenomegaly, no hernias, no inguinal lymphadenopathy  Pelvic:    External genitalia- Normal, Bartholin's, urethra, skeins glands normal   Vagina- No vaginal lesions, physiologic discharge   Cervix- + endocervical polyp, no lesions, long/closed, no cervical motion tenderness   Uterus- Normal, non-tender, no masses   Adnexa-  Non-tender, no masses    Assessment/Plan:    Diagnoses and all orders for this visit:    Endocervical polyp  - discussed  - recommend removal with MD partner    Irregular bleeding  - discussed and polyp vs perimenopause  - will continue to monitor   - bleeding precautions given  - to call the office with new/worsening symptoms or no improvement    Cyst of ovary, unspecified laterality  - Discussed cysts and surveillance  - requested that pt get records to our office for review  - once I reviewed ED notes and imaging can make better POC       Return for Remove polyp with MD.

## 2023-07-10 ENCOUNTER — HOSPITAL ENCOUNTER (OUTPATIENT)
Age: 43
Discharge: HOME | End: 2023-07-10
Attending: EMERGENCY MEDICINE
Payer: COMMERCIAL

## 2023-07-10 VITALS
OXYGEN SATURATION: 98 % | TEMPERATURE: 98 F | BODY MASS INDEX: 47 KG/M2 | SYSTOLIC BLOOD PRESSURE: 140 MMHG | HEART RATE: 71 BPM | DIASTOLIC BLOOD PRESSURE: 78 MMHG | WEIGHT: 293 LBS | RESPIRATION RATE: 20 BRPM

## 2023-07-10 DIAGNOSIS — R30.0 DYSURIA: Primary | ICD-10-CM

## 2023-07-10 LAB
POCT BILIRUBIN URINE: NEGATIVE
POCT GLUCOSE URINE: NEGATIVE MG/DL
POCT KETONE URINE: NEGATIVE MG/DL
POCT NITRITE URINE: NEGATIVE
POCT PH URINE: 5.5 (ref 5–8)
POCT PROTEIN URINE: NEGATIVE MG/DL
POCT SPECIFIC GRAVITY URINE: 1.02
POCT URINE COLOR: YELLOW
POCT UROBILINOGEN URINE: 0.2 MG/DL

## 2023-07-10 PROCEDURE — 87086 URINE CULTURE/COLONY COUNT: CPT | Performed by: EMERGENCY MEDICINE

## 2023-07-10 PROCEDURE — 99214 OFFICE O/P EST MOD 30 MIN: CPT

## 2023-07-10 PROCEDURE — 81002 URINALYSIS NONAUTO W/O SCOPE: CPT | Performed by: EMERGENCY MEDICINE

## 2023-07-10 RX ORDER — PHENAZOPYRIDINE HYDROCHLORIDE 200 MG/1
200 TABLET, FILM COATED ORAL 3 TIMES DAILY PRN
Qty: 6 TABLET | Refills: 0 | Status: SHIPPED | OUTPATIENT
Start: 2023-07-10 | End: 2023-07-17

## 2023-07-10 RX ORDER — SULFAMETHOXAZOLE AND TRIMETHOPRIM 800; 160 MG/1; MG/1
1 TABLET ORAL 2 TIMES DAILY
Qty: 20 TABLET | Refills: 0 | Status: SHIPPED | OUTPATIENT
Start: 2023-07-10 | End: 2023-07-20

## 2023-07-10 NOTE — ED INITIAL ASSESSMENT (HPI)
Dr. Milly Simon at the bedside assessing. Patient states a little over 1 week ago she was diagnosed with a bladder infection and almost complete with the antibiotic but states she still has foul smelling urine, increased urinary frequency and urgency, and LLQ abdominal pain. States she feels warm and the chills during the middle of the day but has not taken her temp.  States she has had a kidney stone

## 2023-07-10 NOTE — DISCHARGE INSTRUCTIONS
Plenty of fluids. Stop cefadroxil. Stop your current antibiotic, start Bactrim. Pyridium as needed. Follow-up for further evaluation with primary physician or urology as discussed. Call for appointment. Return if new or worse symptoms.

## 2023-08-01 RX ORDER — ALBUTEROL SULFATE 90 UG/1
AEROSOL, METERED RESPIRATORY (INHALATION)
Qty: 3 EACH | Refills: 1 | Status: SHIPPED | OUTPATIENT
Start: 2023-08-01

## 2023-08-01 NOTE — TELEPHONE ENCOUNTER
LOV: 6/19/2023 with Jay Cobos PA-C  RTC: February 2024  Last Relevant Labs: June 2023  Filled: 7/9/2020    #3 Inhaler with 1 refill    Future Appointments   Date Time Provider Gris Brewster   9/7/2023  2:00 PM Nichole John MD [facility] [address]   9/8/2023  7:30 AM JJ Osuna [facility] [address]

## 2023-08-03 ENCOUNTER — HOSPITAL ENCOUNTER (EMERGENCY)
Age: 43
Discharge: HOME | End: 2023-08-03
Attending: EMERGENCY MEDICINE
Payer: COMMERCIAL

## 2023-08-03 VITALS
RESPIRATION RATE: 18 BRPM | HEIGHT: 66 IN | DIASTOLIC BLOOD PRESSURE: 54 MMHG | SYSTOLIC BLOOD PRESSURE: 100 MMHG | TEMPERATURE: 99 F | BODY MASS INDEX: 46.12 KG/M2 | WEIGHT: 287 LBS | OXYGEN SATURATION: 98 % | HEART RATE: 77 BPM

## 2023-08-03 DIAGNOSIS — R00.2 PALPITATIONS: Primary | ICD-10-CM

## 2023-08-03 LAB
ALBUMIN SERPL-MCNC: 3.3 G/DL (ref 3.4–5)
ALBUMIN/GLOB SERPL: 0.8 {RATIO} (ref 1–2)
ALP LIVER SERPL-CCNC: 79 U/L
ALT SERPL-CCNC: 19 U/L
ANION GAP SERPL CALC-SCNC: 6 MMOL/L (ref 0–18)
AST SERPL-CCNC: 12 U/L (ref 15–37)
BASOPHILS # BLD AUTO: 0.07 X10(3) UL (ref 0–0.2)
BASOPHILS NFR BLD AUTO: 0.7 %
BILIRUB SERPL-MCNC: 0.2 MG/DL (ref 0.1–2)
BUN BLD-MCNC: 14 MG/DL (ref 7–18)
CALCIUM BLD-MCNC: 8.4 MG/DL (ref 8.5–10.1)
CHLORIDE SERPL-SCNC: 110 MMOL/L (ref 98–112)
CO2 SERPL-SCNC: 21 MMOL/L (ref 21–32)
CREAT BLD-MCNC: 1.04 MG/DL
EGFRCR SERPLBLD CKD-EPI 2021: 68 ML/MIN/1.73M2 (ref 60–?)
EOSINOPHIL # BLD AUTO: 0.14 X10(3) UL (ref 0–0.7)
EOSINOPHIL NFR BLD AUTO: 1.3 %
ERYTHROCYTE [DISTWIDTH] IN BLOOD BY AUTOMATED COUNT: 13.5 %
GLOBULIN PLAS-MCNC: 3.9 G/DL (ref 2.8–4.4)
GLUCOSE BLD-MCNC: 117 MG/DL (ref 70–99)
HCT VFR BLD AUTO: 33.2 %
HGB BLD-MCNC: 11.2 G/DL
IMM GRANULOCYTES # BLD AUTO: 0.03 X10(3) UL (ref 0–1)
IMM GRANULOCYTES NFR BLD: 0.3 %
LYMPHOCYTES # BLD AUTO: 3.19 X10(3) UL (ref 1–4)
LYMPHOCYTES NFR BLD AUTO: 30.5 %
MCH RBC QN AUTO: 29.3 PG (ref 26–34)
MCHC RBC AUTO-ENTMCNC: 33.7 G/DL (ref 31–37)
MCV RBC AUTO: 86.9 FL
MONOCYTES # BLD AUTO: 0.71 X10(3) UL (ref 0.1–1)
MONOCYTES NFR BLD AUTO: 6.8 %
NEUTROPHILS # BLD AUTO: 6.33 X10 (3) UL (ref 1.5–7.7)
NEUTROPHILS # BLD AUTO: 6.33 X10(3) UL (ref 1.5–7.7)
NEUTROPHILS NFR BLD AUTO: 60.4 %
OSMOLALITY SERPL CALC.SUM OF ELEC: 286 MOSM/KG (ref 275–295)
PLATELET # BLD AUTO: 261 10(3)UL (ref 150–450)
POTASSIUM SERPL-SCNC: 3.7 MMOL/L (ref 3.5–5.1)
PROT SERPL-MCNC: 7.2 G/DL (ref 6.4–8.2)
RBC # BLD AUTO: 3.82 X10(6)UL
SODIUM SERPL-SCNC: 137 MMOL/L (ref 136–145)
TROPONIN I HIGH SENSITIVITY: <3 NG/L
WBC # BLD AUTO: 10.5 X10(3) UL (ref 4–11)

## 2023-08-03 PROCEDURE — 84484 ASSAY OF TROPONIN QUANT: CPT | Performed by: EMERGENCY MEDICINE

## 2023-08-03 PROCEDURE — 85025 COMPLETE CBC W/AUTO DIFF WBC: CPT | Performed by: EMERGENCY MEDICINE

## 2023-08-03 PROCEDURE — 99283 EMERGENCY DEPT VISIT LOW MDM: CPT

## 2023-08-03 PROCEDURE — 80053 COMPREHEN METABOLIC PANEL: CPT | Performed by: EMERGENCY MEDICINE

## 2023-08-03 PROCEDURE — 84443 ASSAY THYROID STIM HORMONE: CPT | Performed by: EMERGENCY MEDICINE

## 2023-08-03 PROCEDURE — 36415 COLL VENOUS BLD VENIPUNCTURE: CPT

## 2023-08-03 PROCEDURE — 99284 EMERGENCY DEPT VISIT MOD MDM: CPT

## 2023-08-03 PROCEDURE — 93005 ELECTROCARDIOGRAM TRACING: CPT

## 2023-08-03 PROCEDURE — 93010 ELECTROCARDIOGRAM REPORT: CPT

## 2023-08-04 LAB
ATRIAL RATE: 94 BPM
P AXIS: 50 DEGREES
P-R INTERVAL: 160 MS
Q-T INTERVAL: 338 MS
QRS DURATION: 82 MS
QTC CALCULATION (BEZET): 422 MS
R AXIS: 64 DEGREES
T AXIS: 19 DEGREES
TSI SER-ACNC: 2.29 MIU/ML (ref 0.36–3.74)
VENTRICULAR RATE: 94 BPM

## 2023-08-04 NOTE — ED INITIAL ASSESSMENT (HPI)
Pt states felt her heart beat in her throat just pta. States her watch read as high as 170's HR. States felt short of breath and sweaty. Started 15 mins pta starting to feel better now.  Denies chest pain, light headed or dizziness, n/v.

## 2023-08-28 ENCOUNTER — TELEPHONE (OUTPATIENT)
Dept: SURGERY | Facility: CLINIC | Age: 43
End: 2023-08-28

## 2023-09-06 ENCOUNTER — TELEPHONE (OUTPATIENT)
Dept: INTERNAL MEDICINE CLINIC | Facility: CLINIC | Age: 43
End: 2023-09-06

## 2023-09-06 NOTE — TELEPHONE ENCOUNTER
Received records from [facility] ED  Patient presented on 9/1/23 with shortness of breath, chest pain. Unremarkable EKG, chest x-ray. D-dimer mildly elevated, had 3 hr plane ride, thus ED did CTA chest which was normal/negative for PE. Patient felt to have pleurisy, discharged home. Notes sent for scanning.

## 2023-09-11 NOTE — PROGRESS NOTES
-- DO NOT REPLY / DO NOT REPLY ALL --  -- Message is from Engagement Center Operations (ECO) --    ONLY TO BE USED WITHIN A REFILL MEDICATION ENCOUNTER  ?Patient is requesting their prescription be TRANSFERRED to a new pharmacy that has been updated in the system.  Med Refill  Is the patient currently having any symptoms?: No/Non-Emergent symptoms    Name of medication requested: See pended med    Has patient contacted the pharmacy? Not Applicable-Patient states reason is transfer request    Is this the first request for the medication in the last 48 hours?: Yes      Patient is requesting a medication refill - medication is on active list      Full name of the provider who ordered the medication: Rodrigo Rider    [facility] site name / Account # for provider: Rani Fields    Preferred Pharmacy: Pharmacy  [id number] [address] [facility]  [address]    Patient confirmed the above pharmacy as correct?  Yes      Caller Information       Type Contact Phone/Fax    09/11/2023 03:49 PM CDT Phone (Incoming) Michael Maynard (Self) [phone] (M)          Alternative phone number: [phone] (father of pt Giorgio Maynard)    Can a detailed message be left?: Yes (only on cell)    Patient is completely out of medication: Verify if patient is currently experiencing symptoms. If patient is symptomatic, proceed with front end triage instead of medication refill. If patient is not symptomatic but is completely out of medication, sabrina as High priority when routing. Inform patient: “Please call back with any questions or concerns and if your condition becomes life threatening, you should seek immediate medical assistance by calling 911 or going to the Emergency Department for evaluation.”    Inform all patients: \"If the clinical team needs to contact you regarding this refill, please be aware the return phone call may come from an unidentified or out of state phone number and your refill  Subjective:  39year old    Patient presents with:  Gyn Problem: Three days of heavy bleeding, clots, lightheaded, nausea and cramping    Pt here today to follow up on heavy menstrual bleeding  Pt started period on , started normally, but became exteremly heavy which is unusual for her  Was seen in Urgent Care - not given any medication  Since bleeding has slowed/stopped  Notes that this is the second time she has experienced heavy bleeding like this  Last time was about 1 year ago when she took muscle relaxers  History of hypothyroid, has not had TSH checked recently  Also recently had kidney stone, vaccinated for flu and COVID    Review of Systems:  Pertinent items are noted in the HPI. Objective:  /80   Pulse 82   Ht 66\"   Wt 281 lb 3.2 oz (127.6 kg)   LMP 2023 (Exact Date)     Physical Examination:  General appearance: Well dressed, well nourished in no apparent distress  Neurologic/Psychiatric: Alert and oriented to person, place and time, mood normal, affect appropriate  Abdomen: Soft, non-tender, non-distended, no masses, no hepatosplenomegaly, no hernias, no inguinal lymphadenopathy  Pelvic:    External genitalia- Normal, Bartholin's, urethra, skeins glands normal   Vagina- No vaginal lesions, small amount of menstrual blood in vaginal vault   Cervix- No lesions, long/closed, no cervical motion tenderness   Uterus- Normal, non-tender, no masses   Adnexa-  Non-tender, no masses    Assessment/Plan:    Diagnoses and all orders for this visit:    Menorrhagia with regular cycle  - reviewed UC visit and pelvic US  - bleeding has slowed  - discussed treatment options  - will watch and wait to see if this continues  - would not recommend hormonal options d/t history of migraine with aura  - discussed possibility of Mirena IUD    Hypothyroidism, unspecified type  -     TSH W REFLEX TO FREE T4; Future      Return if symptoms worsen or fail to improve. request will be addressed as soon as the clinical team reviews your message.\"

## 2023-10-11 DIAGNOSIS — G43.909 EPISODIC MIGRAINE: ICD-10-CM

## 2023-10-11 RX ORDER — PANTOPRAZOLE SODIUM 20 MG/1
20 TABLET, DELAYED RELEASE ORAL
Qty: 90 TABLET | Refills: 1 | Status: SHIPPED | OUTPATIENT
Start: 2023-10-11

## 2023-10-11 RX ORDER — ATOGEPANT 30 MG/1
1 TABLET ORAL DAILY
Qty: 30 TABLET | Refills: 2 | Status: SHIPPED | OUTPATIENT
Start: 2023-10-11

## 2023-10-11 RX ORDER — LEVOTHYROXINE SODIUM 0.07 MG/1
75 TABLET ORAL
Qty: 90 TABLET | Refills: 1 | Status: SHIPPED | OUTPATIENT
Start: 2023-10-11

## 2023-10-11 NOTE — TELEPHONE ENCOUNTER
Medication: Qulipta     Date of last refill: 6/19/23  [place]     Last office visit: 4/13/23  Due back to clinic per last office note:  3 months  Date next office visit scheduled:    Future Appointments   Date Time Provider Department Center   10/12/2023  8:30 AM [facility] [address]   10/13/2023  8:30 AM [facility] [address]   10/16/2023  8:30 AM [facility] [address]   10/17/2023  8:30 AM [facility] [address]   10/18/2023  8:30 AM [facility] [address]   10/19/2023  8:30 AM [facility] [address]   10/20/2023  8:30 AM AD[facility] [address]   10/23/2023  8:30 AM AD[facility] [address]   10/24/2023  8:30 AM AD[facility] [address]   10/25/2023  8:30 AM AD[facility] [address]   10/26/2023  8:30 AM AD[facility] [address]   10/27/2023  8:30 AM AD[facility] [address]   10/30/2023  8:30 AM AD[facility] [address]   10/31/2023  8:30 AM AD[facility] [address]   11/1/2023  8:30 AM AD[facility] [address]   11/2/2023  8:30 AM AD[facility] [address]   11/3/2023  8:30 AM [facility] [address]   11/6/2023  8:30 AM [facility] [address]   11/7/2023  1:00 PM Jed Alexander MD [facility] [address]   11/8/2023  8:30 AM [facility] [address]   11/9/2023  8:30 AM [facility] [address]   11/10/2023  8:30 AM [facility]ULT [facility] [address]   11/13/2023  8:30 AM [facility] [address]   11/14/2023  8:30 AM ADULT PSYCH [facility] - [facility]   11/15/2023  8:30 AM ADULT PS[facility] - [facility]   11/16/2023  8:30 AM ADULT [facility] - [facility]   11/17/2023  8:30 AM ADULT [facility] - [facility]  [address]   11/20/2023  8:30 AM ADULT [facility]CH [facility] - [facility] [place]           Last OV note recommendation:    Per Dr. Galicia:  Crys Carter is a 43 year old female with PMHx significant for bipolar disorder and chronic episodic migraine, who originally presented 11/3/2022, for evaluation and management of recent worsening migraines.    Neurologic exam is normal and nonfocal and patient was previously well controlled in terms of migraine, which occurred with and without aura, on preventive medication with Topamax as well as abortive therapy with sumatriptan 100 mg dose PRN.  However, she had weight loss surgery 3/2022 and had worsening migraines which did not respond to higher dose of Topamax and she developed significant side effects with cognitive changes and suicidal thoughts.  She was weaned off Topamax and had been doing better on Quilipta 30 mg daily for prevention for the past 3+ months.     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  Patient advised to continue lowest effective dose and to monitor for stomach pain and/or blood in stool and to monitor for signs/ symptoms of peptic ulcer disease and notify office with any concerns.      Otherwise, continue Quilipta at current dose; if not improving with Indomethacin or not able to tolerate, may need to trial high flow oxygen for abortive therapy or Emgality for episodic cluster headache. Of note, MRI brain done showed no secondary pathology to account for headache by PCP recently.      Patient was additionally advised to keep a headache diary, detailing  migraine triggers, alleviating factors, response to medication, as well as frequency/severity and type of headaches.      Patient was counseled regarding conservative management, stress reduction techniques, moist heat, massage, and OTC anti-inflammatory medications as needed; also discussed optimal timing of migraine specific medications for abortive therapy to maximize effectiveness, and risk of medication overuse headache.

## 2023-10-11 NOTE — TELEPHONE ENCOUNTER
Refills needed.       Medication Detail    Medication Quantity Refills Start End   LEVOTHYROXINE 75 MCG Oral Tab 90 tablet 1 2/17/2023    Sig:   TAKE 1 TABLET(75 MCG) BY MOUTH EVERY MORNING BEFORE BREAKFAST     Route:   (none)     Order #:   [id number]           Medication Detail    Medication Quantity Refills Start End   PANTOPRAZOLE 20 MG Oral Tab EC 90 tablet 1 2/17/2023    Sig:   TAKE 1 TABLET(20 MG) BY MOUTH EVERY MORNING BEFORE BREAKFAST     Route:   (none)     Order #:   [id number]       [place] [address] [address][id number] [place], [address] [place] [address], [phone], [phone]

## 2023-11-13 ENCOUNTER — TELEPHONE (OUTPATIENT)
Dept: NEUROLOGY | Facility: CLINIC | Age: 43
End: 2023-11-13

## 2023-11-14 ENCOUNTER — APPOINTMENT (OUTPATIENT)
Dept: GENERAL RADIOLOGY | Facility: HOSPITAL | Age: 43
End: 2023-11-14
Attending: EMERGENCY MEDICINE
Payer: COMMERCIAL

## 2023-11-14 ENCOUNTER — APPOINTMENT (OUTPATIENT)
Dept: CT IMAGING | Facility: HOSPITAL | Age: 43
End: 2023-11-14
Attending: EMERGENCY MEDICINE
Payer: COMMERCIAL

## 2023-11-14 ENCOUNTER — HOSPITAL ENCOUNTER (EMERGENCY)
Facility: HOSPITAL | Age: 43
Discharge: HOME | End: 2023-11-14
Attending: EMERGENCY MEDICINE
Payer: COMMERCIAL

## 2023-11-14 ENCOUNTER — APPOINTMENT (OUTPATIENT)
Dept: MRI IMAGING | Facility: HOSPITAL | Age: 43
End: 2023-11-14
Attending: EMERGENCY MEDICINE
Payer: COMMERCIAL

## 2023-11-14 VITALS
OXYGEN SATURATION: 97 % | RESPIRATION RATE: 16 BRPM | WEIGHT: 280 LBS | TEMPERATURE: 98 F | HEART RATE: 66 BPM | SYSTOLIC BLOOD PRESSURE: 124 MMHG | BODY MASS INDEX: 45 KG/M2 | DIASTOLIC BLOOD PRESSURE: 85 MMHG | HEIGHT: 66 IN

## 2023-11-14 DIAGNOSIS — G43.009 ATYPICAL MIGRAINE: Primary | ICD-10-CM

## 2023-11-14 LAB
ALBUMIN SERPL-MCNC: 3.4 G/DL (ref 3.4–5)
ALBUMIN/GLOB SERPL: 0.8 {RATIO} (ref 1–2)
ALP LIVER SERPL-CCNC: 83 U/L
ALT SERPL-CCNC: 19 U/L
ANION GAP SERPL CALC-SCNC: 5 MMOL/L (ref 0–18)
APTT PPP: 30.3 SECONDS (ref 23.3–35.6)
AST SERPL-CCNC: 16 U/L (ref 15–37)
BASOPHILS # BLD AUTO: 0.06 X10(3) UL (ref 0–0.2)
BASOPHILS NFR BLD AUTO: 0.5 %
BILIRUB SERPL-MCNC: 0.3 MG/DL (ref 0.1–2)
BUN BLD-MCNC: 10 MG/DL (ref 9–23)
CALCIUM BLD-MCNC: 9.2 MG/DL (ref 8.5–10.1)
CHLORIDE SERPL-SCNC: 107 MMOL/L (ref 98–112)
CO2 SERPL-SCNC: 26 MMOL/L (ref 21–32)
CREAT BLD-MCNC: 1.09 MG/DL
EGFRCR SERPLBLD CKD-EPI 2021: 64 ML/MIN/1.73M2 (ref 60–?)
EOSINOPHIL # BLD AUTO: 0.11 X10(3) UL (ref 0–0.7)
EOSINOPHIL NFR BLD AUTO: 0.9 %
ERYTHROCYTE [DISTWIDTH] IN BLOOD BY AUTOMATED COUNT: 12.7 %
GLOBULIN PLAS-MCNC: 4.1 G/DL (ref 2.8–4.4)
GLUCOSE BLD-MCNC: 93 MG/DL (ref 70–99)
GLUCOSE BLD-MCNC: 97 MG/DL (ref 70–99)
HCT VFR BLD AUTO: 42.7 %
HGB BLD-MCNC: 14.1 G/DL
IMM GRANULOCYTES # BLD AUTO: 0.04 X10(3) UL (ref 0–1)
IMM GRANULOCYTES NFR BLD: 0.3 %
INR BLD: 1 (ref 0.8–1.2)
LYMPHOCYTES # BLD AUTO: 3.46 X10(3) UL (ref 1–4)
LYMPHOCYTES NFR BLD AUTO: 27.3 %
MCH RBC QN AUTO: 29.1 PG (ref 26–34)
MCHC RBC AUTO-ENTMCNC: 33 G/DL (ref 31–37)
MCV RBC AUTO: 88 FL
MONOCYTES # BLD AUTO: 0.88 X10(3) UL (ref 0.1–1)
MONOCYTES NFR BLD AUTO: 7 %
NEUTROPHILS # BLD AUTO: 8.11 X10 (3) UL (ref 1.5–7.7)
NEUTROPHILS # BLD AUTO: 8.11 X10(3) UL (ref 1.5–7.7)
NEUTROPHILS NFR BLD AUTO: 64 %
NT-PROBNP SERPL-MCNC: 116 PG/ML (ref ?–125)
OSMOLALITY SERPL CALC.SUM OF ELEC: 285 MOSM/KG (ref 275–295)
PLATELET # BLD AUTO: 262 10(3)UL (ref 150–450)
POTASSIUM SERPL-SCNC: 3.6 MMOL/L (ref 3.5–5.1)
PROT SERPL-MCNC: 7.5 G/DL (ref 6.4–8.2)
PROTHROMBIN TIME: 13.2 SECONDS (ref 11.6–14.8)
RBC # BLD AUTO: 4.85 X10(6)UL
SODIUM SERPL-SCNC: 138 MMOL/L (ref 136–145)
TROPONIN I SERPL HS-MCNC: <3 NG/L
WBC # BLD AUTO: 12.7 X10(3) UL (ref 4–11)

## 2023-11-14 PROCEDURE — 85610 PROTHROMBIN TIME: CPT | Performed by: EMERGENCY MEDICINE

## 2023-11-14 PROCEDURE — 96365 THER/PROPH/DIAG IV INF INIT: CPT

## 2023-11-14 PROCEDURE — 96375 TX/PRO/DX INJ NEW DRUG ADDON: CPT

## 2023-11-14 PROCEDURE — 99285 EMERGENCY DEPT VISIT HI MDM: CPT

## 2023-11-14 PROCEDURE — 80053 COMPREHEN METABOLIC PANEL: CPT | Performed by: EMERGENCY MEDICINE

## 2023-11-14 PROCEDURE — 70498 CT ANGIOGRAPHY NECK: CPT | Performed by: EMERGENCY MEDICINE

## 2023-11-14 PROCEDURE — 70551 MRI BRAIN STEM W/O DYE: CPT | Performed by: EMERGENCY MEDICINE

## 2023-11-14 PROCEDURE — 84484 ASSAY OF TROPONIN QUANT: CPT | Performed by: EMERGENCY MEDICINE

## 2023-11-14 PROCEDURE — 93005 ELECTROCARDIOGRAM TRACING: CPT

## 2023-11-14 PROCEDURE — 83880 ASSAY OF NATRIURETIC PEPTIDE: CPT | Performed by: EMERGENCY MEDICINE

## 2023-11-14 PROCEDURE — 70450 CT HEAD/BRAIN W/O DYE: CPT | Performed by: EMERGENCY MEDICINE

## 2023-11-14 PROCEDURE — 82962 GLUCOSE BLOOD TEST: CPT

## 2023-11-14 PROCEDURE — 85730 THROMBOPLASTIN TIME PARTIAL: CPT | Performed by: EMERGENCY MEDICINE

## 2023-11-14 PROCEDURE — 70496 CT ANGIOGRAPHY HEAD: CPT | Performed by: EMERGENCY MEDICINE

## 2023-11-14 PROCEDURE — 93010 ELECTROCARDIOGRAM REPORT: CPT

## 2023-11-14 PROCEDURE — 71045 X-RAY EXAM CHEST 1 VIEW: CPT | Performed by: EMERGENCY MEDICINE

## 2023-11-14 PROCEDURE — 85025 COMPLETE CBC W/AUTO DIFF WBC: CPT | Performed by: EMERGENCY MEDICINE

## 2023-11-14 RX ORDER — METOCLOPRAMIDE HYDROCHLORIDE 5 MG/ML
10 INJECTION INTRAMUSCULAR; INTRAVENOUS ONCE
Status: COMPLETED | OUTPATIENT
Start: 2023-11-14 | End: 2023-11-14

## 2023-11-14 RX ORDER — ASPIRIN 81 MG/1
324 TABLET, CHEWABLE ORAL ONCE
Status: COMPLETED | OUTPATIENT
Start: 2023-11-14 | End: 2023-11-14

## 2023-11-14 RX ORDER — KETOROLAC TROMETHAMINE 15 MG/ML
15 INJECTION, SOLUTION INTRAMUSCULAR; INTRAVENOUS ONCE
Status: COMPLETED | OUTPATIENT
Start: 2023-11-14 | End: 2023-11-14

## 2023-11-14 RX ORDER — DEXAMETHASONE SODIUM PHOSPHATE 10 MG/ML
10 INJECTION, SOLUTION INTRAMUSCULAR; INTRAVENOUS ONCE
Status: COMPLETED | OUTPATIENT
Start: 2023-11-14 | End: 2023-11-14

## 2023-11-14 RX ORDER — MAGNESIUM SULFATE HEPTAHYDRATE 40 MG/ML
2 INJECTION, SOLUTION INTRAVENOUS ONCE
Status: COMPLETED | OUTPATIENT
Start: 2023-11-14 | End: 2023-11-14

## 2023-11-14 RX ORDER — DIPHENHYDRAMINE HYDROCHLORIDE 50 MG/ML
50 INJECTION INTRAMUSCULAR; INTRAVENOUS ONCE
Status: COMPLETED | OUTPATIENT
Start: 2023-11-14 | End: 2023-11-14

## 2023-11-14 NOTE — PROGRESS NOTES
Called to ED for Stroke Alert at 1703  Arrived to department at [address] patient in room A8, already seen by Dr Dl Martinez  Last Known Normal at [phone] today  Pre-morbid mRS 0    Initial NIHSS 3 including L sided facial droop, LUE weakness and decreased sensation  Pt accompanied to CT dept  Dr Melanie Wilson (Neuro Critical Care) notified at [phone]  Repeat NIHSS completed back in ED room    NIH Stroke Scale  1a. Level of consciousness: 0   1b. LOC questions:  0   1c. LOC commands: 0   2. Best Gaze: 0   3. Visual: 0   4. Facial Palsy: 0   5a. Motor left arm: 0   5b. Motor right arm: 0   6a. Motor left le   6b. Motor right le   7. Limb Ataxia: 0   8. Sensory: 1 (Decreased sensation to RUE)   9. Best Language:  0   10. Dysarthria: 0   11. Extinction and Inattention: 0     Total:   1      Other symptoms include headache and seeing black floaters    Dr Melanie Wilson updated on patient's status, CT/CTA results and repeat NIHSS   Case discussed with Dr Dl Martinez, ED    Of note: Patient came to ED with complaint of headache that she has been experiencing for a couple days. Patient developed additional neurological symptoms while waiting in the waiting room. Stroke Alert was initiated and CT imaging was completed per protocol. Improvement in symptoms was noted after return from CT dept. Please refer to detailed breakdown of NIHSS above.       Lizzie Yang, RN, BSN  Stroke Navigator  [phone]

## 2023-11-14 NOTE — ED INITIAL ASSESSMENT (HPI)
Patient to ER w/ complaints of migraine headache since Sunday. Complains of nausea. Patient w/ decreased strength & sensation to right arm. States \"I began about an hour ago while I was in the ER. \"

## 2023-11-14 NOTE — PROGRESS NOTES
ED Pharmacy Infusion Time Optimization    Mag sulfate infused over 60 minutes was ordered for use in patient presenting with headache. Pharmacy has clarified and adjusted the infusion time to 30 minutes.     Toro Charlton, PharmD  7/12/2023  7:59 PM

## 2023-11-15 LAB
ATRIAL RATE: 68 BPM
P AXIS: 80 DEGREES
P-R INTERVAL: 184 MS
Q-T INTERVAL: 402 MS
QRS DURATION: 88 MS
QTC CALCULATION (BEZET): 427 MS
R AXIS: 71 DEGREES
T AXIS: 32 DEGREES
VENTRICULAR RATE: 68 BPM

## 2023-11-16 NOTE — TELEPHONE ENCOUNTER
Received fax from [phone] received for patient use of Annetta Contreras   Approval granted: 11/15/23-11/15/24        Pt notified

## 2023-12-16 ENCOUNTER — HOSPITAL ENCOUNTER (OUTPATIENT)
Age: 43
Discharge: HOME | End: 2023-12-16
Payer: COMMERCIAL

## 2023-12-16 ENCOUNTER — APPOINTMENT (OUTPATIENT)
Dept: CT IMAGING | Age: 43
End: 2023-12-16
Attending: NURSE PRACTITIONER
Payer: COMMERCIAL

## 2023-12-16 VITALS
TEMPERATURE: 97 F | WEIGHT: 285 LBS | SYSTOLIC BLOOD PRESSURE: 130 MMHG | HEART RATE: 60 BPM | HEIGHT: 66 IN | BODY MASS INDEX: 45.8 KG/M2 | RESPIRATION RATE: 18 BRPM | DIASTOLIC BLOOD PRESSURE: 83 MMHG | OXYGEN SATURATION: 100 %

## 2023-12-16 DIAGNOSIS — R10.9 LEFT SIDED ABDOMINAL PAIN: Primary | ICD-10-CM

## 2023-12-16 LAB
#MXD IC: 0.8 X10ˆ3/UL (ref 0.1–1)
B-HCG UR QL: NEGATIVE
BILIRUB UR QL STRIP: NEGATIVE
BUN BLD-MCNC: 10 MG/DL (ref 7–18)
CHLORIDE BLD-SCNC: 103 MMOL/L (ref 98–112)
CLARITY UR: CLEAR
CO2 BLD-SCNC: 25 MMOL/L (ref 21–32)
COLOR UR: YELLOW
CREAT BLD-MCNC: 0.9 MG/DL
EGFRCR SERPLBLD CKD-EPI 2021: 81 ML/MIN/1.73M2 (ref 60–?)
GLUCOSE BLD-MCNC: 88 MG/DL (ref 70–99)
GLUCOSE UR STRIP-MCNC: NEGATIVE MG/DL
HCT VFR BLD AUTO: 40.9 %
HCT VFR BLD CALC: 38 %
HGB BLD-MCNC: 12.7 G/DL
ISTAT IONIZED CALCIUM FOR CHEM 8: 1.22 MMOL/L (ref 1.12–1.32)
KETONES UR STRIP-MCNC: NEGATIVE MG/DL
LEUKOCYTE ESTERASE UR QL STRIP: NEGATIVE
LYMPHOCYTES # BLD AUTO: 3.3 X10ˆ3/UL (ref 1–4)
LYMPHOCYTES NFR BLD AUTO: 35.5 %
MCH RBC QN AUTO: 28 PG (ref 26–34)
MCHC RBC AUTO-ENTMCNC: 31.1 G/DL (ref 31–37)
MCV RBC AUTO: 90.1 FL (ref 80–100)
MIXED CELL %: 8.8 %
NEUTROPHILS # BLD AUTO: 5.1 X10ˆ3/UL (ref 1.5–7.7)
NEUTROPHILS NFR BLD AUTO: 55.7 %
NITRITE UR QL STRIP: NEGATIVE
PH UR STRIP: 5.5 [PH]
PLATELET # BLD AUTO: 235 X10ˆ3/UL (ref 150–450)
POTASSIUM BLD-SCNC: 3.7 MMOL/L (ref 3.6–5.1)
PROT UR STRIP-MCNC: NEGATIVE MG/DL
RBC # BLD AUTO: 4.54 X10ˆ6/UL
SODIUM BLD-SCNC: 141 MMOL/L (ref 136–145)
SP GR UR STRIP: >=1.03
UROBILINOGEN UR STRIP-ACNC: <2 MG/DL
WBC # BLD AUTO: 9.2 X10ˆ3/UL (ref 4–11)

## 2023-12-16 PROCEDURE — 96361 HYDRATE IV INFUSION ADD-ON: CPT

## 2023-12-16 PROCEDURE — 96376 TX/PRO/DX INJ SAME DRUG ADON: CPT

## 2023-12-16 PROCEDURE — 85025 COMPLETE CBC W/AUTO DIFF WBC: CPT | Performed by: NURSE PRACTITIONER

## 2023-12-16 PROCEDURE — 80047 BASIC METABLC PNL IONIZED CA: CPT

## 2023-12-16 PROCEDURE — 74176 CT ABD & PELVIS W/O CONTRAST: CPT | Performed by: NURSE PRACTITIONER

## 2023-12-16 PROCEDURE — 99215 OFFICE O/P EST HI 40 MIN: CPT

## 2023-12-16 PROCEDURE — 99214 OFFICE O/P EST MOD 30 MIN: CPT

## 2023-12-16 PROCEDURE — 81025 URINE PREGNANCY TEST: CPT

## 2023-12-16 PROCEDURE — 81002 URINALYSIS NONAUTO W/O SCOPE: CPT

## 2023-12-16 PROCEDURE — 96374 THER/PROPH/DIAG INJ IV PUSH: CPT

## 2023-12-16 RX ORDER — SODIUM CHLORIDE 9 MG/ML
1000 INJECTION, SOLUTION INTRAVENOUS ONCE
Status: COMPLETED | OUTPATIENT
Start: 2023-12-16 | End: 2023-12-16

## 2023-12-16 RX ORDER — ONDANSETRON 2 MG/ML
4 INJECTION INTRAMUSCULAR; INTRAVENOUS ONCE
Status: COMPLETED | OUTPATIENT
Start: 2023-12-16 | End: 2023-12-16

## 2023-12-16 RX ORDER — KETOROLAC TROMETHAMINE 30 MG/ML
30 INJECTION, SOLUTION INTRAMUSCULAR; INTRAVENOUS ONCE
Status: COMPLETED | OUTPATIENT
Start: 2023-12-16 | End: 2023-12-16

## 2023-12-16 RX ORDER — ONDANSETRON 4 MG/1
4 TABLET, ORALLY DISINTEGRATING ORAL EVERY 8 HOURS PRN
Qty: 10 TABLET | Refills: 0 | Status: SHIPPED | OUTPATIENT
Start: 2023-12-16 | End: 2023-12-23

## 2023-12-16 NOTE — DISCHARGE INSTRUCTIONS
Please use Zofran for any further nausea vomiting. Clear liquid diet and advance as tolerated. Follow closely with your primary. Tylenol and or ibuprofen for pain. ER if worse.

## 2023-12-19 ENCOUNTER — OFFICE VISIT (OUTPATIENT)
Dept: OBGYN CLINIC | Facility: CLINIC | Age: 43
End: 2023-12-19
Payer: COMMERCIAL

## 2023-12-19 VITALS
SYSTOLIC BLOOD PRESSURE: 124 MMHG | DIASTOLIC BLOOD PRESSURE: 74 MMHG | BODY MASS INDEX: 46 KG/M2 | WEIGHT: 284 LBS | HEART RATE: 85 BPM

## 2023-12-19 DIAGNOSIS — N84.1 CERVICAL POLYP: Primary | ICD-10-CM

## 2023-12-19 DIAGNOSIS — N93.9 ABNORMAL UTERINE BLEEDING (AUB): ICD-10-CM

## 2023-12-19 PROCEDURE — 99203 OFFICE O/P NEW LOW 30 MIN: CPT | Performed by: OBSTETRICS & GYNECOLOGY

## 2023-12-19 PROCEDURE — 58100 BIOPSY OF UTERUS LINING: CPT | Performed by: OBSTETRICS & GYNECOLOGY

## 2023-12-19 PROCEDURE — 3078F DIAST BP <80 MM HG: CPT | Performed by: OBSTETRICS & GYNECOLOGY

## 2023-12-19 PROCEDURE — 3074F SYST BP LT 130 MM HG: CPT | Performed by: OBSTETRICS & GYNECOLOGY

## 2023-12-19 PROCEDURE — 88305 TISSUE EXAM BY PATHOLOGIST: CPT | Performed by: OBSTETRICS & GYNECOLOGY

## 2023-12-19 RX ORDER — IBUPROFEN 800 MG/1
TABLET ORAL
COMMUNITY
Start: 2023-07-21

## 2023-12-19 RX ORDER — NAPROXEN 500 MG/1
TABLET ORAL
COMMUNITY
Start: 2023-07-13

## 2023-12-19 NOTE — PROCEDURES
Endometrial Biopsy Procedure Note    Indication and Diagnosis: AUB    Procedure Details   The risks (including infection, bleeding, pain, and uterine perforation) and benefits of the procedure were explained to the patient and informed consent was obtained. The patient was placed in the dorsal lithotomy position. Bimanual exam showed the uterus to be in the anteverted position. The cervix was prepped with betadine solution. 2% Lidocaine jellly was applied to the anterior cervix and a tenaculum placed. Using sterile technique, endometrial biopsy was performed using the pipelle catheter without complications. The uterus sounded to 7 cm's. A small amount of tissue was obtained and sent to pathology for examination. Condition:  Stable    Complications:  None    Plan:  The patient was advised to call for any fever or for prolonged or severe pain or bleeding. She was advised to use ibuprofen as needed for mild to moderate pain. She was advised to avoid vaginal intercourse for 48 hours or until the bleeding has completely stopped.

## 2023-12-19 NOTE — PROCEDURES
Cervical Polypectomy Procedure Note    Indication and Diagnosis: 2 cm cervical polyp at external os    Procedure Details   The risks (including infection, bleeding, pain) and benefits of the procedure were explained to the patient and informed consent was obtained. Patient placed in lithotomy position. Bivalved speculum placed. 2 cm endocervical polyp noted. Area prepped with betadine solution. Benzocaine gel applied. Ring forceps used to remove polyp to the base by twisting. Minimal bleeding noted. Monsels solution applied for hemostasis. Patient tolerated procedure. Specimen sent to pathology      Condition:  Stable    Complications:  None    Plan:  The patient was advised to call for any fever or for prolonged or severe pain or bleeding. She was advised to use ibuprofen as needed for mild to moderate pain. She was advised to avoid vaginal intercourse for 48 hours or until the bleeding has completely stopped.

## 2023-12-19 NOTE — PROGRESS NOTES
Subjective:  39year old [facility] Complaint   Patient presents with    Other     Poly removal-      Patient presents today for cervical polyp removal and to discuss recent pelvic ultrasound results. Patient saw nurse practitioner in July for irregular cycles and told in ED that she had a cyst.  Menses vary from 14 to 40 days with intermenstrual bleeding. Patient had an ultrasound 7/4/23 with showed a 2.9 cm simple ovarian cyst, endometrial lining 9 mm with fluid. Noted during exam with APN to have a cervical polyp. Review of Systems:  Pertinent items are noted in the HPI. Objective:  /74   Pulse 85   Wt 284 lb (128.8 kg)   LMP 12/05/2023 (Approximate)   Physical Examination:  General appearance: Well dressed, well nourished in no apparent distress  Neurologic/Psychiatric: Alert and oriented to person, place and time, mood normal, affect appropriate  Pelvic:    External genitalia- Normal, Bartholin's, urethra, skeins glands normal   Vagina- No vaginal lesions, no discharge   Cervix- 2 cm polyp at external os, long/closed, no cervical motion tenderness    Assessment/Plan:  [1] Cervical polyp- Recommend removal in office or LEEP polypectomy. Patient desires manual removal in office. [ 2] Abnormal uterine bleeding- ultrasound fairly unremarkable but due to risk factors for endometrial hyperplasia, recommend endometrial biopsy. Patient agreeable. Will do today at same time as polypectomy. Patient desires trial of Mirena IUD to help with heavy menstrual bleeding. Patient not sexually active with men. Risks, benefits, side effects and potential complications of Mirena IUD including expulsion, perforation with need for surgical removal, infection with potential effects on future fertility and need for intravenous antibiotics, and failure with increased risk of ectopic pregnancy reviewed and all questions answered.       Diagnoses and all orders for this visit:    Cervical polyp  - Specimen to Pathology Tissue; Future    Abnormal uterine bleeding (AUB)  -     Specimen to Pathology Tissue; Future        Return for Mirena next available.

## 2024-01-04 ENCOUNTER — TELEPHONE (OUTPATIENT)
Dept: NEUROLOGY | Facility: CLINIC | Age: 44
End: 2024-01-04

## 2024-01-04 ENCOUNTER — OFFICE VISIT (OUTPATIENT)
Dept: NEUROLOGY | Facility: CLINIC | Age: 44
End: 2024-01-04
Payer: COMMERCIAL

## 2024-01-04 VITALS
SYSTOLIC BLOOD PRESSURE: 98 MMHG | WEIGHT: 284 LBS | HEIGHT: 66 IN | BODY MASS INDEX: 45.64 KG/M2 | RESPIRATION RATE: 16 BRPM | HEART RATE: 80 BPM | DIASTOLIC BLOOD PRESSURE: 66 MMHG

## 2024-01-04 DIAGNOSIS — G44.039 EPISODIC PAROXYSMAL HEMICRANIA, NOT INTRACTABLE: Primary | ICD-10-CM

## 2024-01-04 DIAGNOSIS — G43.909 EPISODIC MIGRAINE: ICD-10-CM

## 2024-01-04 PROCEDURE — 3008F BODY MASS INDEX DOCD: CPT | Performed by: OTHER

## 2024-01-04 PROCEDURE — 99214 OFFICE O/P EST MOD 30 MIN: CPT | Performed by: OTHER

## 2024-01-04 PROCEDURE — 3078F DIAST BP <80 MM HG: CPT | Performed by: OTHER

## 2024-01-04 PROCEDURE — 3074F SYST BP LT 130 MM HG: CPT | Performed by: OTHER

## 2024-01-04 RX ORDER — LAMOTRIGINE 100 MG/1
100 TABLET ORAL 2 TIMES DAILY
COMMUNITY
Start: 2023-12-19

## 2024-01-04 RX ORDER — UBROGEPANT 100 MG/1
TABLET ORAL
Qty: 10 TABLET | Refills: 0 | Status: SHIPPED | OUTPATIENT
Start: 2024-01-04 | End: 2025-01-03

## 2024-01-04 NOTE — PROGRESS NOTES
[facility] Progress Note    Chief Complaint   Patient presents with    Migraine     F/U, Qulipta preventative/sumatriptan abortive, notes \"doing well for awhile then having cluster events\", sumatriptan not effective, using \"at home cocktail\" for relief     As per my initial H&P from 11/3/2022:  \"  Crys Carter is a 44 year old, who presents for evaluation of migraines and headaches.     Patient states she has headaches since age 15. She mainly notes headache on the right side and behind the eyes and may have visual aura and nausea associated with headaches.   She previously was having these every other week when she was younger.  She was started on Imitrex in her early 20s and did not start on preventive therapy until her 30s or 40s, after she had been having migraines 1-2 times per week.  She was on Topamax 50 mg bid and well controlled for 1-2 yrs only having migraine once every 2 months.  She had weight loss surgery 3/2022 and started to have worsening migraines after this time.  She was having once every 2-3 weeks but they became more frequent.  She was recommended to increase the dose of Topamax up to 100 mg bid but subsequently had brain fog and \"suicidal thoughts\" and is being tapered off by her PCP recently.  She is currently on 50 mg AM / 100 mg nightly.      She does have history of bipolar disorder and had been on lamotrigine as well as sertraline and Latuda when she was originally on Topamax but recently has been changed from Latuda to Abilify.      Currently, her mood is fluctuating is having intermittent suicidal thoughts but overall improved and is doing outpatient therapy program.       Currently, she is having at lest 8-10 migraines per month; these sometimes respond to sumatriptan when used but not always and overall estimates less than 50% effective at the 100 mg dose.     Otherwise, patient denies any recent weight change, fevers, chills, nausea, double vision/ blurry  vision / loss of vision, chest pain, palpitations, shortness of breath, rashes, joint pains, bowel / bladder incontinence or mood issues. \"       Prior notes as per 4/13/2023.  Patient overall since last visit had been doing better in terms of migraines and was not having migraines for ~ 3 months but in the past 3-4 weeks, she has a new type of headache.        She has been having some sharp pains behind left eye, and also having some cloudy vision in the left eye.  She denies pain when moving eyes from side to side but has noted her left more than right eye was tearing with these events; she denies sweating on one side of the face; notes improvement when sitting and may worsen when lying down; these may occur a few times in a day but are briefer than her usual migraines.  She has been on Quilipta and has been having to go to ER to treat these symptoms when she does not respond to sumatriptan.        Patient last seen 4/13/2023.  She has been lost to follow up.  According to records, she has been seen multiple times in the ER for headaches as well as fall from ladder. She has been to ER multiple times and recently was in [facility] ED with migraine, with some R sided numbness 11/14/2023.  She had workup for possible stroke with CTA brain and MRI brain with no suggestion of stroke or LVO.  She has also been having medications adjusted by psychiatry and following with therapist regularly.  She currently is having migraines, which cluster for a few days when they occur.      She states in 10/2023, she was having a lot of stress and adjustment to psychiatric medications and had more frequent migraines during this time.       She is currently doing well with only 2 migraines in the past month.  In terms of her prior other type of headaches with severe pain behind left eye and tearing of left eye, she did take indomethacin and noted improvement in ~2 weeks when she was on the 50 mg tid dosing.  She has not had recurrence of  these events.        She has been taking Quilipta 60 mg daily for prevention of migraines otherwise and doing well currently. She states Imitrex does not work well for abortive therapy and takes a \"cocktail\" of tramadol, hydroxyzine and Zofran.           Past Medical History:   Diagnosis Date    Allergic rhinitis 1988    Anxiety     Arthritis     Asthma     Bipolar disorder (HCC)     Calculus of kidney 05/2021    Cancer (HCC)     Basal cell carcinoma over the left eyebrow    Depression     Esophageal reflux     Hypothyroidism 2021    Migraine     Migraines     Obesity, unspecified     S/P laparoscopic sleeve gastrectomy 03/16/2022    Sleep apnea     history of, improved with weight loss surgery, no longer needs cpap     Past Surgical History:   Procedure Laterality Date    OTHER  2009    benign tumor reomoved  from left inner thigh    OTHER  2014    basal carcinoma removed from upper lid of left eye    OTHER SURGICAL HISTORY  03/07/2022    Gastric sleeve    SKIN SURGERY  Oct 2014    tumor removed from eyebrow ridge    TONSILLECTOMY       Social History     Socioeconomic History    Marital status: Single   Tobacco Use    Smoking status: Former     Packs/day: 1.00     Years: 7.00     Additional pack years: 0.00     Total pack years: 7.00     Types: Cigarettes     Start date: 1/27/1985     Quit date: 12/15/2008     Years since quitting: 15.0    Smokeless tobacco: Never   Vaping Use    Vaping Use: Never used   Substance and Sexual Activity    Alcohol use: Not Currently     Comment: Last wee in August 2023    Drug use: Not Currently     Types: Cannabis    Sexual activity: Yes     Partners: Male     Birth control/protection: Condom   Other Topics Concern    Caffeine Concern Yes     Comment: 32 oz daily    Stress Concern No    Weight Concern Yes    Special Diet Yes     Comment: Bariatric    Exercise Yes    Seat Belt Yes     Family History   Problem Relation Age of Onset    Depression Father     Heart Disease Father      Asthma Father     Diabetes Mother     Obesity Mother     Depression Mother     Alcohol and Other Disorders Associated Maternal Grandmother     Cancer Maternal Grandmother         Lung CA    Cancer Maternal Grandfather         Prostate CA    Hypertension Paternal Grandmother     Cancer Paternal Grandfather         Lung CA    Personality Disorder Paternal Grandfather     Breast Cancer Maternal Aunt 65        in her 60's    Substance Abuse Maternal Uncle     Stroke Neg        Allergies:  Allergies   Allergen Reactions    Clonazepam OTHER (SEE COMMENTS)     Suicidal thoughts  [place] out of sorts and has increased irritability as well as extremities feeling strange    Compazine [Prochlorperazine] OTHER (SEE COMMENTS)     Oral tablet-  Twitching;  Blanked out; nausea    Topiramate OTHER (SEE COMMENTS)     Suicidal,aggresive,brain fog      Current Meds:  Current Outpatient Medications   Medication Sig Dispense Refill    lamoTRIgine 100 MG Oral Tab Take 1 tablet (100 mg total) by mouth 2 (two) times daily.      ubrogepant (UBRELVY) 100 MG Oral Tab Take one tablet at onset of migraine.  May take additional tablet in 2 hours if needed.  Do not exceed two tablets per 24 hour period. 10 tablet 0    naproxen 500 MG Oral Tab       ibuprofen 800 MG Oral Tab       lurasidone (LATUDA) 60 MG Oral Tab Take 1 tablet (60 mg total) by mouth daily with dinner. 90 tablet 0    buPROPion  MG Oral Tablet 24 Hr Take 1 tablet (150 mg total) by mouth daily. 30 tablet 0    propranolol 20 MG Oral Tab Take 1 tablet (20 mg total) by mouth 2 (two) times daily. 60 tablet 0    naltrexone 50 MG Oral Tab Take 1 tablet (50 mg total) by mouth daily. 30 tablet 0    sertraline 100 MG Oral Tab Take 1 tablet (100 mg total) by mouth daily. 30 tablet 0    traZODone 100 MG Oral Tab Take 1 tablet (100 mg total) by mouth nightly as needed for Sleep. 30 tablet 0    Atogepant (QULIPTA) 30 MG Oral Tab Take 1 tablet by mouth daily. 30 tablet 2    levothyroxine 75  MCG Oral Tab Take 1 tablet (75 mcg total) by mouth before breakfast. 90 tablet 1    pantoprazole 20 MG Oral Tab EC Take 1 tablet (20 mg total) by mouth before breakfast. 90 tablet 1    hydrOXYzine 50 MG Oral Tab Take 1 tablet (50 mg total) by mouth nightly as needed for Anxiety.      meclizine 25 MG Oral Tab Take 1 tablet (25 mg total) by mouth 3 (three) times daily as needed for Dizziness. 15 tablet 0    ondansetron 4 MG Oral Tablet Dispersible Take 1 tablet (4 mg total) by mouth every 4 (four) hours as needed for Nausea. 10 tablet 0    albuterol (PROAIR HFA) 108 (90 Base) MCG/ACT Inhalation Aero Soln SHAKE WELL AND INHALE 1 TO 2 PUFFS INTO THE LUNGS EVERY 4 HOURS AS NEEDED FOR WHEEZING( COUGH) 3 each 1    Ketorolac Tromethamine 10 MG Oral Tab Take 1 tablet (10 mg total) by mouth daily as needed for Pain.      LORazepam 1 MG Oral Tab Take 1 tablet (1 mg total) by mouth 2 (two) times daily as needed.      Multiple Vitamins-Minerals (MULTIVITAL OR) Take by mouth. Bariatric vitamin      guanFACINE HCl ER 1 MG Oral Tablet 24 Hr Take 2 tablets (2 mg total) by mouth daily. (Patient not taking: Reported on 12/19/2023) 30 tablet 2          ROS:   A comprehensive 10 point review of systems was completed.  Pertinent positives and negatives noted in the the HPI.      PHYSICAL EXAM:   BP 98/66 (BP Location: Left arm, Patient Position: Sitting, Cuff Size: adult)   Pulse 80   Resp 16   Ht 66\"   Wt 284 lb (128.8 kg)   LMP 12/28/2023 (Approximate)   BMI 45.84 kg/m²   Estimated body mass index is 45.84 kg/m² as calculated from the following:    Height as of this encounter: 66\".    Weight as of this encounter: 284 lb (128.8 kg).      Gen: well developed, well nourished, no acute distress  HEENT: normocephalic  Heart; normal S1/S2, regular rate and rhythm  Lungs: clear to auscultation bilaterally  Extremities: no edema, peripheral pulses intact    Neck: supple, full range of motion; no carotid bruits    Fundoscopic Exam: optic  discs sharp bilaterally    Neuro:  Mental status:  Orientation: Alert and oriented to person, place, time  Speech Fluent and conversational    CN: PERRL, EOMI with no nystagmus, VFF, smile symmetric, sensation intact, tongue and palate midline, SCM intact, otherwise, CN 2-12 intact  Motor: 5/5 strength throughout, tone normal  DTR: 2+ symmetric throughout, toes downgoing bilaterally, no clonus  Sensory: intact to light touch throughout  Coord: FNF intact with no tremor or dysmetria; rapid alternating movements intact bilaterally  Romberg: absent  Gait: normal casual, heel, toe and tandem gait      TEST RESULTS/DATA REVIEWED:   Imaging  None new    Prior as noted below    MRI brain with and without contrast (4/10/2023):     FINDINGS: Several 2-3 mm T2/FLAIR hyperintense, non-enhancing signal abnormalities are identified in the frontal and parietal subcortical white matter bilaterally. The cerebellum and brainstem are normal in morphology and signal. Normal morphology of the    corpus callosum. No areas of restricted diffusion are identified. No evidence of cortical infarct, edema, hemorrhage, mass, abnormal enhancement, or abnormal extra-axial fluid collection. Ventricular volumes are normal. No midline shift. The major   intracranial arterial flow voids and the dural venous sinuses appear patent. Normal size of the pituitary gland. No Chiari malformation.     Minimal mucosal thickening in bilateral maxillary sinuses. The mastoid air cells are normally aerated. A 4F synovial cyst along the anteromedial margin of the right temporomandibular joint is of questionable clinical significance. No bone lesions are   identified.     IMPRESSION:   1. Several 2-3 mm T2/FLAIR hyperintense, non-enhancing signal abnormalities in the frontal and parietal subcortical white matter bilaterally may relate to migraines or minimal chronic small vessel ischemic changes but are non-specific in appearance.     2. No evidence of acute  infarct, cortical infarct, edema, hemorrhage, mass, or acute intracranial abnormality.     3. Minimal chronic-appearing maxillary sinus inflammatory changes.     Labs:   Prior as noted below  Component      Latest Ref Rng & Units 4/16/2022   Vitamin B12      193 - 986 pg/mL 1,014 (H)   FOLATE (FOLIC ACID), SERUM      >=8.7 ng/mL 18.1   VITAMIN B1 (THIAMINE), WHOLE B      70 - 180 nmol/L 73   VITAMIN D, 25-OH, TOTAL      30.0 - 100.0 ng/mL 44.5    4/16/2022    1,014 (H)     SARS-CoV-2 by PCR (9./1/2022): detected      IMPRESSION AND PLAN:   Crys Carter is a 44 year old female with PMHx significant for bipolar disorder and chronic episodic migraine, who originally presented 11/3/2022, for evaluation and management of recent worsening migraines.    Neurologic exam is normal and nonfocal and patient was previously well controlled in terms of migraine, which occurred with and without aura, on preventive medication with Topamax as well as abortive therapy with sumatriptan 100 mg dose PRN.  However, she had weight loss surgery 3/2022 and had worsening migraines which did not respond to higher dose of Topamax and she developed significant side effects with cognitive changes and suicidal thoughts.  She was weaned off Topamax and had been doing better on Qulipta daily for prevention.       However, she subsequently developed a different type of headache with mainly unilateral left side headache with some associated tearing of eye - given unilateral presentation, thought this could be variant of trigeminal autonomic cephalgia (ie paroxysmal hemicrania) and was recommended to start indomethacin trial for treatment and noted improvement when she took 50 mg tid dosing with no recurrence since this time.  Patient advised to monitor for recurrence and likely plan for resuming short course of indomethacin at ~50 mg tid if this occurs.       Otherwise, continue Qulipta at current dose; for abortive therapy for migraines, she  notes sumatriptan has not been working and is not ideal due to risk of serotonin syndrome with concurrent psychiatric medications. Therefore, we will trial ubrogepantt (Ubrelvy) :  take 100 mg  within first 30 minutes of symptoms starting; if not improved after 2 hours, take additional dose, and then stop taking; discussed potential side effects, including but not limited to nausea, drowsiness and dry mouth    Patient was additionally advised to keep a headache diary, detailing migraine triggers, alleviating factors, response to medication, as well as frequency/severity and type of headaches.     Patient was counseled regarding conservative management, stress reduction techniques, moist heat, massage, and OTC anti-inflammatory medications as needed; also discussed optimal timing of migraine specific medications for abortive therapy to maximize effectiveness, and risk of medication overuse headache.    1. Episodic paroxysmal hemicrania, not intractable  As noted above     2. Episodic migraine  As noted above   - ubrogepant (UBRELVY) 100 MG Oral Tab; Take one tablet at onset of migraine.  May take additional tablet in 2 hours if needed.  Do not exceed two tablets per 24 hour period.  Dispense: 10 tablet; Refill: 0    Return in about 4 months (around 5/4/2024).    30 total minutes spent, including chart review, discussion of pertinent labs and imaging with plan of care regarding migraine / headache management as noted above;  patient allowed to ask questions and all questions answered to the best of my ability     Albert Galicia MD, Neurology  [facility]  Pager [phone]  1/4/2024

## 2024-01-04 NOTE — TELEPHONE ENCOUNTER
PA requested for Ubrelvy  Clinical questions answered and submitted to insurance  Awaiting coverage determination

## 2024-01-04 NOTE — PATIENT INSTRUCTIONS
Refill policies:    Allow 2-3 business days for refills; controlled substances may take longer.  Contact your pharmacy at least 5 days prior to running out of medication and have them send an electronic request or submit request through the “request refill” option in your [employer] account.  Refills are not addressed on weekends; covering physicians do not authorize routine medications on weekends.  No narcotics or controlled substances are refilled after noon on Fridays or by on call physicians.  By law, narcotics must be electronically prescribed.  A 30 day supply with no refills is the maximum allowed.  If your prescription is due for a refill, you may be due for a follow up appointment.  To best provide you care, patients receiving routine medications need to be seen at least once a year.  Patients receiving narcotic/controlled substance medications need to be seen at least once every 3 months.  In the event that your preferred pharmacy does not have the requested medication in stock (e.g. Backordered), it is your responsibility to find another pharmacy that has the requested medication available.  We will gladly send a new prescription to that pharmacy at your request.    Scheduling Tests:    If your physician has ordered radiology tests such as MRI or CT scans, please contact Central Scheduling at [phone] right away to schedule the test.  Once scheduled, the [facility] Centralized Referral Team will work with your insurance carrier to obtain pre-certification or prior authorization.  Depending on your insurance carrier, approval may take 3-10 days.  It is highly recommended patients assure they have received an authorization before having a test performed.  If test is done without insurance authorization, patient may be responsible for the entire amount billed.      Precertification and Prior Authorizations:  If your physician has recommended that you have a procedure or additional testing performed the [facility]  Centralized Referral Team will contact your insurance carrier to obtain pre-certification or prior authorization.    You are strongly encouraged to contact your insurance carrier to verify that your procedure/test has been approved and is a COVERED benefit.  Although the [facility] Centralized Referral Team does its due diligence, the insurance carrier gives the disclaimer that \"Although the procedure is authorized, this does not guarantee payment.\"    Ultimately the patient is responsible for payment.   Thank you for your understanding in this matter.  Paperwork Completion:  If you require FMLA or disability paperwork for your recovery, please make sure to either drop it off or have it faxed to our office at [phone]. Be sure the form has your name and date of birth on it.  The form will be faxed to our Forms Department and they will complete it for you.  There is a 25$ fee for all forms that need to be filled out.  Please be aware there is a 10-14 day turnaround time.  You will need to sign a release of information (RONNY) form if your paperwork does not come with one.  You may call the Forms Department with any questions at [phone].  Their fax number is [phone].

## 2024-01-04 NOTE — TELEPHONE ENCOUNTER
Received fax from [employer]   Approval received for patient use of Ubrelvy   Approved 1/4/24 - 1/4/25          Pt notified

## 2024-01-23 ENCOUNTER — OFFICE VISIT (OUTPATIENT)
Dept: INTERNAL MEDICINE CLINIC | Facility: CLINIC | Age: 44
End: 2024-01-23
Payer: COMMERCIAL

## 2024-01-23 VITALS
TEMPERATURE: 98 F | WEIGHT: 290.31 LBS | SYSTOLIC BLOOD PRESSURE: 130 MMHG | OXYGEN SATURATION: 98 % | DIASTOLIC BLOOD PRESSURE: 78 MMHG | RESPIRATION RATE: 15 BRPM | HEIGHT: 66.4 IN | BODY MASS INDEX: 46.1 KG/M2 | HEART RATE: 74 BPM

## 2024-01-23 DIAGNOSIS — R53.83 FATIGUE, UNSPECIFIED TYPE: Primary | ICD-10-CM

## 2024-01-23 DIAGNOSIS — N39.0 URINARY TRACT INFECTION WITHOUT HEMATURIA, SITE UNSPECIFIED: ICD-10-CM

## 2024-01-23 DIAGNOSIS — Z12.11 COLON CANCER SCREENING: ICD-10-CM

## 2024-01-23 DIAGNOSIS — Z98.84 S/P BARIATRIC SURGERY: ICD-10-CM

## 2024-01-23 DIAGNOSIS — Z23 IMMUNIZATION DUE: ICD-10-CM

## 2024-01-23 DIAGNOSIS — F32.A DEPRESSION, UNSPECIFIED DEPRESSION TYPE: ICD-10-CM

## 2024-01-23 DIAGNOSIS — K21.9 GASTROESOPHAGEAL REFLUX DISEASE, UNSPECIFIED WHETHER ESOPHAGITIS PRESENT: ICD-10-CM

## 2024-01-23 DIAGNOSIS — E03.9 HYPOTHYROIDISM, UNSPECIFIED TYPE: ICD-10-CM

## 2024-01-23 PROBLEM — Z01.818 PREOP TESTING: Status: RESOLVED | Noted: 2019-01-14 | Resolved: 2024-01-23

## 2024-01-23 PROBLEM — E66.01 MORBID OBESITY WITH BMI OF 40.0-44.9, ADULT (HCC): Status: RESOLVED | Noted: 2017-09-25 | Resolved: 2024-01-23

## 2024-01-23 PROCEDURE — 99214 OFFICE O/P EST MOD 30 MIN: CPT | Performed by: INTERNAL MEDICINE

## 2024-01-23 PROCEDURE — 3008F BODY MASS INDEX DOCD: CPT | Performed by: INTERNAL MEDICINE

## 2024-01-23 PROCEDURE — 90686 IIV4 VACC NO PRSV 0.5 ML IM: CPT | Performed by: INTERNAL MEDICINE

## 2024-01-23 PROCEDURE — 3075F SYST BP GE 130 - 139MM HG: CPT | Performed by: INTERNAL MEDICINE

## 2024-01-23 PROCEDURE — 3078F DIAST BP <80 MM HG: CPT | Performed by: INTERNAL MEDICINE

## 2024-01-23 PROCEDURE — 90471 IMMUNIZATION ADMIN: CPT | Performed by: INTERNAL MEDICINE

## 2024-01-23 RX ORDER — LEVOTHYROXINE SODIUM 0.07 MG/1
75 TABLET ORAL
Qty: 90 TABLET | Refills: 1 | Status: CANCELLED | OUTPATIENT
Start: 2024-01-23

## 2024-01-23 RX ORDER — PANTOPRAZOLE SODIUM 20 MG/1
20 TABLET, DELAYED RELEASE ORAL
Qty: 90 TABLET | Refills: 1 | Status: CANCELLED | OUTPATIENT
Start: 2024-01-23

## 2024-01-23 NOTE — PROGRESS NOTES
Subjective:   Crys Carter is a 44 year old female  who presents for Establish Care       Recently treated for UTI. Still finishing abx. Feeling better.   Left ovarian cyst vs other- to follow-up with gyne   Depression/FADY- follows with psych   Hypothyroidism- tsh wnl 9/2023. On levothyroxine  GERD- controlled on ppi  S/p bariatric sleeve surgery in 2022. Follows with specialist.   Reports feeling really fatigued and sleeps over 12 hours a nigh.   History/Other:    Chief Complaint Reviewed and Verified  No Further Nursing Notes to   Review  Tobacco Reviewed  Allergies Reviewed  Medications Reviewed    Problem List Reviewed  Medical History Reviewed  Surgical History   Reviewed  OB Status Reviewed  Family History Reviewed  Social History   Reviewed         Current Outpatient Medications   Medication Sig Dispense Refill    lamoTRIgine 100 MG Oral Tab Take 1 tablet (100 mg total) by mouth 2 (two) times daily.      ubrogepant (UBRELVY) 100 MG Oral Tab Take one tablet at onset of migraine.  May take additional tablet in 2 hours if needed.  Do not exceed two tablets per 24 hour period. 10 tablet 0    naproxen 500 MG Oral Tab       ibuprofen 800 MG Oral Tab       lurasidone (LATUDA) 60 MG Oral Tab Take 1 tablet (60 mg total) by mouth daily with dinner. 90 tablet 0    buPROPion  MG Oral Tablet 24 Hr Take 1 tablet (150 mg total) by mouth daily. 30 tablet 0    propranolol 20 MG Oral Tab Take 1 tablet (20 mg total) by mouth 2 (two) times daily. 60 tablet 0    naltrexone 50 MG Oral Tab Take 1 tablet (50 mg total) by mouth daily. 30 tablet 0    sertraline 100 MG Oral Tab Take 1 tablet (100 mg total) by mouth daily. 30 tablet 0    traZODone 100 MG Oral Tab Take 1 tablet (100 mg total) by mouth nightly as needed for Sleep. 30 tablet 0    Atogepant (QULIPTA) 30 MG Oral Tab Take 1 tablet by mouth daily. 30 tablet 2    levothyroxine 75 MCG Oral Tab Take 1 tablet (75 mcg total) by mouth before breakfast. 90  tablet 1    pantoprazole 20 MG Oral Tab EC Take 1 tablet (20 mg total) by mouth before breakfast. 90 tablet 1    hydrOXYzine 50 MG Oral Tab Take 1 tablet (50 mg total) by mouth nightly as needed for Anxiety.      meclizine 25 MG Oral Tab Take 1 tablet (25 mg total) by mouth 3 (three) times daily as needed for Dizziness. 15 tablet 0    ondansetron 4 MG Oral Tablet Dispersible Take 1 tablet (4 mg total) by mouth every 4 (four) hours as needed for Nausea. 10 tablet 0    albuterol (PROAIR HFA) 108 (90 Base) MCG/ACT Inhalation Aero Soln SHAKE WELL AND INHALE 1 TO 2 PUFFS INTO THE LUNGS EVERY 4 HOURS AS NEEDED FOR WHEEZING( COUGH) 3 each 1    Ketorolac Tromethamine 10 MG Oral Tab Take 1 tablet (10 mg total) by mouth daily as needed for Pain.      LORazepam 1 MG Oral Tab Take 1 tablet (1 mg total) by mouth 2 (two) times daily as needed.      Multiple Vitamins-Minerals (MULTIVITAL OR) Take by mouth. Bariatric vitamin      guanFACINE HCl ER 1 MG Oral Tablet 24 Hr Take 2 tablets (2 mg total) by mouth daily. (Patient not taking: Reported on 12/19/2023) 30 tablet 2       Review of Systems:  Pertinent items are noted in HPI.  A comprehensive 10 point review of systems was completed.  Pertinent positives and negatives noted in the the HPI.        Objective:   /78 (BP Location: Left arm, Patient Position: Sitting, Cuff Size: large)   Pulse 74   Temp 97.7 °F (36.5 °C) (Temporal)   Resp 15   Ht 5' 6.4\" (1.687 m)   Wt 290 lb 4.8 oz (131.7 kg)   LMP 12/28/2023 (Exact Date)   SpO2 98%   BMI 46.29 kg/m²  Estimated body mass index is 46.29 kg/m² as calculated from the following:    Height as of this encounter: 5' 6.4\" (1.687 m).    Weight as of this encounter: 290 lb 4.8 oz (131.7 kg).  PHYSICAL EXAM:   General: no acute distress   Neck: trachea midline; no adenopathy; thyroid not enlarged   Hematologic/lymphatic:no cervical lymphadenopathy; no supraclavicular adenopathy   Respiratory: no increased work of breathing; good  air exchange; CTAB; no crackles or wheezing   Cardiovascular: RRR; S1, S2; no murmurs;  Neurological: awake, alert, oriented x3; CNII-XII grossly intact;  Behavioral/Psych: euthymic; appropriate affect       Assessment & Plan:     Crys was seen today for establish care.    Diagnoses and all orders for this visit:    Fatigue, unspecified type  -     Vitamin D; Future  -     CBC With Differential With Platelet; Future  -     Comp Metabolic Panel (14); Future  -     Hemoglobin A1C; Future  -     Lipid Panel; Future  -     TSH and Free T4 [E]; Future  -     Iron And Tibc; Future  -     B12 AND FOLATE; Future  -     Vitamin B1 (Thiamine), Blood [E]; Future  -     Zinc, Plasma Or Serum; Future    Immunization due  -     Fluzone Quadrivalent 6mo+ 0.5mL    Gastroesophageal reflux disease, unspecified whether esophagitis present  -continue ppi     Hypothyroidism, unspecified type  -     Vitamin D; Future  -     CBC With Differential With Platelet; Future  -     Comp Metabolic Panel (14); Future  -     Hemoglobin A1C; Future  -     Lipid Panel; Future  -     TSH and Free T4 [E]; Future  -levothyroxine     Colon cancer screening  -     Gastro Referral - In Network    Urinary tract infection without hematuria, site unspecified  -     Urine Culture, Routine [E]; Future    Depression, unspecified depression type  -as per psych     S/P bariatric surgery  -     Vitamin D; Future  -     CBC With Differential With Platelet; Future  -     Comp Metabolic Panel (14); Future  -     Hemoglobin A1C; Future  -     Lipid Panel; Future  -     TSH and Free T4 [E]; Future  -     Iron And Tibc; Future  -     B12 AND FOLATE; Future  -     Vitamin B1 (Thiamine), Blood [E]; Future  -     Zinc, Plasma Or Serum; Future              Viki Pinto MD

## 2024-01-27 ENCOUNTER — LAB ENCOUNTER (OUTPATIENT)
Dept: LAB | Age: 44
End: 2024-01-27
Attending: INTERNAL MEDICINE
Payer: COMMERCIAL

## 2024-01-27 DIAGNOSIS — E03.9 HYPOTHYROIDISM, UNSPECIFIED TYPE: ICD-10-CM

## 2024-01-27 DIAGNOSIS — N39.0 URINARY TRACT INFECTION WITHOUT HEMATURIA, SITE UNSPECIFIED: ICD-10-CM

## 2024-01-27 DIAGNOSIS — Z98.84 S/P BARIATRIC SURGERY: ICD-10-CM

## 2024-01-27 DIAGNOSIS — R53.83 FATIGUE, UNSPECIFIED TYPE: ICD-10-CM

## 2024-01-27 LAB
ALBUMIN SERPL-MCNC: 3.7 G/DL (ref 3.4–5)
ALBUMIN/GLOB SERPL: 0.9 {RATIO} (ref 1–2)
ALP LIVER SERPL-CCNC: 86 U/L
ANION GAP SERPL CALC-SCNC: 4 MMOL/L (ref 0–18)
AST SERPL-CCNC: 11 U/L (ref 15–37)
BASOPHILS # BLD AUTO: 0.08 X10(3) UL (ref 0–0.2)
BASOPHILS NFR BLD AUTO: 1.1 %
BILIRUB SERPL-MCNC: 0.5 MG/DL (ref 0.1–2)
BUN BLD-MCNC: 12 MG/DL (ref 9–23)
CALCIUM BLD-MCNC: 9.1 MG/DL (ref 8.5–10.1)
CHLORIDE SERPL-SCNC: 110 MMOL/L (ref 98–112)
CHOLEST SERPL-MCNC: 204 MG/DL (ref ?–200)
CO2 SERPL-SCNC: 26 MMOL/L (ref 21–32)
CREAT BLD-MCNC: 1.05 MG/DL
EGFRCR SERPLBLD CKD-EPI 2021: 67 ML/MIN/1.73M2 (ref 60–?)
EOSINOPHIL # BLD AUTO: 0.12 X10(3) UL (ref 0–0.7)
EOSINOPHIL NFR BLD AUTO: 1.6 %
ERYTHROCYTE [DISTWIDTH] IN BLOOD BY AUTOMATED COUNT: 13.2 %
EST. AVERAGE GLUCOSE BLD GHB EST-MCNC: 111 MG/DL (ref 68–126)
FASTING PATIENT LIPID ANSWER: YES
FASTING STATUS PATIENT QL REPORTED: YES
FOLATE SERPL-MCNC: 18.1 NG/ML (ref 8.7–?)
GLOBULIN PLAS-MCNC: 3.9 G/DL (ref 2.8–4.4)
GLUCOSE BLD-MCNC: 97 MG/DL (ref 70–99)
HBA1C MFR BLD: 5.5 % (ref ?–5.7)
HCT VFR BLD AUTO: 42.1 %
HDLC SERPL-MCNC: 42 MG/DL (ref 40–59)
HGB BLD-MCNC: 13.7 G/DL
IMM GRANULOCYTES # BLD AUTO: 0.01 X10(3) UL (ref 0–1)
IMM GRANULOCYTES NFR BLD: 0.1 %
IRON SATN MFR SERPL: 17 %
IRON SERPL-MCNC: 61 UG/DL
LDLC SERPL CALC-MCNC: 138 MG/DL (ref ?–100)
LYMPHOCYTES # BLD AUTO: 2.76 X10(3) UL (ref 1–4)
LYMPHOCYTES NFR BLD AUTO: 36.7 %
MCH RBC QN AUTO: 28.5 PG (ref 26–34)
MCHC RBC AUTO-ENTMCNC: 32.5 G/DL (ref 31–37)
MCV RBC AUTO: 87.5 FL
MONOCYTES # BLD AUTO: 0.5 X10(3) UL (ref 0.1–1)
MONOCYTES NFR BLD AUTO: 6.6 %
NEUTROPHILS # BLD AUTO: 4.06 X10 (3) UL (ref 1.5–7.7)
NEUTROPHILS # BLD AUTO: 4.06 X10(3) UL (ref 1.5–7.7)
NEUTROPHILS NFR BLD AUTO: 53.9 %
NONHDLC SERPL-MCNC: 162 MG/DL (ref ?–130)
OSMOLALITY SERPL CALC.SUM OF ELEC: 290 MOSM/KG (ref 275–295)
PLATELET # BLD AUTO: 260 10(3)UL (ref 150–450)
POTASSIUM SERPL-SCNC: 4.3 MMOL/L (ref 3.5–5.1)
PROT SERPL-MCNC: 7.6 G/DL (ref 6.4–8.2)
RBC # BLD AUTO: 4.81 X10(6)UL
SODIUM SERPL-SCNC: 140 MMOL/L (ref 136–145)
T4 FREE SERPL-MCNC: 1 NG/DL (ref 0.8–1.7)
TIBC SERPL-MCNC: 353 UG/DL (ref 240–450)
TRANSFERRIN SERPL-MCNC: 237 MG/DL (ref 200–360)
TRIGL SERPL-MCNC: 132 MG/DL (ref 30–149)
TSI SER-ACNC: 0.53 MIU/ML (ref 0.36–3.74)
VIT B12 SERPL-MCNC: 1227 PG/ML (ref 193–986)
VIT D+METAB SERPL-MCNC: 41.5 NG/ML (ref 30–100)
VLDLC SERPL CALC-MCNC: 24 MG/DL (ref 0–30)
WBC # BLD AUTO: 7.5 X10(3) UL (ref 4–11)

## 2024-01-27 PROCEDURE — 87086 URINE CULTURE/COLONY COUNT: CPT

## 2024-01-27 PROCEDURE — 80053 COMPREHEN METABOLIC PANEL: CPT

## 2024-01-27 PROCEDURE — 80061 LIPID PANEL: CPT

## 2024-01-27 PROCEDURE — 82306 VITAMIN D 25 HYDROXY: CPT

## 2024-01-27 PROCEDURE — 84630 ASSAY OF ZINC: CPT

## 2024-01-27 PROCEDURE — 85025 COMPLETE CBC W/AUTO DIFF WBC: CPT

## 2024-01-27 PROCEDURE — 84443 ASSAY THYROID STIM HORMONE: CPT

## 2024-01-27 PROCEDURE — 84439 ASSAY OF FREE THYROXINE: CPT

## 2024-01-27 PROCEDURE — 82746 ASSAY OF FOLIC ACID SERUM: CPT

## 2024-01-27 PROCEDURE — 83550 IRON BINDING TEST: CPT

## 2024-01-27 PROCEDURE — 84425 ASSAY OF VITAMIN B-1: CPT

## 2024-01-27 PROCEDURE — 83036 HEMOGLOBIN GLYCOSYLATED A1C: CPT

## 2024-01-27 PROCEDURE — 82607 VITAMIN B-12: CPT

## 2024-01-27 PROCEDURE — 36415 COLL VENOUS BLD VENIPUNCTURE: CPT

## 2024-01-27 PROCEDURE — 83540 ASSAY OF IRON: CPT

## 2024-02-01 LAB — VITAMIN B1 WHOLE BLD: 117.1 NMOL/L

## 2024-02-03 LAB — ZINC: 73 UG/DL

## 2024-02-06 ENCOUNTER — OFFICE VISIT (OUTPATIENT)
Dept: OBGYN CLINIC | Facility: CLINIC | Age: 44
End: 2024-02-06
Payer: COMMERCIAL

## 2024-02-06 VITALS
DIASTOLIC BLOOD PRESSURE: 76 MMHG | SYSTOLIC BLOOD PRESSURE: 122 MMHG | BODY MASS INDEX: 46 KG/M2 | HEART RATE: 79 BPM | WEIGHT: 291.19 LBS

## 2024-02-06 DIAGNOSIS — Z01.812 PRE-PROCEDURAL LABORATORY EXAMINATION: ICD-10-CM

## 2024-02-06 DIAGNOSIS — Z30.430 ENCOUNTER FOR IUD INSERTION: Primary | ICD-10-CM

## 2024-02-06 DIAGNOSIS — N92.0 MENORRHAGIA WITH REGULAR CYCLE: ICD-10-CM

## 2024-02-06 LAB
CONTROL LINE PRESENT WITH A CLEAR BACKGROUND (YES/NO): YES YES/NO
KIT LOT #: NORMAL NUMERIC
PREGNANCY TEST, URINE: NEGATIVE

## 2024-02-06 PROCEDURE — 81025 URINE PREGNANCY TEST: CPT | Performed by: OBSTETRICS & GYNECOLOGY

## 2024-02-06 PROCEDURE — 3074F SYST BP LT 130 MM HG: CPT | Performed by: OBSTETRICS & GYNECOLOGY

## 2024-02-06 PROCEDURE — 3078F DIAST BP <80 MM HG: CPT | Performed by: OBSTETRICS & GYNECOLOGY

## 2024-02-06 PROCEDURE — 58300 INSERT INTRAUTERINE DEVICE: CPT | Performed by: OBSTETRICS & GYNECOLOGY

## 2024-02-06 NOTE — PATIENT INSTRUCTIONS
INSTRUCTIONS AFTER IUD INSERTION    Mild cramping, spotting or light to moderate bleeding can be expected over the next several weeks.  You may take an over-the-counter pain medicine, such as acetaminophen (Tylenol), ibuprofen (Advil, Motrin), and naproxen (Aleve) if needed.  Read and follow all instructions on the label.  Avoid douching, using tampons, tub baths or having sex for 2-3 days after your procedure.  We recommend using abstinence or condom use until correct IUD placement is confirmed at your one month follow up visit.  The IUD does NOT protect you against sexually transmitted infections.    Tell your healthcare provider if you have any of the following:    - Excessive bleeding heavier than your usual cycle  - Foul smelling vaginal discharge  - Fever or chills  - Severe, persistent or worsening lower belly or pelvic pain

## 2024-02-06 NOTE — PROCEDURES
IUD Insertion Procedure Note    Indications: Desires contraception    Procedure Details:   Urine pregnancy test negative.  No contraindications.  The risks including infection with effects on future fertility and need for antibiotics, bleeding, expulsion, lost IUD threads with need for hysteroscopic removal and uterine perforation with need for laparoscopic surgery and benefits of the procedure were explained to the patient and informed consent obtained.    Bimanual examination was performed and the uterus noted to be anteverted.  The cervix was prepped with betadine solution.  Benzocaine gel 20% applied to anterior cervix.      The uterus was sounded to 8 cms.  A new Mirena IUD was inserted without complications using correct insertion technique.  The strings were trimmed to 3 cms.  Signs and symptoms of complications reviewed and patient was given the patient information pamphlet.    Condition:  Stable    Complications:  None    Plan:  The patient was advised to call for any fever or for prolonged or severe pain or bleeding. Follow up one month for IUD check, and refrain for intercourse until next visit.  's information booklet given to patient.  Card with insertion and removal date also given to patient.

## 2024-02-19 RX ORDER — LEVOTHYROXINE SODIUM 0.07 MG/1
75 TABLET ORAL
Qty: 90 TABLET | Refills: 3 | Status: SHIPPED | OUTPATIENT
Start: 2024-02-19

## 2024-02-19 RX ORDER — PANTOPRAZOLE SODIUM 20 MG/1
20 TABLET, DELAYED RELEASE ORAL
Qty: 90 TABLET | Refills: 3 | Status: SHIPPED | OUTPATIENT
Start: 2024-02-19

## 2024-02-19 NOTE — TELEPHONE ENCOUNTER
Protocol PASS    Requesting:   levothyroxine 75 MCG Oral Tab 10/11/23 90 tablet 1 refill  pantoprazole 20 MG Oral Tab EC 10/11/23 90 tablet 1 refill    LOV: 1/23/24  RTC:   Recent Labs: 1/27/24    Upcoming OV NONE  Future Appointments   Date Time Provider Department Center   3/12/2024 12:15 PM Jed Alexander MD [facility] [address]   4/25/2024 12:20 PM Geraldine Orourke MD [facility] [address]   5/7/2024  1:40 PM Albert Galicia MD [facility] [place]

## 2024-02-21 ENCOUNTER — HOSPITAL ENCOUNTER (OUTPATIENT)
Age: 44
Discharge: HOME | End: 2024-02-21
Attending: EMERGENCY MEDICINE
Payer: COMMERCIAL

## 2024-02-21 ENCOUNTER — APPOINTMENT (OUTPATIENT)
Dept: CT IMAGING | Age: 44
End: 2024-02-21
Attending: EMERGENCY MEDICINE
Payer: COMMERCIAL

## 2024-02-21 VITALS
HEART RATE: 68 BPM | TEMPERATURE: 99 F | DIASTOLIC BLOOD PRESSURE: 76 MMHG | OXYGEN SATURATION: 96 % | SYSTOLIC BLOOD PRESSURE: 120 MMHG | BODY MASS INDEX: 46 KG/M2 | RESPIRATION RATE: 18 BRPM | WEIGHT: 290 LBS

## 2024-02-21 DIAGNOSIS — R10.9 ABDOMINAL PAIN OF UNKNOWN ETIOLOGY: Primary | ICD-10-CM

## 2024-02-21 LAB
#MXD IC: 0.7 X10ˆ3/UL (ref 0.1–1)
B-HCG UR QL: NEGATIVE
BILIRUB UR QL STRIP: NEGATIVE
BUN BLD-MCNC: 12 MG/DL (ref 7–18)
CHLORIDE BLD-SCNC: 105 MMOL/L (ref 98–112)
CLARITY UR: CLEAR
CO2 BLD-SCNC: 28 MMOL/L (ref 21–32)
COLOR UR: YELLOW
CREAT BLD-MCNC: 1.1 MG/DL
EGFRCR SERPLBLD CKD-EPI 2021: 63 ML/MIN/1.73M2 (ref 60–?)
GLUCOSE BLD-MCNC: 96 MG/DL (ref 70–99)
GLUCOSE UR STRIP-MCNC: NEGATIVE MG/DL
HCT VFR BLD AUTO: 42.1 %
HCT VFR BLD CALC: 43 %
HGB BLD-MCNC: 13.7 G/DL
ISTAT IONIZED CALCIUM FOR CHEM 8: 1.19 MMOL/L (ref 1.12–1.32)
KETONES UR STRIP-MCNC: NEGATIVE MG/DL
LYMPHOCYTES # BLD AUTO: 3.4 X10ˆ3/UL (ref 1–4)
LYMPHOCYTES NFR BLD AUTO: 32.9 %
MCH RBC QN AUTO: 28.7 PG (ref 26–34)
MCHC RBC AUTO-ENTMCNC: 32.5 G/DL (ref 31–37)
MCV RBC AUTO: 88.1 FL (ref 80–100)
MIXED CELL %: 7.1 %
NEUTROPHILS # BLD AUTO: 6.2 X10ˆ3/UL (ref 1.5–7.7)
NEUTROPHILS NFR BLD AUTO: 60 %
NITRITE UR QL STRIP: NEGATIVE
PH UR STRIP: 6 [PH]
PLATELET # BLD AUTO: 279 X10ˆ3/UL (ref 150–450)
POTASSIUM BLD-SCNC: 5.1 MMOL/L (ref 3.6–5.1)
PROT UR STRIP-MCNC: 30 MG/DL
RBC # BLD AUTO: 4.78 X10ˆ6/UL
SODIUM BLD-SCNC: 141 MMOL/L (ref 136–145)
SP GR UR STRIP: >=1.03
UROBILINOGEN UR STRIP-ACNC: <2 MG/DL
WBC # BLD AUTO: 10.3 X10ˆ3/UL (ref 4–11)

## 2024-02-21 PROCEDURE — 99215 OFFICE O/P EST HI 40 MIN: CPT

## 2024-02-21 PROCEDURE — 74177 CT ABD & PELVIS W/CONTRAST: CPT | Performed by: EMERGENCY MEDICINE

## 2024-02-21 PROCEDURE — 99214 OFFICE O/P EST MOD 30 MIN: CPT

## 2024-02-21 PROCEDURE — 81002 URINALYSIS NONAUTO W/O SCOPE: CPT

## 2024-02-21 PROCEDURE — 80047 BASIC METABLC PNL IONIZED CA: CPT

## 2024-02-21 PROCEDURE — 85025 COMPLETE CBC W/AUTO DIFF WBC: CPT | Performed by: EMERGENCY MEDICINE

## 2024-02-21 PROCEDURE — 81025 URINE PREGNANCY TEST: CPT

## 2024-02-21 PROCEDURE — 96360 HYDRATION IV INFUSION INIT: CPT

## 2024-02-21 RX ORDER — SODIUM CHLORIDE 9 MG/ML
1000 INJECTION, SOLUTION INTRAVENOUS ONCE
Status: COMPLETED | OUTPATIENT
Start: 2024-02-21 | End: 2024-02-21

## 2024-02-21 NOTE — ED INITIAL ASSESSMENT (HPI)
Pt here c/o lower abd pain.  Sharp pains.   Onset Sat afternoon then pain subsided and pain returned this am.   Feeling nauseated.   Vomited on Sat.    Denies diarrhea.    Denies urinary symptoms.  Had IUD placed early Feb.

## 2024-02-21 NOTE — ED PROVIDER NOTES
[facility] for discomfort in the suprapubic region.  Plan is for discharge home if no acute pathology on CT.  Had an IUD placed 2 weeks ago.  No vaginal bleeding.  Had some nausea with the pain but no vomiting or diarrhea.  No urinary symptoms.  Lab workup was unremarkable.  No acute findings on CT scan.  Reviewed imaging with patient.  No significant tenderness on palpation of the abdomen at this time.  Recommend she continue monitoring symptoms and return with worsening or any concerns.    CT ABDOMEN+PELVIS(CONTRAST ONLY)(CPT=74177)    Result Date: 2/21/2024  PROCEDURE:  CT ABDOMEN+PELVIS (CONTRAST ONLY) (CPT=74177)  COMPARISON:  [facility], CT, CT ABDOMEN+PELVIS KIDNEYSTONE 2D RNDR(NO IV,NO ORAL)(CPT=74176), 12/16/2023, 3:35 PM.  INDICATIONS:  abdominal pain  TECHNIQUE:  CT scanning was performed from the dome of the diaphragm to the pubic symphysis with non-ionic intravenous contrast material. Post contrast coronal MPR imaging was performed.  Dose reduction techniques were used. Dose information is transmitted to the ACR (American College of Radiology) NRDR (National Radiology Data Registry) which includes the Dose Index Registry.  PATIENT STATED HISTORY:(As transcribed by Technologist)  Patient complains of abdominal pain discomfort lower abdominal pelvic area for the past few days.   CONTRAST USED:  85cc of Isovue 370  FINDINGS:  LIVER:  No enlargement, atrophy, abnormal density, or significant focal lesion.  BILIARY:  No visible dilatation or calcification.  PANCREAS:  No lesion, fluid collection, ductal dilatation, or atrophy.  SPLEEN:  No enlargement or focal lesion.  KIDNEYS:  No mass, obstruction, or calcification.  ADRENALS:  No mass or enlargement.  AORTA/VASCULAR:  No aneurysm or dissection.  RETROPERITONEUM:  No mass or adenopathy.  BOWEL/MESENTERY:  No dilated loops of small bowel are seen.  Portions of the bowel are decompressed, limiting  evaluation.  There is a normal-appearing appendix.  No free fluid is seen.  Lack of oral contrast limits assessment of the bowel.  Postsurgical changes of the stomach are present. ABDOMINAL WALL:  There is a very tiny umbilical hernia containing fat. URINARY BLADDER:  Urinary bladder is decompressed, limiting evaluation. PELVIC NODES:  No adenopathy.  PELVIC ORGANS:  There is an IUD present within the uterus.  Please note that uterus and ovaries are not well assessed with CT. BONES:  No bony lesion or fracture.  LUNG BASES:  No visible pulmonary or pleural disease.  OTHER:  Negative.             CONCLUSION:  No free fluid is seen within the abdomen or pelvis.  No acute abnormality is seen at this time.  If clinical symptoms persist then recommend follow-up imaging.   LOCATION:  [place]   Dictated by (CST): Haroon De La Rosa MD on 2/21/2024 at 3:11 PM     Finalized by (CST): Haroon De La Rosa MD on 2/21/2024 at 3:19 PM

## 2024-02-21 NOTE — ED PROVIDER NOTES
Patient Seen in: [facility]      History     Chief Complaint   Patient presents with    Abdominal Pain     Stated Complaint: abdominal pain    Subjective:   HPI    44-year-old female presents for evaluation of intermittent lower abdominal pain for the last 5 days along with nausea and vomiting.  The pain first started 4 days ago, suprapubic and sort of started abruptly.  Resolved later that day and since then she has been nauseous, has not had much of an appetite but no more episodes of pain and no vomiting until today when the pain returned.  Similar location and quality although not as bad as it was when it first started.  No diarrhea.  Intermittent subjective fevers and chills.    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    Sleep apnea     history of, improved with weight loss surgery, no longer needs cpap              Past Surgical History:   Procedure Laterality Date    OTHER  2009    benign tumor reomoved  from left inner thigh    OTHER 2014    basal carcinoma removed from upper lid of left eye    OTHER SURGICAL HISTORY  03/07/2022    Gastric sleeve    SKIN SURGERY  Oct 2014    tumor removed from eyebrow ridge    TONSILLECTOMY                  No pertinent social history.            Review of Systems    Positive for stated complaint: abdominal pain  Other systems are as noted in HPI.  Constitutional and vital signs reviewed.      All other systems reviewed and negative except as noted above.    Physical Exam     ED Triage Vitals [02/21/24 1318]   /77   Pulse 76   Resp 20   Temp 98.7 °F (37.1 °C)   Temp src Temporal   SpO2 96 %    O2 Device None (Room air)       Current:/77   Pulse 76   Temp 98.7 °F (37.1 °C) (Temporal)   Resp 20   Wt 131.5 kg   LMP 01/25/2024 (Approximate)   SpO2 96%   BMI 46.24 kg/m²         Physical Exam  Vitals and nursing note reviewed.   Constitutional:       Appearance: She is well-developed.      Comments: Comfortable, no distress.   HENT:      Head: Normocephalic and atraumatic.   Eyes:      Conjunctiva/sclera: Conjunctivae normal.      Pupils: Pupils are equal, round, and reactive to light.   Cardiovascular:      Rate and Rhythm: Normal rate and regular rhythm.      Heart sounds: Normal heart sounds.   Pulmonary:      Effort: Pulmonary effort is normal.      Breath sounds: Normal breath sounds.   Abdominal:      General: Bowel sounds are normal.      Palpations: Abdomen is soft.      Comments: Nontender/nondistended.  No rebound or guarding.   Musculoskeletal:         General: Normal range of motion.      Cervical back: Normal range of motion and neck supple.   Skin:     General: Skin is warm and dry.   Neurological:      Mental Status: She is alert and oriented to person, place, and time.               ED Course     Labs Reviewed   [facility] POCT URINALYSIS DIPSTICK - Abnormal; Notable for the following components:       Result Value    Protein urine 30 (*)     Blood, Urine Large (*)     Leukocyte esterase urine Small (*)     All other components within normal limits   POCT PREGNANCY URINE - Normal   POCT CBC                      MDM      Patient is a 44-year-old female with intermittent lower abdominal pain.  Had an episode 4 days ago, pain-free since until came back today although not as severe.  She has had ongoing nausea and appetite has been poor.  No fevers, no urinary symptoms.  Comfortable here, normal vitals, no significant tenderness on exam.  Differential includes gastroenteritis, colitis, diverticulitis, cystitis.  She does have a history of ovarian cysts and kidney stones but states this pain  is really nothing like pain she has had with those.  Labs, CT ordered.                                   Medical Decision Making      Disposition and Plan     Clinical Impression:  No diagnosis found.     Disposition:  There is no disposition on file for this visit.  There is no disposition time on file for this visit.    Follow-up:  No follow-up provider specified.        Medications Prescribed:  Current Discharge Medication List

## 2024-03-31 ENCOUNTER — HOSPITAL ENCOUNTER (EMERGENCY)
Facility: HOSPITAL | Age: 44
Discharge: HOME | End: 2024-03-31
Attending: EMERGENCY MEDICINE
Payer: COMMERCIAL

## 2024-03-31 VITALS
OXYGEN SATURATION: 98 % | RESPIRATION RATE: 14 BRPM | SYSTOLIC BLOOD PRESSURE: 117 MMHG | HEIGHT: 66 IN | BODY MASS INDEX: 47.09 KG/M2 | DIASTOLIC BLOOD PRESSURE: 78 MMHG | HEART RATE: 80 BPM | WEIGHT: 293 LBS | TEMPERATURE: 98 F

## 2024-03-31 DIAGNOSIS — S29.012A UPPER BACK STRAIN, INITIAL ENCOUNTER: ICD-10-CM

## 2024-03-31 DIAGNOSIS — S16.1XXA NECK STRAIN, INITIAL ENCOUNTER: Primary | ICD-10-CM

## 2024-03-31 PROCEDURE — 96372 THER/PROPH/DIAG INJ SC/IM: CPT

## 2024-03-31 PROCEDURE — 99283 EMERGENCY DEPT VISIT LOW MDM: CPT

## 2024-03-31 RX ORDER — NAPROXEN 500 MG/1
500 TABLET ORAL 2 TIMES DAILY PRN
Qty: 20 TABLET | Refills: 0 | Status: SHIPPED | OUTPATIENT
Start: 2024-03-31 | End: 2024-04-10

## 2024-03-31 RX ORDER — TIZANIDINE 4 MG/1
4 TABLET ORAL ONCE
Status: COMPLETED | OUTPATIENT
Start: 2024-03-31 | End: 2024-03-31

## 2024-03-31 RX ORDER — KETOROLAC TROMETHAMINE 30 MG/ML
60 INJECTION, SOLUTION INTRAMUSCULAR; INTRAVENOUS ONCE
Status: COMPLETED | OUTPATIENT
Start: 2024-03-31 | End: 2024-03-31

## 2024-03-31 RX ORDER — CYCLOBENZAPRINE HCL 10 MG
10 TABLET ORAL 3 TIMES DAILY PRN
Qty: 20 TABLET | Refills: 0 | Status: SHIPPED | OUTPATIENT
Start: 2024-03-31 | End: 2024-04-07

## 2024-03-31 NOTE — ED INITIAL ASSESSMENT (HPI)
Pt states woke up this morning with back pain radiating from her lower back up to her neck and shoulders.  Pt states her CO detector went off yesterday, states had to wrestle her cat to get out of the house.  Denies any injury.  States the FD did not detect any CO in her home.  Denies dysuria.

## 2024-03-31 NOTE — ED PROVIDER NOTES
Patient Seen in: [facility] Emergency Department      History     Chief Complaint   Patient presents with    Back Pain     Stated Complaint: Woke up with back pain, starts at her lower back and works its way up to the ba*    Subjective:   HPI    44-year-old female with a past medical history as below presents with pain in her neck/shoulders and upper back area that she noted upon awakening this morning.  She states that her carbon monoxide detector went off falsely yesterday and she was caring her cat while rushing outside think she may have strained her muscles at that time.  She denies any recent falls or injury.  Denies any fever, chills, cough or cold symptoms.    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    Sleep apnea     history of, improved with weight loss surgery, no longer needs cpap              Past Surgical History:   Procedure Laterality Date    OTHER  2009    benign tumor reomoved  from left inner thigh    OTHER  2014    basal carcinoma removed from upper lid of left eye    OTHER SURGICAL HISTORY  03/07/2022    Gastric sleeve    SKIN SURGERY  Oct 2014    tumor removed from eyebrow ridge    TONSILLECTOMY                  Social History     Socioeconomic History    Marital status: Single   Tobacco Use    Smoking status: Former     Packs/day: 1.00     Years: 7.00     Additional pack years: 0.00     Total pack years: 7.00     Types: Cigarettes     Start date: 1/27/1985     Quit date: 12/15/2008     Years since quitting: 15.3    Smokeless tobacco: Never   Vaping Use    Vaping Use: Never used   Substance and Sexual Activity    Alcohol use: Not  Currently     Comment: Last wee in August 2023    Drug use: Not Currently     Types: Cannabis    Sexual activity: Not Currently     Partners: Male     Birth control/protection: Condom   Other Topics Concern    Caffeine Concern Yes    Stress Concern Yes    Weight Concern Yes    Special Diet Yes     Comment: Bariatric    Exercise No    Seat Belt Yes              Review of Systems    Positive for stated complaint: Woke up with back pain, starts at her lower back and works its way up to the ba*  Other systems are as noted in HPI.  Constitutional and vital signs reviewed.      All other systems reviewed and negative except as noted above.    Physical Exam     ED Triage Vitals [03/31/24 1656]   /84   Pulse 86   Resp 14   Temp 98.2 °F (36.8 °C)   Temp src Temporal   SpO2 96 %   O2 Device None (Room air)       Current:/78   Pulse 80   Temp 98.2 °F (36.8 °C) (Temporal)   Resp 14   Ht 167.6 cm (5' 6\")   Wt 133.8 kg   LMP 03/23/2024 (Approximate)   SpO2 98%   BMI 47.61 kg/m²         Physical Exam  Vitals and nursing note reviewed.   Constitutional:       Appearance: She is well-developed.   HENT:      Head: Normocephalic and atraumatic.      Mouth/Throat:      Mouth: Mucous membranes are moist.   Eyes:      General: No scleral icterus.  Neck:      Comments: Diffuse tenderness across bilateral trapezius muscles with tense feeling muscles extending to the cervical paraspinal muscles and rhomboid muscles  Cardiovascular:      Rate and Rhythm: Normal rate and regular rhythm.   Pulmonary:      Effort: Pulmonary effort is normal.      Breath sounds: Normal breath sounds.   Skin:     General: Skin is warm and dry.   Neurological:      General: No focal deficit present.      Mental Status: She is alert and oriented to person, place, and time.      Cranial Nerves: No cranial nerve deficit.      Motor: No weakness.   Psychiatric:         Mood and Affect: Mood normal.         Behavior: Behavior normal.                ED Course   Labs Reviewed - No data to display                   MDM   44-year-old female with a past medical history as below presents with pain in her neck/shoulders and upper back area that she noted upon awakening this morning.     Differential includes but is not limited to trapezius muscle strain, muscle spasm    Patient was treated with Toradol and Zanaflex with significant improvement in symptoms.  Will DC home with Rx for same.  Vies to follow-up with PCP in 2 to 3 days.  Return precaution discussed    Medical Decision Making  Risk  Prescription drug management.        Disposition and Plan     Clinical Impression:  1. Neck strain, initial encounter    2. Upper back strain, initial encounter         Disposition:  Discharge  3/31/2024  6:25 pm    Follow-up:  Viki Molina  [address] [address]  [phone]    Schedule an appointment as soon as possible for a visit in 2 day(s)            Medications Prescribed:  Current Discharge Medication List        START taking these medications    Details   !! naproxen 500 MG Oral Tab Take 1 tablet (500 mg total) by mouth 2 (two) times daily as needed.  Qty: 20 tablet, Refills: 0      cyclobenzaprine 10 MG Oral Tab Take 1 tablet (10 mg total) by mouth 3 (three) times daily as needed for Muscle spasms.  Qty: 20 tablet, Refills: 0       !! - Potential duplicate medications found. Please discuss with provider.

## 2024-04-05 ENCOUNTER — HOSPITAL ENCOUNTER (OUTPATIENT)
Age: 44
Discharge: HOME | End: 2024-04-05
Payer: COMMERCIAL

## 2024-04-05 VITALS
OXYGEN SATURATION: 96 % | HEIGHT: 66 IN | SYSTOLIC BLOOD PRESSURE: 132 MMHG | BODY MASS INDEX: 47.09 KG/M2 | TEMPERATURE: 98 F | WEIGHT: 293 LBS | HEART RATE: 75 BPM | DIASTOLIC BLOOD PRESSURE: 90 MMHG | RESPIRATION RATE: 20 BRPM
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
## 2024-04-05 DIAGNOSIS — L03.313 CELLULITIS OF CHEST WALL: ICD-10-CM

## 2024-04-05 PROCEDURE — 99214 OFFICE O/P EST MOD 30 MIN: CPT

## 2024-04-05 PROCEDURE — 99213 OFFICE O/P EST LOW 20 MIN: CPT

## 2024-04-05 RX ORDER — TRIAMCINOLONE ACETONIDE 1 MG/G
1 OINTMENT TOPICAL 2 TIMES DAILY
Qty: 1 EACH | Refills: 0 | Status: SHIPPED | OUTPATIENT
Start: 2024-04-05 | End: 2024-04-10

## 2024-04-05 RX ORDER — CEPHALEXIN 500 MG/1
500 CAPSULE ORAL 4 TIMES DAILY
Qty: 28 CAPSULE | Refills: 0 | Status: SHIPPED | OUTPATIENT
Start: 2024-04-05 | End: 2024-04-12

## 2024-04-05 NOTE — ED INITIAL ASSESSMENT (HPI)
Scratched on her chest last Saturday by her cat. A paramedic cleaned the scratches, unsure what they used. Yesterday developed a rash and increased pain/itchiness over the area.

## 2024-04-05 NOTE — ED PROVIDER NOTES
Patient Seen in: [facility]      History     Chief Complaint   Patient presents with    Rash Skin Problem     Stated Complaint: rash    Subjective:   HPI    45 YO female presents to immediate care for evaluation of increased redness and pruritic rash starting at the chest yesterday after cat scratch on 3/30. Denies fever/chills or any other systemic symptoms.        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    Sleep apnea     history of, improved with weight loss surgery, no longer needs cpap              Past Surgical History:   Procedure Laterality Date    OTHER  2009    benign tumor reomoved  from left inner thigh    OTHER  2014    basal carcinoma removed from upper lid of left eye    OTHER SURGICAL HISTORY  03/07/2022    Gastric sleeve    SKIN SURGERY  Oct 2014    tumor removed from eyebrow ridge    TONSILLECTOMY                  Social History     Socioeconomic History    Marital status: Single   Tobacco Use    Smoking status: Former     Packs/day: 1.00     Years: 7.00     Additional pack years: 0.00     Total pack years: 7.00     Types: Cigarettes     Start date: 1/27/1985     Quit date: 12/15/2008     Years since quitting: 15.3    Smokeless tobacco: Never   Vaping Use    Vaping Use: Never used   Substance and Sexual Activity    Alcohol use: Not Currently     Comment: Last wee in August 2023    Drug use: Not Currently     Types: Cannabis    Sexual activity: Not Currently     Partners: Male     Birth control/protection: Condom   Other Topics Concern    Caffeine Concern Yes    Stress Concern Yes    Weight Concern Yes    Special Diet Yes      Comment: Bariatric    Exercise No    Seat Belt Yes              Review of Systems    Positive for stated complaint: rash  Other systems are as noted in HPI.  Constitutional and vital signs reviewed.      All other systems reviewed and negative except as noted above.    Physical Exam     ED Triage Vitals [04/05/24 1546]   /90   Pulse 75   Resp 20   Temp 97.8 °F (36.6 °C)   Temp src Temporal   SpO2 96 %   O2 Device None (Room air)       Current:/90   Pulse 75   Temp 97.8 °F (36.6 °C) (Temporal)   Resp 20   Ht 167.6 cm (5' 6\")   Wt 133.8 kg   LMP 03/23/2024 (Approximate)   SpO2 96%   BMI 47.61 kg/m²         Physical Exam  Vitals and nursing note reviewed.   Constitutional:       General: She is not in acute distress.     Appearance: Normal appearance. She is not ill-appearing, toxic-appearing or diaphoretic.   Cardiovascular:      Rate and Rhythm: Normal rate and regular rhythm.   Pulmonary:      Effort: Pulmonary effort is normal. No respiratory distress.   Skin:     Findings: Erythema and rash present.      Comments: Erythematous, pinpoint, papular lesions at the chest with underlying erythema and tenderness.   Neurological:      Mental Status: She is alert and oriented to person, place, and time.   Psychiatric:         Mood and Affect: Mood normal.         Behavior: Behavior normal.                ED Course   Labs Reviewed - No data to display           MDM      43 YO female with increased redness and pruritic rash starting at the chest yesterday after cat scratch on 3/30.     Differential diagnosis considered but not limited to dermatitis, cellulitis, other    Physical exam and image as above.  Keflex prescribed for cellulitis.  Triamcinolone 0.1% cream for pruritic rash.  Home care and return instructions discussed with patient understanding.        Medical Decision Making  Risk  Prescription drug management.        Disposition and Plan     Clinical Impression:  1. Cat scratch    2. Cellulitis  of chest wall         Disposition:  Discharge  4/5/2024  5:05 pm    Follow-up:  [facility]  [address]  [facility] [id number] [phone]    If symptoms worsen          Medications Prescribed:  Discharge Medication List as of 4/5/2024  5:23 PM        START taking these medications    Details   cephalexin 500 MG Oral Cap Take 1 capsule (500 mg total) by mouth 4 (four) times daily for 7 days., Normal, Disp-28 capsule, R-0      triamcinolone 0.1 % External Ointment Apply 1 Application topically 2 (two) times daily for 5 days., Normal, Disp-1 each, R-0

## 2024-05-16 ENCOUNTER — OFFICE VISIT (OUTPATIENT)
Dept: NEUROLOGY | Facility: CLINIC | Age: 44
End: 2024-05-16

## 2024-05-16 VITALS
HEART RATE: 83 BPM | OXYGEN SATURATION: 99 % | BODY MASS INDEX: 48 KG/M2 | SYSTOLIC BLOOD PRESSURE: 112 MMHG | DIASTOLIC BLOOD PRESSURE: 82 MMHG | HEIGHT: 66 IN | RESPIRATION RATE: 14 BRPM

## 2024-05-16 DIAGNOSIS — G43.909 EPISODIC MIGRAINE: ICD-10-CM

## 2024-05-16 PROCEDURE — 3079F DIAST BP 80-89 MM HG: CPT | Performed by: OTHER

## 2024-05-16 PROCEDURE — 99213 OFFICE O/P EST LOW 20 MIN: CPT | Performed by: OTHER

## 2024-05-16 PROCEDURE — 3074F SYST BP LT 130 MM HG: CPT | Performed by: OTHER

## 2024-05-16 RX ORDER — LORAZEPAM 0.5 MG/1
TABLET ORAL
COMMUNITY
Start: 2024-04-25

## 2024-05-16 RX ORDER — HYDROXYZINE HYDROCHLORIDE 25 MG/1
TABLET, FILM COATED ORAL
COMMUNITY
Start: 2024-04-25

## 2024-05-16 RX ORDER — ATOGEPANT 30 MG/1
1 TABLET ORAL DAILY
Qty: 90 TABLET | Refills: 1 | Status: SHIPPED | OUTPATIENT
Start: 2024-05-16

## 2024-05-16 NOTE — PROGRESS NOTES
[facility] Progress Note    Chief Complaint   Patient presents with    Follow - Up     LOV 1/4/24 for Episodic paroxysmal hemicrania, not intractable - migraines are better, has only had a few.     Pt is taking UBRELVY and Qulipta      As per my initial H&P from 11/3/2022:  \"  Crys Carter is a 44 year old, who presents for evaluation of migraines and headaches.     Patient states she has headaches since age 15. She mainly notes headache on the right side and behind the eyes and may have visual aura and nausea associated with headaches.   She previously was having these every other week when she was younger.  She was started on Imitrex in her early 20s and did not start on preventive therapy until her 30s or 40s, after she had been having migraines 1-2 times per week.  She was on Topamax 50 mg bid and well controlled for 1-2 yrs only having migraine once every 2 months.  She had weight loss surgery 3/2022 and started to have worsening migraines after this time.  She was having once every 2-3 weeks but they became more frequent.  She was recommended to increase the dose of Topamax up to 100 mg bid but subsequently had brain fog and \"suicidal thoughts\" and is being tapered off by her PCP recently.  She is currently on 50 mg AM / 100 mg nightly.      She does have history of bipolar disorder and had been on lamotrigine as well as sertraline and Latuda when she was originally on Topamax but recently has been changed from Latuda to Abilify.      Currently, her mood is fluctuating is having intermittent suicidal thoughts but overall improved and is doing outpatient therapy program.       Currently, she is having at lest 8-10 migraines per month; these sometimes respond to sumatriptan when used but not always and overall estimates less than 50% effective at the 100 mg dose.     Otherwise, patient denies any recent weight change, fevers, chills, nausea, double vision/ blurry vision / loss of  vision, chest pain, palpitations, shortness of breath, rashes, joint pains, bowel / bladder incontinence or mood issues. \"       Prior notes as per 4/13/2023.  Patient overall since last visit had been doing better in terms of migraines and was not having migraines for ~ 3 months but in the past 3-4 weeks, she has a new type of headache.        She has been having some sharp pains behind left eye, and also having some cloudy vision in the left eye.  She denies pain when moving eyes from side to side but has noted her left more than right eye was tearing with these events; she denies sweating on one side of the face; notes improvement when sitting and may worsen when lying down; these may occur a few times in a day but are briefer than her usual migraines.  She has been on Quilipta and has been having to go to ER to treat these symptoms when she does not respond to sumatriptan.        Prior notes as per 1/4/2024.  Patient last seen 4/13/2023.  She has been lost to follow up.  According to records, she has been seen multiple times in the ER for headaches as well as fall from ladder. She has been to ER multiple times and recently was in [facility] ED with migraine, with some R sided numbness 11/14/2023.  She had workup for possible stroke with CTA brain and MRI brain with no suggestion of stroke or LVO.  She has also been having medications adjusted by psychiatry and following with therapist regularly.  She currently is having migraines, which cluster for a few days when they occur.      She states in 10/2023, she was having a lot of stress and adjustment to psychiatric medications and had more frequent migraines during this time.       She is currently doing well with only 2 migraines in the past month.  In terms of her prior other type of headaches with severe pain behind left eye and tearing of left eye, she did take indomethacin and noted improvement in ~2 weeks when she was on the 50 mg tid dosing.  She has not had  recurrence of these events.        She has been taking Quilipta 60 mg daily for prevention of migraines otherwise and doing well currently. She states Imitrex does not work well for abortive therapy and takes a \"cocktail\" of tramadol, hydroxyzine and Zofran.     Patient last seen 1/4/2024.  Since last visit, she remains on Qulipta 60 mg daily for prevention of migraines.  She has been taking Ubrelvy for abortive therapy as well and denies any side effects when she takes this medication.  She notes improvement in migraines with 1 dose within ~15 minutes and states this is working better than sumatriptan.  She has migraines on average 1-2 days per month.  She has had a few brief episodes of stabbing type pain but this is not as severe as in the past and denies associate tearing of the eyes and redness in the eyes; she has not had to use indomethacin and notes these are minor overall.           Past Medical History:    Allergic rhinitis    Anxiety    Arthritis    Asthma (HCC)    Bipolar disorder (HCC)    Calculus of kidney    Cancer (HCC)    Basal cell carcinoma over the left eyebrow    Depression    Esophageal reflux    Hypothyroidism    Migraine    Migraines    Obesity, unspecified    S/P laparoscopic sleeve gastrectomy    Sleep apnea    history of, improved with weight loss surgery, no longer needs cpap     Past Surgical History:   Procedure Laterality Date    Other  2009    benign tumor reomoved  from left inner thigh    Other 2014    basal carcinoma removed from upper lid of left eye    Other surgical history  03/07/2022    Gastric sleeve    Skin surgery  Oct 2014    tumor removed from eyebrow ridge    Tonsillectomy       Social History     Socioeconomic History    Marital status: Single   Tobacco Use    Smoking status: Former     Current packs/day: 0.00     Average packs/day: 1 pack/day for 23.9 years (23.9 ttl pk-yrs)     Types: Cigarettes     Start date: 1/27/1985     Quit date: 12/15/2008     Years since  quitting: 15.4    Smokeless tobacco: Never   Vaping Use    Vaping status: Never Used   Substance and Sexual Activity    Alcohol use: Not Currently     Comment: Last wee in August 2023    Drug use: Not Currently     Types: Cannabis    Sexual activity: Not Currently     Partners: Male     Birth control/protection: Condom   Other Topics Concern    Caffeine Concern Yes    Stress Concern Yes    Weight Concern Yes    Special Diet Yes     Comment: Bariatric    Exercise No    Seat Belt Yes     Social Determinants of Health      [facility]     Family History   Problem Relation Age of Onset    Depression Father     Heart Disease Father     Asthma Father     Diabetes Mother     Obesity Mother     Depression Mother     Alcohol and Other Disorders Associated Maternal Grandmother     Cancer Maternal Grandmother         Lung CA    Cancer Maternal Grandfather         Prostate CA    Hypertension Paternal Grandmother     Cancer Paternal Grandfather         Lung CA    Personality Disorder Paternal Grandfather     Breast Cancer Maternal Aunt 65        in her 60's    Substance Abuse Maternal Uncle     Stroke Neg        Allergies:  Allergies   Allergen Reactions    Clonazepam OTHER (SEE COMMENTS)     Suicidal thoughts  [place] out of sorts and has increased irritability as well as extremities feeling strange    Compazine [Prochlorperazine] OTHER (SEE COMMENTS)     Oral tablet-  Twitching;  Blanked out; nausea    Topiramate OTHER (SEE COMMENTS)     Suicidal,aggresive,brain fog      Current Meds:  Current Outpatient Medications   Medication Sig Dispense Refill    hydrOXYzine 25 MG Oral Tab       LORazepam 0.5 MG Oral Tab       Atogepant (QULIPTA) 30 MG Oral Tab Take 1 tablet by mouth daily. 90 tablet 1    Ketorolac Tromethamine 10 MG Oral Tab Take 2 tablets (20 mg total) by mouth As Directed. For migraines: take 20mg once at the onset of the migraine. 30 tablet 0    ondansetron 4 MG Oral Tablet  Dispersible Take 1 tablet (4 mg total) by mouth every 4 (four) hours as needed for Nausea. 10 tablet 0    levothyroxine 75 MCG Oral Tab Take 1 tablet (75 mcg total) by mouth before breakfast. 90 tablet 3    pantoprazole 20 MG Oral Tab EC Take 1 tablet (20 mg total) by mouth before breakfast. 90 tablet 3    sertraline 50 MG Oral Tab       lamoTRIgine 100 MG Oral Tab Take 1 tablet (100 mg total) by mouth 2 (two) times daily.      ubrogepant (UBRELVY) 100 MG Oral Tab Take one tablet at onset of migraine.  May take additional tablet in 2 hours if needed.  Do not exceed two tablets per 24 hour period. 10 tablet 0    lurasidone (LATUDA) 60 MG Oral Tab Take 1 tablet (60 mg total) by mouth daily with dinner. 90 tablet 0    buPROPion  MG Oral Tablet 24 Hr Take 1 tablet (150 mg total) by mouth daily. 30 tablet 0    propranolol 20 MG Oral Tab Take 1 tablet (20 mg total) by mouth 2 (two) times daily. 60 tablet 0    naltrexone 50 MG Oral Tab Take 1 tablet (50 mg total) by mouth daily. 30 tablet 0    sertraline 100 MG Oral Tab Take 1 tablet (100 mg total) by mouth daily. 30 tablet 0    traZODone 100 MG Oral Tab Take 1 tablet (100 mg total) by mouth nightly as needed for Sleep. 30 tablet 0    Multiple Vitamins-Minerals (MULTIVITAL OR) Take by mouth. Bariatric vitamin      meclizine 25 MG Oral Tab Take 1 tablet (25 mg total) by mouth 3 (three) times daily as needed for Dizziness. (Patient not taking: Reported on 4/5/2024) 15 tablet 0    albuterol (PROAIR HFA) 108 (90 Base) MCG/ACT Inhalation Aero Soln SHAKE WELL AND INHALE 1 TO 2 PUFFS INTO THE LUNGS EVERY 4 HOURS AS NEEDED FOR WHEEZING( COUGH) (Patient not taking: Reported on 4/5/2024) 3 each 1          ROS:   A comprehensive 10 point review of systems was completed.  Pertinent positives and negatives noted in the the HPI.      PHYSICAL EXAM:   /82   Pulse 83   Resp 14   Ht 66\"   LMP 03/23/2024 (Approximate)   SpO2 99%   BMI 47.61 kg/m²   Estimated body mass  index is 47.61 kg/m² as calculated from the following:    Height as of this encounter: 66\".    Weight as of 4/5/24: 295 lb (133.8 kg).      Gen: well developed, well nourished, no acute distress  HEENT: normocephalic  Heart; normal S1/S2, regular rate and rhythm  Lungs: clear to auscultation bilaterally  Extremities: no edema, peripheral pulses intact    Neck: supple, full range of motion; no carotid bruits    Fundoscopic Exam: optic discs sharp bilaterally    Neuro:  Mental status:  Orientation: Alert and oriented to person, place, time  Speech Fluent and conversational    CN: PERRL, EOMI with no nystagmus, VFF, smile symmetric, sensation intact, tongue and palate midline, SCM intact, otherwise, CN 2-12 intact  Motor: 5/5 strength throughout, tone normal  DTR: 2+ symmetric throughout, toes downgoing bilaterally, no clonus  Sensory: intact to light touch throughout  Coord: FNF intact with no tremor or dysmetria; rapid alternating movements intact bilaterally  Romberg: absent  Gait: normal casual, heel, toe and tandem gait      TEST RESULTS/DATA REVIEWED:   Imaging  None new    Prior as noted below    MRI brain with and without contrast (4/10/2023):     FINDINGS: Several 2-3 mm T2/FLAIR hyperintense, non-enhancing signal abnormalities are identified in the frontal and parietal subcortical white matter bilaterally. The cerebellum and brainstem are normal in morphology and signal. Normal morphology of the    corpus callosum. No areas of restricted diffusion are identified. No evidence of cortical infarct, edema, hemorrhage, mass, abnormal enhancement, or abnormal extra-axial fluid collection. Ventricular volumes are normal. No midline shift. The major   intracranial arterial flow voids and the dural venous sinuses appear patent. Normal size of the pituitary gland. No Chiari malformation.     Minimal mucosal thickening in bilateral maxillary sinuses. The mastoid air cells are normally aerated. A 4F synovial cyst along  the anteromedial margin of the right temporomandibular joint is of questionable clinical significance. No bone lesions are   identified.     IMPRESSION:   1. Several 2-3 mm T2/FLAIR hyperintense, non-enhancing signal abnormalities in the frontal and parietal subcortical white matter bilaterally may relate to migraines or minimal chronic small vessel ischemic changes but are non-specific in appearance.     2. No evidence of acute infarct, cortical infarct, edema, hemorrhage, mass, or acute intracranial abnormality.     3. Minimal chronic-appearing maxillary sinus inflammatory changes.     Labs:   Prior as noted below  Component      Latest Ref Rng & Units 4/16/2022   Vitamin B12      193 - 986 pg/mL 1,014 (H)   FOLATE (FOLIC ACID), SERUM      >=8.7 ng/mL 18.1   VITAMIN B1 (THIAMINE), WHOLE B      70 - 180 nmol/L 73   VITAMIN D, 25-OH, TOTAL      30.0 - 100.0 ng/mL 44.5    4/16/2022    1,014 (H)     SARS-CoV-2 by PCR (9./1/2022): detected      IMPRESSION AND PLAN:   Crys Carter is a 44 year old female with PMHx significant for bipolar disorder and chronic episodic migraine, who originally presented 11/3/2022, for evaluation and management of recent worsening migraines.    Neurologic exam is normal and nonfocal and patient was previously well controlled in terms of migraine, which occurred with and without aura, on preventive medication with Topamax as well as abortive therapy with sumatriptan 100 mg dose PRN.  However, she had weight loss surgery 3/2022 and had worsening migraines which did not respond to higher dose of Topamax and she developed significant side effects with cognitive changes and suicidal thoughts.  She was weaned off Topamax and had been doing better on Qulipta daily for prevention.       However, she subsequently developed a different type of headache with mainly unilateral left side headache with some associated tearing of eye - given unilateral presentation, thought this could be variant of  trigeminal autonomic cephalgia (ie paroxysmal hemicrania) and was recommended to start indomethacin trial for treatment and noted improvement when she took 50 mg tid dosing with no recurrence since this time.  Patient advised to monitor for recurrence and likely plan for resuming short course of indomethacin at ~50 mg tid if this occurs.       Otherwise, continue Qulipta at current dose; for abortive therapy for migraines, she notes sumatriptan was not working and is not ideal due to risk of serotonin syndrome with concurrent psychiatric medications.     She is now on ubrogepantt (Ubrelvy) and doing well with this for abortive therapy:  continue to take 100 mg  within first 30 minutes of symptoms starting; if not improved after 2 hours, take additional dose, and then stop taking; discussed potential side effects, including but not limited to nausea, drowsiness and dry mouth    1. Episodic migraine  As noted above   - Atogepant (QULIPTA) 30 MG Oral Tab; Take 1 tablet by mouth daily.  Dispense: 90 tablet; Refill: 1    Return in about 6 months (around 11/16/2024).    Albert Galicia MD, Neurology  [facility]  Pager [phone]  5/16/2024

## 2024-05-16 NOTE — PATIENT INSTRUCTIONS
Refill policies:    Allow 2-3 business days for refills; controlled substances may take longer.  Contact your pharmacy at least 5 days prior to running out of medication and have them send an electronic request or submit request through the “request refill” option in your [employer] account.  Refills are not addressed on weekends; covering physicians do not authorize routine medications on weekends.  No narcotics or controlled substances are refilled after noon on Fridays or by on call physicians.  By law, narcotics must be electronically prescribed.  A 30 day supply with no refills is the maximum allowed.  If your prescription is due for a refill, you may be due for a follow up appointment.  To best provide you care, patients receiving routine medications need to be seen at least once a year.  Patients receiving narcotic/controlled substance medications need to be seen at least once every 3 months.  In the event that your preferred pharmacy does not have the requested medication in stock (e.g. Backordered), it is your responsibility to find another pharmacy that has the requested medication available.  We will gladly send a new prescription to that pharmacy at your request.    Scheduling Tests:    If your physician has ordered radiology tests such as MRI or CT scans, please contact Central Scheduling at [phone] right away to schedule the test.  Once scheduled, the [facility] Centralized Referral Team will work with your insurance carrier to obtain pre-certification or prior authorization.  Depending on your insurance carrier, approval may take 3-10 days.  It is highly recommended patients assure they have received an authorization before having a test performed.  If test is done without insurance authorization, patient may be responsible for the entire amount billed.      Precertification and Prior Authorizations:  If your physician has recommended that you have a procedure or additional testing performed the [facility]  Centralized Referral Team will contact your insurance carrier to obtain pre-certification or prior authorization.    You are strongly encouraged to contact your insurance carrier to verify that your procedure/test has been approved and is a COVERED benefit.  Although the [facility] Centralized Referral Team does its due diligence, the insurance carrier gives the disclaimer that \"Although the procedure is authorized, this does not guarantee payment.\"    Ultimately the patient is responsible for payment.   Thank you for your understanding in this matter.  Paperwork Completion:  If you require FMLA or disability paperwork for your recovery, please make sure to either drop it off or have it faxed to our office at [phone]. Be sure the form has your name and date of birth on it.  The form will be faxed to our Forms Department and they will complete it for you.  There is a 25$ fee for all forms that need to be filled out.  Please be aware there is a 10-14 day turnaround time.  You will need to sign a release of information (RONNY) form if your paperwork does not come with one.  You may call the Forms Department with any questions at [phone].  Their fax number is [phone].

## 2024-05-22 ENCOUNTER — APPOINTMENT (OUTPATIENT)
Dept: GENERAL RADIOLOGY | Age: 44
End: 2024-05-22
Attending: EMERGENCY MEDICINE

## 2024-05-22 ENCOUNTER — HOSPITAL ENCOUNTER (OUTPATIENT)
Age: 44
Discharge: HOME | End: 2024-05-22

## 2024-05-22 VITALS
TEMPERATURE: 97 F | SYSTOLIC BLOOD PRESSURE: 117 MMHG | HEIGHT: 66 IN | HEART RATE: 98 BPM | WEIGHT: 293 LBS | BODY MASS INDEX: 47.09 KG/M2 | DIASTOLIC BLOOD PRESSURE: 82 MMHG | OXYGEN SATURATION: 98 % | RESPIRATION RATE: 18 BRPM

## 2024-05-22 DIAGNOSIS — S92.415A CLOSED NONDISPLACED FRACTURE OF PROXIMAL PHALANX OF LEFT GREAT TOE, INITIAL ENCOUNTER: Primary | ICD-10-CM

## 2024-05-22 PROCEDURE — 99213 OFFICE O/P EST LOW 20 MIN: CPT

## 2024-05-22 PROCEDURE — 28490 TREAT BIG TOE FRACTURE: CPT

## 2024-05-22 PROCEDURE — 73630 X-RAY EXAM OF FOOT: CPT | Performed by: EMERGENCY MEDICINE

## 2024-05-23 NOTE — ED PROVIDER NOTES
Patient Seen in: [facility]      History     Chief Complaint   Patient presents with    Leg or Foot Injury     Stated Complaint: left foot injury    Subjective:   HPI  44-year-old with allergic rhinitis, anxiety, arthritis, kidney stones, GERD presents complaining of pain to her left great toe after she power washed her great toe that had dirt on it on Monday.    Objective:   Past Medical History:    Allergic rhinitis    Anxiety    Arthritis    Asthma (HCC)    Bipolar disorder (HCC)    Calculus of kidney    Cancer (HCC)    Basal cell carcinoma over the left eyebrow    Depression    Esophageal reflux    Hypothyroidism    Migraine    Migraines    Obesity, unspecified    S/P laparoscopic sleeve gastrectomy    Sleep apnea    history of, improved with weight loss surgery, no longer needs cpap              Past Surgical History:   Procedure Laterality Date    Other  2009    benign tumor reomoved  from left inner thigh    Other  2014    basal carcinoma removed from upper lid of left eye    Other surgical history  03/07/2022    Gastric sleeve    Skin surgery  Oct 2014    tumor removed from eyebrow ridge    Tonsillectomy                  Social History     Socioeconomic History    Marital status: Single   Tobacco Use    Smoking status: Former     Current packs/day: 0.00     Average packs/day: 1 pack/day for 23.9 years (23.9 ttl pk-yrs)     Types: Cigarettes     Start date: 1/27/1985     Quit date: 12/15/2008     Years since quitting: 15.4    Smokeless tobacco: Never   Vaping Use    Vaping status: Never Used   Substance and Sexual Activity    Alcohol use: Not Currently     Comment: Last wee in August 2023    Drug use: Not Currently     Types: Cannabis    Sexual activity: Not Currently     Partners: Male     Birth control/protection: Condom   Other Topics Concern    Caffeine Concern Yes    Stress Concern Yes    Weight Concern Yes    Special Diet Yes     Comment: Bariatric    Exercise No    Seat Belt Yes      Social Determinants of Health      [facility]              Review of Systems   All other systems reviewed and are negative.      Positive for stated complaint: left foot injury  Other systems are as noted in HPI.  Constitutional and vital signs reviewed.      All other systems reviewed and negative except as noted above.    Physical Exam     ED Triage Vitals [05/22/24 1812]   /82   Pulse 98   Resp 18   Temp 97.3 °F (36.3 °C)   Temp src Temporal   SpO2 98 %   O2 Device None (Room air)       Current Vitals:   Vital Signs  BP: 117/82  Pulse: 98  Resp: 18  Temp: 97.3 °F (36.3 °C)  Temp src: Temporal    Oxygen Therapy  SpO2: 98 %  O2 Device: None (Room air)            Physical Exam  Vitals and nursing note reviewed.   Constitutional:       Appearance: She is well-developed.   Cardiovascular:      Rate and Rhythm: Normal rate.   Pulmonary:      Effort: Pulmonary effort is normal.   Musculoskeletal:      Comments: Tenderness and swelling noted to the side of the left great toe with limited range of motion to the DIP joint   Skin:     General: Skin is warm and dry.   Neurological:      Mental Status: She is alert and oriented to person, place, and time.               ED Course   Labs Reviewed - No data to display          XR FOOT, COMPLETE (MIN 3 VIEWS), LEFT (CPT=73630)    Result Date: 5/22/2024  PROCEDURE:  XR FOOT, COMPLETE (MIN 3 VIEWS), LEFT (CPT=73630)  TECHNIQUE:  AP, oblique, and lateral views were obtained.  COMPARISON:  None.  INDICATIONS:  Left 1st toe pain  PATIENT STATED HISTORY: (As transcribed by Technologist)  Patient states injury to left foot after accidently powerwashing it on Monday, has pain, swelling and slight bruising to the dorsal portion area of the first toe.               CONCLUSION: There is a tiny chip fracture involving the distal medial aspect of the proximal phalanx of the left 1st toe with adjacent soft tissue swelling.  There is mild  osteoarthritis involving the 1st MTP joint.  Moderate-sized calcaneal enthesophytes noted.     LOCATION:  [place] [address]   Dictated by (CST): Bernardo Fortune MD on 5/22/2024 at 7:07 PM     Finalized by (CST): Bernardo Fortune MD on 5/22/2024 at 7:08 PM             [facility]     Medical Decision Making  44-year-old with allergic rhinitis, anxiety, arthritis, kidney stones, GERD presents complaining of pain to her left great toe after she power washed her great toe that had dirt on it on Monday.    Clinical impression: Closed nondisplaced fracture of the proximal phalanx of the left great toe    Differential diagnosis: Toe contusion, toe fracture, toe sprain    X-ray shows There is a tiny chip fracture involving the distal medial aspect of the proximal phalanx of the left 1st toe with adjacent soft tissue swelling.  There is mild osteoarthritis involving the 1st MTP joint.  Moderate-sized calcaneal enthesophytes noted.  Nathan tape applied and postop shoe provided and patient instructed on follow-up with podiatry.  Tetanus up-to-date.    Problems Addressed:  Closed nondisplaced fracture of proximal phalanx of left great toe, initial encounter: acute illness or injury    Amount and/or Complexity of Data Reviewed  Radiology: ordered and independent interpretation performed.     Details: Foot x-ray reviewed and independently interpreted by myself as a chip fracture of the distal medial aspect of the proximal phalanx of the first toe        Disposition and Plan     Clinical Impression:  1. Closed nondisplaced fracture of proximal phalanx of left great toe, initial encounter         Disposition:  Discharge  5/22/2024  7:20 pm    Follow-up:  Bc Munoz DPM  [address] [address] [address]  [phone]    Call             Medications Prescribed:  Discharge Medication List as of 5/22/2024  7:25 PM

## 2024-09-04 ENCOUNTER — HOSPITAL ENCOUNTER (OUTPATIENT)
Age: 44
Discharge: HOME | End: 2024-09-04
Attending: EMERGENCY MEDICINE
Payer: COMMERCIAL

## 2024-09-04 VITALS
BODY MASS INDEX: 47.09 KG/M2 | RESPIRATION RATE: 16 BRPM | SYSTOLIC BLOOD PRESSURE: 114 MMHG | WEIGHT: 293 LBS | OXYGEN SATURATION: 98 % | HEIGHT: 66 IN | TEMPERATURE: 98 F | HEART RATE: 71 BPM | DIASTOLIC BLOOD PRESSURE: 54 MMHG

## 2024-09-04 DIAGNOSIS — J02.9 VIRAL PHARYNGITIS: Primary | ICD-10-CM

## 2024-09-04 LAB — S PYO AG THROAT QL IA.RAPID: NEGATIVE

## 2024-09-04 PROCEDURE — 99213 OFFICE O/P EST LOW 20 MIN: CPT

## 2024-09-04 PROCEDURE — 87651 STREP A DNA AMP PROBE: CPT | Performed by: EMERGENCY MEDICINE

## 2024-09-04 RX ORDER — LURASIDONE HYDROCHLORIDE 80 MG/1
TABLET, FILM COATED ORAL
COMMUNITY
Start: 2024-08-13

## 2024-09-04 RX ORDER — METHYLPREDNISOLONE 4 MG
TABLET, DOSE PACK ORAL
Qty: 1 EACH | Refills: 0 | Status: SHIPPED | OUTPATIENT
Start: 2024-09-04

## 2024-09-04 NOTE — ED PROVIDER NOTES
Patient Seen in: [facility]      History     Chief Complaint   Patient presents with    Sore Throat    Headache    Ear Problem Pain     Stated Complaint: Sore Throat/Ear pain    Subjective:   HPI    44-year-old female with a history of anxiety asthma bipolar disorder presents to the immediate care for complaints of sore throat and ear pain.  Symptoms been present for the last 2 days.  She denies any fever chills or myalgias.  Denies any productive cough or sputum.  Denies any other exacerbating factors.    Objective:   Past Medical History:    Allergic rhinitis    Anxiety    Arthritis    Asthma (HCC)    Bipolar disorder (HCC)    Calculus of kidney    Cancer (HCC)    Basal cell carcinoma over the left eyebrow    Depression    Esophageal reflux    Hypothyroidism    Migraine    Migraines    Obesity, unspecified    S/P laparoscopic sleeve gastrectomy    Sleep apnea    history of, improved with weight loss surgery, no longer needs cpap              Past Surgical History:   Procedure Laterality Date    Other  2009    benign tumor reomoved  from left inner thigh    Other 2014    basal carcinoma removed from upper lid of left eye    Other surgical history  03/07/2022    Gastric sleeve    Skin surgery  Oct 2014    tumor removed from eyebrow ridge    Tonsillectomy                  Social History     Socioeconomic History    Marital status: Single   Tobacco Use    Smoking status: Former     Current packs/day: 0.00     Average packs/day: 1 pack/day for 23.9 years (23.9 ttl pk-yrs)     Types: Cigarettes     Start date: 1/27/1985     Quit date: 12/15/2008     Years since quitting: 15.7    Smokeless tobacco: Never   Vaping Use    Vaping status: Never Used   Substance and Sexual Activity    Alcohol use: Not Currently     Comment: Last wee in August 2023    Drug use: Not Currently     Types: Cannabis    Sexual activity: Not Currently     Partners: Male     Birth control/protection: Condom   Other Topics Concern     Caffeine Concern Yes    Stress Concern Yes    Weight Concern Yes    Special Diet Yes     Comment: Bariatric    Exercise No    Seat Belt Yes     Social Determinants of Health      [facility]              Review of Systems    Positive for stated Chief Complaint: Sore Throat, Headache, and Ear Problem Pain    Other systems are as noted in HPI.  Constitutional and vital signs reviewed.      All other systems reviewed and negative except as noted above.    Physical Exam     ED Triage Vitals [09/04/24 1429]   /54   Pulse 71   Resp 16   Temp 98.1 °F (36.7 °C)   Temp src Oral   SpO2 98 %   O2 Device None (Room air)       Current Vitals:   Vital Signs  BP: 114/54  Pulse: 71  Resp: 16  Temp: 98.1 °F (36.7 °C)  Temp src: Oral    Oxygen Therapy  SpO2: 98 %  O2 Device: None (Room air)            Physical Exam    General: Alert and oriented. No acute distress.  HEENT: Normocephalic. No evidence of trauma. Extraocular movements are intact.  Minimal oropharyngeal injection.  Uvula midline.  No tonsillar exudate.  Cardiovascular exam: Regular rate and rhythm  Lungs: Clear to auscultation bilaterally.  Abdomen: Soft, nondistended, nontender.  Extremities: No evidence of deformity. No clubbing or cyanosis.  Neuro: No focal deficit is noted.    ED Course     Labs Reviewed   RAPID STREP A - Normal     Rapid strep is negative.  Suspect a viral pharyngitis.  Patient will be discharged home with a Medrol Dosepak.  Recommend supportive care at home.         MDM   Patient was screened and evaluated during this visit.   As a treating physician attending to the patient, I determined, within reasonable clinical confidence and prior to discharge, that an emergency medical condition was not or was no longer present.  There was no indication for further evaluation, treatment or admission on an emergency basis.  Comprehensive verbal and written discharge and follow-up instructions were provided to  help prevent relapse or worsening.  Patient was instructed to follow-up with her primary care provider for further evaluation and treatment, but to return immediately to the ER for worsening, concerning, new, changing or persisting symptoms.  I discussed the case with the patient and they had no questions, complaints, or concerns.  Patient felt comfortable going home.    ^^Please note that this report has been produced using speech recognition software and may contain errors related to that system including, but not limited to, errors in grammar, punctuation, and spelling, as well as words and phrases that possibly may have been recognized inappropriately.  If there are any questions or concerns, contact the dictating provider for clarification                                   Medical Decision Making      Disposition and Plan     Clinical Impression:  1. Viral pharyngitis         Disposition:  Discharge  9/4/2024  2:52 pm    Follow-up:  Viki Molina  [place] [address] [address]  [phone]    Call   As needed, If symptoms worsen          Medications Prescribed:  Discharge Medication List as of 9/4/2024  2:52 PM        START taking these medications    Details   methylPREDNISolone (MEDROL) 4 MG Oral Tablet Therapy Pack Dosepack: take as directed, Normal, Disp-1 each, R-0

## 2024-09-04 NOTE — ED INITIAL ASSESSMENT (HPI)
Pt. C/o sore throat since this morning. Both ears are starting to hurt as well, and has a headache.

## 2024-09-04 NOTE — DISCHARGE INSTRUCTIONS
Follow-up with your primary care doctor as needed  Drink plenty of fluids for hydration  Take Tylenol or Motrin as needed for pain  Take Medrol Dosepak as directed  Return if any worsening symptoms or new concern

## 2024-10-06 ENCOUNTER — HOSPITAL ENCOUNTER (EMERGENCY)
Age: 44
Discharge: HOME | End: 2024-10-06
Payer: COMMERCIAL

## 2024-10-06 ENCOUNTER — APPOINTMENT (OUTPATIENT)
Dept: GENERAL RADIOLOGY | Age: 44
End: 2024-10-06
Attending: NURSE PRACTITIONER
Payer: COMMERCIAL

## 2024-10-06 VITALS
SYSTOLIC BLOOD PRESSURE: 103 MMHG | BODY MASS INDEX: 48.82 KG/M2 | DIASTOLIC BLOOD PRESSURE: 67 MMHG | OXYGEN SATURATION: 99 % | WEIGHT: 293 LBS | HEIGHT: 65 IN | TEMPERATURE: 98 F | RESPIRATION RATE: 16 BRPM | HEART RATE: 75 BPM

## 2024-10-06 DIAGNOSIS — M79.10 MYALGIA: ICD-10-CM

## 2024-10-06 DIAGNOSIS — M54.12 CERVICAL RADICULOPATHY: Primary | ICD-10-CM

## 2024-10-06 LAB
BILIRUB UR QL STRIP.AUTO: NEGATIVE
CLARITY UR REFRACT.AUTO: CLEAR
COLOR UR AUTO: YELLOW
GLUCOSE UR STRIP.AUTO-MCNC: NEGATIVE MG/DL
KETONES UR STRIP.AUTO-MCNC: NEGATIVE MG/DL
NITRITE UR QL STRIP.AUTO: NEGATIVE
PH UR STRIP.AUTO: 7 [PH] (ref 5–8)
PROT UR STRIP.AUTO-MCNC: NEGATIVE MG/DL
SARS-COV-2 RNA RESP QL NAA+PROBE: NOT DETECTED
SP GR UR STRIP.AUTO: 1.01 (ref 1–1.03)
UROBILINOGEN UR STRIP.AUTO-MCNC: 0.2 MG/DL

## 2024-10-06 PROCEDURE — 81001 URINALYSIS AUTO W/SCOPE: CPT | Performed by: NURSE PRACTITIONER

## 2024-10-06 PROCEDURE — 81015 MICROSCOPIC EXAM OF URINE: CPT | Performed by: NURSE PRACTITIONER

## 2024-10-06 PROCEDURE — 99284 EMERGENCY DEPT VISIT MOD MDM: CPT

## 2024-10-06 PROCEDURE — 72050 X-RAY EXAM NECK SPINE 4/5VWS: CPT | Performed by: NURSE PRACTITIONER

## 2024-10-06 PROCEDURE — 96372 THER/PROPH/DIAG INJ SC/IM: CPT

## 2024-10-06 RX ORDER — PREDNISONE 20 MG/1
20 TABLET ORAL DAILY
Qty: 5 TABLET | Refills: 0 | Status: SHIPPED | OUTPATIENT
Start: 2024-10-06 | End: 2024-10-11

## 2024-10-06 RX ORDER — KETOROLAC TROMETHAMINE 30 MG/ML
60 INJECTION, SOLUTION INTRAMUSCULAR; INTRAVENOUS ONCE
Status: COMPLETED | OUTPATIENT
Start: 2024-10-06 | End: 2024-10-06

## 2024-10-06 RX ORDER — CYCLOBENZAPRINE HCL 10 MG
10 TABLET ORAL 3 TIMES DAILY PRN
Qty: 20 TABLET | Refills: 0 | Status: SHIPPED | OUTPATIENT
Start: 2024-10-06 | End: 2024-10-13

## 2024-10-06 NOTE — ED PROVIDER NOTES
Patient Seen in: [facility] [place]      History     Chief Complaint   Patient presents with    Body ache and/or chills    Ear Pain     Stated Complaint: Bodyache/pain, bilateral ear pain since yesterday.    Subjective:   HPI    44-year-old female with anxiety, bipolar presents today with complaints of neck pain, hip pain, earache for the last couple days.  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  Patient denies any history of trauma to her neck.  Patient denies any known COVID or flu exposure.    Objective:     Past Medical History:    Allergic rhinitis    Anxiety    Arthritis    Asthma (HCC)    Bipolar disorder (HCC)    Calculus of kidney    Cancer (HCC)    Basal cell carcinoma over the left eyebrow    Depression    Esophageal reflux    Hypothyroidism    Migraine    Migraines    Obesity, unspecified    S/P laparoscopic sleeve gastrectomy    Sleep apnea    history of, improved with weight loss surgery, no longer needs cpap              Past Surgical History:   Procedure Laterality Date    Other 2009    benign tumor reomoved  from left inner thigh    Other 2014    basal carcinoma removed from upper lid of left eye    Other surgical history  03/07/2022    Gastric sleeve    Skin surgery  Oct 2014    tumor removed from eyebrow ridge    Tonsillectomy                  Social History     Socioeconomic History    Marital status: Single   Tobacco Use    Smoking status: Former     Current packs/day: 0.00     Average packs/day: 1 pack/day for 23.9 years (23.9 ttl pk-yrs)     Types: Cigarettes     Start date: 1/27/1985     Quit date: 12/15/2008     Years since quitting: 15.8     Passive exposure: Never    Smokeless tobacco: Never   Vaping Use    Vaping status: Never Used   Substance and Sexual Activity    Alcohol use: Not Currently     Comment: Last wee in August 2023    Drug use: Not Currently     Types: Cannabis    Sexual activity: Not Currently     Partners: Male      Birth control/protection: Condom   Other Topics Concern    Caffeine Concern Yes    Stress Concern Yes    Weight Concern Yes    Special Diet Yes     Comment: Bariatric    Exercise No    Seat Belt Yes     Social Determinants of Health      [facility]                  Physical Exam     ED Triage Vitals [10/06/24 0935]   /87   Pulse 78   Resp 18   Temp 98 °F (36.7 °C)   Temp src Oral   SpO2 99 %   O2 Device None (Room air)       Current Vitals:   Vital Signs  BP: 116/87  Pulse: 78  Resp: 18  Temp: 98 °F (36.7 °C)  Temp src: Oral    Oxygen Therapy  SpO2: 99 %  O2 Device: None (Room air)        Physical Exam  Vitals and nursing note reviewed.   Constitutional:       Appearance: She is obese.   HENT:      Head: Normocephalic.      Right Ear: External ear normal.      Left Ear: External ear normal.      Nose: Nose normal.      Mouth/Throat:      Mouth: Mucous membranes are moist.      Pharynx: Oropharynx is clear.      Comments: Postnasal drip noted.  Eyes:      Extraocular Movements: Extraocular movements intact.      Conjunctiva/sclera: Conjunctivae normal.      Pupils: Pupils are equal, round, and reactive to light.   Neck:      Comments: Tenderness to palpation over the lower cervical paraspinal posterior region.  Cardiovascular:      Rate and Rhythm: Normal rate and regular rhythm.      Pulses: Normal pulses.      Heart sounds: Normal heart sounds.   Pulmonary:      Effort: Pulmonary effort is normal.      Breath sounds: Normal breath sounds.   Musculoskeletal:      Cervical back: Normal range of motion and neck supple. Tenderness present.   Skin:     General: Skin is warm.   Neurological:      Mental Status: She is alert.   Psychiatric:         Mood and Affect: Mood normal.           ED Course     Labs Reviewed   URINALYSIS, ROUTINE - Abnormal; Notable for the following components:       Result Value    Blood Urine Trace-lysed (*)     Leukocyte Esterase Urine Trace  (*)     All other components within normal limits   UA MICROSCOPIC ONLY, URINE - Abnormal; Notable for the following components:    Bacteria Urine 1+ (*)     Squamous Epi. Cells Moderate (*)     All other components within normal limits   RAPID SARS-COV-2 BY PCR - Normal                   MDM      44-year-old female with anxiety, bipolar presents today with complaints of neck pain, hip pain, earache for the last couple days.  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  Patient denies any history of trauma to her neck.  Patient denies any known COVID or flu exposure.  Vital signs: Please see EMR.  Physical exam: Please see exam.  Differential diagnosis: Cervical algia, cervical strain, myalgia, COVID.  Recent Results (from the past 24 hour(s))   Rapid SARS-CoV-2 by PCR    Collection Time: 10/06/24  9:50 AM    Specimen: Nares; Other   Result Value Ref Range    Rapid SARS-CoV-2 by PCR Not Detected Not Detected   Urinalysis, Routine    Collection Time: 10/06/24  9:50 AM   Result Value Ref Range    Urine Color Yellow Yellow    Clarity Urine Clear Clear    Spec Gravity 1.015 1.005 - 1.030    Glucose Urine Negative Negative mg/dL    Bilirubin Urine Negative Negative    Ketones Urine Negative Negative mg/dL    Blood Urine Trace-lysed (A) Negative    pH Urine 7.0 5.0 - 8.0    Protein Urine Negative Negative mg/dL    Urobilinogen Urine 0.2 <2.0 mg/dL    Nitrite Urine Negative Negative    Leukocyte Esterase Urine Trace (A) Negative   UA Microscopic only, urine    Collection Time: 10/06/24  9:50 AM   Result Value Ref Range    WBC Urine 1-5 0 - 5 /HPF    RBC Urine None Seen 0 - 2 /HPF    Bacteria Urine 1+ (A) None Seen /HPF    Squamous Epi. Cells Moderate (A) None Seen /HPF    Renal Tubular Epithelial Cells None Seen None Seen /HPF    Transitional Cells None Seen None Seen /HPF    Yeast Urine None Seen None Seen /HPF     XR CERVICAL SPINE (4VIEWS) (CPT=72050)    Result Date: 10/6/2024  CONCLUSION:     There is straightening of the cervical spine, with loss of usually observed lordosis. However there is no kyphosis. This appearance can be due to any of the following, including in combination: Neck pain, muscle spasm, cervical strain, cervical degenerative disease, and injury.  Degenerative disc changes most notable C6-7 with neural foraminal osteophyte encroachment on the right and probably on the left at this level.  No fracture, or subluxation.  Mild scoliosis.   LOCATION:  [facility]   Dictated by (CST): Chago Collado MD on 10/06/2024 at 10:31 AM     Finalized by (CST): Chago Collado MD on 10/06/2024 at 10:32 AM      Based on physical exam and HPI will diagnosed with cervical radiculopathy.  Will prescribe prednisone and a muscle relaxer.  Patient is to follow-up with spine specialist.  ED precautions given.                    Medical Decision Making  44-year-old female with anxiety, bipolar presents today with complaints of neck pain, hip pain, earache for the last couple days.  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  Patient denies any history of trauma to her neck.  Patient denies any known COVID or flu exposure.      Problems Addressed:  Cervical radiculopathy: acute illness or injury  Myalgia: acute illness or injury    Amount and/or Complexity of Data Reviewed  Labs: ordered. Decision-making details documented in ED Course.  Radiology: ordered. Decision-making details documented in ED Course.  ECG/medicine tests: ordered. Decision-making details documented in ED Course.    Risk  Prescription drug management.        Disposition and Plan     Clinical Impression:  1. Cervical radiculopathy    2. Myalgia         Disposition:  Discharge  10/6/2024 10:56 am    Follow-up:  Allen Dickson MD  [address]  [facility] [address]  [facility] [address] [phone]    Follow up in 1 week(s)      Viki Molina MD  [address]  [facility] [address]  [facility] [id number] [phone]    Follow  up in 2 day(s)            Medications Prescribed:  Current Discharge Medication List        START taking these medications    Details   predniSONE 20 MG Oral Tab Take 1 tablet (20 mg total) by mouth daily for 5 days.  Qty: 5 tablet, Refills: 0      cyclobenzaprine 10 MG Oral Tab Take 1 tablet (10 mg total) by mouth 3 (three) times daily as needed.  Qty: 20 tablet, Refills: 0                 Supplementary Documentation:

## 2024-10-06 NOTE — DISCHARGE INSTRUCTIONS
Do not drive while taking muscle relaxer.  Please follow-up with spine specialist.  Increase your water intake. Increase water intake 2-3 liters daily '

## 2024-10-14 ENCOUNTER — HOSPITAL ENCOUNTER (EMERGENCY)
Facility: HOSPITAL | Age: 44
Discharge: HOME | End: 2024-10-14
Attending: EMERGENCY MEDICINE
Payer: COMMERCIAL

## 2024-10-14 VITALS
DIASTOLIC BLOOD PRESSURE: 82 MMHG | SYSTOLIC BLOOD PRESSURE: 117 MMHG | HEART RATE: 81 BPM | WEIGHT: 293 LBS | TEMPERATURE: 99 F | OXYGEN SATURATION: 99 % | HEIGHT: 66 IN | RESPIRATION RATE: 18 BRPM | BODY MASS INDEX: 47.09 KG/M2

## 2024-10-14 DIAGNOSIS — M54.12 CERVICAL RADICULOPATHY: Primary | ICD-10-CM

## 2024-10-14 PROCEDURE — 96372 THER/PROPH/DIAG INJ SC/IM: CPT

## 2024-10-14 PROCEDURE — 99283 EMERGENCY DEPT VISIT LOW MDM: CPT

## 2024-10-14 RX ORDER — KETOROLAC TROMETHAMINE 30 MG/ML
30 INJECTION, SOLUTION INTRAMUSCULAR; INTRAVENOUS ONCE
Status: COMPLETED | OUTPATIENT
Start: 2024-10-14 | End: 2024-10-14

## 2024-10-14 RX ORDER — CYCLOBENZAPRINE HCL 10 MG
10 TABLET ORAL 3 TIMES DAILY PRN
Qty: 20 TABLET | Refills: 0 | Status: SHIPPED | OUTPATIENT
Start: 2024-10-14 | End: 2024-10-21

## 2024-10-14 NOTE — DISCHARGE INSTRUCTIONS
Suggest ibuprofen 600 mg 3 times a day with food.  Suggest 2 extra drink Tylenol every 6-8 hours.  Can take it with the ibuprofen.  Can take 1 Flexeril every 8 hours though cannot drive or work on the Flexeril.  Consider outpatient MRI.  Follow-up with your doctor.  Follow-up with [facility].  Return if weakness, fevers, new complaints

## 2024-10-15 NOTE — ED PROVIDER NOTES
Patient Seen in: [facility] Emergency Department      History     Chief Complaint   Patient presents with    Neck Pain    Back Pain     Stated Complaint: neck and shoulder pain, seen at PED 1 week ago for same, no improvement    Subjective:   HPI      Patient is a very pleasant 44-year-old woman who presents for reevaluation.  Patient was seen in [place] ER for neck pain that radiated to her right shoulder and down her right arm.  Was diagnosed with presumptive cervical radiculopathy and treated with steroids and Flexeril.  She did improve.  She finished her steroids.  She finished her medications.  Pain is returned.  She complains of a sharp pain in her right shoulder neck that radiates down her right arm.  No trauma.  No fevers or chills.  No chest pain or shortness of breath.  No known cervical disc disease.  No other specific complaints.    Objective:     Past Medical History:    Allergic rhinitis    Anxiety    Arthritis    Asthma (HCC)    Bipolar disorder (HCC)    Calculus of kidney    Cancer (HCC)    Basal cell carcinoma over the left eyebrow    Depression    Esophageal reflux    Hypothyroidism    Migraine    Migraines    Obesity, unspecified    S/P laparoscopic sleeve gastrectomy    Sleep apnea    history of, improved with weight loss surgery, no longer needs cpap              Past Surgical History:   Procedure Laterality Date    Other  2009    benign tumor reomoved  from left inner thigh    Other  2014    basal carcinoma removed from upper lid of left eye    Other surgical history  03/07/2022    Gastric sleeve    Skin surgery  Oct 2014    tumor removed from eyebrow ridge    Tonsillectomy                  Social History     Socioeconomic History    Marital status: Single   Tobacco Use    Smoking status: Former     Current packs/day: 0.00     Average packs/day: 1 pack/day for 23.9 years (23.9 ttl pk-yrs)     Types: Cigarettes     Start date: 1/27/1985     Quit date: 12/15/2008     Years since  quitting: 15.8     Passive exposure: Never    Smokeless tobacco: Never   Vaping Use    Vaping status: Never Used   Substance and Sexual Activity    Alcohol use: Not Currently     Comment: Last wee in August 2023    Drug use: Not Currently     Types: Cannabis    Sexual activity: Not Currently     Partners: Male     Birth control/protection: Condom   Other Topics Concern    Caffeine Concern Yes    Stress Concern Yes    Weight Concern Yes    Special Diet Yes     Comment: Bariatric    Exercise No    Seat Belt Yes     Social Drivers of Health      Received from [employer], [employer] [facility]                  Physical Exam     ED Triage Vitals [10/14/24 1656]   /82   Pulse 81   Resp 18   Temp 98.8 °F (37.1 °C)   Temp src    SpO2 99 %   O2 Device None (Room air)       Current Vitals:   Vital Signs  BP: 117/82  Pulse: 81  Resp: 18  Temp: 98.8 °F (37.1 °C)    Oxygen Therapy  SpO2: 99 %  O2 Device: None (Room air)        Physical Exam  General: Well-appearing patient of stated age resting comfortably  HEENT: Normocephalic atraumatic.  Nonicteric sclera.  Moist mucous membranes  Lungs: No tachypnea  Cardiac: No tachycardia  Skin: No rashes, pallor  Neuro: No focal deficits.  Good strength in bilateral upper extremities, nonfocal  Extremities: No cyanosis/edema    ED Course   Labs Reviewed - No data to display                MDM      Patient presents with persistent neck pain rating down her right arm.  Consistent with cervical radiculopathy.  Movement related.  Discussed with patient.  Was given IM Toradol here.  Suggested Tylenol/ibuprofen/Flexeril.  Refilled her Flexeril.  Suggest following up with her primary care doctor/[facility].  Consider outpatient MRI if not improving care.  Rest.  Will try to avoid opiates        MDM    Disposition and Plan     Clinical Impression:  1. Cervical radiculopathy         Disposition:  Discharge  10/14/2024  5:20 pm    Follow-up:  Viki Molina  [address]  [facility] [id number]  [phone]    Follow up in 2 day(s)            Medications Prescribed:  Discharge Medication List as of 10/14/2024  5:37 PM              Supplementary Documentation:

## 2024-10-22 ENCOUNTER — OFFICE VISIT (OUTPATIENT)
Dept: SURGERY | Facility: CLINIC | Age: 44
End: 2024-10-22
Payer: COMMERCIAL

## 2024-10-22 VITALS
BODY MASS INDEX: 49 KG/M2 | HEART RATE: 69 BPM | DIASTOLIC BLOOD PRESSURE: 72 MMHG | HEIGHT: 66 IN | SYSTOLIC BLOOD PRESSURE: 118 MMHG

## 2024-10-22 DIAGNOSIS — R29.898 DECREASED GRIP STRENGTH: ICD-10-CM

## 2024-10-22 DIAGNOSIS — R29.2 HOFFMAN SIGN PRESENT: ICD-10-CM

## 2024-10-22 DIAGNOSIS — R29.2 HYPER REFLEXIA: ICD-10-CM

## 2024-10-22 DIAGNOSIS — M54.2 NECK PAIN: Primary | ICD-10-CM

## 2024-10-22 DIAGNOSIS — R20.2 NUMBNESS AND TINGLING OF RIGHT ARM: ICD-10-CM

## 2024-10-22 DIAGNOSIS — R26.81 GAIT INSTABILITY: ICD-10-CM

## 2024-10-22 DIAGNOSIS — R20.0 NUMBNESS AND TINGLING OF RIGHT ARM: ICD-10-CM

## 2024-10-22 DIAGNOSIS — M54.12 CERVICAL RADICULOPATHY: ICD-10-CM

## 2024-10-22 PROCEDURE — 3078F DIAST BP <80 MM HG: CPT | Performed by: PHYSICIAN ASSISTANT

## 2024-10-22 PROCEDURE — 99215 OFFICE O/P EST HI 40 MIN: CPT | Performed by: PHYSICIAN ASSISTANT

## 2024-10-22 PROCEDURE — 3074F SYST BP LT 130 MM HG: CPT | Performed by: PHYSICIAN ASSISTANT

## 2024-10-22 NOTE — H&P
Patient: Crys Carter  Medical Record Number: [id number]  YOB: 1980  PCP: Viki Pinto MD    Reason for visit:   Chief Complaint   Patient presents with    New Patient    Neck Pain       Thank you very much for requesting this consultation. I had the opportunity to evaluate and initiate care for your patient today, as per your request.    HISTORY OF CHIEF COMPLAINT:    Crys Carter is a very pleasant 44 year old female, with a pertinent PMH of bipolar disorder, obesity, asthma, prediabetes, status post laparoscopic sleeve gastrectomy, migraines, GERD, SASHA  Presents today for a complaint of:   Chief Complaint   Patient presents with    New Patient    Neck Pain   Went to the ED 2x for her neck pain. Was advised concern for radiculopathy and degenerative disc disease.   Onset: Symptoms began 2-3 weeks ago. Woke up with the pain, felt she slept wrong.    Location: Onset on the left neck when she went to the ED. Steroids and muscle relaxants helped. After the steroids were completed, neck pain returned on the right. This pain waxes and wanes. Pain radiates from the right neck to the shoulder and shoulder blade. Causing dull headaches. Intermittently her right arm has numbness and tingling.   Duration/Timing: Symptoms have been occurring for 2-3 weeks.  Pain is intermittent.  Character: Muscle tightness and stiffness. Tightness. Numbness/tingling  Rate: Patient rates the pain  4-5/10.   Severity: Aggravated with cervical flexion. Aggravated with rotation. Steroids provided short term relief. Naproxen and Tylenol provide relief as well. Ice makes her feel stiffer. Heat pad provides relief.   Patient Denies bladder/bowel incontinence/retention.   Dropping items a bit more recently. Unsure if difficulty opening jars. Doesn't feel weak per se but has been dropping items. Has been tripping. Her iwatch has been stating that she's unsteady and is higher risk for a fall. Unsure what is  contributing to her balance issues.   No leg weakness or heaviness.   Head feels heavy.     Past Medical History:    Allergic rhinitis    Anxiety    Arthritis    Asthma (HCC)    Bipolar disorder (HCC)    Calculus of kidney    Cancer (HCC)    Basal cell carcinoma over the left eyebrow    Depression    Esophageal reflux    Hypothyroidism    Migraine    Migraines    Obesity, unspecified    S/P laparoscopic sleeve gastrectomy    Sleep apnea    history of, improved with weight loss surgery, no longer needs cpap    Stroke (HCC)      Past Surgical History:   Procedure Laterality Date    Other  2009    benign tumor reomoved  from left inner thigh    Other 2014    basal carcinoma removed from upper lid of left eye    Other surgical history  03/07/2022    Gastric sleeve    Skin surgery  Oct 2014    tumor removed from eyebrow ridge    Tonsillectomy        Family History   Problem Relation Age of Onset    Depression Father     Heart Disease Father     Asthma Father     Diabetes Mother     Obesity Mother     Depression Mother     Alcohol and Other Disorders Associated Maternal Grandmother     Cancer Maternal Grandmother         Lung CA    Cancer Maternal Grandfather         Prostate CA    Hypertension Paternal Grandmother     Cancer Paternal Grandfather         Lung CA    Personality Disorder Paternal Grandfather     Breast Cancer Maternal Aunt 65        in her 60's    Substance Abuse Maternal Uncle     Stroke Neg       Social History     Socioeconomic History    Marital status: Single   Tobacco Use    Smoking status: Former     Current packs/day: 0.00     Average packs/day: 1 pack/day for 23.9 years (23.9 ttl pk-yrs)     Types: Cigarettes     Start date: 1/27/1985     Quit date: 12/15/2008     Years since quitting: 15.8     Passive exposure: Never    Smokeless tobacco: Never   Vaping Use    Vaping status: Never Used   Substance and Sexual Activity    Alcohol use: Not Currently     Comment: Last wee in August 2023    Drug use:  Not Currently     Types: Cannabis    Sexual activity: Not Currently     Partners: Male     Birth control/protection: Condom   Other Topics Concern    Caffeine Concern Yes    Stress Concern Yes    Weight Concern Yes    Special Diet Yes     Comment: Bariatric    Exercise No    Seat Belt Yes      Allergies[1]   Current Medications:  Current Outpatient Medications   Medication Sig Dispense Refill    lurasidone 80 MG Oral Tab       hydrOXYzine 25 MG Oral Tab       LORazepam 0.5 MG Oral Tab       Atogepant (QULIPTA) 30 MG Oral Tab Take 1 tablet by mouth daily. 90 tablet 1    Ketorolac Tromethamine 10 MG Oral Tab Take 2 tablets (20 mg total) by mouth As Directed. For migraines: take 20mg once at the onset of the migraine. 30 tablet 0    ondansetron 4 MG Oral Tablet Dispersible Take 1 tablet (4 mg total) by mouth every 4 (four) hours as needed for Nausea. 10 tablet 0    levothyroxine 75 MCG Oral Tab Take 1 tablet (75 mcg total) by mouth before breakfast. 90 tablet 3    pantoprazole 20 MG Oral Tab EC Take 1 tablet (20 mg total) by mouth before breakfast. 90 tablet 3    sertraline 50 MG Oral Tab       lamoTRIgine 100 MG Oral Tab Take 1 tablet (100 mg total) by mouth daily.      ubrogepant (UBRELVY) 100 MG Oral Tab Take one tablet at onset of migraine.  May take additional tablet in 2 hours if needed.  Do not exceed two tablets per 24 hour period. 10 tablet 0    lurasidone (LATUDA) 60 MG Oral Tab Take 1 tablet (60 mg total) by mouth daily with dinner. 90 tablet 0    buPROPion  MG Oral Tablet 24 Hr Take 1 tablet (150 mg total) by mouth daily. 30 tablet 0    propranolol 20 MG Oral Tab Take 1 tablet (20 mg total) by mouth 2 (two) times daily. 60 tablet 0    naltrexone 50 MG Oral Tab Take 1 tablet (50 mg total) by mouth daily. 30 tablet 0    sertraline 100 MG Oral Tab Take 1 tablet (100 mg total) by mouth daily. 30 tablet 0    traZODone 100 MG Oral Tab Take 1 tablet (100 mg total) by mouth nightly as needed for Sleep. 30  tablet 0    meclizine 25 MG Oral Tab Take 1 tablet (25 mg total) by mouth 3 (three) times daily as needed for Dizziness. 15 tablet 0    albuterol (PROAIR HFA) 108 (90 Base) MCG/ACT Inhalation Aero Soln SHAKE WELL AND INHALE 1 TO 2 PUFFS INTO THE LUNGS EVERY 4 HOURS AS NEEDED FOR WHEEZING( COUGH) 3 each 1    Multiple Vitamins-Minerals (MULTIVITAL OR) Take by mouth. Bariatric vitamin          REVIEW OF SYSTEMS   Comprehensive review of systems done. Negative except what is outlined in the above HPI.     PHYSICAL EXAMIMATION    height is 66\". Her blood pressure is 118/72 and her pulse is 69.   GENERAL: Very pleasant patient is in no apparent distress. Sitting comfortably in the examination chair.   HEENT: Normocephalic, atraumatic.  RESPIRATORY RATE: Easy and Even  SKIN: Warm and dry  NEURO: Awake, alert and orientated. Speech fluent, comprehension intact, answering questions appropriately.     SPINE:  Gait/Coordination: Intact, non-antalgic gait.   Palpation: Non tender to palpation of midline cervical spine.  Positive tenderness over the right paraspinal musculature of the cervical spine.    Upper Extremity Strength:    Deltoid  Biceps  Triceps  W.extension  F.extension   Thumb adduction Thumb abduction  Intrinsics      Right 5 5 5 5  5 5 5 5 5     Left 5 5 5 5 5 5 5 5 5   Lower Extremity Strength:     Iliopsoas  Hamstrings   Quads    D-flexion P-flexion   Right       5         5       5         5 5   Left       5         5       5         5 5   DTRs:       Biceps    Triceps   Brachioradialis     Patellar     Ankle    Right       2+         2+            3+         2+        2+    Left       2+         2+             2+         2+        2+      Tests:   Test Right   (POS or NEG) Left   (POS or NEG)   Page's Sign Positive Neg   Clonus Neg Neg     DATA:   None    IMAGING:     Study Result    Narrative   PROCEDURE:  XR CERVICAL SPINE (4VIEWS) (CPT=72050)     TECHNIQUE:  AP, lateral, obliques, and coned down view  of the spine were obtained.     COMPARISON:  None.     INDICATIONS:  Bodyache/pain, bilateral ear pain since yesterday.     PATIENT STATED HISTORY: (As transcribed by Technologist)  Patient states bilateral ear pain radiating to posterior and left side of neck without trauma or injury.                       Impression   CONCLUSION:      There is straightening of the cervical spine, with loss of usually observed lordosis. However there is no kyphosis. This appearance can be due to any of the following, including in combination: Neck pain, muscle spasm, cervical strain, cervical  degenerative disease, and injury.  Degenerative disc changes most notable C6-7 with neural foraminal osteophyte encroachment on the right and probably on the left at this level.  No fracture, or subluxation.  Mild scoliosis.        LOCATION:  [facility]        Dictated by (CST): Chago Collado MD on 10/06/2024 at 10:31 AM      Finalized by (CST): Chago Collado MD on 10/06/2024 at 10:32 AM       MEDICAL DECISION MAKING:     ASSESSMENT and PLAN:    ICD-10-CM    1. Neck pain  M54.2 MRI SPINE CERVICAL (CPT=72141)     Physiatry Referral - In Network     PHYSICAL THERAPY - INTERNAL     MRI SPINE CERVICAL (CPT=72141)      2. Cervical radiculopathy  M54.12 MRI SPINE CERVICAL (CPT=72141)     Physiatry Referral - In Network     PHYSICAL THERAPY - INTERNAL     MRI SPINE CERVICAL (CPT=72141)      3. Decreased  strength  R29.898 MRI SPINE CERVICAL (CPT=72141)     Physiatry Referral - In Network     PHYSICAL THERAPY - INTERNAL     MRI SPINE CERVICAL (CPT=72141)      4. Gait instability  R26.81 MRI SPINE CERVICAL (CPT=72141)     Physiatry Referral - In Network     PHYSICAL THERAPY - INTERNAL     MRI SPINE CERVICAL (CPT=72141)      5. Numbness and tingling of right arm  R20.0 MRI SPINE CERVICAL (CPT=72141)    R20.2 Physiatry Referral - In Network     PHYSICAL THERAPY - INTERNAL     MRI SPINE CERVICAL (CPT=72141)      6. Hyper reflexia  R29.2 MRI SPINE  CERVICAL (CPT=72141)     Physiatry Referral - In Network     PHYSICAL THERAPY - INTERNAL     MRI SPINE CERVICAL (CPT=72141)      7. Page sign present  R29.2 MRI SPINE CERVICAL (CPT=72141)     Physiatry Referral - In Network     PHYSICAL THERAPY - INTERNAL     MRI SPINE CERVICAL (CPT=72141)        PLAN:   1. Medication: None prescribed, continue muscle relaxant as needed for pain relief.  Encouraged heat as needed, as this has been providing relief for her likely muscle tightness symptoms  2. Imaging:    - Reviewed today:    - XR cervical spine:     - Cervical DDD, agree with radiology   - Ordered today:    -MRI cervical spine:     -Further assess neck pain with likely right cervical radiculopathy and right arm numbness.  Patient endorses over the past 2 to 3 weeks progressing of dropping items, poor  strength.  Endorses gait instability.  Hyperreflexia on examination as well as left Sai's positive.  Recommend MRI to further assess.  3. Activity:    - PT ordered  4. Referral:    - Physiatry:    - Further conservative treatment  5. Follow up once MRI imaging is complete or call or follow up sooner or go to the ED for any new, worsening or concerning signs or symptoms     I reviewed imaging. I discussed the plan and reviewed imaging with the patient. The patient agrees with the plan, verbalized understanding and is appreciative. All questions were sought out and thoroughly answered to satisfaction.       Total visit time: 60 min  More than 50% spent coordinating care, providing patient education, reviewing imaging, discussing further imaging, discussing PT, discussing physiatry, discussing medication therapy and counseling.    Thank you very much for the kind referral.  Respectfully yours,    Otilia Garcia M.S., MICHELL  [address], [address] [address]  [phone]  10/22/2024 9:18 AM    Dragon speech recognition software was  used to prepare this note. If a word or phrase is confusing, it is likely due to a failure of recognition. Please contact me with any questions or clarifications.         [1]   Allergies  Allergen Reactions    Clonazepam OTHER (SEE COMMENTS)     Suicidal thoughts  [place] out of sorts and has increased irritability as well as extremities feeling strange    Compazine [Prochlorperazine] OTHER (SEE COMMENTS)     Oral tablet-  Twitching;  Blanked out; nausea    Topiramate OTHER (SEE COMMENTS)     Suicidal,aggresive,brain fog

## 2024-10-22 NOTE — PATIENT INSTRUCTIONS
Refill policies:    Allow 2-3 business days for refills; controlled substances may take longer.  Contact your pharmacy at least 5 days prior to running out of medication and have them send an electronic request or submit request through the “request refill” option in your [employer] account.  Refills are not addressed on weekends; covering physicians do not authorize routine medications on weekends.  No narcotics or controlled substances are refilled after noon on Fridays or by on call physicians.  By law, narcotics must be electronically prescribed.  A 30 day supply with no refills is the maximum allowed.  If your prescription is due for a refill, you may be due for a follow up appointment.  To best provide you care, patients receiving routine medications need to be seen at least once a year.  Patients receiving narcotic/controlled substance medications need to be seen at least once every 3 months.  In the event that your preferred pharmacy does not have the requested medication in stock (e.g. Backordered), it is your responsibility to find another pharmacy that has the requested medication available.  We will gladly send a new prescription to that pharmacy at your request.    Scheduling Tests:    If your physician has ordered radiology tests such as MRI or CT scans, please contact Central Scheduling at [phone] right away to schedule the test.  Once scheduled, the [facility] Centralized Referral Team will work with your insurance carrier to obtain pre-certification or prior authorization.  Depending on your insurance carrier, approval may take 3-10 days.  It is highly recommended patients assure they have received an authorization before having a test performed.  If test is done without insurance authorization, patient may be responsible for the entire amount billed.      Precertification and Prior Authorizations:  If your physician has recommended that you have a procedure or additional testing performed the [facility]  Centralized Referral Team will contact your insurance carrier to obtain pre-certification or prior authorization.    You are strongly encouraged to contact your insurance carrier to verify that your procedure/test has been approved and is a COVERED benefit.  Although the [facility] Centralized Referral Team does its due diligence, the insurance carrier gives the disclaimer that \"Although the procedure is authorized, this does not guarantee payment.\"    Ultimately the patient is responsible for payment.   Thank you for your understanding in this matter.  Paperwork Completion:  If you require FMLA or disability paperwork for your recovery, please make sure to either drop it off or have it faxed to our office at [phone]. Be sure the form has your name and date of birth on it.  The form will be faxed to our Forms Department and they will complete it for you.  There is a 25$ fee for all forms that need to be filled out.  Please be aware there is a 10-14 day turnaround time.  You will need to sign a release of information (RONNY) form if your paperwork does not come with one.  You may call the Forms Department with any questions at [phone].  Their fax number is [phone].

## 2024-10-22 NOTE — PROGRESS NOTES
New patient:  Reason for visit: cervical spine     Estimated time of onset:   2 -3 weeks    Numeric Rating Scale:      Pain at Present:  4/10                                                                                                                       Distribution of Pain:    bilateral more on the right side, states she will have N/T in the arms and hands more on the right     Past Treatments for Current Pain Condition:     No passed PT   States she was on MEDROL- states it helped   No passed injections   No passed SX     Response to treatment: some relief    Prior diagnostic testing related to this condition:      X-Ray cervical spine

## 2024-10-25 ENCOUNTER — TELEPHONE (OUTPATIENT)
Dept: SURGERY | Facility: CLINIC | Age: 44
End: 2024-10-25

## 2024-10-25 ENCOUNTER — MED REC SCAN ONLY (OUTPATIENT)
Dept: SURGERY | Facility: CLINIC | Age: 44
End: 2024-10-25

## 2024-10-25 NOTE — TELEPHONE ENCOUNTER
Received plan of care DOS 10/24/24 from [facility] , for review and signature from provider  Placed in neuro surgery bin for clinical staff.

## 2024-10-25 NOTE — TELEPHONE ENCOUNTER
Plan of Care DOS 10/24/24 from [employer] received by [facility] clinical staff, endorsed to provider for review.     Placed on Harshad's desk for signature.

## 2024-10-31 ENCOUNTER — TELEPHONE (OUTPATIENT)
Dept: SURGERY | Facility: CLINIC | Age: 44
End: 2024-10-31

## 2024-10-31 NOTE — TELEPHONE ENCOUNTER
Patient was called, reviewed MRI cervical spine results with her.  I recommended follow-up with Dr. Dickson for further evaluation.  She excepted appointment tomorrow with 11:30 AM.  Discussed with [facility] front staff.    Patient agreed to the plan, verbalized understanding was very appreciative.

## 2024-11-01 ENCOUNTER — OFFICE VISIT (OUTPATIENT)
Dept: SURGERY | Facility: CLINIC | Age: 44
End: 2024-11-01
Payer: COMMERCIAL

## 2024-11-01 ENCOUNTER — TELEPHONE (OUTPATIENT)
Dept: SURGERY | Facility: CLINIC | Age: 44
End: 2024-11-01

## 2024-11-01 VITALS
DIASTOLIC BLOOD PRESSURE: 80 MMHG | OXYGEN SATURATION: 97 % | HEIGHT: 66 IN | WEIGHT: 293 LBS | SYSTOLIC BLOOD PRESSURE: 122 MMHG | HEART RATE: 71 BPM | BODY MASS INDEX: 47.09 KG/M2

## 2024-11-01 DIAGNOSIS — M54.12 CERVICAL RADICULOPATHY: ICD-10-CM

## 2024-11-01 DIAGNOSIS — R20.0 NUMBNESS AND TINGLING OF RIGHT ARM: Primary | ICD-10-CM

## 2024-11-01 DIAGNOSIS — M54.12 CERVICAL MYELOPATHY WITH CERVICAL RADICULOPATHY (HCC): ICD-10-CM

## 2024-11-01 DIAGNOSIS — R20.2 NUMBNESS AND TINGLING OF RIGHT ARM: Primary | ICD-10-CM

## 2024-11-01 DIAGNOSIS — G95.9 CERVICAL MYELOPATHY WITH CERVICAL RADICULOPATHY (HCC): ICD-10-CM

## 2024-11-01 PROCEDURE — 3079F DIAST BP 80-89 MM HG: CPT | Performed by: NEUROLOGICAL SURGERY

## 2024-11-01 PROCEDURE — 99214 OFFICE O/P EST MOD 30 MIN: CPT | Performed by: NEUROLOGICAL SURGERY

## 2024-11-01 PROCEDURE — 3008F BODY MASS INDEX DOCD: CPT | Performed by: NEUROLOGICAL SURGERY

## 2024-11-01 PROCEDURE — 3074F SYST BP LT 130 MM HG: CPT | Performed by: NEUROLOGICAL SURGERY

## 2024-11-01 NOTE — PATIENT INSTRUCTIONS
Refill policies:    Allow 2-3 business days for refills; controlled substances may take longer.  Contact your pharmacy at least 5 days prior to running out of medication and have them send an electronic request or submit request through the “request refill” option in your [employer] account.  Refills are not addressed on weekends; covering physicians do not authorize routine medications on weekends.  No narcotics or controlled substances are refilled after noon on Fridays or by on call physicians.  By law, narcotics must be electronically prescribed.  A 30 day supply with no refills is the maximum allowed.  If your prescription is due for a refill, you may be due for a follow up appointment.  To best provide you care, patients receiving routine medications need to be seen at least once a year.  Patients receiving narcotic/controlled substance medications need to be seen at least once every 3 months.  In the event that your preferred pharmacy does not have the requested medication in stock (e.g. Backordered), it is your responsibility to find another pharmacy that has the requested medication available.  We will gladly send a new prescription to that pharmacy at your request.    Scheduling Tests:    If your physician has ordered radiology tests such as MRI or CT scans, please contact Central Scheduling at [phone] right away to schedule the test.  Once scheduled, the [facility] Centralized Referral Team will work with your insurance carrier to obtain pre-certification or prior authorization.  Depending on your insurance carrier, approval may take 3-10 days.  It is highly recommended patients assure they have received an authorization before having a test performed.  If test is done without insurance authorization, patient may be responsible for the entire amount billed.      Precertification and Prior Authorizations:  If your physician has recommended that you have a procedure or additional testing performed the [facility]  Centralized Referral Team will contact your insurance carrier to obtain pre-certification or prior authorization.    You are strongly encouraged to contact your insurance carrier to verify that your procedure/test has been approved and is a COVERED benefit.  Although the [facility] Centralized Referral Team does its due diligence, the insurance carrier gives the disclaimer that \"Although the procedure is authorized, this does not guarantee payment.\"    Ultimately the patient is responsible for payment.   Thank you for your understanding in this matter.  Paperwork Completion:  If you require FMLA or disability paperwork for your recovery, please make sure to either drop it off or have it faxed to our office at [phone]. Be sure the form has your name and date of birth on it.  The form will be faxed to our Forms Department and they will complete it for you.  There is a 25$ fee for all forms that need to be filled out.  Please be aware there is a 10-14 day turnaround time.  You will need to sign a release of information (RONNY) form if your paperwork does not come with one.  You may call the Forms Department with any questions at [phone].  Their fax number is [phone].

## 2024-11-01 NOTE — TELEPHONE ENCOUNTER
Pt called back to state she would like to proceed with surgery.Pt aware surgery scheduler will reach out when has information.

## 2024-11-01 NOTE — PROGRESS NOTES
Neurosurgery Clinic Visit  2024    Crys Carter PCP:  Viki Pinto MD    1980 MRN [id number]     HISTORY OF PRESENT ILLNESS:  Crys Carter is a(n) 44 year old female here for follow-up  She did get her MRI  She describes little bit of weakness in her right hand  She also describes some dropping things from time to time  She also has some pain in her right upper arm at times and now the left down her arm  She notes that the nature most of her fingers were numb and she had a movement and got better        PHYSICAL EXAMINATION:  Vital Signs:  /80   Pulse 71   Ht 66\"   Wt (!) 306 lb (138.8 kg)   LMP 2024 (Approximate)   SpO2 97%   BMI 49.39 kg/m²   Awake alert [place] x 3  Follows commands x 4 good strength throughout  Right  is a little weaker than the left  She has a Sai's on the left  Jaw jerk is negative  No clonus  Gait within normal limits  DTRs are 2+ bilaterally and symmetrical  Positive Lhermitte's with flexion      REVIEW OF STUDIES:    MRI reviewed which shows disc bulge at 4 5 as well as 5 6 with some cord signal change at C5-6 eccentric to the left      ASSESSMENT and PLAN:  44-year-old female with cervical disc disease causing stenosis and early myelopathy as well as a little radiculopathy  We had nice discussion about treatment options  She is physical therapy started  She is also or physiatry  Given the fact she does have a little cord signal change I also offered surgery  We discussed a two-level ACDF given her anatomy  We discussed the risks and benefits in detail which included but not limited to bleeding, infection, CSF leak, temporary or permanent neurologic deficit, not relieving her symptoms, the goal of surgery was to prevent her from getting worse, nonunion, adjacent segment disease  She wishes to discuss this with her mother  She will call me back and let me know she decides she wants surgery or to continue with her nonoperative  treatments  We reviewed her films in detail  All questions were answered  Patient appreciative        Time spent on counseling/coordination of care:  30 Minutes    Total time spent with patient:  30 Minutes      Allen Dickson MD   [facility]  11/1/2024  12:01 PM   Dictated but not proofread

## 2024-11-01 NOTE — H&P (VIEW-ONLY)
Neurosurgery Clinic Visit  2024    Crys Carter PCP:  Viki Pinto MD    1980 MRN [id number]     HISTORY OF PRESENT ILLNESS:  Crys Carter is a(n) 44 year old female here for follow-up  She did get her MRI  She describes little bit of weakness in her right hand  She also describes some dropping things from time to time  She also has some pain in her right upper arm at times and now the left down her arm  She notes that the nature most of her fingers were numb and she had a movement and got better        PHYSICAL EXAMINATION:  Vital Signs:  /80   Pulse 71   Ht 66\"   Wt (!) 306 lb (138.8 kg)   LMP 2024 (Approximate)   SpO2 97%   BMI 49.39 kg/m²   Awake alert [place] x 3  Follows commands x 4 good strength throughout  Right  is a little weaker than the left  She has a Sai's on the left  Jaw jerk is negative  No clonus  Gait within normal limits  DTRs are 2+ bilaterally and symmetrical  Positive Lhermitte's with flexion      REVIEW OF STUDIES:    MRI reviewed which shows disc bulge at 4 5 as well as 5 6 with some cord signal change at C5-6 eccentric to the left      ASSESSMENT and PLAN:  44-year-old female with cervical disc disease causing stenosis and early myelopathy as well as a little radiculopathy  We had nice discussion about treatment options  She is physical therapy started  She is also or physiatry  Given the fact she does have a little cord signal change I also offered surgery  We discussed a two-level ACDF given her anatomy  We discussed the risks and benefits in detail which included but not limited to bleeding, infection, CSF leak, temporary or permanent neurologic deficit, not relieving her symptoms, the goal of surgery was to prevent her from getting worse, nonunion, adjacent segment disease  She wishes to discuss this with her mother  She will call me back and let me know she decides she wants surgery or to continue with her nonoperative  treatments  We reviewed her films in detail  All questions were answered  Patient appreciative        Time spent on counseling/coordination of care:  30 Minutes    Total time spent with patient:  30 Minutes      Allen Dickson MD   [facility]  11/1/2024  12:01 PM   Dictated but not proofread

## 2024-11-01 NOTE — PROGRESS NOTES
Established patient:  Reason for follow up:   Review MRI results-neck pain to L shoulder, bilateral hand tingling        Numeric Rating Scale:   Pain at Present: 3/10       Most recent treatments for Current Pain Condition:     Pt started PT last week   Pt has appt scheduled with Dr Lomeli on 11/07/24    New imaging or testing since your last office visit:    MRI spine cervical DOS 10/31/24

## 2024-11-05 ENCOUNTER — TELEPHONE (OUTPATIENT)
Dept: SURGERY | Facility: CLINIC | Age: 44
End: 2024-11-05

## 2024-11-05 DIAGNOSIS — M48.02 MYELOPATHY CONCURRENT WITH AND DUE TO SPINAL STENOSIS OF CERVICAL REGION (HCC): Primary | ICD-10-CM

## 2024-11-05 DIAGNOSIS — M54.2 NECK PAIN: ICD-10-CM

## 2024-11-05 DIAGNOSIS — G95.89 MYELOMALACIA OF CERVICAL CORD (HCC): ICD-10-CM

## 2024-11-05 DIAGNOSIS — G99.2 MYELOPATHY CONCURRENT WITH AND DUE TO SPINAL STENOSIS OF CERVICAL REGION (HCC): Primary | ICD-10-CM

## 2024-11-05 DIAGNOSIS — R29.2 HOFFMAN SIGN PRESENT: ICD-10-CM

## 2024-11-05 DIAGNOSIS — R26.81 GAIT INSTABILITY: ICD-10-CM

## 2024-11-05 DIAGNOSIS — R20.0 NUMBNESS AND TINGLING OF RIGHT ARM: ICD-10-CM

## 2024-11-05 DIAGNOSIS — R29.2 HYPER REFLEXIA: ICD-10-CM

## 2024-11-05 DIAGNOSIS — R20.2 NUMBNESS AND TINGLING OF RIGHT ARM: ICD-10-CM

## 2024-11-05 DIAGNOSIS — R29.898 DECREASED GRIP STRENGTH: ICD-10-CM

## 2024-11-05 DIAGNOSIS — M54.12 CERVICAL RADICULOPATHY: ICD-10-CM

## 2024-11-05 NOTE — TELEPHONE ENCOUNTER
You are scheduled for C4-C6 ACDF on 11.27.24 with  at [facility].    You will need to contact the Pre-admission department at [phone] to schedule your pre-op testing.  They will get you scheduled for all the blood work, MRSA/MSSA, chest xray and EKG if needed. If they do not answer when you call, please leave a message and they will call you back, they return calls in surgical order, it may be a few days before they return your call.    PCP clearance is needed.  We have faxed a clearance request to Dr. Candida Pinto 's office.  Please contact their office for appointment.     Do not take any blood thinning medications, over the counter non-steroidal anti inflammatories (ibuprofen, advil, aleve etc.), herbal supplements (garlic,tumeric etc.), vitamin E, fish oil or krill oil for at least 7-14 days prior to surgery.     If you were on blood thinners (such as Coumadin, Plavix, Pradaxa, Xarelto, etc) prior to surgery that we had you stop for surgery, you will need to be cleared by your cardiologist to hold the medication prior to surgery.  Please make sure you get instructions about when to resume the medication before you are discharged from the hospital.    You may only take Tylenol, Extra Strength Tylenol, Arthritis Tylenol, or prescription Norco or Tramadol for pain if something is needed.    You should have nothing to eat or drink after 11:00pm the night prior to surgery except the following:    Do drink 12 ounces of regular Gatorade (NOT RED) 12 hours and 4 hours prior to your scheduled surgery time, Do not drink any other liquids (including water) before your surgery. Do not chew gum or eat candies before surgery.  Take 1000 mg of Tylenol (Acetaminophen) 4 hours before your scheduled surgery time, take this with your scheduled Gatorade.    Surgery is usually scheduled as a 1 day admission.  This is an estimate and varies from person to person.  Ultimately, the surgeon will determine when you  are ready to be discharged.    Our office will get authorization for surgery. We will attempt to contact you 3 days before surgery if we run into any complications or have not received your surgery authorization. We will continue to attempt to get authorization until 2pm the day before surgery. If authorization is not received we will give you a call to discuss next steps. Our goal is to make sure you do not proceed with an un-authorized surgery so that you do not end up having to pay for this surgery out of pocket.     The hospital will contact you 1-2 days before surgery with your expected arrival time.     To prevent infection post-operatively, you will need to shower with Hibiclens soap for 5 days prior to surgery. The last shower should be the night before surgery.  Hibiclens soap can be found at any pharmacy in the first-aid section.  See detailed instructions below.    Post operative appointments to be made 2 weeks before surgery as well as 2 and 6 weeks after surgery.      Your 2 week pre-op surgical telehealth appointment is on 11.14.24 at 9:00 am with Ladonna Rachel RN    Your 2 week post-op appointment is on 12.20.24 at 12:15 pm with DIANE White    Your 6 week post-op appointment is on 1.9.25 at 2:00 pm with Dr Dickson    Please make sure to arrive at least 15 minutes prior to your scheduled appointment in order to get checked in and roomed in a timely manner.  Depending on provider availability, late patients may be required to reschedule.  REFILLS:  After surgery, please remember that we do have a 48 hour refill policy that does not include weekends, please make sure to request your medications in a timely manner so that you do not go days without medication.  *Refills should be requested through your pharmacy or through the refill request in [employer] (log in, go to medications, then select refill request).  [facility] MESSAGING:  Please remember that our office is closed during the weekend and no  one is available for [employer] messages. If you have an urgent or emergent matter please go to a walk-in center or the emergency room. Also please remember your [employer] messages are part of your legal medical record and should not be utilized as a personal email with our providers as it is visible to all [facility] employees. Also, Since [employer] messages are not for emergent matters it may be several days before there is a response to your message.  FMLA/PAPERWORK COMPLETED BY OUR MEDICAL FORMS DEPARTMENT:    If you require FMLA paperwork for your surgery, please make sure to either Drop it off or have it faxed to our office at [phone]. Make sure it has your NAME, , and has your signature. You will need to have a Release of Information on file. To facilitate this process we ask that you requested it at the  on your way out and sign it. Without a signed RONNY or signature on the form we will not be able to fax it and this will cause a delay with your forms. Fees charged for forms are $25 for initial submission and $15 for recertifications. If you have questions on the status of your forms please call the forms department at [phone].  **We do have a 3 week policy for all forms and paperwork, please make sure to allow plenty of time for completion. Same day paperwork will not be completed. **    Spine Navigator  You will have a 30 minute telehealth visit with our spine navigator approximately 2 weeks before your surgery. This visit is NOT optional. This appointment will get booked when you schedule your spinal surgery.  When speaking with Pre-admissions you will be told about a spine class as it relates to your surgery, this is an OPTIONAL class. The same or similar information will be discussed with you during your telehealth appointment with the spine navigator (Spine Navigator appointment is not optional). However, if you would like to have this information repeated to you or if you would  feel more comfortable with additional information, you can elect to schedule this class during your pre-admissions phone call.  The Pre-op Spine Surgery Class is held at [facility] most Wednesdays from 3:30-4:30 pm. Call Pre-admission Testing (PAT) to register at:  [phone]. Please park in the [facility] Parking garage, check in at registration and meet in the [facility] lobby for your escort to class on the Ortho Spine unit. If unable to attend, but would like additional information, the class is available online at [email or web address].     Due to COVID, visitor guidelines are constantly changing.  Please visit the following website for the detailed and up-to-date visitor screening and restriction policy at [email or web address] [email or web address].    Hibiclens Bathing  Hibiclens is a body soap that is used before surgery protect you from getting an infection after surgery   Hibiclens comes in a large blue bottle and can be found in most pharmacies in the First Aid supplies  Shower with this daily for FIVE consecutive days before surgery, using the entire bottle over the five days.  The last shower should be the night before surgery.   Steps to bathing with Hibiclens  Do not use Hibiclens on your hair, face or private areas  Wash your hair and face as normal with your usual cleansers  Rinse well  Using a clean wet washcloth apply enough Hibiclens to cover your body. Wash from the neck down avoiding the genital areas and concentrating on the surgical area  Rinse well  Dry yourself with a clean, dry towel  Do not use any powders, creams, lotions or sprays on your body as these attract bacteria  Deodorant and facial creams are acceptable.   (Laundering/cleaning: Chlorhexidine gluconate skin cleansers will cause stains if used with chlorine releasing products. Rinse completely and use only non-chlorine detergents.)      For Office Use Only:  Medical Clearance Requested: PCP  PA: URVASHI ABDI  PPO  CPT Codes:

## 2024-11-08 ENCOUNTER — TELEPHONE (OUTPATIENT)
Dept: INTERNAL MEDICINE CLINIC | Facility: CLINIC | Age: 44
End: 2024-11-08

## 2024-11-08 NOTE — TELEPHONE ENCOUNTER
Patient is scheduled for  C4-C6 ACDF on 11.27.24 with  at [facility]     NA pre-op apt scheduled (if sx is more than 30 days from last apt)  Y pre-op apt scheduled with RN spine navigator  Y Surgical instructions reviewed by nursing staff with patient  Y  form completed  Y Surgery order signed   Y Placed sx on surgery sheet  Y Placed on outlook calendar  Y [employer] message sent to patient with sx instructions  Y Faxed pre-op clearance request to PCP CONCHA DE LEON Faxed letter to prescribing provider requesting anticoagulants be held for surgery  NA E-mail sent to [employer]  Y Post-op appointments made  Y PA NEEDED. Routed to PA team to initiate.  Y Post-Op outreach pt reminder placed.   Y Entire Neurosurgery Checklist Completed    Clearances: PCP  PA:URVASHI PPO  CPT Codes: 82994, 63462, 16507, [id number], [id number]

## 2024-11-08 NOTE — TELEPHONE ENCOUNTER
Patient calling to inform us she spoke with her PCP and they have not received the clearance form. Patient has requested form to be faxed again to PCP

## 2024-11-08 NOTE — TELEPHONE ENCOUNTER
Future Appointments   Date Time Provider Department Center   11/11/2024 11:30 AM Katie Barrios,  EMG 8 [facility] [place]   11/14/2024 11:15 AM SPINE NAVIGATOR Spine Center None   11/19/2024  1:00 PM Albert Galicia MD [facility] [place]   12/20/2024 12:15 PM Otilia Garcia PA-C ENINAP[facility]2 [facility] [place]   1/9/2025  2:00 PM Allen Dickson MD EN[facility]AP[facility]2 [facility] [place]

## 2024-11-08 NOTE — TELEPHONE ENCOUNTER
Incoming pre-op clearance   DOS 11/27/24  Dr Dickson   Fax- [phone]    Please reach out to patient to schedule pre-op

## 2024-11-08 NOTE — TELEPHONE ENCOUNTER
Pre-op clearance letter re-faxed to PCP. Fax confirmation received.     Advised pt of message. Pt is active on [place] [facility].  message sent.

## 2024-11-11 ENCOUNTER — OFFICE VISIT (OUTPATIENT)
Dept: INTERNAL MEDICINE CLINIC | Facility: CLINIC | Age: 44
End: 2024-11-11
Payer: COMMERCIAL

## 2024-11-11 VITALS
WEIGHT: 293 LBS | OXYGEN SATURATION: 100 % | RESPIRATION RATE: 16 BRPM | SYSTOLIC BLOOD PRESSURE: 118 MMHG | BODY MASS INDEX: 47.09 KG/M2 | DIASTOLIC BLOOD PRESSURE: 78 MMHG | HEIGHT: 66 IN | TEMPERATURE: 98 F | HEART RATE: 66 BPM

## 2024-11-11 DIAGNOSIS — Z12.31 ENCOUNTER FOR SCREENING MAMMOGRAM FOR MALIGNANT NEOPLASM OF BREAST: ICD-10-CM

## 2024-11-11 DIAGNOSIS — J45.20 MILD INTERMITTENT ASTHMA WITHOUT COMPLICATION (HCC): ICD-10-CM

## 2024-11-11 DIAGNOSIS — G47.33 OSA (OBSTRUCTIVE SLEEP APNEA): ICD-10-CM

## 2024-11-11 DIAGNOSIS — G43.109 MIGRAINE WITH AURA AND WITHOUT STATUS MIGRAINOSUS, NOT INTRACTABLE: ICD-10-CM

## 2024-11-11 DIAGNOSIS — F31.77 BIPOLAR DISORDER, IN PARTIAL REMISSION, MOST RECENT EPISODE MIXED (HCC): ICD-10-CM

## 2024-11-11 DIAGNOSIS — Z01.818 PREOP EXAM FOR INTERNAL MEDICINE: Primary | ICD-10-CM

## 2024-11-11 DIAGNOSIS — E03.9 HYPOTHYROIDISM, UNSPECIFIED TYPE: ICD-10-CM

## 2024-11-11 NOTE — PATIENT INSTRUCTIONS
Good luck with the surgery    Please follow up for a physical when you have recovered from your surgery    Mammogram has been ordered     You received the flu vaccine today

## 2024-11-11 NOTE — PROGRESS NOTES
Preoperative evaluation    Chief Complaint   Patient presents with    Pre-Op Exam       Crys Carter is a 44 year old female who presents for a pre-operative physical exam.   Crys Carter is scheduled for a C4-C6 ACDF procedure at [facility] on 11/27/24 performed by Dr Dickson, who has requested that I provide pre-operative consultation before this procedure.   HPI related to surgery:   She  has had previous anesthesia:  Yes.  Previous complications:  No other than vomiting  Patient has good functional status (>4 METS).  No active anginal symptoms, no history of arrhythmias, coronary artery disease, valvular disease.    Anxiety/migraine/hypothyroid/bipolar: Stable on medicines  REVIEW OF SYSTEMS:   GENERAL/ const: no fevers/chills. feels well, no weight loss  SKIN: denies any unusual skin lesions  EYES:no vision problems  HEENT: denies sinus pain or sinus tenderness  LUNGS: denies shortness of breath   CARDIOVASCULAR: denies chest pain  GI: denies nausea/emesis/ abdominal pain diarrhea constipation  : denies dysuria   MUSCULOSKELETAL: no arthalgias  NEURO: denies headaches  PSYCHIATRIC: denies issues  ENDOCRINE: no hot/cold intolerance  ALLERGY: Allergies[1]  PAST HISTORY:     Current Outpatient Medications:     cyclobenzaprine 10 MG Oral Tab, Take 1 tablet (10 mg total) by mouth 3 (three) times daily as needed., Disp: 20 tablet, Rfl: 0    lurasidone 80 MG Oral Tab, , Disp: , Rfl:     hydrOXYzine 25 MG Oral Tab, Take 1 tablet (25 mg total) by mouth as needed for Anxiety., Disp: , Rfl:     LORazepam 0.5 MG Oral Tab, Take 1 tablet (0.5 mg total) by mouth as needed., Disp: , Rfl:     Atogepant (QULIPTA) 30 MG Oral Tab, Take 1 tablet by mouth daily., Disp: 90 tablet, Rfl: 1    Ketorolac Tromethamine 10 MG Oral Tab, Take 2 tablets (20 mg total) by mouth As Directed. For migraines: take 20mg once at the onset of the migraine., Disp: 30 tablet, Rfl: 0    ondansetron 4 MG Oral Tablet Dispersible,  Take 1 tablet (4 mg total) by mouth every 4 (four) hours as needed for Nausea., Disp: 10 tablet, Rfl: 0    levothyroxine 75 MCG Oral Tab, Take 1 tablet (75 mcg total) by mouth before breakfast., Disp: 90 tablet, Rfl: 3    pantoprazole 20 MG Oral Tab EC, Take 1 tablet (20 mg total) by mouth before breakfast., Disp: 90 tablet, Rfl: 3    lamoTRIgine 100 MG Oral Tab, Take 1 tablet (100 mg total) by mouth daily., Disp: , Rfl:     ubrogepant (UBRELVY) 100 MG Oral Tab, Take one tablet at onset of migraine.  May take additional tablet in 2 hours if needed.  Do not exceed two tablets per 24 hour period., Disp: 10 tablet, Rfl: 0    buPROPion  MG Oral Tablet 24 Hr, Take 1 tablet (150 mg total) by mouth daily., Disp: 30 tablet, Rfl: 0    propranolol 20 MG Oral Tab, Take 1 tablet (20 mg total) by mouth 2 (two) times daily. (Patient taking differently: Take 1 tablet (20 mg total) by mouth 2 (two) times daily.), Disp: 60 tablet, Rfl: 0    naltrexone 50 MG Oral Tab, Take 1 tablet (50 mg total) by mouth daily. (Patient taking differently: Take 1 tablet (50 mg total) by mouth daily. Used to combat sugar cravings), Disp: 30 tablet, Rfl: 0    sertraline 100 MG Oral Tab, Take 1 tablet (100 mg total) by mouth daily., Disp: 30 tablet, Rfl: 0    traZODone 100 MG Oral Tab, Take 1 tablet (100 mg total) by mouth nightly as needed for Sleep., Disp: 30 tablet, Rfl: 0    meclizine 25 MG Oral Tab, Take 1 tablet (25 mg total) by mouth 3 (three) times daily as needed for Dizziness., Disp: 15 tablet, Rfl: 0    albuterol (PROAIR HFA) 108 (90 Base) MCG/ACT Inhalation Aero Soln, SHAKE WELL AND INHALE 1 TO 2 PUFFS INTO THE LUNGS EVERY 4 HOURS AS NEEDED FOR WHEEZING( COUGH), Disp: 3 each, Rfl: 1    Multiple Vitamins-Minerals (MULTIVITAL OR), Take by mouth. Bariatric vitamin, Disp: , Rfl:   Medical:  has a past medical history of Allergic rhinitis (1988), Anxiety, Arthritis, Asthma (HCC), Back problem, Bipolar disorder (HCC), Calculus of kidney  (05/2021), Cancer (HCC), Depression, Disorder of thyroid, Esophageal reflux, Hypothyroidism (2021), Migraine, Migraines, Obesity, unspecified, PONV (postoperative nausea and vomiting), S/P laparoscopic sleeve gastrectomy (03/16/2022), Sleep apnea, Stroke (HCC), and Visual impairment.  Surgical:  has a past surgical history that includes tonsillectomy; other (2009); other (2014); other surgical history (03/07/2022); and skin surgery (Oct 2014).  Family: family history includes Alcohol and Other Disorders Associated in her maternal grandmother; Asthma in her father; Breast Cancer (age of onset: 65) in her maternal aunt; Cancer in her maternal grandfather, maternal grandmother, and paternal grandfather; Depression in her father and mother; Diabetes in her mother; Heart Disease in her father; Hypertension in her paternal grandmother; Obesity in her mother; Personality Disorder in her paternal grandfather; Substance Abuse in her maternal uncle.  Social:   Social History     Socioeconomic History    Marital status: Single   Tobacco Use    Smoking status: Former     Current packs/day: 0.00     Average packs/day: 1 pack/day for 23.9 years (23.9 ttl pk-yrs)     Types: Cigarettes     Start date: 1/27/1985     Quit date: 12/15/2008     Years since quitting: 15.9     Passive exposure: Never    Smokeless tobacco: Never   Vaping Use    Vaping status: Never Used   Substance and Sexual Activity    Alcohol use: Not Currently     Comment: Last week in August 2023    Drug use: Not Currently     Types: Cannabis    Sexual activity: Not Currently     Partners: Male     Birth control/protection: Condom   Other Topics Concern    Caffeine Concern No    Stress Concern Yes    Weight Concern Yes    Special Diet Yes     Comment: Bariatric    Exercise No    Seat Belt Yes     Social Drivers of Health      Received from [employer]    [facility]     PHYSICAL EXAM:   /78 (BP Location: Right arm, Patient Position: Sitting, Cuff  Size: adult)   Pulse 66   Temp 97.7 °F (36.5 °C) (Temporal)   Resp 16   Ht 5' 6\" (1.676 m)   Wt (!) 311 lb 3.2 oz (141.2 kg)   LMP 03/23/2024 (Approximate)   SpO2 100%   BMI 50.23 kg/m²    GENERAL: Alert and oriented, well developed, well nourished,in no apparent distress  SKIN: no rashes,no suspicious lesions  HEENT: atraumatic, PERRLA, EOMI, normal lid and conjunctiva  LUNGS: clear to auscultation bilaterally, no wheezing/rubs  CARDIO: RRR without murmurs.  No clubbing, cyanosis or edema.  GI: soft non tender nondistended no hepatosplenomegaly, bowel sounds throughout  NEURO: CN II-XII intact  PSYCH: pleasant, appropriate mood and affect  LABORATORY DATA:   Ordered by the surgeon    ASSESSMENT AND PLAN:   1. Preop exam for internal medicine  Note: Patient presents for pre-operative examination at the request of performing surgeon.  Patient has good functional status (>4 METS).  No active anginal symptoms, no history of arrhythmias, coronary artery disease, valvular disease.  RCRI score of 0,  Class I risk, 3.9% risk of major cardiac event.  She is considered a low risk patient undergoing a moderate risk procedure, and is ok to proceed with surgery.  Note and pertinent pre-operative testing results will be faxed to referring surgeon's office and/or surgical center as requested.    2. Encounter for screening mammogram for malignant neoplasm of breast  - [facility] NATO 2D+3D SCREENING BILAT (CPT=77067/21866); Future    3. SASHA (obstructive sleep apnea)  Note: Will need to be monitored after extubation.  She may  take her CPAP machine to the surgery as well     4. Mild intermittent asthma without complication (HCC)  Note: Stable on inhalers    5. Migraine with aura and without status migrainosus, not intractable  Note: Follows with the neurologist and is doing well on Qulipta and Ubrelvy    6. Hypothyroidism, unspecified type  Note: Stable on levothyroxine    7. Bipolar disorder, in partial remission, most recent  episode mixed (HCC)  Note: Currently on sertraline,Lurasidone, Lamictal.  She was notified by the surgery team to hold off on the naltrexone for 3 days.  She will get further recommendations from her psychiatrist    Flu vaccine provided today    Patient Care Team:  Viki Molina MD as PCP - General (Internal Medicine)  Radha Downing APRN (Nurse Practitioner)  Geraldine Orourke MD (Internal Medicine)  Otilia Gutierrez APRN as Obstetrician (Nurse Practitioner Family)  Dutch Donato MD as Psychiatrist/APN (Psychiatry)  Suly Carbajal LCPC as Clinical Therapist  Otilia Garcia PA-C (Physician Assistant)  Allen Dickson MD (NEUROSURGERY)  The patient indicates understanding of these issues and agrees to the plan.  The patient is asked to return to clinic in 3 months with Dr. Viki Pinto MD for follow up on chronic issues, or earlier if acute issues arise.  Katie Barrios,           [1]   Allergies  Allergen Reactions    Clonazepam OTHER (SEE COMMENTS)     Suicidal thoughts  [facility] out of sorts and has increased irritability as well as extremities feeling strange    Compazine [Prochlorperazine] OTHER (SEE COMMENTS)     Oral tablet-  Twitching;  Blanked out; nausea    Topiramate OTHER (SEE COMMENTS)     Suicidal,aggresive,brain fog

## 2024-11-11 NOTE — TELEPHONE ENCOUNTER
Prior authorization request completed for: C4-C6 ACDF    Authorization #NO PRIOR AUTHORIZATION REQUIRED   Authorization dates: 11/27/24  CPT codes approved: 11952, 98134, 50073, 75068, 24622  Number of visits/dates of service approved: Outpatient  Physician: Randolph  Location: [facility]     Call Ref#: [id number]  Representative Name: Isabel LOCKHART   Insurance Carrier: [facility]

## 2024-11-13 ENCOUNTER — TELEPHONE (OUTPATIENT)
Dept: SURGERY | Facility: CLINIC | Age: 44
End: 2024-11-13

## 2024-11-13 ENCOUNTER — PATIENT MESSAGE (OUTPATIENT)
Dept: SURGERY | Facility: CLINIC | Age: 44
End: 2024-11-13

## 2024-11-13 NOTE — TELEPHONE ENCOUNTER
Patient called form email to submit Family Medical Leave Act/ disability paperwork. Patient states she was advised by  in provider's office she was unable to drop off forms and that she would need to contact forms department for further instructions. Provided patient with email address for the forms department and provided forms current processing time.    Type of Leave:Family Medical Leave Act & disability    Reason for Leave: Surgery  Start date of leave: 11/27/24  End date of leave: 6-8 weeks   How many flare ups per month/length?:  How many appts per month/length?:   Was Fee and Turnaround info Given?: Yes

## 2024-11-13 NOTE — TELEPHONE ENCOUNTER
Message received from pt.    Advised pt to call forms department and confirm with them as we do not believe they have an address for her to drop forms off.

## 2024-11-13 NOTE — TELEPHONE ENCOUNTER
Message below noted.    Advised pt that we do not fill out FMLA forms in our office. Provided Forms Department information and to reach back out with any other questions or concerns.

## 2024-11-14 ENCOUNTER — LABORATORY ENCOUNTER (OUTPATIENT)
Dept: LAB | Age: 44
End: 2024-11-14
Attending: NEUROLOGICAL SURGERY
Payer: COMMERCIAL

## 2024-11-14 ENCOUNTER — NURSE ONLY (OUTPATIENT)
Age: 44
End: 2024-11-14
Payer: COMMERCIAL

## 2024-11-14 ENCOUNTER — HOSPITAL ENCOUNTER (OUTPATIENT)
Dept: GENERAL RADIOLOGY | Age: 44
Discharge: HOME | End: 2024-11-14
Attending: NEUROLOGICAL SURGERY
Payer: COMMERCIAL

## 2024-11-14 DIAGNOSIS — M54.12 CERVICAL RADICULOPATHY: ICD-10-CM

## 2024-11-14 DIAGNOSIS — Z71.9 ENCOUNTER FOR EDUCATION: Primary | ICD-10-CM

## 2024-11-14 LAB
ANION GAP SERPL CALC-SCNC: 4 MMOL/L (ref 0–18)
ANTIBODY SCREEN: NEGATIVE
APTT PPP: 37.3 SECONDS (ref 23–36)
BASOPHILS # BLD AUTO: 0.09 X10(3) UL (ref 0–0.2)
BASOPHILS NFR BLD AUTO: 0.9 %
BUN BLD-MCNC: 10 MG/DL (ref 9–23)
CALCIUM BLD-MCNC: 9.7 MG/DL (ref 8.7–10.4)
CHLORIDE SERPL-SCNC: 105 MMOL/L (ref 98–112)
CO2 SERPL-SCNC: 27 MMOL/L (ref 21–32)
CREAT BLD-MCNC: 1.18 MG/DL
EGFRCR SERPLBLD CKD-EPI 2021: 58 ML/MIN/1.73M2 (ref 60–?)
EOSINOPHIL # BLD AUTO: 0.22 X10(3) UL (ref 0–0.7)
EOSINOPHIL NFR BLD AUTO: 2.2 %
ERYTHROCYTE [DISTWIDTH] IN BLOOD BY AUTOMATED COUNT: 12.8 %
FASTING STATUS PATIENT QL REPORTED: NO
GLUCOSE BLD-MCNC: 93 MG/DL (ref 70–99)
HCT VFR BLD AUTO: 42.6 %
HGB BLD-MCNC: 14 G/DL
IMM GRANULOCYTES # BLD AUTO: 0.03 X10(3) UL (ref 0–1)
IMM GRANULOCYTES NFR BLD: 0.3 %
INR BLD: 0.98 (ref 0.8–1.2)
LYMPHOCYTES # BLD AUTO: 3.52 X10(3) UL (ref 1–4)
LYMPHOCYTES NFR BLD AUTO: 35.4 %
MCH RBC QN AUTO: 29.2 PG (ref 26–34)
MCHC RBC AUTO-ENTMCNC: 32.9 G/DL (ref 31–37)
MCV RBC AUTO: 88.8 FL
MONOCYTES # BLD AUTO: 0.62 X10(3) UL (ref 0.1–1)
MONOCYTES NFR BLD AUTO: 6.2 %
NEUTROPHILS # BLD AUTO: 5.45 X10 (3) UL (ref 1.5–7.7)
NEUTROPHILS # BLD AUTO: 5.45 X10(3) UL (ref 1.5–7.7)
NEUTROPHILS NFR BLD AUTO: 55 %
OSMOLALITY SERPL CALC.SUM OF ELEC: 281 MOSM/KG (ref 275–295)
PLATELET # BLD AUTO: 258 10(3)UL (ref 150–450)
POTASSIUM SERPL-SCNC: 4.5 MMOL/L (ref 3.5–5.1)
PROTHROMBIN TIME: 13.1 SECONDS (ref 11.6–14.8)
RBC # BLD AUTO: 4.8 X10(6)UL
RH BLOOD TYPE: NEGATIVE
SODIUM SERPL-SCNC: 136 MMOL/L (ref 136–145)
WBC # BLD AUTO: 9.9 X10(3) UL (ref 4–11)

## 2024-11-14 PROCEDURE — 36415 COLL VENOUS BLD VENIPUNCTURE: CPT

## 2024-11-14 PROCEDURE — 85730 THROMBOPLASTIN TIME PARTIAL: CPT

## 2024-11-14 PROCEDURE — 85025 COMPLETE CBC W/AUTO DIFF WBC: CPT

## 2024-11-14 PROCEDURE — 86900 BLOOD TYPING SEROLOGIC ABO: CPT

## 2024-11-14 PROCEDURE — 86850 RBC ANTIBODY SCREEN: CPT

## 2024-11-14 PROCEDURE — 87081 CULTURE SCREEN ONLY: CPT

## 2024-11-14 PROCEDURE — 80048 BASIC METABOLIC PNL TOTAL CA: CPT

## 2024-11-14 PROCEDURE — 71046 X-RAY EXAM CHEST 2 VIEWS: CPT | Performed by: NEUROLOGICAL SURGERY

## 2024-11-14 PROCEDURE — 85610 PROTHROMBIN TIME: CPT

## 2024-11-14 PROCEDURE — 86901 BLOOD TYPING SEROLOGIC RH(D): CPT

## 2024-11-14 NOTE — PROGRESS NOTES
RN Spine Navigator Education for [facility]     If you have received instructions from your surgeon that are different from those listed below, please follow your physician's instructions.    You are scheduled for a Cervical fusion with Dr. Dickson on 11/27.      Patient Surgical Goals: Decrease pain, headaches, numbness, tingling, weakness. Improved balance.     Before Your Surgery    Choose a Care Partner  Patient attended spine navigator visit independently.  Care partner is Keena. Your care partner(s) should be able to provide transportation to and from the hospital. They should be able to help at home for the first week after discharge, including helping you with meals, medication, and dressing changes.    Clearance Before Surgery  You will need to see your primary care provider within 30 days before surgery. Please make sure this appointment is at least a week before surgery as more testing or doctor visits may be ordered. Presurgical testing may include labs, nasal swab, imaging, EKG.   Make sure that you complete all preadmission testing so that surgery does not get delayed.    Home Environment  Assessed home status: home has stairs, bathroom and bedroom are upstairs, and patient has pets. Suggested arrangements for pet care.  It is recommended that you prepare your home by putting clean sheets on your bed, freezing meals, and putting frequently used items at waist level.   Prevent falls by removing items that could cause you to trip, adding nightlights and adding a nonskid mat in shower.   Assistive devices can be purchased at a medical supply store or online including canes, walkers, toileting devices (commodes, raised toilet seats, toilet paper wiping aid), long handled sponge, shower chair or tub transfer bench, grabber/reacher tool, sock aid, long handled shoehorn, if needed.      Tobacco, Nicotine and Marijuana Use   Not applicable    Medications to Stop   For 7-10 days before surgery do not take any  blood thinning medications. This includes non-steroidal anti-inflammatories or NSAIDs (Advil, Aleve, Motrin, ibuprofen, naproxen, meloxicam, diclofenac, celecoxib, etc.), aspirin (unless told otherwise by your cardiologist or surgeon), herbal supplements and vitamins (garlic, turmeric, vitamin E, fish oil or krill oil, etc.). You may only take Tylenol or prescribed narcotic medication if needed for pain.   Other medications to stop include: Per patient PAT said stop Naltrexone 3 days before.     Leading Up to Day of Surgery  Five days before surgery wash with Hibiclens soap after your regular body soap every day. Do not put use Hibiclens on your face, hair or privates. Your last shower should be the night before surgery. and use mupirocin (Bactroban) if prescribed.   One-two business days before surgery, the preadmission testing staff will call you and let you know what time to arrive, where to park, when to take your Tylenol and Gatorade, and will provide any additional instructions.   After 11pm the day before surgery, do not eat or drink anything (including water, gum, or candies) except for Tylenol and Gatorade.   Drink 12 ounces of regular Gatorade (NOT RED) 12 hours prior to your scheduled surgery time. Drink your second 12 ounces of regular Gatorade and take 1000 mg of Tylenol (acetaminophen) 4 hours before your scheduled surgery time.     Items to Bring to the Hospital   Bring insurance card, ID, advanced directive, or medical power of  paperwork, loose fitting clothing, shoes with a back that can accommodate swollen feet, long phone charging cord.   Do not bring jewelry, valuables, or medications.   If you take an uncommon medication that the hospital may not have, it must be brought to the hospital in the original container, and you must notify the nurse of this medication.     In the Hospital     Operative Day and Hospital Stay  In the preoperative area, you will change into a gown, have an IV  placed in your hand or arm by the nurse, and sign any consent paperwork that is needed for your procedure. You will speak to the surgeon and anesthesiologist. It is important to tell the doctors and nurses if you have had any significant side effects from pain medications or anesthesia such as a rash or severe nausea.    In the operating room, the anesthesiologist will attach monitors, give you oxygen through a mask, and give you medicine through the IV to fall asleep. After you are sleeping, the breathing tube will be placed. The surgeon may use additional nerve monitoring during your surgery. This is to make sure that the muscles and nerves in your arms and legs are working normally as he operates. The equipment will be hooked up and removed while you are asleep. You will wake up on the hospital bed.     During the surgery, your care partner can sit in the surgical waiting area. There are TV screens in that area that keep them informed of your progress. If the procedure is at [facility], there is a person who can speak to them about your surgical progress.     In the recovery room, monitors will be attached that check your heart rate, blood pressure and oxygen levels. While you may not remember this part, a nurse is with you and constantly checking on your condition. Medications for pain and nausea will be given if you need them. You may have a weldon catheter to empty your bladder or a drain at your surgical site. Your family is not allowed in the recovery room. When you are ready to leave the recovery room, you will be transported on your bed to the inpatient unit accompanied by your family once a room is available.  On the inpatient unit, a team of doctors and advanced practice providers will manage your care. The spine care nurses will check your blood pressure, temperature, heartbeat, breathing, stomach sounds and your incision. They may assess the strength and sensation in your arms and legs. Medications that are  given in the hospital include antibiotics, IV fluids, pain medications, muscle relaxers, stool softeners, and your home medications. You may get blood drawn and another x-ray. Physical and occupational therapists may come to your room to teach you how to move around safely after surgery.     Post Op Plan   The average length of hospital stay is one to two days. A  may visit you to help arrange extra care for you once you leave the hospital. Occasionally, it is recommended that a home health nurse or therapist visit you in your home for a short time. The best place to recover is your own home, but if you need more assistance than home health and your care partner can provide, the  will help you and your family choose other facilities to help you recover your strength.    Preventing surgical complications  It is important to follow all instructions before and after surgery to decrease the risks of surgery and prevent complications.     Blood clots: walk, wear leg compression devices in the hospital, and do ankle pumps at home  Infection: wash with Hibiclens before surgery, wash your hands, don't touch your incision  Pneumonia: take deep breaths and cough and use the breathing exercise (incentive spirometer)   Nausea/vomiting: start with liquids and small meals and do not take pills on an empty stomach  Constipation: drink water and walk frequently    Tell your nurse if you are experiencing nausea, vomiting or constipation as they have medications to help treat these.      At home     Understanding Pain After Surgery  We will do our best to manage your pain after surgery, but it is not possible to be completely pain-free. There will be operative pain in your back or neck. Pain in the arms or legs may be one of the first things to improve. Numbness, weakness, and tingling should improve over time. However, there can be a temporary increase in symptoms in the first few days due to inflammation  from surgery.   Pain medications will be prescribed to take home at discharge. The goal of pain medicine after surgery is to make your pain tolerable, not to make you pain free. We encourage you to use the medication prescribed to you after your surgery, but please take the lowest possible dose to manage your pain. Taking high doses of narcotics can cause side effects. Transition to plain Tylenol when your pain improves. At [facility], your prescriptions can be filled by our pharmacy and brought to you bedside. You may get more continuous pain relief by alternating between medications if you have multiple instead of taking them at the same time. Write down when you have taken a medication as it may be difficult to remember after a few doses.    Post operative medication   Tylenol (acetaminophen): take for pain. Do not take more than 3000 mg - 4000 mg in 24 hours because it can damage your liver.   Narcotics: take for moderate to severe pain. Do not drink alcohol or drive while taking narcotics. Some narcotic medications (Norco, Tylenol #3, Percocet, Vicodin) contain Tylenol (acetaminophen). Make sure to not exceed the maximum dose if you are taking additional Tylenol with these medications.  Muscle relaxers: take for muscle cramping. These can cause drowsiness.    NSAIDS (Advil, Aleve, Motrin, ibuprofen, naproxen, meloxicam, diclofenac, celecoxib, etc.) or aspirin: Do not take these unless your physician says it is ok. For a fusion, it may be several months before you can take NSAIDS.  Stool softeners: take to prevent constipation while you are taking narcotic medications. You can get these over the counter at the pharmacy. You may use laxatives, which are stronger, if needed.    If you believe you will need more of medication your surgeon has prescribed to you, request a refill through your pharmacy or through the refill request in [employer] (log in, go to medications, then select refill request) at least two  business days before you run out of medication.     Nonpharmacologic pain management   You may use ice on your incision for 20 minutes every hour to help with pain and swelling. Do not place ice directly on your skin. You can use heat to sooth your muscles but avoid placing heat directly on your incision. Make frequent position changes. You can do gentle stretching while avoiding significant bending or twisting. Use deep breathing techniques and distractions such as TV, music, reading, or games.   Additional Recommendations for Anterior Cervical Surgery  Smoothies or protein shakes may be more comfortable to consume then solid food for the first week after surgery. To help decrease throat swelling, suck on ice chips and drink cold liquids. Cough drops can also be help decrease throat irritation.    Movement restrictions  After surgery, no bending or twisting your neck or back. Do not lift anything over 10lbs (about the weight of a gallon of milk) or lift anything over your shoulders. Avoid pulling or pushing. You may use stairs while holding the handrail.  It is ok to ride in the car but refrain from driving or traveling long distances until cleared to do so by your surgeon. You may not drive while taking narcotics or muscle relaxants. If you have cervical surgery, it may be several weeks before you can drive. , If you had a fracture or fusion surgery, your doctor may give you a brace. Braces are worn for comfort, when up and moving around, or constantly depending on your doctor's order. Wear your brace as instructed.     Post Op Exercise   Walk frequently. Start with walking short distances and increase as you start to feel better. Do ankle pumps (bending at your ankles, bring your feet towards your head then point them towards the ground) 15-20 times every hour when awake to help prevent blood clots.     Your surgeon will let you know at your post operative appointment if you are ready to decrease your movement  restrictions and increase your exercise. If you have questions in between appointments about lessening your restrictions, please contact the office.     You and your doctor will discuss how you are feeling as you heal and decide if outpatient physical therapy or a medical fitness referral is needed.    Caring for your incision  Always wash your hands before touching your incision. Your incision will be closed with sutures under the skin and skin glue or Steri-Strips (thin white bandages) on top of the skin. Do not attempt to peal off Skin glue/Steri-Strips as they will come off on their own. If the incision is closed with sutures or staples on top of the skin, they will be removed at a post op appointment. The incision may be covered with a gauze dressing that can come off after three days. Once the original gauze dressing is removed, we prefer that you leave your incision open to air (without a gauze dressing). If the incision has drainage or is rubbing against your clothes or brace, you may place gauze and medical tape over it. Change the gauze and medical tape daily. Look at your incision daily to check for signs of infection.   You can shower three days after your surgery or sooner if your surgeon allows. We recommend the care partner be present during the first shower for safety. Let soapy water run over the incision, but do not scrub it or spray it directly. Gently pat it dry after with a clean towel. Do not apply any creams or lotions to the incision. Do not soak in a tub, pool, or any body of water until your incision is fully healed.    Signs of Infection   Check your incision daily for swelling, redness, drainage, pus, bad smell, or opening of the incision.     When to Call for Assistance  Call the Neurosurgery Office ([phone] Option #2) if you experience signs of infection, opening of the incision, continuous nausea or vomiting, poor pain control despite using the pain medication as directed, a  sudden increase in pain, temperature over 101F, difficulty swallowing, leg swelling, or with any concerns, unanswered questions, or new problems, such as new numbness/weakness/tingling.  Call 911 or go to the nearest emergency room if you experience chest pain, difficulty breathing, loss of bowel or bladder control, extreme drowsiness, or any other life-threatening situation.     Follow-up Plan   Appointments with Dr. Dickson or Otilia at 2 and 6 weeks    Answered questions regarding: None     You can contact the RN Spine Navigator at [phone] or [email or web address] with additional questions or feedback on your care. It may take several business days to receive a reply so please do not call the RN spine navigator for refills or for emergencies.    Spine navigator spent 38 minutes conducting a virtual visit to provide education. Thank you for letting the RN Spine Navigator participate in your care.

## 2024-11-18 NOTE — TELEPHONE ENCOUNTER
Patient called requesting status of forms. Family Medical Leave Act and short term disability forms located via email ad logged for processing. Patient requested forms to be uploaded to [employer]. Notified rep completing forms and details are in.

## 2024-11-19 ENCOUNTER — OFFICE VISIT (OUTPATIENT)
Dept: NEUROLOGY | Facility: CLINIC | Age: 44
End: 2024-11-19
Payer: COMMERCIAL

## 2024-11-19 VITALS
SYSTOLIC BLOOD PRESSURE: 128 MMHG | HEIGHT: 66 IN | RESPIRATION RATE: 16 BRPM | BODY MASS INDEX: 47.09 KG/M2 | DIASTOLIC BLOOD PRESSURE: 86 MMHG | HEART RATE: 92 BPM | WEIGHT: 293 LBS

## 2024-11-19 DIAGNOSIS — R20.2 NUMBNESS AND TINGLING IN BOTH HANDS: ICD-10-CM

## 2024-11-19 DIAGNOSIS — G43.909 EPISODIC MIGRAINE: ICD-10-CM

## 2024-11-19 DIAGNOSIS — M48.02 MYELOPATHY CONCURRENT WITH AND DUE TO SPINAL STENOSIS OF CERVICAL REGION (HCC): Primary | ICD-10-CM

## 2024-11-19 DIAGNOSIS — R20.0 NUMBNESS AND TINGLING IN BOTH HANDS: ICD-10-CM

## 2024-11-19 DIAGNOSIS — G99.2 MYELOPATHY CONCURRENT WITH AND DUE TO SPINAL STENOSIS OF CERVICAL REGION (HCC): Primary | ICD-10-CM

## 2024-11-19 DIAGNOSIS — R29.898 RIGHT HAND WEAKNESS: ICD-10-CM

## 2024-11-19 PROCEDURE — 3008F BODY MASS INDEX DOCD: CPT | Performed by: OTHER

## 2024-11-19 PROCEDURE — 99214 OFFICE O/P EST MOD 30 MIN: CPT | Performed by: OTHER

## 2024-11-19 PROCEDURE — 3079F DIAST BP 80-89 MM HG: CPT | Performed by: OTHER

## 2024-11-19 PROCEDURE — 3074F SYST BP LT 130 MM HG: CPT | Performed by: OTHER

## 2024-11-19 RX ORDER — ONDANSETRON 4 MG/1
4 TABLET, ORALLY DISINTEGRATING ORAL EVERY 8 HOURS PRN
Qty: 30 TABLET | Refills: 0 | Status: SHIPPED | OUTPATIENT
Start: 2024-11-19

## 2024-11-19 RX ORDER — LEVOTHYROXINE SODIUM 75 UG/1
75 TABLET ORAL
Qty: 90 TABLET | Refills: 0 | Status: SHIPPED | OUTPATIENT
Start: 2024-11-19

## 2024-11-19 RX ORDER — PANTOPRAZOLE SODIUM 20 MG/1
20 TABLET, DELAYED RELEASE ORAL
Qty: 90 TABLET | Refills: 0 | Status: SHIPPED | OUTPATIENT
Start: 2024-11-19

## 2024-11-19 RX ORDER — UBROGEPANT 100 MG/1
TABLET ORAL
Qty: 10 TABLET | Refills: 5 | Status: SHIPPED | OUTPATIENT
Start: 2024-11-19 | End: 2025-11-19

## 2024-11-19 RX ORDER — ATOGEPANT 30 MG/1
1 TABLET ORAL DAILY
Qty: 90 TABLET | Refills: 1 | Status: SHIPPED | OUTPATIENT
Start: 2024-11-19

## 2024-11-19 NOTE — PROGRESS NOTES
[facility] Progress Note    Chief Complaint   Patient presents with    Migraine     6 months follow up and gets one a month     As per my initial H&P from 11/3/2022:  \"  Crys Carter is a 44 year old, who presents for evaluation of migraines and headaches.     Patient states she has headaches since age 15. She mainly notes headache on the right side and behind the eyes and may have visual aura and nausea associated with headaches.   She previously was having these every other week when she was younger.  She was started on Imitrex in her early 20s and did not start on preventive therapy until her 30s or 40s, after she had been having migraines 1-2 times per week.  She was on Topamax 50 mg bid and well controlled for 1-2 yrs only having migraine once every 2 months.  She had weight loss surgery 3/2022 and started to have worsening migraines after this time.  She was having once every 2-3 weeks but they became more frequent.  She was recommended to increase the dose of Topamax up to 100 mg bid but subsequently had brain fog and \"suicidal thoughts\" and is being tapered off by her PCP recently.  She is currently on 50 mg AM / 100 mg nightly.      She does have history of bipolar disorder and had been on lamotrigine as well as sertraline and Latuda when she was originally on Topamax but recently has been changed from Latuda to Abilify.      Currently, her mood is fluctuating is having intermittent suicidal thoughts but overall improved and is doing outpatient therapy program.       Currently, she is having at lest 8-10 migraines per month; these sometimes respond to sumatriptan when used but not always and overall estimates less than 50% effective at the 100 mg dose.     Otherwise, patient denies any recent weight change, fevers, chills, nausea, double vision/ blurry vision / loss of vision, chest pain, palpitations, shortness of breath, rashes, joint pains, bowel / bladder incontinence or mood  issues. \"       Prior notes as per 4/13/2023.  Patient overall since last visit had been doing better in terms of migraines and was not having migraines for ~ 3 months but in the past 3-4 weeks, she has a new type of headache.        She has been having some sharp pains behind left eye, and also having some cloudy vision in the left eye.  She denies pain when moving eyes from side to side but has noted her left more than right eye was tearing with these events; she denies sweating on one side of the face; notes improvement when sitting and may worsen when lying down; these may occur a few times in a day but are briefer than her usual migraines.  She has been on Quilipta and has been having to go to ER to treat these symptoms when she does not respond to sumatriptan.        Prior notes as per 1/4/2024.  Patient last seen 4/13/2023.  She has been lost to follow up.  According to records, she has been seen multiple times in the ER for headaches as well as fall from ladder. She has been to ER multiple times and recently was in [facility] ED with migraine, with some R sided numbness 11/14/2023.  She had workup for possible stroke with CTA brain and MRI brain with no suggestion of stroke or LVO.  She has also been having medications adjusted by psychiatry and following with therapist regularly.  She currently is having migraines, which cluster for a few days when they occur.      She states in 10/2023, she was having a lot of stress and adjustment to psychiatric medications and had more frequent migraines during this time.       She is currently doing well with only 2 migraines in the past month.  In terms of her prior other type of headaches with severe pain behind left eye and tearing of left eye, she did take indomethacin and noted improvement in ~2 weeks when she was on the 50 mg tid dosing.  She has not had recurrence of these events.        She has been taking Quilipta 60 mg daily for prevention of migraines otherwise  and doing well currently. She states Imitrex does not work well for abortive therapy and takes a \"cocktail\" of tramadol, hydroxyzine and Zofran.     Prior notes as per 5/16/2024.  Patient last seen 1/4/2024.  Since last visit, she remains on Qulipta 60 mg daily for prevention of migraines.  She has been taking Ubrelvy for abortive therapy as well and denies any side effects when she takes this medication.  She notes improvement in migraines with 1 dose within ~15 minutes and states this is working better than sumatriptan.  She has migraines on average 1-2 days per month.  She has had a few brief episodes of stabbing type pain but this is not as severe as in the past and denies associate tearing of the eyes and redness in the eyes; she has not had to use indomethacin and notes these are minor overall.       Patient last seen 5/16/2024.  She has been doing well in terms of Migraines on Qulipta 60 mg daily for prevention. She only has ~ 1 migraine per month, which responds well to Ubrelvy and denies side effects on the medication.     Of note, she has pain in the back of the neck and some balance issues and some radiation to shoulders and numbness in hands and has been seen by neurosurgery with plan for cervical spinal fusion C4/5/6,. She has been dropping objects from right more than left hand as well .                Past Medical History:    Allergic rhinitis    Anxiety    Arthritis    Asthma (HCC)    Back problem    Bipolar disorder (HCC)    Calculus of kidney    Cancer (HCC)    Basal cell carcinoma over the left eyebrow    Depression    Disorder of thyroid    Esophageal reflux    Hypothyroidism    Migraine    Migraines    Obesity, unspecified    PONV (postoperative nausea and vomiting)    S/P laparoscopic sleeve gastrectomy    Sleep apnea    history of, improved with weight loss surgery, no longer needs cpap    Stroke (HCC)    TIA, possible not completely diagonosed    Visual impairment    glasses     Past Surgical  History:   Procedure Laterality Date    Other  2009    benign tumor reomoved  from left inner thigh    Other  2014    basal carcinoma removed from upper lid of left eye    Other surgical history  03/07/2022    Gastric sleeve    Skin surgery  Oct 2014    tumor removed from eyebrow ridge    Tonsillectomy       Social History     Socioeconomic History    Marital status: Single   Tobacco Use    Smoking status: Former     Current packs/day: 0.00     Average packs/day: 1 pack/day for 23.9 years (23.9 ttl pk-yrs)     Types: Cigarettes     Start date: 1/27/1985     Quit date: 12/15/2008     Years since quitting: 15.9     Passive exposure: Never    Smokeless tobacco: Never   Vaping Use    Vaping status: Never Used   Substance and Sexual Activity    Alcohol use: Not Currently     Comment: Last week in August 2023    Drug use: Not Currently     Types: Cannabis    Sexual activity: Not Currently     Partners: Male     Birth control/protection: Condom   Other Topics Concern    Caffeine Concern No    Stress Concern Yes    Weight Concern Yes    Special Diet Yes     Comment: Bariatric    Exercise No    Seat Belt Yes     Social Drivers of Health      Received from [employer] [employer] [facility]     Family History   Problem Relation Age of Onset    Depression Father     Heart Disease Father     Asthma Father     Diabetes Mother     Obesity Mother     Depression Mother     Alcohol and Other Disorders Associated Maternal Grandmother     Cancer Maternal Grandmother         Lung CA    Cancer Maternal Grandfather         Prostate CA    Hypertension Paternal Grandmother     Cancer Paternal Grandfather         Lung CA    Personality Disorder Paternal Grandfather     Breast Cancer Maternal Aunt 65        in her 60's    Substance Abuse Maternal Uncle     Stroke Neg        Allergies:  Allergies   Allergen Reactions    Clonazepam OTHER (SEE COMMENTS)     Suicidal thoughts  Newborn out of sorts and has increased irritability as well as  extremities feeling strange    Compazine [Prochlorperazine] OTHER (SEE COMMENTS)     Oral tablet-  Twitching;  Blanked out; nausea    Topiramate OTHER (SEE COMMENTS)     Suicidal,aggresive,brain fog      Current Meds:  Current Outpatient Medications   Medication Sig Dispense Refill    levothyroxine 75 MCG Oral Tab Take 1 tablet (75 mcg total) by mouth before breakfast. 90 tablet 0    pantoprazole 20 MG Oral Tab EC Take 1 tablet (20 mg total) by mouth before breakfast. 90 tablet 0    ondansetron 4 MG Oral Tablet Dispersible Take 1 tablet (4 mg total) by mouth every 8 (eight) hours as needed for Nausea. 30 tablet 0    Atogepant (QULIPTA) 30 MG Oral Tab Take 1 tablet by mouth daily. 90 tablet 1    ubrogepant (UBRELVY) 100 MG Oral Tab Take one tablet at onset of migraine.  May take additional tablet in 2 hours if needed.  Do not exceed two tablets per 24 hour period. 10 tablet 5    lurasidone 80 MG Oral Tab       hydrOXYzine 25 MG Oral Tab Take 1 tablet (25 mg total) by mouth as needed for Anxiety.      LORazepam 0.5 MG Oral Tab Take 1 tablet (0.5 mg total) by mouth as needed.      Ketorolac Tromethamine 10 MG Oral Tab Take 2 tablets (20 mg total) by mouth As Directed. For migraines: take 20mg once at the onset of the migraine. 30 tablet 0    lamoTRIgine 100 MG Oral Tab Take 1 tablet (100 mg total) by mouth daily.      buPROPion  MG Oral Tablet 24 Hr Take 1 tablet (150 mg total) by mouth daily. 30 tablet 0    propranolol 20 MG Oral Tab Take 1 tablet (20 mg total) by mouth 2 (two) times daily. 60 tablet 0    naltrexone 50 MG Oral Tab Take 1 tablet (50 mg total) by mouth daily. 30 tablet 0    sertraline 100 MG Oral Tab Take 1 tablet (100 mg total) by mouth daily. 30 tablet 0    traZODone 100 MG Oral Tab Take 1 tablet (100 mg total) by mouth nightly as needed for Sleep. 30 tablet 0    meclizine 25 MG Oral Tab Take 1 tablet (25 mg total) by mouth 3 (three) times daily as needed for Dizziness. 15 tablet 0    albuterol  (PROAIR HFA) 108 (90 Base) MCG/ACT Inhalation Aero Soln SHAKE WELL AND INHALE 1 TO 2 PUFFS INTO THE LUNGS EVERY 4 HOURS AS NEEDED FOR WHEEZING( COUGH) 3 each 1    Multiple Vitamins-Minerals (MULTIVITAL OR) Take by mouth. Bariatric vitamin            ROS:   A comprehensive 10 point review of systems was completed.  Pertinent positives and negatives noted in the the HPI.      PHYSICAL EXAM:   /86 (BP Location: Left arm, Patient Position: Sitting, Cuff Size: large)   Pulse 92   Resp 16   Ht 66\"   Wt (!) 311 lb (141.1 kg)   LMP 03/23/2024 (Approximate)   BMI 50.20 kg/m²   Estimated body mass index is 50.2 kg/m² as calculated from the following:    Height as of this encounter: 66\".    Weight as of this encounter: 311 lb (141.1 kg).      Gen: well developed, well nourished, no acute distress  HEENT: normocephalic  Heart; normal S1/S2, regular rate and rhythm  Lungs: clear to auscultation bilaterally  Extremities: no edema, peripheral pulses intact    Neck: supple, full range of motion; no carotid bruits    Fundoscopic Exam: optic discs sharp bilaterally    Neuro:  Mental status:  Orientation: Alert and oriented to person, place, time  Speech Fluent and conversational    CN: PERRL, EOMI with no nystagmus, VFF, smile symmetric, sensation intact, tongue and palate midline, SCM intact, otherwise, CN 2-12 intact  Motor: 5/5 strength throughout, tone normal  DTR: brisker on left UE with +benavides's; 4+; otherwise, intact throughout, toes downgoing; no clonus  Sensory: intact to light touch throughout; pin reduced in UE bilaterally up to palm and over R biceps region  Coord: FNF intact with no tremor or dysmetria; rapid alternating movements intact bilaterally  Romberg: absent  Gait: casual gait, stiff with minimal arm swing on R side      TEST RESULTS/DATA REVIEWED:   Imaging  New    MRI cervical spine w/o contrast (10/31/2024):     FINDINGS: Vertebral body height and alignment is maintained. Multilevel disc  degeneration with a small amount of discogenic reactive marrow change. Subcentimeter focus of T2/STIR signal in the spinal cord just below the C5-6 level. Spinal cord is   otherwise normal in appearance. The cerebellum, elissa, skull base are unremarkable insofar as visualized. Paraspinous soft tissues unremarkable.     Occiput-C2:  No significant central canal stenosis.     C2-3: Disc degeneration characterized by disc desiccation. No significant posterior disc bulge and no focal disc herniation. No central canal stenosis or neural foraminal narrowing.     C3-4: Disc degeneration characterized by disc desiccation and a shallow posterior disc bulge. Mild left and no right neural foraminal narrowing. No central canal stenosis.     C4-5: Disc degeneration characterized by disc desiccation and a broad posterior disc bulge. Left-sided uncovertebral joint hypertrophy. Mild to moderate central canal stenosis. Mild to moderate left and no right neural foraminal narrowing.     C5-6: Disc degeneration characterized by disc desiccation and a broad posterior disc bulge. Severe central canal stenosis. Encroachment on the spinal cord with a small focus of compression induced edema/myelomalacia just below the C5-6 level. Mild to   moderate right neural foraminal narrowing. Moderate to severe narrowing at the proximal portion of the left neural foramen. The remainder of the foramen is patent.     C6-7: Disc degeneration characterized by disc desiccation and a shallow posterior disc bulge. No focal disc herniation. No central canal stenosis or neural foraminal narrowing.     C7-T1: Disc degeneration characterized by minimal disc desiccation. No significant posterior disc bulge and no focal disc herniation. No central canal stenosis or neural foraminal narrowing.     IMPRESSION:   1.Multilevel disc degeneration. Vertebral body height and alignment is maintained.   2.C5-6: Severe central canal stenosis. Encroachment on the spinal cord  with a small focus of compression induced edema/myelomalacia just below the C5-6 disc level. Moderate to severe narrowing of the proximal portion of the left neural foramen. Mild to   moderate right neural foraminal narrowing.   3.C4-5: Mild to moderate central canal stenosis. Mild to moderate left and no right neural foraminal narrowing.         Prior as noted below    MRI brain with and without contrast (4/10/2023):     FINDINGS: Several 2-3 mm T2/FLAIR hyperintense, non-enhancing signal abnormalities are identified in the frontal and parietal subcortical white matter bilaterally. The cerebellum and brainstem are normal in morphology and signal. Normal morphology of the    corpus callosum. No areas of restricted diffusion are identified. No evidence of cortical infarct, edema, hemorrhage, mass, abnormal enhancement, or abnormal extra-axial fluid collection. Ventricular volumes are normal. No midline shift. The major   intracranial arterial flow voids and the dural venous sinuses appear patent. Normal size of the pituitary gland. No Chiari malformation.     Minimal mucosal thickening in bilateral maxillary sinuses. The mastoid air cells are normally aerated. A 4F synovial cyst along the anteromedial margin of the right temporomandibular joint is of questionable clinical significance. No bone lesions are   identified.     IMPRESSION:   1. Several 2-3 mm T2/FLAIR hyperintense, non-enhancing signal abnormalities in the frontal and parietal subcortical white matter bilaterally may relate to migraines or minimal chronic small vessel ischemic changes but are non-specific in appearance.     2. No evidence of acute infarct, cortical infarct, edema, hemorrhage, mass, or acute intracranial abnormality.     3. Minimal chronic-appearing maxillary sinus inflammatory changes.     Labs:   Prior as noted below  Component      Latest Ref Rng & Units 4/16/2022   Vitamin B12      193 - 986 pg/mL 1,014 (H)   FOLATE (FOLIC ACID),  SERUM      >=8.7 ng/mL 18.1   VITAMIN B1 (THIAMINE), WHOLE B      70 - 180 nmol/L 73   VITAMIN D, 25-OH, TOTAL      30.0 - 100.0 ng/mL 44.5    4/16/2022    1,014 (H)     SARS-CoV-2 by PCR (9./1/2022): detected      IMPRESSION AND PLAN:   Crys Carter is a 44 year old female with PMHx significant for bipolar disorder and chronic episodic migraine, who originally presented 11/3/2022, for evaluation and management of recent worsening migraines.    Neurologic exam is normal and nonfocal and patient was previously well controlled in terms of migraine, which occurred with and without aura, on preventive medication with Topamax as well as abortive therapy with sumatriptan 100 mg dose PRN.  However, she had weight loss surgery 3/2022 and had worsening migraines which did not respond to higher dose of Topamax and she developed significant side effects with cognitive changes and suicidal thoughts.  She was weaned off Topamax and had been doing better on Qulipta daily for prevention.       However, she subsequently developed a different type of headache with mainly unilateral left side headache with some associated tearing of eye - given unilateral presentation, thought this could be variant of trigeminal autonomic cephalgia (ie paroxysmal hemicrania) and was recommended to start indomethacin trial for treatment and noted improvement when she took 50 mg tid dosing with no recurrence since this time.  Patient advised to monitor for recurrence and likely plan for resuming short course of indomethacin at ~50 mg tid if this occurs.       Otherwise, continue Qulipta at current dose; for abortive therapy for migraines, she notes sumatriptan was not working and is not ideal due to risk of serotonin syndrome with concurrent psychiatric medications.     She is now on ubrogepantt (Ubrelvy) and doing well with this for abortive therapy:  continue to take 100 mg  within first 30 minutes of symptoms starting; if not improved after 2  hours, take additional dose, and then stop taking; discussed potential side effects, including but not limited to nausea, drowsiness and dry mouth        While most of her new symptoms and exam findings could be related attributed to cervical myelopathy, her right hand weakness, with left foraminal encroachment noted on MRI of the cervical spine is unexpected and could suggest a superimposed peripheral entrapment neuropathy such as carpal tunnel syndrome.  In order to evaluate this possibility, we will plan for EMG right upper extremity.    1. Episodic migraine  As noted above  - Atogepant (QULIPTA) 30 MG Oral Tab; Take 1 tablet by mouth daily.  Dispense: 90 tablet; Refill: 1  - ubrogepant (UBRELVY) 100 MG Oral Tab; Take one tablet at onset of migraine.  May take additional tablet in 2 hours if needed.  Do not exceed two tablets per 24 hour period.  Dispense: 10 tablet; Refill: 5    2. Myelopathy concurrent with and due to spinal stenosis of cervical region (HCC)  As noted above    3. Right hand weakness  As noted above  - EMG ([facility]); Future    4. Numbness and tingling in both hands  As noted above  - EMG ([facility]); Future    No follow-ups on file.    Albert Galicia MD, Neurology  [facility]  Pager [phone]  11/19/2024

## 2024-11-19 NOTE — TELEPHONE ENCOUNTER
Disability & FMLA forms completed & uploaded to [employer], per patient's request.    Message sent.

## 2024-11-19 NOTE — PATIENT INSTRUCTIONS
Refill policies:    Allow 2-3 business days for refills; controlled substances may take longer.  Contact your pharmacy at least 5 days prior to running out of medication and have them send an electronic request or submit request through the “request refill” option in your [employer] account.  Refills are not addressed on weekends; covering physicians do not authorize routine medications on weekends.  No narcotics or controlled substances are refilled after noon on Fridays or by on call physicians.  By law, narcotics must be electronically prescribed.  A 30 day supply with no refills is the maximum allowed.  If your prescription is due for a refill, you may be due for a follow up appointment.  To best provide you care, patients receiving routine medications need to be seen at least once a year.  Patients receiving narcotic/controlled substance medications need to be seen at least once every 3 months.  In the event that your preferred pharmacy does not have the requested medication in stock (e.g. Backordered), it is your responsibility to find another pharmacy that has the requested medication available.  We will gladly send a new prescription to that pharmacy at your request.    Scheduling Tests:    If your physician has ordered radiology tests such as MRI or CT scans, please contact Central Scheduling at [phone] right away to schedule the test.  Once scheduled, the [facility] Centralized Referral Team will work with your insurance carrier to obtain pre-certification or prior authorization.  Depending on your insurance carrier, approval may take 3-10 days.  It is highly recommended patients assure they have received an authorization before having a test performed.  If test is done without insurance authorization, patient may be responsible for the entire amount billed.      Precertification and Prior Authorizations:  If your physician has recommended that you have a procedure or additional testing performed the [facility]  Centralized Referral Team will contact your insurance carrier to obtain pre-certification or prior authorization.    You are strongly encouraged to contact your insurance carrier to verify that your procedure/test has been approved and is a COVERED benefit.  Although the [facility] Centralized Referral Team does its due diligence, the insurance carrier gives the disclaimer that \"Although the procedure is authorized, this does not guarantee payment.\"    Ultimately the patient is responsible for payment.   Thank you for your understanding in this matter.  Paperwork Completion:  If you require FMLA or disability paperwork for your recovery, please make sure to either drop it off or have it faxed to our office at [phone]. Be sure the form has your name and date of birth on it.  The form will be faxed to our Forms Department and they will complete it for you.  There is a 25$ fee for all forms that need to be filled out.  Please be aware there is a 10-14 day turnaround time.  You will need to sign a release of information (RONNY) form if your paperwork does not come with one.  You may call the Forms Department with any questions at [phone].  Their fax number is [phone].

## 2024-11-19 NOTE — TELEPHONE ENCOUNTER
Dr. Man/Harshad,    Please sign off on 2 forms if you agree to: Family Medical Leave Act and Disab    -Signature page will be the first page scanned  -From your Inbasket, Highlight the patient and click Chart   -Double click the 11/13/24 Forms Completion telephone encounter  -Scroll down to the Media section   -Click the blue Hyperlink: disability  11/19/24  Family Medical Leave Act  11/19/24  -Click Acknowledge located in the top right ribbon/menu   -Drag the mouse into the blank space of the document and a + sign will appear. Left click to   electronically sign the document.  -Once signed, simply exit out of the screen and you signature will be saved.     Thank you,  Karo

## 2024-11-21 ENCOUNTER — TELEPHONE (OUTPATIENT)
Dept: SURGERY | Facility: CLINIC | Age: 44
End: 2024-11-21

## 2024-11-22 ENCOUNTER — PROCEDURE VISIT (OUTPATIENT)
Dept: NEUROLOGY | Facility: CLINIC | Age: 44
End: 2024-11-22
Payer: COMMERCIAL

## 2024-11-22 DIAGNOSIS — R29.898 RIGHT HAND WEAKNESS: Primary | ICD-10-CM

## 2024-11-22 DIAGNOSIS — R20.0 NUMBNESS AND TINGLING IN BOTH HANDS: ICD-10-CM

## 2024-11-22 DIAGNOSIS — R20.2 NUMBNESS AND TINGLING IN BOTH HANDS: ICD-10-CM

## 2024-11-22 NOTE — PATIENT INSTRUCTIONS
Refill policies:    Allow 2-3 business days for refills; controlled substances may take longer.  Contact your pharmacy at least 5 days prior to running out of medication and have them send an electronic request or submit request through the “request refill” option in your [employer] account.  Refills are not addressed on weekends; covering physicians do not authorize routine medications on weekends.  No narcotics or controlled substances are refilled after noon on Fridays or by on call physicians.  By law, narcotics must be electronically prescribed.  A 30 day supply with no refills is the maximum allowed.  If your prescription is due for a refill, you may be due for a follow up appointment.  To best provide you care, patients receiving routine medications need to be seen at least once a year.  Patients receiving narcotic/controlled substance medications need to be seen at least once every 3 months.  In the event that your preferred pharmacy does not have the requested medication in stock (e.g. Backordered), it is your responsibility to find another pharmacy that has the requested medication available.  We will gladly send a new prescription to that pharmacy at your request.    Scheduling Tests:    If your physician has ordered radiology tests such as MRI or CT scans, please contact Central Scheduling at [phone] right away to schedule the test.  Once scheduled, the [facility] Centralized Referral Team will work with your insurance carrier to obtain pre-certification or prior authorization.  Depending on your insurance carrier, approval may take 3-10 days.  It is highly recommended patients assure they have received an authorization before having a test performed.  If test is done without insurance authorization, patient may be responsible for the entire amount billed.      Precertification and Prior Authorizations:  If your physician has recommended that you have a procedure or additional testing performed the [facility]  Centralized Referral Team will contact your insurance carrier to obtain pre-certification or prior authorization.    You are strongly encouraged to contact your insurance carrier to verify that your procedure/test has been approved and is a COVERED benefit.  Although the [facility] Centralized Referral Team does its due diligence, the insurance carrier gives the disclaimer that \"Although the procedure is authorized, this does not guarantee payment.\"    Ultimately the patient is responsible for payment.   Thank you for your understanding in this matter.  Paperwork Completion:  If you require FMLA or disability paperwork for your recovery, please make sure to either drop it off or have it faxed to our office at [phone]. Be sure the form has your name and date of birth on it.  The form will be faxed to our Forms Department and they will complete it for you.  There is a 25$ fee for all forms that need to be filled out.  Please be aware there is a 10-14 day turnaround time.  You will need to sign a release of information (RONNY) form if your paperwork does not come with one.  You may call the Forms Department with any questions at [phone].  Their fax number is [phone].

## 2024-11-22 NOTE — PROCEDURES
[address] [place] [address]   [phone]        Full Name: Crys Carter Gender: Female  Patient ID: [id number] YOB: 1980      Visit Date: 11/22/2024 11:33 AM  Age: 44 Years  Examining Physician: Albert Galicia  Height: 5 feet 6 inch  Weight: 311 lbs  History:     Focused HPI:   This is a 44 year old female with PMHx significant for bipolar disorder and chronic episodic migraine, who presents for evaluation of pain in the back of the neck with some radiation to shoulders and numbness in hands with dropping objects from right more than left side; she was found to have cervical myelopath with left foraminal encroachment; with right hand weakness, NCS/EMG requested to evaluate for peripheral entrapment neuropathy such as carpal tunnel syndrome.     Focused Exam:   Motor: 5/5 strength throughout, tone normal  DTR: brisker on left UE with +benavides's; 4+; otherwise, intact throughout, toes downgoing; no clonus  Sensory: intact to light touch throughout; pin reduced in UE bilaterally up to palm and over R biceps region      Sensory NCS      Nerve / Sites Rec. Site Onset Lat Peak Lat NP Amp PP Amp Segments Distance Peak Diff Velocity Comment     ms ms µV µV  cm ms m/s    R Median - Dig II (Antidromic)      Wrist Index 2.71 3.59 65.2 111.3 Wrist - Index 14  52       Ref.  <=3.30 <=4.00 >=11.0 >=13.0 Ref.       R Ulnar - Dig V (Antidromic)      Wrist Dig V 2.19 3.07 48.2 86.6 Wrist - Dig V 11  50       Ref.  <=3.10 <=4.00 >=11.0 >=8.0 Ref.       R Radial - Superficial (Antidromic)      Forearm Wrist 1.61 2.14 16.8 32.8 Forearm - Wrist 10  62       Ref.  <=2.20 <=2.80 >=7.0 >=11.0 Ref.       R Median, Ulnar - Transcarpal comparison      Median Palm Wrist 1.56 2.08 82.4 74.4 Median Palm - Wrist 8  51       Ulnar Palm Wrist 1.46 2.08 23.2 28.9 Ulnar Palm - Wrist 8  55          Median Palm - Ulnar Palm  0.00           Ref.  <=0.40          Motor NCS      Nerve / Sites Muscle Latency Amplitude Segments Dist. Lat Diff Velocity Comments     ms mV  cm ms m/s    R Median - APB      Wrist APB 4.00 5.6 Wrist - APB 7         Ref.  <=4.40 >=4.2 Ref.          Elbow APB 8.98 4.8 Elbow - Wrist 25 4.98 50.2       Ref.    Ref.   >=51.0    R Ulnar - ADM      Wrist ADM 2.77 10.7 Wrist - ADM 7         Ref.  <=3.70 >=7.9 Ref.          B.Elbow ADM 7.17 9.8 B.Elbow - Wrist 23.5 4.40 53.5       Ref.    Ref.   >=52.0        F  Wave      Nerve M Latency F Latency    ms ms   R Median - APB 4.6 28.2   Ref.  <=28.5   R Ulnar - ADM 2.6 27.9   Ref.  <=30.2       EMG Summary Table     Spontaneous MUAP Recruitment   Muscle IA Fib PSW Fasc H.F. Amp Dur. PPP Pattern   R. Deltoid N None None None None N N N N   R. Triceps brachii N None None None None 1+ 1+ N Reduced   R. Biceps brachii N None None None None N N N N   R. Extensor digitorum communis N None None None None N N N N   R. First dorsal interosseous N None None None None 1+ 1+ 1+ Reduced       Summary    Nerve conduction studies:  Right median sensory response was normal.  Right ulnar sensory response was normal.  Right radial sensory response was normal.  Right median to ulnar palmar mixed nerve comparison study was normal with no significant interlatency prolongation of median relative to ulnar nerve.  Right median motor response was normal; F-wave response was normal.  Right ulnar motor response was normal; F-wave response was normal.    EMG (needle exam):  Concentric needle EMG examination was performed in 5 muscles. It was normal in 3 muscle(s): R. Deltoid, R. Biceps brachii, R. Extensor digitorum communis. The study was abnormal in 2 muscle(s), with the following distribution:  No increased insertional or spontaneous activity was noted in any of the muscles tested  Motor unit potentials demonstrated increased amplitude and duration, with reduced recruitment in the right triceps brachii muscle; in addition, motor unit  potentials demonstrated increased amplitude and duration with a mild degree of polyphasia in the right FDI muscle.         Conclusion:   This is a mildly abnormal study.  There is evidence for subacute to chronic denervation changes in the right triceps brachii and right FDI muscle, which could suggest a subacute to chronic lower cervical radiculopathy.  There is, however, no evidence for a large fiber polyneuropathy, primary demyelinating neuropathy, myopathy, or median entrapment neuropathy across the right wrist as clinically questioned.    Albert Galicia MD, Neurology  [facility]  Pager [phone]  11/22/2024

## 2024-11-22 NOTE — TELEPHONE ENCOUNTER
Physical Therapy Plan of Care from [employer] DOS 10/24/2024 received by MA clinical staff, endorsed to provider for review.     Placed on [employer]'s desk    Will be sent to scanning once received back from provider.

## 2024-11-22 NOTE — TELEPHONE ENCOUNTER
Spoke to patient, she stated that her forms need to be revised to reflect actual dates, not \"6-8 weeks\" for recovery.    Forms to be revised.

## 2024-11-25 NOTE — TELEPHONE ENCOUNTER
Patient called regarding revision Family Medical Leave Act form, patient advised the rep uploaded the incorrect form but she will send the correct one. Patient said ok.

## 2024-11-25 NOTE — TELEPHONE ENCOUNTER
Disability & FMLA forms revised & scanned into patient's chart.    Forms uploaded to [facility], per patient's request.

## 2024-11-26 ENCOUNTER — ANESTHESIA EVENT (OUTPATIENT)
Dept: SURGERY | Facility: HOSPITAL | Age: 44
End: 2024-11-26
Payer: COMMERCIAL

## 2024-11-26 NOTE — ANESTHESIA PREPROCEDURE EVALUATION
PRE-OP EVALUATION    Patient Name: Crys Carter    Admit Diagnosis: Myelopathy concurrent with and due to spinal stenosis of cervical region (HCC) [M48.02, G99.2]  Myelomalacia of cervical cord (HCC) [G95.89]  Cervical radiculopathy [M54.12]  Numbness and tingling of right arm [R20.0, R20.2]  Neck pain [M54.2]  Decreased  strength [R29.898]  Gait instability [R26.81]  Page sign present [R29.2]  Hyper reflexia [R29.2]    Pre-op Diagnosis: Myelopathy concurrent with and due to spinal stenosis of cervical region (HCC) [M48.02, G99.2]  Myelomalacia of cervical cord (HCC) [G95.89]  Cervical radiculopathy [M54.12]  Numbness and tingling of right arm [R20.0, R20.2]  Neck pain [M54.2]  Decreased  strength [R29.898]  Gait instability [R26.81]  Page sign present [R29.2]  Hyper reflexia [R29.2]    CERVICAL 4 - CERVICAL 5, CERVICAL 5 - CERVICAL 6 ANTERIOR CERVICAL DISCECTOMY AND FUSION WITH INSTRUMENTATION, ALLOGRAFT    Anesthesia Procedure: CERVICAL 4 - CERVICAL 5, CERVICAL 5 - CERVICAL 6 ANTERIOR CERVICAL DISCECTOMY AND FUSION WITH INSTRUMENTATION, ALLOGRAFT (Spine Cervical)    Surgeons and Role:     * Allen Dickson MD - Primary    Pre-op vitals reviewed.        Body mass index is 49.55 kg/m².    Current medications reviewed.  Hospital Medications:  No current facility-administered medications on file as of .       Outpatient Medications:   Prescriptions Prior to Admission[1]    Allergies: Clonazepam, Compazine [prochlorperazine], and Topiramate      Anesthesia Evaluation    Patient summary reviewed.    Anesthetic Complications  (+) history of anesthetic complications  History of: PONV       GI/Hepatic/Renal      (+) GERD                           Cardiovascular                (+) obesity     (+) hyperlipidemia                                  Endo/Other                                  Pulmonary      (+) asthma              (+) sleep apnea       Neuro/Psych      (+) depression  (+) anxiety  (+) CVA                     Allergic rhinitis Attention deficit hyperactivity disorder (ADHD)  Bipolar disorder, in partial remission, most recent episode mixed (HCC) Chronic migraine without aura without status migrainosus, not intractable  Chronic pain of left knee FADY (generalized anxiety disorder)  GERD (gastroesophageal reflux disease) Hypothyroidism  Insomnia Migraine with aura and without status migrainosus, not intractable  Mild intermittent asthma without complication (HCC) Morbid (severe) obesity due to excess calories (HCC)  SASHA (obstructive sleep apnea) Prediabetes  S/P laparoscopic sleeve gastrectomy Vegan diet  Vitamin B12 deficiency Vitamin D deficiency            Past Surgical History:   Procedure Laterality Date    Other  2009    benign tumor reomoved  from left inner thigh    Other  2014    basal carcinoma removed from upper lid of left eye    Other surgical history  03/07/2022    Gastric sleeve    Skin surgery  Oct 2014    tumor removed from eyebrow ridge    Tonsillectomy       Social History     Socioeconomic History    Marital status: Single   Tobacco Use    Smoking status: Former     Current packs/day: 0.00     Average packs/day: 1 pack/day for 23.9 years (23.9 ttl pk-yrs)     Types: Cigarettes     Start date: 1/27/1985     Quit date: 12/15/2008     Years since quitting: 15.9     Passive exposure: Never    Smokeless tobacco: Never   Vaping Use    Vaping status: Never Used   Substance and Sexual Activity    Alcohol use: Not Currently     Comment: Last week in August 2023    Drug use: Not Currently     Types: Cannabis    Sexual activity: Not Currently     Partners: Male     Birth control/protection: Condom   Other Topics Concern    Caffeine Concern No    Stress Concern Yes    Weight Concern Yes    Special Diet Yes     Comment: Bariatric    Exercise No    Seat Belt Yes     History   Drug Use Unknown     Available pre-op labs reviewed.  Lab Results   Component Value Date    WBC 9.9 11/14/2024    RBC 4.80  11/14/2024    HGB 14.0 11/14/2024    HCT 42.6 11/14/2024    MCV 88.8 11/14/2024    MCH 29.2 11/14/2024    MCHC 32.9 11/14/2024    RDW 12.8 11/14/2024    .0 11/14/2024     Lab Results   Component Value Date     11/14/2024    K 4.5 11/14/2024     11/14/2024    CO2 27.0 11/14/2024    BUN 10 11/14/2024    CREATSERUM 1.18 (H) 11/14/2024    GLU 93 11/14/2024    CA 9.7 11/14/2024     Lab Results   Component Value Date    INR 0.98 11/14/2024         Airway      Mallampati: II  Mouth opening: >3 FB  TM distance: > 6 cm  Neck ROM: limited Cardiovascular    Cardiovascular exam normal.         Dental    Dentition appears grossly intact         Pulmonary    Pulmonary exam normal.                 Other findings              ASA: 3   Plan: general  NPO status verified and     Post-procedure pain management plan discussed with surgeon and patient.      Plan/risks discussed with: patient                Present on Admission:  **None**             [1]   No medications prior to admission.

## 2024-11-27 ENCOUNTER — APPOINTMENT (OUTPATIENT)
Dept: GENERAL RADIOLOGY | Facility: HOSPITAL | Age: 44
End: 2024-11-27
Attending: NEUROLOGICAL SURGERY
Payer: COMMERCIAL

## 2024-11-27 ENCOUNTER — HOSPITAL ENCOUNTER (OUTPATIENT)
Facility: HOSPITAL | Age: 44
Discharge: HOME | End: 2024-11-28
Attending: NEUROLOGICAL SURGERY | Admitting: NEUROLOGICAL SURGERY
Payer: COMMERCIAL

## 2024-11-27 ENCOUNTER — ANESTHESIA (OUTPATIENT)
Dept: SURGERY | Facility: HOSPITAL | Age: 44
End: 2024-11-27
Payer: COMMERCIAL

## 2024-11-27 DIAGNOSIS — Z98.1 S/P CERVICAL SPINAL FUSION: ICD-10-CM

## 2024-11-27 DIAGNOSIS — Z98.890 POST-OPERATIVE STATE: ICD-10-CM

## 2024-11-27 DIAGNOSIS — Z78.0 POSTMENOPAUSAL STATE: ICD-10-CM

## 2024-11-27 DIAGNOSIS — M54.12 CERVICAL RADICULOPATHY: Primary | ICD-10-CM

## 2024-11-27 PROBLEM — F31.9 BIPOLAR 1 DISORDER (HCC): Status: ACTIVE | Noted: 2024-11-27

## 2024-11-27 PROBLEM — F41.9 ANXIETY: Status: ACTIVE | Noted: 2024-11-27

## 2024-11-27 LAB
APTT PPP: 32.5 SECONDS (ref 23–36)
B-HCG UR QL: NEGATIVE

## 2024-11-27 PROCEDURE — 22845 INSERT SPINE FIXATION DEVICE: CPT | Performed by: NEUROLOGICAL SURGERY

## 2024-11-27 PROCEDURE — 22845 INSERT SPINE FIXATION DEVICE: CPT | Performed by: PHYSICIAN ASSISTANT

## 2024-11-27 PROCEDURE — 0RT30ZZ RESECTION OF CERVICAL VERTEBRAL DISC, OPEN APPROACH: ICD-10-PCS | Performed by: NEUROLOGICAL SURGERY

## 2024-11-27 PROCEDURE — 76000 FLUOROSCOPY <1 HR PHYS/QHP: CPT | Performed by: NEUROLOGICAL SURGERY

## 2024-11-27 PROCEDURE — 22551 ARTHRD ANT NTRBDY CERVICAL: CPT | Performed by: NEUROLOGICAL SURGERY

## 2024-11-27 PROCEDURE — 22552 ARTHRD ANT NTRBD CERVICAL EA: CPT | Performed by: PHYSICIAN ASSISTANT

## 2024-11-27 PROCEDURE — 00NW0ZZ RELEASE CERVICAL SPINAL CORD, OPEN APPROACH: ICD-10-PCS | Performed by: NEUROLOGICAL SURGERY

## 2024-11-27 PROCEDURE — 22552 ARTHRD ANT NTRBD CERVICAL EA: CPT | Performed by: NEUROLOGICAL SURGERY

## 2024-11-27 PROCEDURE — 0RG20K0 FUSION OF 2 OR MORE CERVICAL VERTEBRAL JOINTS WITH NONAUTOLOGOUS TISSUE SUBSTITUTE, ANTERIOR APPROACH, ANTERIOR COLUMN, OPEN APPROACH: ICD-10-PCS | Performed by: NEUROLOGICAL SURGERY

## 2024-11-27 PROCEDURE — 22551 ARTHRD ANT NTRBDY CERVICAL: CPT | Performed by: PHYSICIAN ASSISTANT

## 2024-11-27 PROCEDURE — 99204 OFFICE O/P NEW MOD 45 MIN: CPT | Performed by: INTERNAL MEDICINE

## 2024-11-27 DEVICE — IMPLANTABLE DEVICE: Type: IMPLANTABLE DEVICE | Site: SPINE CERVICAL | Status: FUNCTIONAL

## 2024-11-27 RX ORDER — HYDROCODONE BITARTRATE AND ACETAMINOPHEN 10; 325 MG/1; MG/1
2 TABLET ORAL ONCE AS NEEDED
Status: DISCONTINUED | OUTPATIENT
Start: 2024-11-27 | End: 2024-11-27 | Stop reason: HOSPADM

## 2024-11-27 RX ORDER — HYDROMORPHONE HYDROCHLORIDE 1 MG/ML
INJECTION, SOLUTION INTRAMUSCULAR; INTRAVENOUS; SUBCUTANEOUS
Status: COMPLETED
Start: 2024-11-27 | End: 2024-11-27

## 2024-11-27 RX ORDER — LURASIDONE HYDROCHLORIDE 40 MG/1
80 TABLET, FILM COATED ORAL NIGHTLY
Status: DISCONTINUED | OUTPATIENT
Start: 2024-11-27 | End: 2024-11-28

## 2024-11-27 RX ORDER — ACETAMINOPHEN 500 MG
1000 TABLET ORAL EVERY 6 HOURS PRN
COMMUNITY

## 2024-11-27 RX ORDER — HYDROCODONE BITARTRATE AND ACETAMINOPHEN 10; 325 MG/1; MG/1
1 TABLET ORAL ONCE AS NEEDED
Status: DISCONTINUED | OUTPATIENT
Start: 2024-11-27 | End: 2024-11-27 | Stop reason: HOSPADM

## 2024-11-27 RX ORDER — PANTOPRAZOLE SODIUM 20 MG/1
20 TABLET, DELAYED RELEASE ORAL
Status: DISCONTINUED | OUTPATIENT
Start: 2024-11-28 | End: 2024-11-28

## 2024-11-27 RX ORDER — LORAZEPAM 0.5 MG/1
0.5 TABLET ORAL NIGHTLY PRN
Status: DISCONTINUED | OUTPATIENT
Start: 2024-11-27 | End: 2024-11-28

## 2024-11-27 RX ORDER — HYDROMORPHONE HYDROCHLORIDE 1 MG/ML
0.6 INJECTION, SOLUTION INTRAMUSCULAR; INTRAVENOUS; SUBCUTANEOUS EVERY 5 MIN PRN
Status: DISCONTINUED | OUTPATIENT
Start: 2024-11-27 | End: 2024-11-27 | Stop reason: HOSPADM

## 2024-11-27 RX ORDER — HYDROMORPHONE HYDROCHLORIDE 1 MG/ML
0.8 INJECTION, SOLUTION INTRAMUSCULAR; INTRAVENOUS; SUBCUTANEOUS EVERY 2 HOUR PRN
Status: DISCONTINUED | OUTPATIENT
Start: 2024-11-27 | End: 2024-11-28

## 2024-11-27 RX ORDER — LAMOTRIGINE 100 MG/1
100 TABLET ORAL DAILY
Status: DISCONTINUED | OUTPATIENT
Start: 2024-11-28 | End: 2024-11-28

## 2024-11-27 RX ORDER — BUPIVACAINE HYDROCHLORIDE AND EPINEPHRINE 2.5; 5 MG/ML; UG/ML
INJECTION, SOLUTION EPIDURAL; INFILTRATION; INTRACAUDAL; PERINEURAL AS NEEDED
Status: DISCONTINUED | OUTPATIENT
Start: 2024-11-27 | End: 2024-11-27 | Stop reason: HOSPADM

## 2024-11-27 RX ORDER — ONDANSETRON 2 MG/ML
INJECTION INTRAMUSCULAR; INTRAVENOUS AS NEEDED
Status: DISCONTINUED | OUTPATIENT
Start: 2024-11-27 | End: 2024-11-27 | Stop reason: SURG

## 2024-11-27 RX ORDER — CEFAZOLIN SODIUM IN 0.9 % NACL 3 G/100 ML
INTRAVENOUS SOLUTION, PIGGYBACK (ML) INTRAVENOUS
Status: DISPENSED
Start: 2024-11-27 | End: 2024-11-27

## 2024-11-27 RX ORDER — BUPROPION HYDROCHLORIDE 150 MG/1
150 TABLET ORAL DAILY
Status: DISCONTINUED | OUTPATIENT
Start: 2024-11-28 | End: 2024-11-28

## 2024-11-27 RX ORDER — SODIUM PHOSPHATE, DIBASIC AND SODIUM PHOSPHATE, MONOBASIC 7; 19 G/230ML; G/230ML
1 ENEMA RECTAL ONCE AS NEEDED
Status: DISCONTINUED | OUTPATIENT
Start: 2024-11-27 | End: 2024-11-28

## 2024-11-27 RX ORDER — SERTRALINE HYDROCHLORIDE 100 MG/1
100 TABLET, FILM COATED ORAL DAILY
Status: DISCONTINUED | OUTPATIENT
Start: 2024-11-28 | End: 2024-11-28

## 2024-11-27 RX ORDER — METHOCARBAMOL 750 MG/1
750 TABLET, FILM COATED ORAL 4 TIMES DAILY
Status: DISCONTINUED | OUTPATIENT
Start: 2024-11-27 | End: 2024-11-28

## 2024-11-27 RX ORDER — HYDROXYZINE HYDROCHLORIDE 25 MG/1
25 TABLET, FILM COATED ORAL NIGHTLY PRN
Status: DISCONTINUED | OUTPATIENT
Start: 2024-11-27 | End: 2024-11-28

## 2024-11-27 RX ORDER — HYDROMORPHONE HYDROCHLORIDE 1 MG/ML
0.4 INJECTION, SOLUTION INTRAMUSCULAR; INTRAVENOUS; SUBCUTANEOUS EVERY 2 HOUR PRN
Status: DISCONTINUED | OUTPATIENT
Start: 2024-11-27 | End: 2024-11-28

## 2024-11-27 RX ORDER — POLYETHYLENE GLYCOL 3350 17 G/17G
17 POWDER, FOR SOLUTION ORAL DAILY PRN
Status: DISCONTINUED | OUTPATIENT
Start: 2024-11-27 | End: 2024-11-28

## 2024-11-27 RX ORDER — DIPHENHYDRAMINE HYDROCHLORIDE 50 MG/ML
25 INJECTION INTRAMUSCULAR; INTRAVENOUS EVERY 4 HOURS PRN
Status: DISCONTINUED | OUTPATIENT
Start: 2024-11-27 | End: 2024-11-28

## 2024-11-27 RX ORDER — CEFAZOLIN SODIUM IN 0.9 % NACL 3 G/100 ML
3 INTRAVENOUS SOLUTION, PIGGYBACK (ML) INTRAVENOUS EVERY 8 HOURS
Status: COMPLETED | OUTPATIENT
Start: 2024-11-27 | End: 2024-11-28

## 2024-11-27 RX ORDER — PROPRANOLOL HCL 20 MG
20 TABLET ORAL 2 TIMES DAILY
Status: DISCONTINUED | OUTPATIENT
Start: 2024-11-27 | End: 2024-11-28

## 2024-11-27 RX ORDER — DIPHENHYDRAMINE HCL 25 MG
25 CAPSULE ORAL EVERY 4 HOURS PRN
Status: DISCONTINUED | OUTPATIENT
Start: 2024-11-27 | End: 2024-11-28

## 2024-11-27 RX ORDER — OXYCODONE HYDROCHLORIDE 10 MG/1
10 TABLET ORAL EVERY 4 HOURS PRN
Status: DISCONTINUED | OUTPATIENT
Start: 2024-11-27 | End: 2024-11-28

## 2024-11-27 RX ORDER — OXYCODONE HYDROCHLORIDE 5 MG/1
5 TABLET ORAL EVERY 4 HOURS PRN
Status: DISCONTINUED | OUTPATIENT
Start: 2024-11-27 | End: 2024-11-28

## 2024-11-27 RX ORDER — LIDOCAINE HYDROCHLORIDE 10 MG/ML
INJECTION, SOLUTION EPIDURAL; INFILTRATION; INTRACAUDAL; PERINEURAL AS NEEDED
Status: DISCONTINUED | OUTPATIENT
Start: 2024-11-27 | End: 2024-11-27 | Stop reason: SURG

## 2024-11-27 RX ORDER — ACETAMINOPHEN 10 MG/ML
1000 INJECTION, SOLUTION INTRAVENOUS EVERY 6 HOURS
Status: DISCONTINUED | OUTPATIENT
Start: 2024-11-27 | End: 2024-11-28

## 2024-11-27 RX ORDER — SENNOSIDES 8.6 MG
17.2 TABLET ORAL NIGHTLY
Status: DISCONTINUED | OUTPATIENT
Start: 2024-11-27 | End: 2024-11-28

## 2024-11-27 RX ORDER — ACETAMINOPHEN 500 MG
1000 TABLET ORAL ONCE AS NEEDED
Status: DISCONTINUED | OUTPATIENT
Start: 2024-11-27 | End: 2024-11-27 | Stop reason: HOSPADM

## 2024-11-27 RX ORDER — METHOCARBAMOL 500 MG/1
500 TABLET, FILM COATED ORAL 3 TIMES DAILY PRN
Qty: 45 TABLET | Refills: 0 | Status: SHIPPED | OUTPATIENT
Start: 2024-11-27 | End: 2024-11-28

## 2024-11-27 RX ORDER — HYDROMORPHONE HYDROCHLORIDE 1 MG/ML
0.2 INJECTION, SOLUTION INTRAMUSCULAR; INTRAVENOUS; SUBCUTANEOUS EVERY 5 MIN PRN
Status: DISCONTINUED | OUTPATIENT
Start: 2024-11-27 | End: 2024-11-27 | Stop reason: HOSPADM

## 2024-11-27 RX ORDER — SODIUM CHLORIDE, SODIUM LACTATE, POTASSIUM CHLORIDE, CALCIUM CHLORIDE 600; 310; 30; 20 MG/100ML; MG/100ML; MG/100ML; MG/100ML
INJECTION, SOLUTION INTRAVENOUS CONTINUOUS
Status: DISCONTINUED | OUTPATIENT
Start: 2024-11-27 | End: 2024-11-27 | Stop reason: HOSPADM

## 2024-11-27 RX ORDER — HYDROMORPHONE HYDROCHLORIDE 1 MG/ML
0.4 INJECTION, SOLUTION INTRAMUSCULAR; INTRAVENOUS; SUBCUTANEOUS EVERY 5 MIN PRN
Status: DISCONTINUED | OUTPATIENT
Start: 2024-11-27 | End: 2024-11-27 | Stop reason: HOSPADM

## 2024-11-27 RX ORDER — DOCUSATE SODIUM 100 MG/1
100 CAPSULE, LIQUID FILLED ORAL 2 TIMES DAILY
Status: DISCONTINUED | OUTPATIENT
Start: 2024-11-27 | End: 2024-11-28

## 2024-11-27 RX ORDER — ONDANSETRON 2 MG/ML
4 INJECTION INTRAMUSCULAR; INTRAVENOUS EVERY 6 HOURS PRN
Status: DISCONTINUED | OUTPATIENT
Start: 2024-11-27 | End: 2024-11-28

## 2024-11-27 RX ORDER — MIDAZOLAM HYDROCHLORIDE 1 MG/ML
1 INJECTION INTRAMUSCULAR; INTRAVENOUS EVERY 5 MIN PRN
Status: DISCONTINUED | OUTPATIENT
Start: 2024-11-27 | End: 2024-11-27 | Stop reason: HOSPADM

## 2024-11-27 RX ORDER — ONDANSETRON 2 MG/ML
4 INJECTION INTRAMUSCULAR; INTRAVENOUS EVERY 6 HOURS PRN
Status: DISCONTINUED | OUTPATIENT
Start: 2024-11-27 | End: 2024-11-27 | Stop reason: HOSPADM

## 2024-11-27 RX ORDER — ACETAMINOPHEN 10 MG/ML
INJECTION, SOLUTION INTRAVENOUS
Status: COMPLETED
Start: 2024-11-27 | End: 2024-11-27

## 2024-11-27 RX ORDER — DEXAMETHASONE SODIUM PHOSPHATE 4 MG/ML
VIAL (ML) INJECTION AS NEEDED
Status: DISCONTINUED | OUTPATIENT
Start: 2024-11-27 | End: 2024-11-27 | Stop reason: SURG

## 2024-11-27 RX ORDER — LEVOTHYROXINE SODIUM 75 UG/1
75 TABLET ORAL
Status: DISCONTINUED | OUTPATIENT
Start: 2024-11-28 | End: 2024-11-28

## 2024-11-27 RX ORDER — SODIUM CHLORIDE, SODIUM LACTATE, POTASSIUM CHLORIDE, CALCIUM CHLORIDE 600; 310; 30; 20 MG/100ML; MG/100ML; MG/100ML; MG/100ML
INJECTION, SOLUTION INTRAVENOUS CONTINUOUS
Status: DISCONTINUED | OUTPATIENT
Start: 2024-11-27 | End: 2024-11-27

## 2024-11-27 RX ORDER — NALOXONE HYDROCHLORIDE 0.4 MG/ML
80 INJECTION, SOLUTION INTRAMUSCULAR; INTRAVENOUS; SUBCUTANEOUS AS NEEDED
Status: DISCONTINUED | OUTPATIENT
Start: 2024-11-27 | End: 2024-11-27 | Stop reason: HOSPADM

## 2024-11-27 RX ORDER — SODIUM CHLORIDE 9 MG/ML
INJECTION, SOLUTION INTRAVENOUS CONTINUOUS
Status: DISCONTINUED | OUTPATIENT
Start: 2024-11-27 | End: 2024-11-28

## 2024-11-27 RX ORDER — LABETALOL HYDROCHLORIDE 5 MG/ML
5 INJECTION, SOLUTION INTRAVENOUS EVERY 5 MIN PRN
Status: DISCONTINUED | OUTPATIENT
Start: 2024-11-27 | End: 2024-11-27 | Stop reason: HOSPADM

## 2024-11-27 RX ORDER — ROCURONIUM BROMIDE 10 MG/ML
INJECTION, SOLUTION INTRAVENOUS AS NEEDED
Status: DISCONTINUED | OUTPATIENT
Start: 2024-11-27 | End: 2024-11-27 | Stop reason: SURG

## 2024-11-27 RX ORDER — CEFAZOLIN SODIUM IN 0.9 % NACL 3 G/100 ML
3 INTRAVENOUS SOLUTION, PIGGYBACK (ML) INTRAVENOUS ONCE
Status: COMPLETED | OUTPATIENT
Start: 2024-11-27 | End: 2024-11-27

## 2024-11-27 RX ORDER — ONDANSETRON 2 MG/ML
INJECTION INTRAMUSCULAR; INTRAVENOUS
Status: COMPLETED
Start: 2024-11-27 | End: 2024-11-27

## 2024-11-27 RX ORDER — OXYCODONE HYDROCHLORIDE 5 MG/1
TABLET ORAL EVERY 4 HOURS PRN
Qty: 45 TABLET | Refills: 0 | Status: SHIPPED | OUTPATIENT
Start: 2024-11-27 | End: 2024-11-28

## 2024-11-27 RX ORDER — ACETAMINOPHEN 500 MG
1000 TABLET ORAL ONCE
Status: DISCONTINUED | OUTPATIENT
Start: 2024-11-27 | End: 2024-11-27 | Stop reason: HOSPADM

## 2024-11-27 RX ORDER — BISACODYL 10 MG
10 SUPPOSITORY, RECTAL RECTAL
Status: DISCONTINUED | OUTPATIENT
Start: 2024-11-27 | End: 2024-11-28

## 2024-11-27 RX ORDER — DOCUSATE SODIUM 100 MG/1
100 CAPSULE, LIQUID FILLED ORAL 2 TIMES DAILY PRN
Qty: 40 CAPSULE | Refills: 0 | Status: SHIPPED | OUTPATIENT
Start: 2024-11-27 | End: 2024-11-28

## 2024-11-27 RX ORDER — TRAZODONE HYDROCHLORIDE 100 MG/1
100 TABLET ORAL NIGHTLY PRN
Status: DISCONTINUED | OUTPATIENT
Start: 2024-11-27 | End: 2024-11-28

## 2024-11-27 RX ORDER — SCOLOPAMINE TRANSDERMAL SYSTEM 1 MG/1
1 PATCH, EXTENDED RELEASE TRANSDERMAL ONCE
Status: DISCONTINUED | OUTPATIENT
Start: 2024-11-27 | End: 2024-11-27 | Stop reason: HOSPADM

## 2024-11-27 RX ADMIN — ONDANSETRON 4 MG: 2 INJECTION INTRAMUSCULAR; INTRAVENOUS at 11:13:00

## 2024-11-27 RX ADMIN — ROCURONIUM BROMIDE 20 MG: 10 INJECTION, SOLUTION INTRAVENOUS at 10:45:00

## 2024-11-27 RX ADMIN — LIDOCAINE HYDROCHLORIDE 50 MG: 10 INJECTION, SOLUTION EPIDURAL; INFILTRATION; INTRACAUDAL; PERINEURAL at 09:15:00

## 2024-11-27 RX ADMIN — ROCURONIUM BROMIDE 20 MG: 10 INJECTION, SOLUTION INTRAVENOUS at 09:57:00

## 2024-11-27 RX ADMIN — DEXAMETHASONE SODIUM PHOSPHATE 8 MG: 4 MG/ML VIAL (ML) INJECTION at 09:15:00

## 2024-11-27 RX ADMIN — SODIUM CHLORIDE, SODIUM LACTATE, POTASSIUM CHLORIDE, CALCIUM CHLORIDE: 600; 310; 30; 20 INJECTION, SOLUTION INTRAVENOUS at 11:40:00

## 2024-11-27 RX ADMIN — ROCURONIUM BROMIDE 30 MG: 10 INJECTION, SOLUTION INTRAVENOUS at 09:44:00

## 2024-11-27 RX ADMIN — CEFAZOLIN SODIUM IN 0.9 % NACL 3 G: 3 G/100 ML INTRAVENOUS SOLUTION, PIGGYBACK (ML) INTRAVENOUS at 09:23:00

## 2024-11-27 NOTE — BRIEF OP NOTE
Pre-Operative Diagnosis: Myelopathy concurrent with and due to spinal stenosis of cervical region (HCC) [M48.02, G99.2]  Myelomalacia of cervical cord (HCC) [G95.89]  Cervical radiculopathy [M54.12]  Numbness and tingling of right arm [R20.0, R20.2]  Neck pain [M54.2]  Decreased  strength [R29.898]  Gait instability [R26.81]  Page sign present [R29.2]  Hyper reflexia [R29.2]     Post-Operative Diagnosis: same as preop      Procedure Performed:   CERVICAL 4 - CERVICAL 5, CERVICAL 5 - CERVICAL 6 ANTERIOR CERVICAL DISCECTOMY AND FUSION WITH INSTRUMENTATION, ALLOGRAFT    Surgeons and Role:     * Allen Dickson MD - Primary    Assistant(s):  PA: Otilia Garcia PA-C     Surgical Findings: See dictation     Specimen: None     Estimated Blood Loss: Blood Output: 5 mL (11/27/2024 11:11 AM)        Allen Dickson MD  11/27/2024  11:39 AM

## 2024-11-27 NOTE — ANESTHESIA PROCEDURE NOTES
Peripheral IV  Date/Time: 11/27/2024 9:36 AM  Inserted by: Sammy Leigh MD    Placement  Needle size: 20 G  Laterality: left  Location: hand  Local anesthetic: none  Site prep: alcohol  Technique: anatomical landmarks  Attempts: 3

## 2024-11-27 NOTE — DISCHARGE INSTRUCTIONS
Post-operative Instructions  Cervical Fusion    Refer to this sheet in the next few weeks. These instructions provide you with information on caring for yourself after your procedure. Your treatment has been planned according to current medical practices, but problems sometimes occur. Call your health care provider if you have any problems or questions after your procedure: [phone], press option 2.          -This surgery is done to relieve pressure on your nerves or spinal cord in your neck.     WHAT TO EXPECT AFTER THE PROCEDURE  After your procedure, it is typical to have the following symptoms:    Pain in the back of your neck   Numbness   Weakness     -Following surgery, these post-operative symptoms will improve with time.    HOME CARE INSTRUCTIONS  It can take 6-12 months to recover fully from this procedure. Make sure to follow all of your health care provider's instructions carefully.    Only take medicines as directed by your health care provider.  You will receive narcotic medication for pain, try to use this only as needed and begin to cut back as your pain improves.   You will receive muscle relaxant medication (Robaxin (methocarbamol) or Cyclobenzaprine (Flexeril); Take this medication scheduled three times per day as needed.   You will also receive two stool softeners to take as needed for constipation.   You can eat what you usually do. Occasionally patients experience difficulty swallowing after this procedure. If you experience this, try soft foods and smoothies for a few days.   Your activities will be limited for the first 3-4 weeks. In general:   ? It is fine to walk and climb stairs as much as tolerated.  ? Do not lift anything weighing more than 10 lb (4.5 kg).  ? Do not lift objects over your head.  ? Do not drive until your health care provider says it is okay, at least 7-10 days.     Take showers instead of baths until directed by your health care provider. Ok to let water run over  incision but do not scrub over incision or apply ointments or creams over incision.   You do not require a bandage, the incision will be covered steri strips. Leave these in place. They will fall off on their own or will be removed at your post operative visit.   The sutures are beneath the skin and dissolvable. You will not have to have them removed.   You may apply ice to the repaired area:    ? Put ice in a plastic bag.  ? Place a towel between your skin and the bag.  ? Leave the ice on for 20 minutes, 2-3 times a day.     You will return for follow-up appointment approximately two weeks post-op.    SEEK MEDICAL CARE IF:   You have redness, swelling, or pain in your cut (incision) that is getting worse.   You have pus coming from the incision.   You have a fever.   There is a bad smell coming from the incision or bandage.   The edges of the incision break open after sutures or staples are taken out.   Your pain medicine is not helping.   You have swelling in your calf or leg.   You seem to be getting worse instead of better.     You develop shortness of breath or chest pain.   You have trouble swallowing.   You have pain, numbness, or weakness that is getting worse.    This information is not intended to replace advice given to you by your health care provider. Make sure you discuss any questions you have with your health care provider.    Please call with any questions/concerns, [phone], option 2.

## 2024-11-27 NOTE — ANESTHESIA PROCEDURE NOTES
Arterial Line    Date/Time: 11/27/2024 9:30 AM    Performed by: Sammy Leigh MD  Authorized by: Sammy Leigh MD    General Information and Staff    Procedure Start:  11/27/2024 9:30 AM  Procedure End:  11/27/2024 9:33 AM  Anesthesiologist:  Sammy Leigh MD  Performed By:  Anesthesiologist  Patient Location:  OR  Indication: continuous blood pressure monitoring and blood sampling needed    Site Identification: surface landmarks    Preanesthetic Checklist: 2 patient identifiers, IV checked, risks and benefits discussed, monitors and equipment checked, pre-op evaluation, timeout performed, anesthesia consent and sterile technique used    Procedure Details    Catheter Size:  20 G  Catheter Length:  1 and 3/4 inch  Catheter Type:  Arrow  Seldinger Technique?: Yes    Site:  Radial artery  Site Prep: chlorhexidine    Line Secured:  Wrist Brace, tape and Tegaderm    Assessment    Events: patient tolerated procedure well with no complications      Medications  11/27/2024 9:30 AM      Additional Comments

## 2024-11-27 NOTE — OPERATIVE REPORT
Patient Name: Crys Carter  1/4/1980 11/27/2024  Preoperative Diagnosis: Myelopathy concurrent with and due to spinal stenosis of cervical region (HCC) [M48.02, G99.2]  Myelomalacia of cervical cord (HCC) [G95.89]  Cervical radiculopathy [M54.12]  Numbness and tingling of right arm [R20.0, R20.2]  Neck pain [M54.2]  Decreased  strength [R29.898]  Gait instability [R26.81]  Page sign present [R29.2]  Hyper reflexia [R29.2]  Postoperative Diagnosis: same   Primary Surgeon: Allen Dickson M.D.   Assistant: Otilia Garcia PA-C, was essential the case including opening, closing and retraction  Procedures: C/5, C5/6 anterior cervical discectomy and fusion with instrumentation, allograft   Surgical Findings: see dictation   Anesthesia: General   Complications: none   Implants: 40 mm loki ozark plate and screws, bone graft   Specimen: none   Drains: none   Condition: stable   Estimated Blood Loss: 5 ml   Indication for Procedure: 44 year old yo female with cervical stenosis and early myelopathy.  Patient was seen in clinic.  After discussion patient wished to proceed with surgery.  Procedure: Patient was properly identified and brought back to OR 16. Patient was placed on OR table and placed under anesthesia by staff. All pressure points were padded. Fluoroscopy was used to confirm level operative level. Patient was sterilely prepped and draped in the usual fashion.  10 mls of 0.25% marcaine with epinephrine was given as local anesthetic. Incision was made down to platysma. A supraplatysmal dissection was done superiorly and inferiorly. The platysmal was elevated and opened longitudinally. The avascular plane between the carotid/jugular vessels and the esophagus/trachea, was found and dissected down to the anterior cervical fascia. The fascia was opened. A bayonetted spinal needle was placed in the disc space and fluoroscopy used to confirmed operative level. The longus was elevated bilaterally. The  retractor was measured and placed. Aurora pins were placed in C 4 and C 5. The disc space was opened with a scalpel blade. The discectomy was performed with kerrisons, pituitaries, and curettes. A nerve hook was used to confirm decompression in all directions. The end plates were prepped. A 6 mm bone plug was measured and placed.  The C4 Aurora pin was removed and placed in the C6.  The disc space was opened with a scalpel blade. The discectomy was performed with kerrisons, pituitaries, and curettes. A nerve hook was used to confirm decompression in all directions. The end plates were prepped. A 6mm bone plug was measured and placed.  The Aurora pins were removed.  A plate was measured and screwed into position. The locking mechanism was locked.  Hemostasis was achieved.  The trachea, esophagus, carotid, and jugular were inspected and no injury seen. The wound was closed in layers and Dermabond for the skin. Patient was awakened and taken to recovery. There were no complication with the procedure.     Dictated but not proofread

## 2024-11-27 NOTE — PROGRESS NOTES
NURSING ADMISSION NOTE      Patient admitted via Cart  Oriented to room.  Safety precautions initiated.  Bed in low position.  Call light in reach.    Admission navigator completed.

## 2024-11-27 NOTE — ANESTHESIA POSTPROCEDURE EVALUATION
[facility]    Crys Carter Patient Status:  Outpatient in a Bed   Age/Gender 44 year old female MRN [id number]   Location [facility] POST ANESTHESIA CARE UNIT Attending Allen Dickson MD   Hosp Day # 0 PCP Viki Pinto MD       Anesthesia Post-op Note    CERVICAL 4 - CERVICAL 5, CERVICAL 5 - CERVICAL 6 ANTERIOR CERVICAL DISCECTOMY AND FUSION WITH INSTRUMENTATION, ALLOGRAFT    Procedure Summary       Date: 11/27/24 Room / Location:  MAIN OR  /  MAIN OR    Anesthesia Start: 0909 Anesthesia Stop: 1140    Procedure: CERVICAL 4 - CERVICAL 5, CERVICAL 5 - CERVICAL 6 ANTERIOR CERVICAL DISCECTOMY AND FUSION WITH INSTRUMENTATION, ALLOGRAFT (Spine Cervical) Diagnosis:       Myelopathy concurrent with and due to spinal stenosis of cervical region (HCC)      Myelomalacia of cervical cord (HCC)      Cervical radiculopathy      Numbness and tingling of right arm      Neck pain      Decreased  strength      Gait instability      Page sign present      Hyper reflexia      (same as preop)    Surgeons: Allen Dickson MD Anesthesiologist: Sammy Leigh MD    Anesthesia Type: general ASA Status: 3            Anesthesia Type: general    Vitals Value Taken Time   /70 11/27/24 1140   Temp 97 °F (36.1 °C) 11/27/24 1140   Pulse 78 11/27/24 1140   Resp 16 11/27/24 1140   SpO2 96 % 11/27/24 1140       Patient Location: PACU    Anesthesia Type: general    Airway Patency: patent and extubated    Postop Pain Control: adequate    Mental Status: mildly sedated but able to meaningfully participate in the post-anesthesia evaluation    Nausea/Vomiting: none    Cardiopulmonary/Hydration status: stable euvolemic    Complications: no apparent anesthesia related complications    Postop vital signs: stable    Dental Exam: Unchanged from Preop    Patient to be discharged from PACU when criteria met.

## 2024-11-27 NOTE — PLAN OF CARE
The patient is A/Ox4, On RA, no SOB, Tele - NSR, Vitals Stable , Afebrile, Pain controlled with PRN and scheduled medications per KIMBERLY Montgomery is c/d/i  Consults following - neurosurg  Safety precautions in place. Staff will continue to monitor.             Problem: Patient/Family Goals  Goal: Patient/Family Long Term Goal  Description: Patient's Long Term Goal: dc    Interventions:  - medications   - See additional Care Plan goals for specific interventions  Outcome: Progressing  Goal: Patient/Family Short Term Goal  Description: Patient's Short Term Goal: safety    Interventions:   - frequent rounding   - See additional Care Plan goals for specific interventions  Outcome: Progressing     Problem: RISK FOR INFECTION - ADULT  Goal: Absence of fever/infection during anticipated neutropenic period  Description: INTERVENTIONS  - Monitor WBC  - Administer growth factors as ordered  - Implement neutropenic guidelines  Outcome: Progressing     Problem: SAFETY ADULT - FALL  Goal: Free from fall injury  Description: INTERVENTIONS:  - Assess pt frequently for physical needs  - Identify cognitive and physical deficits and behaviors that affect risk of falls.  - [place] fall precautions as indicated by assessment.  - Educate pt/family on patient safety including physical limitations  - Instruct pt to call for assistance with activity based on assessment  - Modify environment to reduce risk of injury  - Provide assistive devices as appropriate  - Consider OT/PT consult to assist with strengthening/mobility  - Encourage toileting schedule  Outcome: Progressing     Problem: DISCHARGE PLANNING  Goal: Discharge to home or other facility with appropriate resources  Description: INTERVENTIONS:  - Identify barriers to discharge w/pt and caregiver  - Include patient/family/discharge partner in discharge planning  - Arrange for needed discharge resources and transportation as appropriate  - Identify discharge learning needs (meds, wound  care, etc)  - Arrange for interpreters to assist at discharge as needed  - Consider post-discharge preferences of patient/family/discharge partner  - Complete POLST form as appropriate  - Assess patient's ability to be responsible for managing their own health  - Refer to Case Management Department for coordinating discharge planning if the patient needs post-hospital services based on physician/LIP order or complex needs related to functional status, cognitive ability or social support system  Outcome: Progressing

## 2024-11-27 NOTE — ANESTHESIA PROCEDURE NOTES
Airway  Date/Time: 11/27/2024 9:17 AM  Urgency: elective      General Information and Staff    Patient location during procedure: OR  Anesthesiologist: Sammy Leigh MD  Performed: anesthesiologist   Performed by: Sammy Leigh MD  Authorized by: Sammy Leigh MD      Indications and Patient Condition  Indications for airway management: anesthesia  Sedation level: deep  Preoxygenated: yes  Patient position: sniffing  Mask difficulty assessment: 1 - vent by mask    Final Airway Details  Final airway type: endotracheal airway      Successful airway: ETT  Cuffed: yes   Successful intubation technique: direct laryngoscopy  Facilitating devices/methods: intubating stylet  Endotracheal tube insertion site: oral  Blade: GlideScope  Blade size: #4  ETT size (mm): 7.0    Cormack-Lehane Classification: grade I - full view of glottis  Placement verified by: capnometry   Measured from: lips  ETT to lips (cm): 22  Number of attempts at approach: 1    Additional Comments  Pt head kept neutral thoughout induction and intubation

## 2024-11-27 NOTE — CONSULTS
[place] [facility]  CONSULT     Crys Carter Patient Status:  Outpatient in a Bed    1980 MRN [id number]   Location [facility] 3SW-A Attending Allen Dickson MD   Hosp Day # 0 PCP Viki Pinto MD     Reason for consult: med management    Requested by: Dr. Theodore    Subjective:   History of Present Illness:     Crys Carter is a 44 year old female with PMhx of anxiety, bipolar disorder, TIA, migraines, GERD, cervical myelopathy presents for elective C4C5, C5C6, anterior cervical discectomy and fusion. Pt underwent procedure this morning and tolerated well. Pt pain controlled. No new numbness, tingilng or weakness. No CP or SOB. No N/V/D/C or abd pain. No F/C.     History/Other:    Past Medical History:  Past Medical History:    Allergic rhinitis    Anxiety    Arthritis    Asthma (HCC)    Back problem    Bipolar disorder (HCC)    Calculus of kidney    Cancer (HCC)    Basal cell carcinoma over the left eyebrow    Depression    Disorder of thyroid    Esophageal reflux    Hypothyroidism    Migraine    Migraines    Obesity, unspecified    PONV (postoperative nausea and vomiting)    S/P laparoscopic sleeve gastrectomy    Sleep apnea    history of, improved with weight loss surgery, no longer needs cpap    Stroke (HCC)    TIA, possible not completely diagonosed    Visual impairment    glasses     Past Surgical History:   Past Surgical History:   Procedure Laterality Date    Other      benign tumor reomoved  from left inner thigh    2014    basal carcinoma removed from upper lid of left eye    Other surgical history  2022    Gastric sleeve    Skin surgery  Oct 2014    tumor removed from eyebrow ridge    Tonsillectomy        Family History:   Family History   Problem Relation Age of Onset    Depression Father     Heart Disease Father     Asthma Father     Diabetes Mother     Obesity Mother     Depression Mother     Alcohol and Other Disorders Associated Maternal  Grandmother     Cancer Maternal Grandmother         Lung CA    Cancer Maternal Grandfather         Prostate CA    Hypertension Paternal Grandmother     Cancer Paternal Grandfather         Lung CA    Personality Disorder Paternal Grandfather     Breast Cancer Maternal Aunt 65        in her 60's    Substance Abuse Maternal Uncle     Stroke Neg      Social History:    reports that she quit smoking about 15 years ago. Her smoking use included cigarettes. She started smoking about 39 years ago. She has a 23.9 pack-year smoking history. She has never been exposed to tobacco smoke. She has never used smokeless tobacco. She reports that she does not currently use alcohol. She reports that she does not currently use drugs.     Allergies: Allergies[1]    Medications:  Medications Ordered Prior to Encounter[2]    Review of Systems:   A comprehensive review of systems was completed.    Pertinent positives and negatives noted in the HPI.    Objective:   Physical Exam:    /69 (BP Location: Left arm)   Pulse 68   Temp 97.4 °F (36.3 °C) (Oral)   Resp 16   Ht 167.6 cm (5' 6\")   Wt (!) 323 lb 1.6 oz (146.6 kg)   LMP 03/23/2024 (Approximate)   SpO2 99%   BMI 52.15 kg/m²   General: No acute distress, Alert, R neck incision CDI  Respiratory: No rhonchi, no wheezes  Cardiovascular: S1, S2. Regular rate and rhythm  Abdomen: Soft, NT/ND, +BS  Neuro: No new focal deficits  Extremities: No edema      Results:    Labs:      Labs Last 24 Hours:  No results for input(s): \"WBC\", \"HGB\", \"MCV\", \"PLT\", \"BAND\", \"INR\" in the last 168 hours.    Invalid input(s): \"LYM#\", \"MONO#\", \"BASOS#\", \"EOSIN#\"    No results for input(s): \"GLU\", \"BUN\", \"CREATSERUM\", \"GFRAA\", \"GFRNAA\", \"CA\", \"ALB\", \"NA\", \"K\", \"CL\", \"CO2\", \"ALKPHO\", \"AST\", \"ALT\", \"BILT\", \"TP\" in the last 168 hours.    No results for input(s): \"PTP\", \"INR\" in the last 168 hours.    No results for input(s): \"TROP\", \"CK\" in the last 168 hours.      Imaging: Imaging data reviewed in  Epic.    Assessment & Plan:      #Cervical Myelopathy  S/P C4C5, C5C6, anterior cervical discectomy and fusion 11/27/2024  Per NeurSx  Pain control  PT/OT    #Anxiety/Depresion/Bipolar Disoder  Continue home meds    #Hypothyroid  Synthroid      Plan of care discussed with patient, RN    Zach Harris MD  11/27/2024    The 21st Century Cures Act makes medical notes like these available to patients in the interest of transparency. Please be advised this is a medical document. Medical documents are intended to carry relevant information, facts as evident, and the clinical opinion of the practitioner. The medical note is intended as peer to peer communication and may appear blunt or direct. It is written in medical language and may contain abbreviations or verbiage that are unfamiliar.            [1]   Allergies  Allergen Reactions    Clonazepam OTHER (SEE COMMENTS)     Suicidal thoughts  [place] out of sorts and has increased irritability as well as extremities feeling strange    Compazine [Prochlorperazine] OTHER (SEE COMMENTS)     Oral tablet-  Twitching;  Blanked out; nausea    Topiramate OTHER (SEE COMMENTS)     Suicidal,aggresive,brain fog   [2]   Current Facility-Administered Medications on File Prior to Encounter   Medication Dose Route Frequency Provider Last Rate Last Admin    [COMPLETED] ketorolac (Toradol) 30 MG/ML injection 30 mg  30 mg Intramuscular Once Yasmani Wilks MD   30 mg at 10/14/24 1733    [COMPLETED] ketorolac (Toradol) 60 MG/2ML IM injection 60 mg  60 mg Intramuscular Once Sara Frias APRN   60 mg at 10/06/24 0953     Current Outpatient Medications on File Prior to Encounter   Medication Sig Dispense Refill    acetaminophen 500 MG Oral Tab Take 2 tablets (1,000 mg total) by mouth every 6 (six) hours as needed for Pain.      lurasidone 80 MG Oral Tab Take 1 tablet (80 mg total) by mouth at bedtime.      hydrOXYzine 25 MG Oral Tab Take 1 tablet (25 mg total) by mouth nightly as  needed for Anxiety.      LORazepam 0.5 MG Oral Tab Take 1 tablet (0.5 mg total) by mouth nightly as needed for Anxiety.      Ketorolac Tromethamine 10 MG Oral Tab Take 2 tablets (20 mg total) by mouth As Directed. For migraines: take 20mg once at the onset of the migraine. 30 tablet 0    lamoTRIgine 100 MG Oral Tab Take 1 tablet (100 mg total) by mouth daily.      buPROPion  MG Oral Tablet 24 Hr Take 1 tablet (150 mg total) by mouth daily. 30 tablet 0    propranolol 20 MG Oral Tab Take 1 tablet (20 mg total) by mouth 2 (two) times daily. 60 tablet 0    naltrexone 50 MG Oral Tab Take 1 tablet (50 mg total) by mouth daily. 30 tablet 0    sertraline 100 MG Oral Tab Take 1 tablet (100 mg total) by mouth daily. 30 tablet 0    traZODone 100 MG Oral Tab Take 1 tablet (100 mg total) by mouth nightly as needed for Sleep. 30 tablet 0    meclizine 25 MG Oral Tab Take 1 tablet (25 mg total) by mouth 3 (three) times daily as needed for Dizziness. 15 tablet 0    albuterol (PROAIR HFA) 108 (90 Base) MCG/ACT Inhalation Aero Soln SHAKE WELL AND INHALE 1 TO 2 PUFFS INTO THE LUNGS EVERY 4 HOURS AS NEEDED FOR WHEEZING( COUGH) 3 each 1    Multiple Vitamins-Minerals (MULTIVITAL OR) Take 1 tablet by mouth daily. Bariatric vitamin

## 2024-11-27 NOTE — INTERVAL H&P NOTE
Pre-op Diagnosis: Myelopathy concurrent with and due to spinal stenosis of cervical region (HCC) [M48.02, G99.2]  Myelomalacia of cervical cord (HCC) [G95.89]  Cervical radiculopathy [M54.12]  Numbness and tingling of right arm [R20.0, R20.2]  Neck pain [M54.2]  Decreased  strength [R29.898]  Gait instability [R26.81]  Page sign present [R29.2]  Hyper reflexia [R29.2]    The above referenced H&P was reviewed by Otilia Garcia PA-C on 11/27/2024, the patient was examined and no significant changes have occurred in the patient's condition since the H&P was performed.  Dr. Dickson and I discussed with the patient and/or legal representative the potential benefits, risks and side effects of this procedure; the likelihood of the patient achieving goals; and potential problems that might occur during recuperation.  Dr. Dickson and I discussed reasonable alternatives to the procedure, including risks, benefits and side effects related to the alternatives and risks related to not receiving this procedure.  We will proceed with procedure as planned. I am acting a scribe.

## 2024-11-28 ENCOUNTER — APPOINTMENT (OUTPATIENT)
Dept: GENERAL RADIOLOGY | Facility: HOSPITAL | Age: 44
End: 2024-11-28
Attending: PHYSICIAN ASSISTANT
Payer: COMMERCIAL

## 2024-11-28 VITALS
WEIGHT: 293 LBS | SYSTOLIC BLOOD PRESSURE: 117 MMHG | RESPIRATION RATE: 16 BRPM | OXYGEN SATURATION: 97 % | BODY MASS INDEX: 47.09 KG/M2 | HEIGHT: 66 IN | TEMPERATURE: 98 F | DIASTOLIC BLOOD PRESSURE: 86 MMHG | HEART RATE: 86 BPM

## 2024-11-28 PROBLEM — G43.909 MIGRAINE: Status: ACTIVE | Noted: 2019-01-14

## 2024-11-28 LAB
HCT VFR BLD AUTO: 34.8 %
HGB BLD-MCNC: 11.7 G/DL

## 2024-11-28 PROCEDURE — 72040 X-RAY EXAM NECK SPINE 2-3 VW: CPT | Performed by: PHYSICIAN ASSISTANT

## 2024-11-28 PROCEDURE — 99214 OFFICE O/P EST MOD 30 MIN: CPT | Performed by: STUDENT IN AN ORGANIZED HEALTH CARE EDUCATION/TRAINING PROGRAM

## 2024-11-28 RX ORDER — METHOCARBAMOL 500 MG/1
500 TABLET, FILM COATED ORAL 3 TIMES DAILY
Qty: 45 TABLET | Refills: 0 | Status: SHIPPED | OUTPATIENT
Start: 2024-11-28

## 2024-11-28 RX ORDER — KETOROLAC TROMETHAMINE 10 MG/1
20 TABLET, FILM COATED ORAL AS DIRECTED
Status: SHIPPED | COMMUNITY
Start: 2024-11-28

## 2024-11-28 RX ORDER — DOCUSATE SODIUM 100 MG/1
100 CAPSULE, LIQUID FILLED ORAL 2 TIMES DAILY PRN
Qty: 40 CAPSULE | Refills: 0 | Status: SHIPPED | OUTPATIENT
Start: 2024-11-28

## 2024-11-28 RX ORDER — OXYCODONE HYDROCHLORIDE 5 MG/1
5 TABLET ORAL EVERY 4 HOURS PRN
Qty: 45 TABLET | Refills: 0 | Status: SHIPPED | OUTPATIENT
Start: 2024-11-28

## 2024-11-28 NOTE — PLAN OF CARE
Patient A&Ox4, VSS on room air. POD#1, site is C/D/I, no swelling. Pain is well controlled. Patient ambulating halls without difficulty. Plan to DC home today. POC reviewed with patient.

## 2024-11-28 NOTE — PLAN OF CARE
Patient A&O X4 on RA. VSS, /IS/telemetry- NSR. SCDs/TEDs. Montgomery in place and draining. Surgical incision to anterior neck covered with dermabond, c/d/i. N/t to BUE/BLE improving since pre-op. Pain controlled with PO medication. PT/OT to eval. Up min assist w/ walker. Reminded to use call light. Plan to dc home when cleared.

## 2024-11-28 NOTE — PROGRESS NOTES
AVS reviewed with patient. Reviewed instructions, restrictions, medications and follow up appointments. All questions answered, verbalized understanding.

## 2024-11-28 NOTE — PROGRESS NOTES
TEN MOCTEZUMA M.D., F.A.A.N.S.     of Neurosurgery  [facility]  Board Certified Neurosurgeon                          [facility]   part of [facility] Neurosurgery        [place] for [address]  [address] [place] [address]    PHONE  [phone]          FAX  [phone]    [email or web address]      NEUROSURGERY PROGRESS NOTE      Crys Carter Patient Status:  Outpatient in a Bed    1980 MRN [id number]   Location [facility] 3SW-A Attending Allen Dickson MD   Hosp Day # 0 PCP Viki Pinto MD     Subjective:  Crys Carter is a(n) 44 year old female.  Alert, orientated x3.  Cooperative.  No apparent distress.      Vital Signs:    Temp:  [96.8 °F (36 °C)-98.5 °F (36.9 °C)] 98 °F (36.7 °C)  Pulse:  [68-98] 86  Resp:  [16-24] 16  BP: (104-120)/(53-86) 117/86  SpO2:  [95 %-100 %] 97 %    I/O:  I/O last 3 completed shifts:  In:  [P.O.:240; I.V.:1600; IV PIGGYBACK:100]  Out:  [Urine:; Blood:5]    Inpatient Medications:    Current Facility-Administered Medications:     sodium chloride 0.9 % IV bolus 500 mL, 500 mL, Intravenous, Once PRN    sennosides (Senokot) tab 17.2 mg, 17.2 mg, Oral, Nightly    docusate sodium (Colace) cap 100 mg, 100 mg, Oral, BID    polyethylene glycol (PEG 3350) (Miralax) 17 g oral packet 17 g, 17 g, Oral, Daily PRN    magnesium hydroxide (Milk of Magnesia) 400 MG/5ML oral suspension 30 mL, 30 mL, Oral, Daily PRN    bisacodyl (Dulcolax) 10 MG rectal suppository 10 mg, 10 mg, Rectal, Daily PRN    fleet enema (Fleet) rectal enema 133 mL, 1 enema, Rectal, Once PRN    ondansetron (Zofran) 4 MG/2ML injection 4 mg, 4 mg, Intravenous, Q6H PRN    diphenhydrAMINE (Benadryl) cap/tab 25 mg, 25 mg, Oral, Q4H PRN **OR** diphenhydrAMINE (Benadryl) 50 mg/mL  injection 25 mg, 25 mg, Intravenous, Q4H PRN    benzocaine-menthol (Cepacol)  lozenge 1 lozenge, 1 lozenge, Buccal, Q15 Min PRN    sodium chloride 0.9% infusion, , Intravenous, Continuous    oxyCODONE immediate release tab 5 mg, 5 mg, Oral, Q4H PRN **OR** oxyCODONE immediate release tab 10 mg, 10 mg, Oral, Q4H PRN    HYDROmorphone (Dilaudid) 1 MG/ML injection 0.4 mg, 0.4 mg, Intravenous, Q2H PRN **OR** HYDROmorphone (Dilaudid) 1 MG/ML injection 0.8 mg, 0.8 mg, Intravenous, Q2H PRN    methocarbamol (Robaxin) tab 750 mg, 750 mg, Oral, QID    acetaminophen (Ofirmev) 10 mg/mL infusion premix 1,000 mg, 1,000 mg, Intravenous, Q6H    buPROPion ER (Wellbutrin XL) 24 hr tab 150 mg, 150 mg, Oral, Daily    hydrOXYzine (Atarax) tab 25 mg, 25 mg, Oral, Nightly PRN    lamoTRIgine (LaMICtal) tab 100 mg, 100 mg, Oral, Daily    levothyroxine (Synthroid) tab 75 mcg, 75 mcg, Oral, Before breakfast    LORazepam (Ativan) tab 0.5 mg, 0.5 mg, Oral, Nightly PRN    lurasidone (Latuda) tab 80 mg, 80 mg, Oral, Nightly    pantoprazole (Protonix) DR tab 20 mg, 20 mg, Oral, Before breakfast    propranolol (Inderal) tab 20 mg, 20 mg, Oral, BID    sertraline (Zoloft) tab 100 mg, 100 mg, Oral, Daily    traZODone (Desyrel) tab 100 mg, 100 mg, Oral, Nightly PRN    Labs:  Lab Results   Component Value Date    WBC 9.9 11/14/2024    HGB 11.7 (L) 11/28/2024    .0 11/14/2024    BUN 10 11/14/2024     11/14/2024    K 4.5 11/14/2024    CO2 27.0 11/14/2024    GLU 93 11/14/2024    ALB 3.7 01/27/2024    PTT 32.5 11/27/2024    INR 0.98 11/14/2024         Neurological Exam:  AAOx3, following commands  PERRLA  EOMI  MAEx4, 5/5 AMG  Sensation symmetrical  Incision c/d/I    Abdomen:  Soft, non-distended, non-tender, with no rebound or guarding.  No peritoneal signs.   Extremities:  Non-tender, no lower extremity edema noted.        Imaging:    X-ray C-spine reviewed    XR FLUOROSCOPY C-ARM TIME LESS THAN 1 HOUR (CPT=76000)    Result Date: 11/27/2024  CONCLUSION:  4 fluoroscopic image(s) obtained and submitted during cervical  spine procedure.  No radiologist was present for the exam.  Correlation with real-time fluoroscopy and operative report are recommended.   LOCATION:  [facility]    Dictated by (CST): Matthew Barnes MD on 11/27/2024 at 11:43 AM     Finalized by (CST): Matthew Barnes MD on 11/27/2024 at 11:44 AM        Assessment/Plan:  Active Problems:    Cervical radiculopathy    Anxiety    Bipolar 1 disorder (HCC)    S/p ACDF, doing well  Dispo today.  F/u with Dr. Man in 2-3 weeks.  D/w patient and nursing staff.    All questions and concerns were addressed. We appreciate the opportunity to participate in the care of this patient. Please do not hesitate to call our office ([phone]) with any issues.          Vito Gomez M.D., F.A.A.N.S.    11/28/2024  9:29 AM    This note has been dictated utilizing voice recognition software. Unfortunately, this may lead to occasional typographical errors. If there are any questions regarding this, please do not hesitate to contact our office.

## 2024-11-28 NOTE — PHYSICAL THERAPY NOTE
PHYSICAL THERAPY EVALUATION - INPATIENT     Room Number: 383/383-A  Evaluation Date: 2024  Type of Evaluation: Initial  Physician Order: PT Eval and Treat    Presenting Problem: s/p C4-5-6 anterior cervical discectomy and fusion  Co-Morbidities : anxiety, bipolar disorder, TIA, migraines, GERD, cervical myelopathy  Reason for Therapy: Mobility Dysfunction and Discharge Planning    PHYSICAL THERAPY ASSESSMENT   Patient is a 44 year old female admitted 2024 s/p C4-5-6 anterior discectomy.   Patient is currently functioning at baseline with bed mobility, transfers, gait, stair negotiation, and maintaining seated position. Prior to admission, patient's baseline is IND.     Patient will benefit from continued skilled PT Services with no additional skilled services but increased support at home.    PLAN  Patient has been evaluated and presents with no skilled Physical Therapy needs at this time.  Patient discharged from Physical Therapy services.  Please re-order if a new functional limitation presents during this admission.    PT Device Recommendation: Gait belt    GOALS  Patient was able to achieve the following goals ...    Patient was able to transfer At previous, functional level  Safely and independently   Patient able to ambulate on level surfaces At previous, functional level  Safely and independently     HOME SITUATION  Type of Home: House  Home Layout: Two level  Stairs to Enter : 0        Stairs to Bedroom: 13         Lives With: Parent(s)    Drives: Yes   Patient Regularly Uses: None     Prior Level of [place]: IND prior to admission.     SUBJECTIVE  \"My mom got an extra ticket\"    OBJECTIVE     Fall Risk: Standard fall risk    WEIGHT BEARING RESTRICTION     PAIN ASSESSMENT  Ratin          COGNITION  Overall Cognitive Status:  WFL - within functional limits    RANGE OF MOTION AND STRENGTH ASSESSMENT  Upper extremity ROM and strength are within functional limits     Lower extremity ROM is  within functional limits     Lower extremity strength is within functional limits     BALANCE  Static Sitting: Normal  Dynamic Sitting: Normal  Static Standing: Good  Dynamic Standing: Good    ADDITIONAL TESTS                                    ACTIVITY TOLERANCE                         O2 WALK       NEUROLOGICAL FINDINGS  Neurological Findings: None                     AM-PAC '6-Clicks' INPATIENT SHORT FORM - BASIC MOBILITY  How much difficulty does the patient currently have...  Patient Difficulty: Turning over in bed (including adjusting bedclothes, sheets and blankets)?: None   Patient Difficulty: Sitting down on and standing up from a chair with arms (e.g., wheelchair, bedside commode, etc.): None   Patient Difficulty: Moving from lying on back to sitting on the side of the bed?: None   How much help from another person does the patient currently need...   Help from Another: Moving to and from a bed to a chair (including a wheelchair)?: None   Help from Another: Need to walk in hospital room?: None   Help from Another: Climbing 3-5 steps with a railing?: None       AM-PAC Score:  Raw Score: 24   Approx Degree of Impairment: 0%   Standardized Score (AM-PAC Scale): 61.14   CMS Modifier (G-Code): CH    FUNCTIONAL ABILITY STATUS  Gait Assessment   Functional Mobility/Gait Assessment  Gait Assistance: Independent  Distance (ft): 150  Assistive Device: None  Pattern: Within Functional Limits  Stairs: Stairs  How Many Stairs: 5  Device: 1 Rail  Assist: Modified independent  Pattern: Ascend and Descend  Ascend and Descend : Step to    Skilled Therapy Provided     Bed Mobility:  Supine to sit: IND   Sit to supine: IND     Transfer Mobility:  Sit to stand: IND   Stand to sit: IND  Gait = IND    Therapist's comments:pt educated on role of IP PT services, PT evaluation purpose, PT POC, PT goals, device recommendations, dc recommendations, and importance of mobility while hospitalized.   -discussed cervical  precautions    Exercise/Education Provided:  Bed mobility  Body mechanics  Energy conservation  Functional activity tolerated  Gait training  Neuromuscular re-educate  Posture  ROM  Strengthening  Transfer training    Patient End of Session: In bed;Needs met;Call light within reach;RN aware of session/findings;All patient questions and concerns addressed;Hospital anti-slip socks;Alarm set    Patient Evaluation Complexity Level:  History Low - no personal factors and/or co-morbidities   Examination of body systems Low -  addressing 1-2 elements   Clinical Presentation Low- Stable   Clinical Decision Making Low Complexity       PT Session Time: 20 minutes  Therapeutic Exercise: 10 minutes

## 2024-11-28 NOTE — OCCUPATIONAL THERAPY NOTE
OCCUPATIONAL THERAPY EVALUATION - INPATIENT    Room Number: 383/383-A  Evaluation Date: 11/28/2024     Type of Evaluation: Initial  Presenting Problem: C4-C6 disectomy and fusion on 11/27    Physician Order: IP Consult to Occupational Therapy  Reason for Therapy:  ADL/IADL Dysfunction and Discharge Planning    OCCUPATIONAL THERAPY ASSESSMENT   Patient is a 44 year old female admitted on 11/27/2024 with Presenting Problem: C4-C6 disectomy and fusion on 11/27. Co-Morbidities : anxiety, bipolar disorder, TIA, migraines, GERD, cervical myelopathy  Patient is currently functioning at baseline with toileting, upper body dressing, lower body dressing, grooming, bed mobility, transfers, static sitting balance, dynamic sitting balance, static standing balance, dynamic standing balance, maintaining seated position, and functional standing tolerance.  Prior to admission, patient's baseline is Mod I.  Patient met all OT goals at sup level.  Patient reports no further questions/concerns at this time.       Recommendations for nursing staff:   Transfers: Sup  Toileting location: Toilet    EVALUATION SESSION:  Patient at start of session: supine in bed for session    FUNCTIONAL TRANSFER ASSESSMENT  Sit to Stand: Edge of Bed; Chair  Edge of Bed: Supervision  Chair: Supervision  Toilet Transfer: Supervision    BED MOBILITY  Rolling: Supervision  Supine to Sit : Supervision  Sit to Supine (OT): Supervision  Scooting: Sup to EOB    BALANCE ASSESSMENT  Static Sitting: Supervision  Static Standing: Supervision (to stand and amb in room and bathroom)    FUNCTIONAL ADL ASSESSMENT  Grooming Standing: Supervision (to wash up at sink)  UB Dressing Seated: Supervision (for shirt)  LB Dressing Seated: Supervision (to ailyn socks and pants over feet)  LB Dressing Standing: Supervision (to pull up pants)  Toileting Seated: Supervision (at [facility] toilet)    ACTIVITY TOLERANCE: vitals stable                         O2 SATURATIONS        COGNITION  Overall Cognitive Status:  WFL - within functional limits    COGNITION ASSESSMENTS     Upper Extremity:   ROM: within functional limits   Strength: is within functional limits   Coordination:  Gross motor: WNL  Fine motor: WNL  Sensation: Light touch:  intact    EDUCATION PROVIDED  Patient Education : Role of Occupational Therapy; Plan of Care  Patient's Response to Education: Verbalized Understanding; Returned Demonstration    Equipment used: RW  Demonstrates functional use    Therapist comments: Pt reported fatigue at home, pt educated on work simplification and energy conservation for at home to assist with independence with fatigue at home.     Patient End of Session: In bed;Needs met;Call light within reach;RN aware of session/findings;All patient questions and concerns addressed    OCCUPATIONAL PROFILE    HOME SITUATION  Type of Home: House  Home Layout: Two level  Lives With: Parent(s)    Toilet and Equipment: Comfort height toilet  Shower/Tub and Equipment: Walk-in shower  Other Equipment: None          Drives: Yes  Patient Regularly Uses: None    Prior Level of Function: Pt typically independent with ADLs and mobility. Pt does not use AD.    SUBJECTIVE  Pt stated, \"I just cannot stand too long.\"    PAIN ASSESSMENT  Ratin  Location: no pain at this time       OBJECTIVE     Fall Risk: Standard fall risk    WEIGHT BEARING RESTRICTION       AM-PAC ‘6-Clicks’ Inpatient Daily Activity Short Form  -   Putting on and taking off regular lower body clothing?: A Little  -   Bathing (including washing, rinsing, drying)?: A Little  -   Toileting, which includes using toilet, bedpan or urinal? : A Little  -   Putting on and taking off regular upper body clothing?: A Little  -   Taking care of personal grooming such as brushing teeth?: A Little  -   Eating meals?: A Little    AM-PAC Score:  Score: 18  Approx Degree of Impairment: 46.65%  Standardized Score (AM-PAC Scale): 38.66    ADDITIONAL TESTS      NEUROLOGICAL FINDINGS      PLAN   Patient has been evaluated and presents with no skilled Occupational Therapy needs at this time.  Patient discharged from Occupational Therapy services.  Please re-order if a new functional limitation presents during this admission.         Patient Evaluation Complexity Level:   Occupational Profile/Medical History LOW - Brief history including review of medical or therapy records    Specific performance deficits impacting engagement in ADL/IADL LOW  1 - 3 performance deficits    Client Assessment/Performance Deficits LOW - No comorbidities nor modifications of tasks    Clinical Decision Making LOW - Analysis of occupational profile, problem-focused assessments, limited treatment options    Overall Complexity LOW     OT Session Time: 30 minutes  Self-Care Home Management: 25 minutes  Therapeutic Activity: 0 minutes  Neuromuscular Re-education: 0 minutes  Therapeutic Exercise: 0 minutes  Cognitive Skills: 0 minutes  Sensory Integrative: 0 minutes  Orthotic Management and Trainin minutes  Can add/delete any of these

## 2024-11-28 NOTE — PROGRESS NOTES
[facility]   part of [facility]     Hospitalist Progress Note     Crys aCrter Patient Status:  Outpatient in a Bed    1980 MRN [id number]   Location [facility] 3SW-A Attending Allen Dickson MD   Hosp Day # 0 PCP Viki Pinto MD     Chief Complaint: Medical management     Subjective:      - Pt is feeling well post-op, states that tingling of bilateral upper extremities resolved after surgery, and does note that  strength in bilateral hands seems to be improving as well    - Denies other f/c, cp, sob, abd pain, n/v    Objective:    Review of Systems:   A comprehensive review of systems was completed; pertinent positive and negatives stated in subjective.    Vital signs:  Temp:  [96.8 °F (36 °C)-98.5 °F (36.9 °C)] 98.2 °F (36.8 °C)  Pulse:  [57-98] 81  Resp:  [16-24] 16  BP: ()/(53-82) 120/68  SpO2:  [95 %-100 %] 97 %    Physical Exam:    General: No acute distress  HEENT : Anterior cervical incision well approximated   Respiratory: no wheezes, no rhonchi  Cardiovascular: S1, S2, regular rate and rhythm  Abdomen: Soft, Non-tender, non-distended, positive bowel sounds  Neuro: No new focal deficits.   Extremities: no edema    Diagnostic Data:    Labs:  Recent Labs   Lab 24  0541   HGB 11.7*       No results for input(s): \"GLU\", \"BUN\", \"CREATSERUM\", \"GFRAA\", \"GFRNAA\", \"CA\", \"ALB\", \"NA\", \"K\", \"CL\", \"CO2\", \"ALKPHO\", \"AST\", \"ALT\", \"BILT\", \"TP\" in the last 168 hours.    CrCl cannot be calculated (Patient's most recent lab result is older than the maximum 7 days allowed.).    No results for input(s): \"TROP\", \"TROPHS\", \"CK\" in the last 168 hours.    No results for input(s): \"PTP\", \"INR\" in the last 168 hours.               Microbiology    No results found for this visit on 24.      Imaging: Reviewed in Epic.    Medications:    sennosides  17.2 mg Oral Nightly    docusate sodium  100 mg Oral BID    methocarbamol  750 mg Oral QID    acetaminophen  1,000 mg  Intravenous Q6H    buPROPion ER  150 mg Oral Daily    lamoTRIgine  100 mg Oral Daily    levothyroxine  75 mcg Oral Before breakfast    lurasidone  80 mg Oral Nightly    pantoprazole  20 mg Oral Before breakfast    propranolol  20 mg Oral BID    sertraline  100 mg Oral Daily       Assessment & Plan:      #Cervical Myelopathy  S/P C4C5, C5C6, anterior cervical discectomy and fusion 11/27/2024  Per NeurSx  Pain control  PT/OT     #Anxiety/Depresion/Bipolar Disoder  Continue home meds    #Hypothyroid  Synthroid    #Migraines - Holding home ketorolac oral till cleared by surgery clinic as outpatient   # Obstructive Sleep Apnea - CPAP at night per protocol    #Asthma - no wheezing on exam    Agree with home today    Keyon Castro MD    Supplementary Documentation:     Quality:  DVT Mechanical Prophylaxis: BLOSSOM hose, SCDs, Early ambuation  DVT Pharmacologic Prophylaxis   Medication   None                Code Status: Prior  [place]: No urinary catheter in place  [place] Duration (in days):   Central line:    BORIS: 11/28/2024    The 21st Century Cures Act makes medical notes like these available to patients in the interest of transparency. Please be advised this is a medical document. Medical documents are intended to carry relevant information, facts as evident, and the clinical opinion of the practitioner. The medical note is intended as peer to peer communication and may appear blunt or direct. It is written in medical language and may contain abbreviations or verbiage that are unfamiliar.

## 2024-12-02 ENCOUNTER — TELEPHONE (OUTPATIENT)
Dept: SURGERY | Facility: CLINIC | Age: 44
End: 2024-12-02

## 2024-12-02 NOTE — TELEPHONE ENCOUNTER
Spoke with patient and conducted post op out reach.    Crys states overall she is doing okay, she had been doing well until last night when she was struggling with pain control.  Patient reports last night pain was excruciating and was not helped with any of the medications provided.  She was finally able to get to sleep in the early hours of the morning.  Per patient incision looks well, her mother has been assessing this for her and has indicated that although it is a little puffy there is no redness, drainage, bad smell, incision area is not hot to the touch.  Patient has not been having any fevers, chills, or malaise.    She has been taking her medications as prescribed.-No refills needed at this time  We discussed ice and heat therapy to use in conjunction with prescribed medication.  Educated about restrictions while encouraging patient to get up and walk, to change positions often, and to do her ankle pump exercises to reduce the risk of blood clots.    Discussed red flag symptoms and when to proceed to the emergency room.  Patient acknowledged    Patient is scheduled for her first postop appointment on December 20, 2024 with DIANE Bingham  With the charge provider for input

## 2024-12-02 NOTE — TELEPHONE ENCOUNTER
Attempted to call patient to discuss. Left message to call back or respond to MyChart message.     Patient is active on MyChart. MC message sent.

## 2024-12-02 NOTE — TELEPHONE ENCOUNTER
Patient reminder received regarding post-op outreach.    Patient had surgery for CERVICAL 4 - CERVICAL 5, CERVICAL 5 - CERVICAL 6 ANTERIOR CERVICAL DISCECTOMY AND FUSION WITH INSTRUMENTATION, ALLOGRAFT on 11/27/24 with Dr. Dickson.     Routed to nursing [facility] for outreach call.

## 2024-12-03 NOTE — DISCHARGE SUMMARY
[facility]  Discharge Summary    Crys Carter Patient Status:  Outpatient in a Bed    1980 MRN [id number]   Location [facility] 3SW-A Attending No att. 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]  Cervical radiculopathy [M54.12]  Numbness and tingling of right arm [R20.0, R20.2]  Neck pain [M54.2]  Decreased  strength [R29.898]  Gait instability [R26.81]  Page sign present [R29.2]  Hyper reflexia [R29.2]  Cervical radiculopathy    Discharge Diagnosis:   Patient Active Problem List   Diagnosis    Uncomplicated asthma (HCC)    GERD (gastroesophageal reflux disease)    Allergic rhinitis    Prediabetes    Chronic migraine without aura without status migrainosus, not intractable    Bipolar disorder, in partial remission, most recent episode mixed (HCC)    Vitamin D deficiency    FADY (generalized anxiety disorder)    Vitamin B12 deficiency    Insomnia    Chronic pain of left knee    Migraine    Hypothyroidism    Vegan diet    SASHA (obstructive sleep apnea)    Attention deficit hyperactivity disorder (ADHD)    Morbid (severe) obesity due to excess calories (HCC)    S/P laparoscopic sleeve gastrectomy    Mild intermittent asthma without complication (HCC)    Cervical radiculopathy    Anxiety    Bipolar 1 disorder (HCC)    same as preop    Reason for Admission: For the below surgical procedure with Dr. Dickson    Procedures: CERVICAL 4 - CERVICAL 5, CERVICAL 5 - CERVICAL 6 ANTERIOR CERVICAL DISCECTOMY AND FUSION WITH INSTRUMENTATION, ALLOGRAFT     Hospital Course: The patient arrived for surgical intervention as noted above with Dr. Dickson.  Patient underwent surgical intervention and anesthesia without apparent complication.  Patient was  transferred to the orthospine floor.  She remained for pain control.  Patient was provided extensive discharge  instructions.  Medication therapy provided.  Please see Dr. Gomez's note from date of discharge for further details.    Complications: No apparent complications    Discharge Condition: Good    Disposition: Home or Self Care    Discharge Medications:   Discharge Medication List as of 11/28/2024 10:41 AM        START taking these medications    Details   methocarbamol 500 MG Oral Tab Take 1 tablet (500 mg total) by mouth 3 (three) times daily., Normal, Disp-45 tablet, R-0      oxyCODONE 5 MG Oral Tab Take 1 tablet (5 mg total) by mouth every 4 (four) hours as needed., Normal, Disp-45 tablet, R-0      docusate sodium 100 MG Oral Cap Take 1 capsule (100 mg total) by mouth 2 (two) times daily as needed for constipation., Normal, Disp-40 capsule, R-0      Naloxone HCl 4 MG/0.1ML Nasal Liquid 4 mg by Nasal route as needed. If patient remains unresponsive, repeat dose in other nostril 2-5 minutes after first dose., Normal, Disp-1 kit, R-0           CONTINUE these medications which have CHANGED    Details   Ketorolac Tromethamine 10 MG Oral Tab Take 2 tablets (20 mg total) by mouth As Directed. For migraines: take 20mg once at the onset of the migraine. HOLD till cleared by neurosurgery clinic, Historical           CONTINUE these medications which have NOT CHANGED    Details   acetaminophen 500 MG Oral Tab Take 2 tablets (1,000 mg total) by mouth every 6 (six) hours as needed for Pain., Historical      levothyroxine 75 MCG Oral Tab Take 1 tablet (75 mcg total) by mouth before breakfast., Normal, Disp-90 tablet, R-0      pantoprazole 20 MG Oral Tab EC Take 1 tablet (20 mg total) by mouth before breakfast., Normal, Disp-90 tablet, R-0      ondansetron 4 MG Oral Tablet Dispersible Take 1 tablet (4 mg total) by mouth every 8 (eight) hours as needed for Nausea., Normal, Disp-30 tablet, R-0      Atogepant (QULIPTA) 30 MG  Oral Tab Take 1 tablet by mouth daily., Normal, Disp-90 tablet, R-1      ubrogepant (UBRELVY) 100 MG Oral Tab Take one tablet at onset of migraine.  May take additional tablet in 2 hours if needed.  Do not exceed two tablets per 24 hour period., Normal, Disp-10 tablet, R-5      lurasidone 80 MG Oral Tab Take 1 tablet (80 mg total) by mouth at bedtime., Historical      hydrOXYzine 25 MG Oral Tab Take 1 tablet (25 mg total) by mouth nightly as needed for Anxiety., Historical      LORazepam 0.5 MG Oral Tab Take 1 tablet (0.5 mg total) by mouth nightly as needed for Anxiety., Historical      lamoTRIgine 100 MG Oral Tab Take 1 tablet (100 mg total) by mouth daily., Historical      buPROPion  MG Oral Tablet 24 Hr Take 1 tablet (150 mg total) by mouth daily., Normal, Disp-30 tablet, R-0      propranolol 20 MG Oral Tab Take 1 tablet (20 mg total) by mouth 2 (two) times daily., Normal, Disp-60 tablet, R-0      naltrexone 50 MG Oral Tab Take 1 tablet (50 mg total) by mouth daily., Normal, Disp-30 tablet, R-0      sertraline 100 MG Oral Tab Take 1 tablet (100 mg total) by mouth daily., Normal, Disp-30 tablet, R-0      traZODone 100 MG Oral Tab Take 1 tablet (100 mg total) by mouth nightly as needed for Sleep., Normal, Disp-30 tablet, R-0      meclizine 25 MG Oral Tab Take 1 tablet (25 mg total) by mouth 3 (three) times daily as needed for Dizziness., Normal, Disp-15 tablet, R-0      albuterol (PROAIR HFA) 108 (90 Base) MCG/ACT Inhalation Aero Soln SHAKE WELL AND INHALE 1 TO 2 PUFFS INTO THE LUNGS EVERY 4 HOURS AS NEEDED FOR WHEEZING( COUGH), Normal, Disp-3 each, R-1      Multiple Vitamins-Minerals (MULTIVITAL OR) Take 1 tablet by mouth daily. Bariatric vitamin, Historical             Follow up Visits:   Future Appointments   Date Time Provider Department Center   12/5/2024 10:45 AM Otilia Garcia PA-C ENINAPER2 [facility]   12/20/2024 12:15 PM Otilia Garcia PA-C ENINAP[facility]2 [facility] [place]   1/9/2025  2:00 PM Randolph,  MD ALESSIA Barth2 [facility]   1/23/2025 12:00 PM [facility]   2/25/2025  1:00 PM Albert Galicia MD ENIWARREN [facility]       Follow up Labs: None at this time from a neurosurgical standpoint    Follow up Imaging: None at this time from a neurosurgical standpoint    Patient Instructions:  Please see patient instruction section for further details      Otilia Garcia M.S., PA-C  [address], [address] [address]  [phone]  12/3/2024 2:44 PM    Dragon speech recognition software was used to prepare this note. If a word or phrase is confusing, it is likely due to a failure of recognition. Please contact me with any questions or clarifications.    Is this a shared or split note between Advanced Practice Provider and Physician? Yes

## 2024-12-05 ENCOUNTER — TELEPHONE (OUTPATIENT)
Dept: SURGERY | Facility: CLINIC | Age: 44
End: 2024-12-05

## 2024-12-05 ENCOUNTER — OFFICE VISIT (OUTPATIENT)
Dept: SURGERY | Facility: CLINIC | Age: 44
End: 2024-12-05
Payer: COMMERCIAL

## 2024-12-05 VITALS — SYSTOLIC BLOOD PRESSURE: 118 MMHG | HEART RATE: 80 BPM | DIASTOLIC BLOOD PRESSURE: 72 MMHG

## 2024-12-05 DIAGNOSIS — Z98.1 S/P CERVICAL SPINAL FUSION: Primary | ICD-10-CM

## 2024-12-05 DIAGNOSIS — Z98.890 POST-OPERATIVE STATE: ICD-10-CM

## 2024-12-05 PROBLEM — F41.0 PANIC ATTACKS: Status: ACTIVE | Noted: 2024-03-15

## 2024-12-05 PROCEDURE — 99024 POSTOP FOLLOW-UP VISIT: CPT | Performed by: PHYSICIAN ASSISTANT

## 2024-12-05 PROCEDURE — 3078F DIAST BP <80 MM HG: CPT | Performed by: PHYSICIAN ASSISTANT

## 2024-12-05 PROCEDURE — 3074F SYST BP LT 130 MM HG: CPT | Performed by: PHYSICIAN ASSISTANT

## 2024-12-05 PROCEDURE — 3008F BODY MASS INDEX DOCD: CPT | Performed by: PHYSICIAN ASSISTANT

## 2024-12-05 RX ORDER — METHYLPREDNISOLONE 4 MG/1
TABLET ORAL
Qty: 1 EACH | Refills: 0 | Status: SHIPPED | OUTPATIENT
Start: 2024-12-05

## 2024-12-05 NOTE — TELEPHONE ENCOUNTER
Patient called.  She endorses no drainage from the incision site.  States it still feels mildly swollen.  Some difficulty swallowing.  She would feel better if she could come in today for her appointment.  Patient plan to be seen at 1045 today.

## 2024-12-05 NOTE — PROGRESS NOTES
Patient: Crys Carter  Medical Record Number: [id number]  YOB: 1980  PCP: Viki Pinto MD    Reason for visit: Cervical follow up, post op visit    HISTORY OF CHIEF COMPLAINT:    Crys Carter is a very pleasant 44 year old female who presents today for: Cervical follow up, post op visit  S/p 11/27/24: Dr. Dickson: ACDF C4-6  Patient presents with her mother who has been her primary care taker postsurgery.  She advises the incision was initially flat and then eventually began to have some swelling.  It was soft to first and is now become harder.  There is no discharge.  The glue towards the medial aspect was pulled off by getting stuck on her shirt, patient endorses.  No erythema or concern for infection.  No fevers.  She has noticed improvement of her upper extremities postsurgery.  She does have discomfort in the back of the neck and upper shoulders, left greater than right, to be expected postoperatively.  Mild dysphagia noted, which appears worse today, but she endorses this could be secondary to what she was eating.  Dysphagia can be expected postsurgical intervention for ACDF.    Past Medical History:    Allergic rhinitis    Anxiety    Arthritis    Asthma (HCC)    Back problem    Bipolar disorder (HCC)    Calculus of kidney    Cancer (HCC)    Basal cell carcinoma over the left eyebrow    Depression    Disorder of thyroid    Esophageal reflux    Hypothyroidism    Migraine    Migraines    Obesity, unspecified    PONV (postoperative nausea and vomiting)    S/P laparoscopic sleeve gastrectomy    Sleep apnea    history of, improved with weight loss surgery, no longer needs cpap    Stroke (HCC)    TIA, possible not completely diagonosed    Visual impairment    glasses      Past Surgical History:   Procedure Laterality Date    Other  2009    benign tumor reomoved  from left inner thigh    Other  2014    basal carcinoma removed from upper lid of left eye    Other surgical history   03/07/2022    Gastric sleeve    Other surgical history  11/27/2024    CERVICAL 4 - CERVICAL 5, CERVICAL 5 - CERVICAL 6 ANTERIOR CERVICAL DISCECTOMY AND FUSION WITH INSTRUMENTATION, ALLOGRAFT    Skin surgery  Oct 2014    tumor removed from eyebrow ridge    Tonsillectomy        Family History   Problem Relation Age of Onset    Depression Father     Heart Disease Father     Asthma Father     Diabetes Mother     Obesity Mother     Depression Mother     Alcohol and Other Disorders Associated Maternal Grandmother     Cancer Maternal Grandmother         Lung CA    Cancer Maternal Grandfather         Prostate CA    Hypertension Paternal Grandmother     Cancer Paternal Grandfather         Lung CA    Personality Disorder Paternal Grandfather     Breast Cancer Maternal Aunt 65        in her 60's    Substance Abuse Maternal Uncle     Stroke Neg       Social History     Socioeconomic History    Marital status: Single   Tobacco Use    Smoking status: Former     Current packs/day: 0.00     Average packs/day: 1 pack/day for 23.9 years (23.9 ttl pk-yrs)     Types: Cigarettes     Start date: 1/27/1985     Quit date: 12/15/2008     Years since quitting: 15.9     Passive exposure: Never    Smokeless tobacco: Never   Vaping Use    Vaping status: Never Used   Substance and Sexual Activity    Alcohol use: Not Currently     Comment: Last week in August 2023    Drug use: Not Currently    Sexual activity: Not Currently     Partners: Male     Birth control/protection: Condom   Other Topics Concern    Caffeine Concern No    Stress Concern Yes    Weight Concern Yes    Special Diet Yes     Comment: Bariatric    Exercise No    Seat Belt Yes      Allergies[1]   Current Medications:  Current Outpatient Medications   Medication Sig Dispense Refill    methocarbamol 500 MG Oral Tab Take 1 tablet (500 mg total) by mouth 3 (three) times daily. 45 tablet 0    oxyCODONE 5 MG Oral Tab Take 1 tablet (5 mg total) by mouth every 4 (four) hours as needed. 45  tablet 0    docusate sodium 100 MG Oral Cap Take 1 capsule (100 mg total) by mouth 2 (two) times daily as needed for constipation. 40 capsule 0    Ketorolac Tromethamine 10 MG Oral Tab Take 2 tablets (20 mg total) by mouth As Directed. For migraines: take 20mg once at the onset of the migraine. HOLD till cleared by neurosurgery clinic      acetaminophen 500 MG Oral Tab Take 2 tablets (1,000 mg total) by mouth every 6 (six) hours as needed for Pain.      levothyroxine 75 MCG Oral Tab Take 1 tablet (75 mcg total) by mouth before breakfast. 90 tablet 0    pantoprazole 20 MG Oral Tab EC Take 1 tablet (20 mg total) by mouth before breakfast. 90 tablet 0    ondansetron 4 MG Oral Tablet Dispersible Take 1 tablet (4 mg total) by mouth every 8 (eight) hours as needed for Nausea. 30 tablet 0    Atogepant (QULIPTA) 30 MG Oral Tab Take 1 tablet by mouth daily. 90 tablet 1    ubrogepant (UBRELVY) 100 MG Oral Tab Take one tablet at onset of migraine.  May take additional tablet in 2 hours if needed.  Do not exceed two tablets per 24 hour period. 10 tablet 5    lurasidone 80 MG Oral Tab Take 1 tablet (80 mg total) by mouth at bedtime.      hydrOXYzine 25 MG Oral Tab Take 1 tablet (25 mg total) by mouth nightly as needed for Anxiety.      LORazepam 0.5 MG Oral Tab Take 1 tablet (0.5 mg total) by mouth nightly as needed for Anxiety.      lamoTRIgine 100 MG Oral Tab Take 1 tablet (100 mg total) by mouth daily.      buPROPion  MG Oral Tablet 24 Hr Take 1 tablet (150 mg total) by mouth daily. 30 tablet 0    propranolol 20 MG Oral Tab Take 1 tablet (20 mg total) by mouth 2 (two) times daily. 60 tablet 0    naltrexone 50 MG Oral Tab Take 1 tablet (50 mg total) by mouth daily. 30 tablet 0    sertraline 100 MG Oral Tab Take 1 tablet (100 mg total) by mouth daily. 30 tablet 0    traZODone 100 MG Oral Tab Take 1 tablet (100 mg total) by mouth nightly as needed for Sleep. 30 tablet 0    meclizine 25 MG Oral Tab Take 1 tablet (25 mg  total) by mouth 3 (three) times daily as needed for Dizziness. 15 tablet 0    albuterol (PROAIR HFA) 108 (90 Base) MCG/ACT Inhalation Aero Soln SHAKE WELL AND INHALE 1 TO 2 PUFFS INTO THE LUNGS EVERY 4 HOURS AS NEEDED FOR WHEEZING( COUGH) 3 each 1    Multiple Vitamins-Minerals (MULTIVITAL OR) Take 1 tablet by mouth daily. Bariatric vitamin      Naloxone HCl 4 MG/0.1ML Nasal Liquid 4 mg by Nasal route as needed. If patient remains unresponsive, repeat dose in other nostril 2-5 minutes after first dose. (Patient not taking: Reported on 12/5/2024) 1 kit 0        REVIEW OF SYSTEMS   Comprehensive review of systems done. Negative except what is outlined in the above HPI.     PHYSICAL EXAMIMATION    blood pressure is 118/72 and her pulse is 80.   GENERAL: Very pleasant patient is in no apparent distress. Sitting comfortably in the examination chair.   HEENT: Normocephalic, atraumatic. No voice hoarseness noted.   RESPIRATORY RATE: Easy and Even   SKIN: Warm and dry  NEURO: Awake, alert and orientated. Speech fluent, comprehension intact, answering questions appropriately.     SPINE:  Gait/Coordination:Gait deferred    Upper Extremity Strength:     Deltoid  Biceps  Triceps    Intrinsics      Right 5 5 5 5 5     Left 5 5 5 5 5   Moving BLE spontaneously to full resistance     Incision: CDI without erythema or discharge. Trachea midline. Neck soft to palpation. Mild edema over the incision without fluctuance. Coincides with the incision. Mild opening medially without edema, erythema or discharge where the glue was caught on the patient's shirt. Site cleansed with alcohol swab. Dermabond placed. Patient tolerated well.     DATA:   None    IMAGING:   No recent imaging     MEDICAL DECISION MAKING:     ASSESSMENT and PLAN:    ICD-10-CM    1. S/p 11/27/24: Dr. Dickson: ACDF C4-6  Z98.1       2. Post-operative state  Z98.890         PLAN:   1. Medication: None prescribed  2. Imaging:    - Reviewed today:    -No recent  imaging   - Ordered today:    -XR cervical spine:     - Complete prior to follow up. Complete no further than 1 week out from follow up appointment   3. Activity:    -PT to be considered next OV   -No lifting greater than 10 pounds for the first month postoperatively, 20 pounds for the second, 30 pounds for the third   -Encourage walking   -No excessive bending, lifting, straining, reaching or twisting   -Continue to monitor incision for healing.  If any warning/red flag signs/symptoms, including but not limited to, fevers or drainage, follow-up as stated below  -No submerging incision site under water. No baths, swimming or hot tubs  4. Follow up as schedule for post op visit or call or follow up sooner or go to the ED for any new, worsening or concerning signs or symptoms     I discussed the plan with the patient. The patient agrees with the plan, verbalized understanding and is appreciative. All questions were sought out and thoroughly answered to satisfaction.       Total visit time: 30 min  More than 50% spent coordinating care, providing patient education, discussing activity, discussing incision and counseling.    Otilia Garcia M.S., PA-C  [address], [address]  [place] [address]  [phone]  12/5/2024 10:56 AM    Dragon speech recognition software was used to prepare this note. If a word or phrase is confusing, it is likely due to a failure of recognition. Please contact me with any questions or clarifications.         [1]   Allergies  Allergen Reactions    Clonazepam OTHER (SEE COMMENTS)     Suicidal thoughts  [place] out of sorts and has increased irritability as well as extremities feeling strange    Compazine [Prochlorperazine] OTHER (SEE COMMENTS)     Oral tablet-  Twitching;  Blanked out; nausea    Topiramate OTHER (SEE COMMENTS)     Suicidal,aggresive,brain fog

## 2024-12-05 NOTE — PATIENT INSTRUCTIONS
Refill policies:    Allow 2-3 business days for refills; controlled substances may take longer.  Contact your pharmacy at least 5 days prior to running out of medication and have them send an electronic request or submit request through the “request refill” option in your [employer] account.  Refills are not addressed on weekends; covering physicians do not authorize routine medications on weekends.  No narcotics or controlled substances are refilled after noon on Fridays or by on call physicians.  By law, narcotics must be electronically prescribed.  A 30 day supply with no refills is the maximum allowed.  If your prescription is due for a refill, you may be due for a follow up appointment.  To best provide you care, patients receiving routine medications need to be seen at least once a year.  Patients receiving narcotic/controlled substance medications need to be seen at least once every 3 months.  In the event that your preferred pharmacy does not have the requested medication in stock (e.g. Backordered), it is your responsibility to find another pharmacy that has the requested medication available.  We will gladly send a new prescription to that pharmacy at your request.    Scheduling Tests:    If your physician has ordered radiology tests such as MRI or CT scans, please contact Central Scheduling at [phone] right away to schedule the test.  Once scheduled, the [facility] Centralized Referral Team will work with your insurance carrier to obtain pre-certification or prior authorization.  Depending on your insurance carrier, approval may take 3-10 days.  It is highly recommended patients assure they have received an authorization before having a test performed.  If test is done without insurance authorization, patient may be responsible for the entire amount billed.      Precertification and Prior Authorizations:  If your physician has recommended that you have a procedure or additional testing performed the [facility]  Centralized Referral Team will contact your insurance carrier to obtain pre-certification or prior authorization.    You are strongly encouraged to contact your insurance carrier to verify that your procedure/test has been approved and is a COVERED benefit.  Although the [facility] Centralized Referral Team does its due diligence, the insurance carrier gives the disclaimer that \"Although the procedure is authorized, this does not guarantee payment.\"    Ultimately the patient is responsible for payment.   Thank you for your understanding in this matter.  Paperwork Completion:  If you require FMLA or disability paperwork for your recovery, please make sure to either drop it off or have it faxed to our office at [phone]. Be sure the form has your name and date of birth on it.  The form will be faxed to our Forms Department and they will complete it for you.  There is a 25$ fee for all forms that need to be filled out.  Please be aware there is a 10-14 day turnaround time.  You will need to sign a release of information (RONNY) form if your paperwork does not come with one.  You may call the Forms Department with any questions at [phone].  Their fax number is [phone].

## 2024-12-05 NOTE — PROGRESS NOTES
Established patient:  Reason for follow up: post op and wound check     Numeric Rating Scale:    Pain at Present:  5/10    states the pain is in her left shoulder and the left side of the neck towards the back. The pain get worst at night time       Most recent treatments for Current Pain Condition:   Surgery    Response to treatment: some relief

## 2024-12-20 ENCOUNTER — OFFICE VISIT (OUTPATIENT)
Dept: SURGERY | Facility: CLINIC | Age: 44
End: 2024-12-20
Payer: COMMERCIAL

## 2024-12-20 VITALS
DIASTOLIC BLOOD PRESSURE: 84 MMHG | BODY MASS INDEX: 47.09 KG/M2 | SYSTOLIC BLOOD PRESSURE: 122 MMHG | OXYGEN SATURATION: 98 % | WEIGHT: 293 LBS | HEART RATE: 76 BPM | HEIGHT: 66 IN

## 2024-12-20 DIAGNOSIS — Z98.1 S/P CERVICAL SPINAL FUSION: Primary | ICD-10-CM

## 2024-12-20 DIAGNOSIS — M54.12 CERVICAL RADICULOPATHY: ICD-10-CM

## 2024-12-20 DIAGNOSIS — Z98.890 POST-OPERATIVE STATE: ICD-10-CM

## 2024-12-20 PROCEDURE — 3008F BODY MASS INDEX DOCD: CPT | Performed by: PHYSICIAN ASSISTANT

## 2024-12-20 PROCEDURE — 3074F SYST BP LT 130 MM HG: CPT | Performed by: PHYSICIAN ASSISTANT

## 2024-12-20 PROCEDURE — 3079F DIAST BP 80-89 MM HG: CPT | Performed by: PHYSICIAN ASSISTANT

## 2024-12-20 PROCEDURE — 99024 POSTOP FOLLOW-UP VISIT: CPT | Performed by: PHYSICIAN ASSISTANT

## 2024-12-20 RX ORDER — METHOCARBAMOL 500 MG/1
500 TABLET, FILM COATED ORAL 3 TIMES DAILY
Qty: 45 TABLET | Refills: 0 | Status: SHIPPED | OUTPATIENT
Start: 2024-12-20

## 2024-12-20 NOTE — PATIENT INSTRUCTIONS
Refill policies:    Allow 2-3 business days for refills; controlled substances may take longer.  Contact your pharmacy at least 5 days prior to running out of medication and have them send an electronic request or submit request through the “request refill” option in your [employer] account.  Refills are not addressed on weekends; covering physicians do not authorize routine medications on weekends.  No narcotics or controlled substances are refilled after noon on Fridays or by on call physicians.  By law, narcotics must be electronically prescribed.  A 30 day supply with no refills is the maximum allowed.  If your prescription is due for a refill, you may be due for a follow up appointment.  To best provide you care, patients receiving routine medications need to be seen at least once a year.  Patients receiving narcotic/controlled substance medications need to be seen at least once every 3 months.  In the event that your preferred pharmacy does not have the requested medication in stock (e.g. Backordered), it is your responsibility to find another pharmacy that has the requested medication available.  We will gladly send a new prescription to that pharmacy at your request.    Scheduling Tests:    If your physician has ordered radiology tests such as MRI or CT scans, please contact Central Scheduling at [phone] right away to schedule the test.  Once scheduled, the [facility] Centralized Referral Team will work with your insurance carrier to obtain pre-certification or prior authorization.  Depending on your insurance carrier, approval may take 3-10 days.  It is highly recommended patients assure they have received an authorization before having a test performed.  If test is done without insurance authorization, patient may be responsible for the entire amount billed.      Precertification and Prior Authorizations:  If your physician has recommended that you have a procedure or additional testing performed the [facility]  Centralized Referral Team will contact your insurance carrier to obtain pre-certification or prior authorization.    You are strongly encouraged to contact your insurance carrier to verify that your procedure/test has been approved and is a COVERED benefit.  Although the [facility] Centralized Referral Team does its due diligence, the insurance carrier gives the disclaimer that \"Although the procedure is authorized, this does not guarantee payment.\"    Ultimately the patient is responsible for payment.   Thank you for your understanding in this matter.  Paperwork Completion:  If you require FMLA or disability paperwork for your recovery, please make sure to either drop it off or have it faxed to our office at [phone]. Be sure the form has your name and date of birth on it.  The form will be faxed to our Forms Department and they will complete it for you.  There is a 25$ fee for all forms that need to be filled out.  Please be aware there is a 10-14 day turnaround time.  You will need to sign a release of information (RONNY) form if your paperwork does not come with one.  You may call the Forms Department with any questions at [phone].  Their fax number is [phone].

## 2024-12-20 NOTE — PROGRESS NOTES
Patient: Crys Carter  Medical Record Number: [id number]  YOB: 1980  PCP: Viki Pinto MD    Reason for visit: Cervical follow up, post op state    HISTORY OF CHIEF COMPLAINT:    Crys Carter is a very pleasant 44 year old female who presents today for: Cervical follow up, post op state   S/p 11/27/24: Dr. Dickson: ACDF C4-6  The patient endorses she is doing very well.  No incisional complaints.  Still some cervical muscle fatigue, which can be expected.  No new neurologic complaints.  Endorses good relief with the Medrol Dosepak.    Last hx: 12/5/24  Crys Carter is a very pleasant 44 year old female who presents today for: Cervical follow up, post op visit  S/p 11/27/24: Dr. Dickson: ACDF C4-6  Patient presents with her mother who has been her primary care taker postsurgery.  She advises the incision was initially flat and then eventually began to have some swelling.  It was soft to first and is now become harder.  There is no discharge.  The glue towards the medial aspect was pulled off by getting stuck on her shirt, patient endorses.  No erythema or concern for infection.  No fevers.  She has noticed improvement of her upper extremities postsurgery.  She does have discomfort in the back of the neck and upper shoulders, left greater than right, to be expected postoperatively.  Mild dysphagia noted, which appears worse today, but she endorses this could be secondary to what she was eating.  Dysphagia can be expected postsurgical intervention for ACDF.    Past Medical History:    Allergic rhinitis    Anxiety    Arthritis    Asthma (HCC)    Back problem    Bipolar disorder (HCC)    Calculus of kidney    Cancer (HCC)    Basal cell carcinoma over the left eyebrow    Depression    Disorder of thyroid    Esophageal reflux    Hypothyroidism    Migraine    Migraines    Obesity, unspecified    PONV (postoperative nausea and vomiting)    S/P laparoscopic sleeve gastrectomy    Sleep  apnea    history of, improved with weight loss surgery, no longer needs cpap    Stroke (HCC)    TIA, possible not completely diagonosed    Visual impairment    glasses      Past Surgical History:   Procedure Laterality Date    Other      benign tumor reomoved  from left inner thigh    2014    basal carcinoma removed from upper lid of left eye    Other surgical history  2022    Gastric sleeve    Other surgical history  2024    CERVICAL 4 - CERVICAL 5, CERVICAL 5 - CERVICAL 6 ANTERIOR CERVICAL DISCECTOMY AND FUSION WITH INSTRUMENTATION, ALLOGRAFT    Skin surgery  Oct 2014    tumor removed from eyebrow ridge    Tonsillectomy        Family History   Problem Relation Age of Onset    Depression Father     Heart Disease Father     Asthma Father     Diabetes Mother     Obesity Mother     Depression Mother     Alcohol and Other Disorders Associated Maternal Grandmother     Cancer Maternal Grandmother         Lung CA    Cancer Maternal Grandfather         Prostate CA    Hypertension Paternal Grandmother     Cancer Paternal Grandfather         Lung CA    Personality Disorder Paternal Grandfather     Breast Cancer Maternal Aunt 65        in her 60's    Substance Abuse Maternal Uncle     Stroke Neg       Social History     Socioeconomic History    Marital status: Single   Tobacco Use    Smoking status: Former     Current packs/day: 0.00     Average packs/day: 1 pack/day for 23.9 years (23.9 ttl pk-yrs)     Types: Cigarettes     Start date: 1985     Quit date: 12/15/2008     Years since quittin.0     Passive exposure: Never    Smokeless tobacco: Never   Vaping Use    Vaping status: Never Used   Substance and Sexual Activity    Alcohol use: Not Currently     Comment: Last week in 2023    Drug use: Not Currently    Sexual activity: Not Currently     Partners: Male     Birth control/protection: Condom   Other Topics Concern    Caffeine Concern No    Stress Concern Yes    Weight Concern Yes     Special Diet Yes     Comment: Bariatric    Exercise No    Seat Belt Yes      Allergies[1]   Current Medications:  Current Outpatient Medications   Medication Sig Dispense Refill    methylPREDNISolone (MEDROL) 4 MG Oral Tablet Therapy Pack Take per package instructions 1 each 0    methocarbamol 500 MG Oral Tab Take 1 tablet (500 mg total) by mouth 3 (three) times daily. 45 tablet 0    oxyCODONE 5 MG Oral Tab Take 1 tablet (5 mg total) by mouth every 4 (four) hours as needed. 45 tablet 0    docusate sodium 100 MG Oral Cap Take 1 capsule (100 mg total) by mouth 2 (two) times daily as needed for constipation. 40 capsule 0    Naloxone HCl 4 MG/0.1ML Nasal Liquid 4 mg by Nasal route as needed. If patient remains unresponsive, repeat dose in other nostril 2-5 minutes after first dose. (Patient not taking: Reported on 12/5/2024) 1 kit 0    Ketorolac Tromethamine 10 MG Oral Tab Take 2 tablets (20 mg total) by mouth As Directed. For migraines: take 20mg once at the onset of the migraine. HOLD till cleared by neurosurgery clinic      acetaminophen 500 MG Oral Tab Take 2 tablets (1,000 mg total) by mouth every 6 (six) hours as needed for Pain.      levothyroxine 75 MCG Oral Tab Take 1 tablet (75 mcg total) by mouth before breakfast. 90 tablet 0    pantoprazole 20 MG Oral Tab EC Take 1 tablet (20 mg total) by mouth before breakfast. 90 tablet 0    ondansetron 4 MG Oral Tablet Dispersible Take 1 tablet (4 mg total) by mouth every 8 (eight) hours as needed for Nausea. 30 tablet 0    Atogepant (QULIPTA) 30 MG Oral Tab Take 1 tablet by mouth daily. 90 tablet 1    ubrogepant (UBRELVY) 100 MG Oral Tab Take one tablet at onset of migraine.  May take additional tablet in 2 hours if needed.  Do not exceed two tablets per 24 hour period. 10 tablet 5    lurasidone 80 MG Oral Tab Take 1 tablet (80 mg total) by mouth at bedtime.      hydrOXYzine 25 MG Oral Tab Take 1 tablet (25 mg total) by mouth nightly as needed for Anxiety.      LORazepam  0.5 MG Oral Tab Take 1 tablet (0.5 mg total) by mouth nightly as needed for Anxiety.      lamoTRIgine 100 MG Oral Tab Take 1 tablet (100 mg total) by mouth daily.      buPROPion  MG Oral Tablet 24 Hr Take 1 tablet (150 mg total) by mouth daily. 30 tablet 0    propranolol 20 MG Oral Tab Take 1 tablet (20 mg total) by mouth 2 (two) times daily. 60 tablet 0    naltrexone 50 MG Oral Tab Take 1 tablet (50 mg total) by mouth daily. 30 tablet 0    sertraline 100 MG Oral Tab Take 1 tablet (100 mg total) by mouth daily. 30 tablet 0    traZODone 100 MG Oral Tab Take 1 tablet (100 mg total) by mouth nightly as needed for Sleep. 30 tablet 0    meclizine 25 MG Oral Tab Take 1 tablet (25 mg total) by mouth 3 (three) times daily as needed for Dizziness. 15 tablet 0    albuterol (PROAIR HFA) 108 (90 Base) MCG/ACT Inhalation Aero Soln SHAKE WELL AND INHALE 1 TO 2 PUFFS INTO THE LUNGS EVERY 4 HOURS AS NEEDED FOR WHEEZING( COUGH) 3 each 1    Multiple Vitamins-Minerals (MULTIVITAL OR) Take 1 tablet by mouth daily. Bariatric vitamin          REVIEW OF SYSTEMS   Comprehensive review of systems done. Negative except what is outlined in the above HPI.     PHYSICAL EXAMIMATION    vitals were not taken for this visit.   GENERAL: Very pleasant patient is in no apparent distress. Sitting comfortably in the examination chair.   HEENT: Normocephalic, atraumatic.  RESPIRATORY RATE: Easy and Even   SKIN: Warm and dry  NEURO: Awake, alert and orientated. Speech fluent, comprehension intact, answering questions appropriately.     SPINE:  Gait/Coordination: Gait deferred    Upper Extremity Strength:     Deltoid  Biceps  Triceps    Intrinsics      Right 5 5 5 5 5     Left 5 5 5 5 5   Moving bilateral lower extremity spontaneously to full resistance  Incision: CDI without edema, erythema or discharge.  Tiny stitch noted at the lateral aspect the incision.  Removed with forceps after cleansing with alcohol swab.  After removal, cleansed with  alcohol swab.  Patient tolerated well.    DATA:   None    IMAGING:   No recent imaging    MEDICAL DECISION MAKING:     ASSESSMENT and PLAN:    ICD-10-CM    1. S/p 11/27/24: Dr. Dickson: ACDF C4-6  Z98.1 XR CERVICAL SPINE (2-3 VIEWS) (CPT=72040)      2. Post-operative state  Z98.890 XR CERVICAL SPINE (2-3 VIEWS) (CPT=72040)        PLAN:   1. Medication: None prescribed  2. Imaging:    - Reviewed today:    -No recent imaging   - Ordered today:    -XR cervical spine:     - Complete prior to follow up. Complete no further than 1 week out from follow up appointment   3. Activity:    -PT ordered   -No lifting greater than 10 pounds for the first month postoperatively, 20 pounds for the second, 30 pounds for the third   -Encourage walking   -No excessive bending, lifting, straining, reaching or twisting   -Continue to monitor incision for healing.  If any warning/red flag signs/symptoms, including but not limited to, fevers or drainage, follow-up as stated below  4. Follow up in 4 weeks with Dr. Dickson for 6 week post op visit or call or follow up sooner or go to the ED for any new, worsening or concerning signs or symptoms     I discussed the plan with the patient. The patient agrees with the plan, verbalized understanding and is appreciative. All questions were sought out and thoroughly answered to satisfaction.       Total visit time: 30 min  More than 50% spent coordinating care, providing patient education, discussing further imaging, discussing activity and counseling.    Otilia Garcia M.S., PA-C  [address], [address] [address]  [phone]  12/20/2024 12:02 PM    Dragon speech recognition software was used to prepare this note. If a word or phrase is confusing, it is likely due to a failure of recognition. Please contact me with any questions or clarifications.         [1]   Allergies  Allergen Reactions    Clonazepam OTHER (SEE COMMENTS)     Suicidal  thoughts  Felt out of sorts and has increased irritability as well as extremities feeling strange    Compazine [Prochlorperazine] OTHER (SEE COMMENTS)     Oral tablet-  Twitching;  Blanked out; nausea    Topiramate OTHER (SEE COMMENTS)     Suicidal,aggresive,brain fog

## 2024-12-20 NOTE — PROGRESS NOTES
Established patient:  Reason for follow up:   Post op, sx 11/27/24    Numeric Rating Scale:      Pain at Present: 1/10

## 2025-01-06 ENCOUNTER — HOSPITAL ENCOUNTER (OUTPATIENT)
Dept: GENERAL RADIOLOGY | Age: 45
Discharge: HOME | End: 2025-01-06
Attending: PHYSICIAN ASSISTANT
Payer: COMMERCIAL

## 2025-01-06 DIAGNOSIS — Z98.1 S/P CERVICAL SPINAL FUSION: ICD-10-CM

## 2025-01-06 DIAGNOSIS — Z98.890 POST-OPERATIVE STATE: ICD-10-CM

## 2025-01-06 PROCEDURE — 72040 X-RAY EXAM NECK SPINE 2-3 VW: CPT | Performed by: PHYSICIAN ASSISTANT

## 2025-01-09 ENCOUNTER — OFFICE VISIT (OUTPATIENT)
Dept: SURGERY | Facility: CLINIC | Age: 45
End: 2025-01-09
Payer: COMMERCIAL

## 2025-01-09 VITALS
BODY MASS INDEX: 51 KG/M2 | WEIGHT: 293 LBS | HEART RATE: 64 BPM | DIASTOLIC BLOOD PRESSURE: 74 MMHG | SYSTOLIC BLOOD PRESSURE: 126 MMHG

## 2025-01-09 DIAGNOSIS — M54.12 CERVICAL RADICULOPATHY: Primary | ICD-10-CM

## 2025-01-09 DIAGNOSIS — Z98.1 S/P CERVICAL SPINAL FUSION: ICD-10-CM

## 2025-01-09 PROCEDURE — 3074F SYST BP LT 130 MM HG: CPT | Performed by: NEUROLOGICAL SURGERY

## 2025-01-09 PROCEDURE — 99024 POSTOP FOLLOW-UP VISIT: CPT | Performed by: NEUROLOGICAL SURGERY

## 2025-01-09 PROCEDURE — 3078F DIAST BP <80 MM HG: CPT | Performed by: NEUROLOGICAL SURGERY

## 2025-01-09 NOTE — PROGRESS NOTES
Established patient:  Reason for follow up: post op cervical (Neck)    Numeric Rating Scale: (No Pain) 0  to  10 (Worst Pain)        Pain at Present:  3/10       Distribution of Pain:    bilateral    Most recent treatments for Current Pain Condition:   Surgery, tylenol  Response to treatment: some relief    New imaging or testing since your last office visit:  xray

## 2025-01-09 NOTE — PATIENT INSTRUCTIONS
Refill policies:    Allow 2-3 business days for refills; controlled substances may take longer.  Contact your pharmacy at least 5 days prior to running out of medication and have them send an electronic request or submit request through the “request refill” option in your [employer] account.  Refills are not addressed on weekends; covering physicians do not authorize routine medications on weekends.  No narcotics or controlled substances are refilled after noon on Fridays or by on call physicians.  By law, narcotics must be electronically prescribed.  A 30 day supply with no refills is the maximum allowed.  If your prescription is due for a refill, you may be due for a follow up appointment.  To best provide you care, patients receiving routine medications need to be seen at least once a year.  Patients receiving narcotic/controlled substance medications need to be seen at least once every 3 months.  In the event that your preferred pharmacy does not have the requested medication in stock (e.g. Backordered), it is your responsibility to find another pharmacy that has the requested medication available.  We will gladly send a new prescription to that pharmacy at your request.    Scheduling Tests:    If your physician has ordered radiology tests such as MRI or CT scans, please contact Central Scheduling at [phone] right away to schedule the test.  Once scheduled, the [facility] Centralized Referral Team will work with your insurance carrier to obtain pre-certification or prior authorization.  Depending on your insurance carrier, approval may take 3-10 days.  It is highly recommended patients assure they have received an authorization before having a test performed.  If test is done without insurance authorization, patient may be responsible for the entire amount billed.      Precertification and Prior Authorizations:  If your physician has recommended that you have a procedure or additional testing performed the [facility]  Centralized Referral Team will contact your insurance carrier to obtain pre-certification or prior authorization.    You are strongly encouraged to contact your insurance carrier to verify that your procedure/test has been approved and is a COVERED benefit.  Although the [facility] Centralized Referral Team does its due diligence, the insurance carrier gives the disclaimer that \"Although the procedure is authorized, this does not guarantee payment.\"    Ultimately the patient is responsible for payment.   Thank you for your understanding in this matter.  Paperwork Completion:  If you require FMLA or disability paperwork for your recovery, please make sure to either drop it off or have it faxed to our office at [phone]. Be sure the form has your name and date of birth on it.  The form will be faxed to our Forms Department and they will complete it for you.  There is a 25$ fee for all forms that need to be filled out.  Please be aware there is a 10-14 day turnaround time.  You will need to sign a release of information (RONNY) form if your paperwork does not come with one.  You may call the Forms Department with any questions at [phone].  Their fax number is [phone].

## 2025-01-09 NOTE — PROGRESS NOTES
Neurosurgery Clinic Visit  2025    Crys Carter PCP:  Viki Pinto MD    1980 MRN [id number]     HISTORY OF PRESENT ILLNESS:  Crys Carter is a(n) 45 year old female here for follow-up status post ACDF  She is 6 weeks out  Her arm symptoms up He which resolved  She is very happy  She is notes her next gets a little tired        PHYSICAL EXAMINATION:  Vital Signs:  /74   Pulse 64   Wt (!) 315 lb (142.9 kg)   LMP 2024 (Approximate)   BMI 50.84 kg/m²   Awake alert, orient x 3  Follows commands x 4  Incision well-healed      REVIEW OF STUDIES:    X-rays look great      ASSESSMENT and PLAN:  45-year-old female status post ACDF  She is doing well  Will see her back in 6 more weeks with repeat x-rays  She still has restrictions for another 6 weeks  Reviewed films in detail  All questions answered  Patient appreciative        Time spent on counseling/coordination of care:  10 minutes    Total time spent with patient:  10 minutes      Allen Dickson MD  [facility]  2025  2:20 PM   Dictated but not proofread

## 2025-01-11 ENCOUNTER — PATIENT MESSAGE (OUTPATIENT)
Dept: SURGERY | Facility: CLINIC | Age: 45
End: 2025-01-11

## 2025-01-14 NOTE — TELEPHONE ENCOUNTER
Okay for our office to complete, if appropriate, unless SANDY nursing's recommendations is this needs to be completed by the forms department?     I will provide a letter in the interim with RTW of 1/23 without restrictions.     Please review and advise, thank you.

## 2025-01-14 NOTE — TELEPHONE ENCOUNTER
Message below noted.    Patient attached forms she needs Provider to fill out to go back to work. Patient states Provider advised for her to send them over for completion.     Patient requesting return to work date 1/23.    LOV 1/09/25  \"ASSESSMENT and PLAN:  45-year-old female status post ACDF  She is doing well  Will see her back in 6 more weeks with repeat x-rays  She still has restrictions for another 6 weeks  Reviewed films in detail  All questions answered  Patient appreciative\"    Routed to Provider.

## 2025-01-16 NOTE — TELEPHONE ENCOUNTER
Printed \"[employer] for Duty\" form and completed sections for return to work on 1.23.25.  Form copied and sent to be scanned into chart.  E-mailed form to [email or web address].     Notified patient that she may  a copy of the form for her records if she chooses to do so.  Also informed patient of above.

## 2025-01-22 ENCOUNTER — PATIENT MESSAGE (OUTPATIENT)
Dept: SURGERY | Facility: CLINIC | Age: 45
End: 2025-01-22

## 2025-01-22 NOTE — TELEPHONE ENCOUNTER
Topical NSAID is usually okay since it is generally absorbed locally at the application site. Routed to provider for input.

## 2025-02-16 ENCOUNTER — HOSPITAL ENCOUNTER (OUTPATIENT)
Dept: GENERAL RADIOLOGY | Age: 45
Discharge: HOME | End: 2025-02-16
Attending: NEUROLOGICAL SURGERY
Payer: COMMERCIAL

## 2025-02-16 ENCOUNTER — HOSPITAL ENCOUNTER (OUTPATIENT)
Dept: GENERAL RADIOLOGY | Age: 45
End: 2025-02-16
Attending: NEUROLOGICAL SURGERY
Payer: COMMERCIAL

## 2025-02-16 DIAGNOSIS — M54.12 CERVICAL RADICULOPATHY: ICD-10-CM

## 2025-02-16 DIAGNOSIS — Z98.1 S/P CERVICAL SPINAL FUSION: ICD-10-CM

## 2025-02-16 PROCEDURE — 72040 X-RAY EXAM NECK SPINE 2-3 VW: CPT | Performed by: NEUROLOGICAL SURGERY

## 2025-02-20 ENCOUNTER — OFFICE VISIT (OUTPATIENT)
Dept: SURGERY | Facility: CLINIC | Age: 45
End: 2025-02-20
Payer: COMMERCIAL

## 2025-02-20 VITALS — SYSTOLIC BLOOD PRESSURE: 130 MMHG | OXYGEN SATURATION: 97 % | HEART RATE: 75 BPM | DIASTOLIC BLOOD PRESSURE: 66 MMHG

## 2025-02-20 DIAGNOSIS — Z98.890 POST-OPERATIVE STATE: ICD-10-CM

## 2025-02-20 DIAGNOSIS — Z98.1 S/P CERVICAL SPINAL FUSION: Primary | ICD-10-CM

## 2025-02-20 PROCEDURE — 99024 POSTOP FOLLOW-UP VISIT: CPT | Performed by: PHYSICIAN ASSISTANT

## 2025-02-20 PROCEDURE — 3075F SYST BP GE 130 - 139MM HG: CPT | Performed by: PHYSICIAN ASSISTANT

## 2025-02-20 PROCEDURE — 3078F DIAST BP <80 MM HG: CPT | Performed by: PHYSICIAN ASSISTANT

## 2025-02-20 NOTE — PROGRESS NOTES
Patient is here today for Post Op visit- 11/27/2024- Sx: CERVICAL 4 - CERVICAL 5, CERVICAL 5 - CERVICAL 6 ANTERIOR CERVICAL DISCECTOMY AND FUSION WITH INSTRUMENTATION, ALLOGRAFT    IMAGING:  - 2/16/2025 XR Cervical Spine    Pt states some \"tenderness\" to incision site   Pt states no longer taking Oxy   Taking Methocarbamol prn and Tylenol with good relief

## 2025-02-20 NOTE — PATIENT INSTRUCTIONS
Refill policies:    Allow 2-3 business days for refills; controlled substances may take longer.  Contact your pharmacy at least 5 days prior to running out of medication and have them send an electronic request or submit request through the “request refill” option in your [employer] account.  Refills are not addressed on weekends; covering physicians do not authorize routine medications on weekends.  No narcotics or controlled substances are refilled after noon on Fridays or by on call physicians.  By law, narcotics must be electronically prescribed.  A 30 day supply with no refills is the maximum allowed.  If your prescription is due for a refill, you may be due for a follow up appointment.  To best provide you care, patients receiving routine medications need to be seen at least once a year.  Patients receiving narcotic/controlled substance medications need to be seen at least once every 3 months.  In the event that your preferred pharmacy does not have the requested medication in stock (e.g. Backordered), it is your responsibility to find another pharmacy that has the requested medication available.  We will gladly send a new prescription to that pharmacy at your request.    Scheduling Tests:    If your physician has ordered radiology tests such as MRI or CT scans, please contact Central Scheduling at [phone] right away to schedule the test.  Once scheduled, the [facility] Centralized Referral Team will work with your insurance carrier to obtain pre-certification or prior authorization.  Depending on your insurance carrier, approval may take 3-10 days.  It is highly recommended patients assure they have received an authorization before having a test performed.  If test is done without insurance authorization, patient may be responsible for the entire amount billed.      Precertification and Prior Authorizations:  If your physician has recommended that you have a procedure or additional testing performed the [facility]  Centralized Referral Team will contact your insurance carrier to obtain pre-certification or prior authorization.    You are strongly encouraged to contact your insurance carrier to verify that your procedure/test has been approved and is a COVERED benefit.  Although the [facility] Centralized Referral Team does its due diligence, the insurance carrier gives the disclaimer that \"Although the procedure is authorized, this does not guarantee payment.\"    Ultimately the patient is responsible for payment.   Thank you for your understanding in this matter.  Paperwork Completion:  If you require FMLA or disability paperwork for your recovery, please make sure to either drop it off or have it faxed to our office at [phone]. Be sure the form has your name and date of birth on it.  The form will be faxed to our Forms Department and they will complete it for you.  There is a 25$ fee for all forms that need to be filled out.  Please be aware there is a 10-14 day turnaround time.  You will need to sign a release of information (RONNY) form if your paperwork does not come with one.  You may call the Forms Department with any questions at [phone].  Their fax number is [phone].

## 2025-02-20 NOTE — PROGRESS NOTES
Patient: Crys Carter  Medical Record Number: [id number]  YOB: 1980  PCP: Viki Pinto MD    Reason for visit: Cervical follow up, XR review, 3 month post op visit    HISTORY OF CHIEF COMPLAINT:    Crys Carter is a very pleasant 45 year old female who presents today for: Cervical follow up, XR review, 3 month post op visit  S/p 11/27/24: Dr. Dickson: ACDF C4-6  Patient is doing very well. No new neurologic complaints. Here for XR review.     Last hx: 1/9/25:  HISTORY OF PRESENT ILLNESS:  Crys Carter is a(n) 45 year old female here for follow-up status post ACDF  She is 6 weeks out  Her arm symptoms up He which resolved  She is very happy  She is notes her next gets a little tired      Last hx: 12/20/24  Crys Carter is a very pleasant 44 year old female who presents today for: Cervical follow up, post op state   S/p 11/27/24: Dr. Dickson: ACDF C4-6  The patient endorses she is doing very well.  No incisional complaints.  Still some cervical muscle fatigue, which can be expected.  No new neurologic complaints.  Endorses good relief with the Medrol Dosepak.     Last hx: 12/5/24  Crys Carter is a very pleasant 44 year old female who presents today for: Cervical follow up, post op visit  S/p 11/27/24: Dr. Dickson: ACDF C4-6  Patient presents with her mother who has been her primary care taker postsurgery.  She advises the incision was initially flat and then eventually began to have some swelling.  It was soft to first and is now become harder.  There is no discharge.  The glue towards the medial aspect was pulled off by getting stuck on her shirt, patient endorses.  No erythema or concern for infection.  No fevers.  She has noticed improvement of her upper extremities postsurgery.  She does have discomfort in the back of the neck and upper shoulders, left greater than right, to be expected postoperatively.  Mild dysphagia noted, which appears worse today, but  she endorses this could be secondary to what she was eating.  Dysphagia can be expected postsurgical intervention for ACDF.    Past Medical History:    Allergic rhinitis    Anxiety    Arthritis    Asthma (HCC)    Back problem    Bipolar disorder (HCC)    Calculus of kidney    Cancer (HCC)    Basal cell carcinoma over the left eyebrow    Depression    Disorder of thyroid    Esophageal reflux    Hypothyroidism    Migraine    Migraines    Obesity, unspecified    PONV (postoperative nausea and vomiting)    S/P laparoscopic sleeve gastrectomy    Sleep apnea    history of, improved with weight loss surgery, no longer needs cpap    Stroke (HCC)    TIA, possible not completely diagonosed    Visual impairment    glasses      Past Surgical History:   Procedure Laterality Date    Other  2009    benign tumor reomoved  from left inner thigh    Other 2014    basal carcinoma removed from upper lid of left eye    Other surgical history  03/07/2022    Gastric sleeve    Other surgical history  11/27/2024    CERVICAL 4 - CERVICAL 5, CERVICAL 5 - CERVICAL 6 ANTERIOR CERVICAL DISCECTOMY AND FUSION WITH INSTRUMENTATION, ALLOGRAFT    Skin surgery  Oct 2014    tumor removed from eyebrow ridge    Tonsillectomy        Family History   Problem Relation Age of Onset    Depression Father     Heart Disease Father     Asthma Father     Heart Attack Father     Migraines Father     Diabetes Mother     Obesity Mother     Depression Mother     Alcohol and Other Disorders Associated Maternal Grandmother     Cancer Maternal Grandmother         Lung CA    Cancer Maternal Grandfather         Prostate CA    Hypertension Paternal Grandmother     Cancer Paternal Grandfather         Lung CA    Personality Disorder Paternal Grandfather     Breast Cancer Maternal Aunt 65        in her 60's    Substance Abuse Maternal Uncle     Stroke Neg       Social History     Socioeconomic History    Marital status: Single   Tobacco Use    Smoking status: Former      Current packs/day: 0.00     Average packs/day: 1 pack/day for 23.9 years (23.9 ttl pk-yrs)     Types: Cigarettes     Start date: 1985     Quit date: 12/15/2008     Years since quittin.1     Passive exposure: Never    Smokeless tobacco: Never   Vaping Use    Vaping status: Never Used   Substance and Sexual Activity    Alcohol use: Not Currently     Comment: Last week in 2023    Drug use: Not Currently    Sexual activity: Not Currently     Partners: Male     Birth control/protection: Condom   Other Topics Concern    Caffeine Concern No    Stress Concern Yes    Weight Concern Yes    Special Diet Yes     Comment: Bariatric    Exercise No    Seat Belt Yes      Allergies[1]   Current Medications:  Current Outpatient Medications   Medication Sig Dispense Refill    methocarbamol 500 MG Oral Tab Take 1 tablet (500 mg total) by mouth 3 (three) times daily. 45 tablet 0    oxyCODONE 5 MG Oral Tab Take 1 tablet (5 mg total) by mouth every 4 (four) hours as needed. 45 tablet 0    Naloxone HCl 4 MG/0.1ML Nasal Liquid 4 mg by Nasal route as needed. If patient remains unresponsive, repeat dose in other nostril 2-5 minutes after first dose. 1 kit 0    Ketorolac Tromethamine 10 MG Oral Tab Take 2 tablets (20 mg total) by mouth As Directed. For migraines: take 20mg once at the onset of the migraine. HOLD till cleared by neurosurgery clinic      acetaminophen 500 MG Oral Tab Take 2 tablets (1,000 mg total) by mouth every 6 (six) hours as needed for Pain.      levothyroxine 75 MCG Oral Tab Take 1 tablet (75 mcg total) by mouth before breakfast. 90 tablet 0    pantoprazole 20 MG Oral Tab EC Take 1 tablet (20 mg total) by mouth before breakfast. 90 tablet 0    ondansetron 4 MG Oral Tablet Dispersible Take 1 tablet (4 mg total) by mouth every 8 (eight) hours as needed for Nausea. 30 tablet 0    Atogepant (QULIPTA) 30 MG Oral Tab Take 1 tablet by mouth daily. 90 tablet 1    ubrogepant (UBRELVY) 100 MG Oral Tab Take one  tablet at onset of migraine.  May take additional tablet in 2 hours if needed.  Do not exceed two tablets per 24 hour period. 10 tablet 5    lurasidone 80 MG Oral Tab Take 1 tablet (80 mg total) by mouth at bedtime.      hydrOXYzine 25 MG Oral Tab Take 1 tablet (25 mg total) by mouth nightly as needed for Anxiety.      LORazepam 0.5 MG Oral Tab Take 1 tablet (0.5 mg total) by mouth nightly as needed for Anxiety.      lamoTRIgine 100 MG Oral Tab Take 1 tablet (100 mg total) by mouth daily.      buPROPion  MG Oral Tablet 24 Hr Take 1 tablet (150 mg total) by mouth daily. 30 tablet 0    propranolol 20 MG Oral Tab Take 1 tablet (20 mg total) by mouth 2 (two) times daily. (Patient taking differently: Take 0.5 tablets (10 mg total) by mouth 2 (two) times daily.) 60 tablet 0    naltrexone 50 MG Oral Tab Take 1 tablet (50 mg total) by mouth daily. 30 tablet 0    sertraline 100 MG Oral Tab Take 1 tablet (100 mg total) by mouth daily. 30 tablet 0    traZODone 100 MG Oral Tab Take 1 tablet (100 mg total) by mouth nightly as needed for Sleep. 30 tablet 0    meclizine 25 MG Oral Tab Take 1 tablet (25 mg total) by mouth 3 (three) times daily as needed for Dizziness. 15 tablet 0    albuterol (PROAIR HFA) 108 (90 Base) MCG/ACT Inhalation Aero Soln SHAKE WELL AND INHALE 1 TO 2 PUFFS INTO THE LUNGS EVERY 4 HOURS AS NEEDED FOR WHEEZING( COUGH) 3 each 1    Multiple Vitamins-Minerals (MULTIVITAL OR) Take 1 tablet by mouth daily. Bariatric vitamin          REVIEW OF SYSTEMS   Comprehensive review of systems done. Negative except what is outlined in the above HPI.     PHYSICAL EXAMIMATION    vitals were not taken for this visit.   GENERAL: Very pleasant patient is in no apparent distress. Sitting comfortably in the examination chair.   HEENT: Normocephalic, atraumatic.  RESPIRATORY RATE: Easy and Even   SKIN: Warm and dry  NEURO: Awake, alert and orientated. Speech fluent, comprehension intact, answering questions appropriately.      SPINE:  Gait/Coordination: Gait deferred  Moving bilateral lower and upper extremities spontaneously to full resistance.     DATA:   None    IMAGING:   Study Result    Narrative   PROCEDURE:  XR CERVICAL SPINE (2 VIEWS) (CPT=72040)     COMPARISON:  [facility], XR, XR CERVICAL SPINE (2-3 VIEWS) (CPT=72040), 1/06/2025, 1:59 PM.     INDICATIONS:  Z98.1 S/P cervical spinal fusion M54.12 Cervical radiculopathy     PATIENT STATED HISTORY: (As transcribed by Technologist)  Patient here for post op imaging after having cervical fusion in November.         FINDINGS:  No acute fracture or subluxation in the cervical spine.  Anterior cervical fusion changes noted at C4-C6.  No evidence of hardware complication.  Vertebral body and disc heights are otherwise well maintained.  Mild scattered facet arthropathy.    Partially imaged earring.  Normal mineralization.  Unremarkable soft tissues.                   Impression   CONCLUSION:  No acute fracture or subluxation in cervical spine.  Postoperative changes from previous cervical spinal fusion at C4-C6.        LOCATION:  [facility]        Dictated by (CST): Terell Pepe MD on 2/16/2025 at 10:47 AM      Finalized by (CST): Terell Pepe MD on 2/16/2025 at 10:48 AM       MEDICAL DECISION MAKING:     ASSESSMENT and PLAN:    ICD-10-CM    1. S/p 11/27/24: Dr. Dickson: ACDF C4-6  Z98.1 XR CERVICAL SPINE (2-3 VIEWS) (CPT=72040)      2. Post-operative state  Z98.890 XR CERVICAL SPINE (2-3 VIEWS) (CPT=72040)        PLAN:   1. Medication: None prescribed  2. Imaging:    - Reviewed today:    - XR cervical:     - Hardware appears stable   - Ordered today:    - XR cervical:     - Monitor fusion   3. Activity:    - Gradually increase activity overtime   4. Follow up in 3 months for 6 month post op visit or call or follow up sooner or go to the ED for any new, worsening or concerning signs or symptoms     I reviewed imaging. I discussed the plan and reviewed imaging with the patient. The  patient agrees with the plan, verbalized understanding and is appreciative. All questions were sought out and thoroughly answered to satisfaction.       Total visit time: 20 min  More than 50% spent coordinating care, providing patient education, reviewing imaging, discussing further imaging and counseling.    Otilia Garcia M.S., PA-C  [address], [address]  [place] [address]  [phone]  2/20/2025 8:58 AM    Dragon speech recognition software was used to prepare this note. If a word or phrase is confusing, it is likely due to a failure of recognition. Please contact me with any questions or clarifications.         [1]   Allergies  Allergen Reactions    Clonazepam OTHER (SEE COMMENTS)     Suicidal thoughts  [place] out of sorts and has increased irritability as well as extremities feeling strange    Compazine [Prochlorperazine] OTHER (SEE COMMENTS)     Oral tablet-  Twitching;  Blanked out; nausea    Topiramate OTHER (SEE COMMENTS)     Suicidal,aggresive,brain fog

## 2025-02-25 ENCOUNTER — OFFICE VISIT (OUTPATIENT)
Dept: NEUROLOGY | Facility: CLINIC | Age: 45
End: 2025-02-25
Payer: COMMERCIAL

## 2025-02-25 VITALS
HEART RATE: 62 BPM | RESPIRATION RATE: 16 BRPM | BODY MASS INDEX: 47.09 KG/M2 | DIASTOLIC BLOOD PRESSURE: 66 MMHG | HEIGHT: 66 IN | SYSTOLIC BLOOD PRESSURE: 136 MMHG | WEIGHT: 293 LBS

## 2025-02-25 DIAGNOSIS — G44.039 EPISODIC PAROXYSMAL HEMICRANIA, NOT INTRACTABLE: Primary | ICD-10-CM

## 2025-02-25 DIAGNOSIS — G43.909 EPISODIC MIGRAINE: ICD-10-CM

## 2025-02-25 PROCEDURE — 3075F SYST BP GE 130 - 139MM HG: CPT | Performed by: OTHER

## 2025-02-25 PROCEDURE — 3008F BODY MASS INDEX DOCD: CPT | Performed by: OTHER

## 2025-02-25 PROCEDURE — 3078F DIAST BP <80 MM HG: CPT | Performed by: OTHER

## 2025-02-25 PROCEDURE — 99213 OFFICE O/P EST LOW 20 MIN: CPT | Performed by: OTHER

## 2025-02-25 RX ORDER — ATOGEPANT 30 MG/1
1 TABLET ORAL DAILY
Qty: 90 TABLET | Refills: 1 | Status: SHIPPED | OUTPATIENT
Start: 2025-02-25

## 2025-02-25 RX ORDER — ONDANSETRON 4 MG/1
4 TABLET, ORALLY DISINTEGRATING ORAL EVERY 8 HOURS PRN
Qty: 30 TABLET | Refills: 0 | Status: CANCELLED | OUTPATIENT
Start: 2025-02-25

## 2025-02-25 NOTE — PROGRESS NOTES
[facility] Progress Note    Chief Complaint   Patient presents with    Neurologic Problem     Follow up on Myelopathy concurrent with and due to spinal stenosis of cervical region and doing good    Migraine     Follow up and doing great with medications     As per my initial H&P from 11/3/2022:  \"  Crys Carter is a 45 year old, who presents for evaluation of migraines and headaches.     Patient states she has headaches since age 15. She mainly notes headache on the right side and behind the eyes and may have visual aura and nausea associated with headaches.   She previously was having these every other week when she was younger.  She was started on Imitrex in her early 20s and did not start on preventive therapy until her 30s or 40s, after she had been having migraines 1-2 times per week.  She was on Topamax 50 mg bid and well controlled for 1-2 yrs only having migraine once every 2 months.  She had weight loss surgery 3/2022 and started to have worsening migraines after this time.  She was having once every 2-3 weeks but they became more frequent.  She was recommended to increase the dose of Topamax up to 100 mg bid but subsequently had brain fog and \"suicidal thoughts\" and is being tapered off by her PCP recently.  She is currently on 50 mg AM / 100 mg nightly.      She does have history of bipolar disorder and had been on lamotrigine as well as sertraline and Latuda when she was originally on Topamax but recently has been changed from Latuda to Abilify.      Currently, her mood is fluctuating is having intermittent suicidal thoughts but overall improved and is doing outpatient therapy program.       Currently, she is having at lest 8-10 migraines per month; these sometimes respond to sumatriptan when used but not always and overall estimates less than 50% effective at the 100 mg dose.     Otherwise, patient denies any recent weight change, fevers, chills, nausea, double vision/ blurry  vision / loss of vision, chest pain, palpitations, shortness of breath, rashes, joint pains, bowel / bladder incontinence or mood issues. \"       Prior notes as per 4/13/2023.  Patient overall since last visit had been doing better in terms of migraines and was not having migraines for ~ 3 months but in the past 3-4 weeks, she has a new type of headache.        She has been having some sharp pains behind left eye, and also having some cloudy vision in the left eye.  She denies pain when moving eyes from side to side but has noted her left more than right eye was tearing with these events; she denies sweating on one side of the face; notes improvement when sitting and may worsen when lying down; these may occur a few times in a day but are briefer than her usual migraines.  She has been on Quilipta and has been having to go to ER to treat these symptoms when she does not respond to sumatriptan.        Prior notes as per 1/4/2024.  Patient last seen 4/13/2023.  She has been lost to follow up.  According to records, she has been seen multiple times in the ER for headaches as well as fall from ladder. She has been to ER multiple times and recently was in [facility] ED with migraine, with some R sided numbness 11/14/2023.  She had workup for possible stroke with CTA brain and MRI brain with no suggestion of stroke or LVO.  She has also been having medications adjusted by psychiatry and following with therapist regularly.  She currently is having migraines, which cluster for a few days when they occur.      She states in 10/2023, she was having a lot of stress and adjustment to psychiatric medications and had more frequent migraines during this time.       She is currently doing well with only 2 migraines in the past month.  In terms of her prior other type of headaches with severe pain behind left eye and tearing of left eye, she did take indomethacin and noted improvement in ~2 weeks when she was on the 50 mg tid dosing.   She has not had recurrence of these events.        She has been taking Quilipta 60 mg daily for prevention of migraines otherwise and doing well currently. She states Imitrex does not work well for abortive therapy and takes a \"cocktail\" of tramadol, hydroxyzine and Zofran.     Prior notes as per 5/16/2024.  Patient last seen 1/4/2024.  Since last visit, she remains on Qulipta 60 mg daily for prevention of migraines.  She has been taking Ubrelvy for abortive therapy as well and denies any side effects when she takes this medication.  She notes improvement in migraines with 1 dose within ~15 minutes and states this is working better than sumatriptan.  She has migraines on average 1-2 days per month.  She has had a few brief episodes of stabbing type pain but this is not as severe as in the past and denies associate tearing of the eyes and redness in the eyes; she has not had to use indomethacin and notes these are minor overall.       Prior notes as per 11/19/2024.  Patient last seen 5/16/2024.  She has been doing well in terms of Migraines on Qulipta 60 mg daily for prevention. She only has ~ 1 migraine per month, which responds well to Ubrelvy and denies side effects on the medication.     Of note, she has pain in the back of the neck and some balance issues and some radiation to shoulders and numbness in hands and has been seen by neurosurgery with plan for cervical spinal fusion C4/5/6,. She has been dropping objects from right more than left hand as well .        Patient last seen 11/19/2024.  In terms of migraines, she has been doing well on Qulipta 60 mg daily for prevention having no migraines since last visit; previously when, migraines occurred, they would respond well to Ubrelvy and denies any side effects on the medication.   Otherwise, she had NCS/EMG since last visit and has been seen by neurosurgery with cervical spinal fusion (ACDF C4-6) done 11/27/2024 and has been doing well since this time as well,  initially having some mild tension type headaches but these have improved.           Past Medical History:    Allergic rhinitis    Anxiety    Arthritis    Asthma (HCC)    Back problem    Bipolar disorder (HCC)    Calculus of kidney    Cancer (HCC)    Basal cell carcinoma over the left eyebrow    Depression    Disorder of thyroid    Esophageal reflux    Hypothyroidism    Migraine    Migraines    Obesity, unspecified    PONV (postoperative nausea and vomiting)    S/P laparoscopic sleeve gastrectomy    Sleep apnea    history of, improved with weight loss surgery, no longer needs cpap    Stroke (HCC)    TIA, possible not completely diagonosed    Visual impairment    glasses     Past Surgical History:   Procedure Laterality Date    Other      benign tumor reomoved  from left inner thigh    Other      basal carcinoma removed from upper lid of left eye    Other surgical history  2022    Gastric sleeve    Other surgical history  2024    CERVICAL 4 - CERVICAL 5, CERVICAL 5 - CERVICAL 6 ANTERIOR CERVICAL DISCECTOMY AND FUSION WITH INSTRUMENTATION, ALLOGRAFT    Skin surgery  Oct 2014    tumor removed from eyebrow ridge    Tonsillectomy       Social History     Socioeconomic History    Marital status: Single   Tobacco Use    Smoking status: Former     Current packs/day: 0.00     Average packs/day: 1 pack/day for 23.9 years (23.9 ttl pk-yrs)     Types: Cigarettes     Start date: 1985     Quit date: 12/15/2008     Years since quittin.2     Passive exposure: Never    Smokeless tobacco: Never   Vaping Use    Vaping status: Never Used   Substance and Sexual Activity    Alcohol use: Not Currently     Comment: Last week in 2023    Drug use: Never    Sexual activity: Not Currently     Partners: Male     Birth control/protection: Condom   Other Topics Concern    Caffeine Concern Yes     Comment: 2 cups a day    Stress Concern Yes    Weight Concern Yes    Special Diet Yes     Comment: Bariatric     Exercise Yes     Comment: 15 mins evevry other day    Seat Belt Yes     Social Drivers of Health     Food Insecurity: No Food Insecurity (11/27/2024)    Food Insecurity     Food Insecurity: Never true   Transportation Needs: No Transportation Needs (11/27/2024)    Transportation Needs     Lack of Transportation: No   Housing Stability: Low Risk  (11/27/2024)    Housing Stability     Housing Instability: No     Family History   Problem Relation Age of Onset    Depression Father     Heart Disease Father     Asthma Father     Heart Attack Father     Migraines Father     Diabetes Mother     Obesity Mother     Depression Mother     Alcohol and Other Disorders Associated Maternal Grandmother     Cancer Maternal Grandmother         Lung CA    Cancer Maternal Grandfather         Prostate CA    Hypertension Paternal Grandmother     Cancer Paternal Grandfather         Lung CA    Personality Disorder Paternal Grandfather     Breast Cancer Maternal Aunt 65        in her 60's    Substance Abuse Maternal Uncle     Stroke Neg        Allergies:  Allergies   Allergen Reactions    Clonazepam OTHER (SEE COMMENTS)     Suicidal thoughts  [place] out of sorts and has increased irritability as well as extremities feeling strange    Compazine [Prochlorperazine] OTHER (SEE COMMENTS)     Oral tablet-  Twitching;  Blanked out; nausea    Topiramate OTHER (SEE COMMENTS)     Suicidal,aggresive,brain fog      Current Meds:  Current Outpatient Medications   Medication Sig Dispense Refill    Atogepant (QULIPTA) 30 MG Oral Tab Take 1 tablet by mouth daily. 90 tablet 1    Naloxone HCl 4 MG/0.1ML Nasal Liquid 4 mg by Nasal route as needed. If patient remains unresponsive, repeat dose in other nostril 2-5 minutes after first dose. 1 kit 0    Ketorolac Tromethamine 10 MG Oral Tab Take 2 tablets (20 mg total) by mouth As Directed. For migraines: take 20mg once at the onset of the migraine. HOLD till cleared by neurosurgery clinic      acetaminophen 500 MG  Oral Tab Take 2 tablets (1,000 mg total) by mouth every 6 (six) hours as needed for Pain.      levothyroxine 75 MCG Oral Tab Take 1 tablet (75 mcg total) by mouth before breakfast. 90 tablet 0    pantoprazole 20 MG Oral Tab EC Take 1 tablet (20 mg total) by mouth before breakfast. 90 tablet 0    ondansetron 4 MG Oral Tablet Dispersible Take 1 tablet (4 mg total) by mouth every 8 (eight) hours as needed for Nausea. 30 tablet 0    ubrogepant (UBRELVY) 100 MG Oral Tab Take one tablet at onset of migraine.  May take additional tablet in 2 hours if needed.  Do not exceed two tablets per 24 hour period. 10 tablet 5    lurasidone 80 MG Oral Tab Take 1 tablet (80 mg total) by mouth at bedtime.      hydrOXYzine 25 MG Oral Tab Take 1 tablet (25 mg total) by mouth nightly as needed for Anxiety.      LORazepam 0.5 MG Oral Tab Take 1 tablet (0.5 mg total) by mouth nightly as needed for Anxiety.      lamoTRIgine 100 MG Oral Tab Take 1 tablet (100 mg total) by mouth daily.      buPROPion  MG Oral Tablet 24 Hr Take 1 tablet (150 mg total) by mouth daily. 30 tablet 0    propranolol 20 MG Oral Tab Take 1 tablet (20 mg total) by mouth 2 (two) times daily. 60 tablet 0    naltrexone 50 MG Oral Tab Take 1 tablet (50 mg total) by mouth daily. 30 tablet 0    sertraline 100 MG Oral Tab Take 1 tablet (100 mg total) by mouth daily. 30 tablet 0    traZODone 100 MG Oral Tab Take 1 tablet (100 mg total) by mouth nightly as needed for Sleep. 30 tablet 0    meclizine 25 MG Oral Tab Take 1 tablet (25 mg total) by mouth 3 (three) times daily as needed for Dizziness. 15 tablet 0    albuterol (PROAIR HFA) 108 (90 Base) MCG/ACT Inhalation Aero Soln SHAKE WELL AND INHALE 1 TO 2 PUFFS INTO THE LUNGS EVERY 4 HOURS AS NEEDED FOR WHEEZING( COUGH) 3 each 1    Multiple Vitamins-Minerals (MULTIVITAL OR) Take 1 tablet by mouth daily. Bariatric vitamin            ROS:   A comprehensive 10 point review of systems was completed.  Pertinent positives and  negatives noted in the the HPI.      PHYSICAL EXAM:   /66 (BP Location: Left arm, Patient Position: Sitting, Cuff Size: large)   Pulse 62   Resp 16   Ht 66\"   Wt (!) 315 lb (142.9 kg)   LMP 03/23/2024 (Approximate)   BMI 50.84 kg/m²   Estimated body mass index is 50.84 kg/m² as calculated from the following:    Height as of this encounter: 66\".    Weight as of this encounter: 315 lb (142.9 kg).      Gen: well developed, well nourished, no acute distress  HEENT: normocephalic  Heart; normal S1/S2, regular rate and rhythm  Lungs: clear to auscultation bilaterally  Extremities: no edema, peripheral pulses intact    Neck: supple, full range of motion; no carotid bruits    Fundoscopic Exam: optic discs sharp bilaterally    Neuro:  Mental status:  Orientation: Alert and oriented to person, place, time  Speech Fluent and conversational    CN: PERRL, EOMI with no nystagmus, VFF, smile symmetric, sensation intact, tongue and palate midline, SCM intact, otherwise, CN 2-12 intact  Motor: 5/5 strength throughout, tone normal  DTR: brisker on left UE with +benavides's; 4+; otherwise, intact throughout, toes downgoing; no clonus  Sensory: intact to light touch throughout; pin intact today  Coord: FNF intact with no tremor or dysmetria; rapid alternating movements intact bilaterally  Romberg: absent  Gait: casual gait, less stiff and able to move arm better today on R side; overall more fluid       TEST RESULTS/DATA REVIEWED:   Imaging  None new    Prior as noted below    MRI cervical spine w/o contrast (10/31/2024):     FINDINGS: Vertebral body height and alignment is maintained. Multilevel disc degeneration with a small amount of discogenic reactive marrow change. Subcentimeter focus of T2/STIR signal in the spinal cord just below the C5-6 level. Spinal cord is   otherwise normal in appearance. The cerebellum, elissa, skull base are unremarkable insofar as visualized. Paraspinous soft tissues unremarkable.      Occiput-C2:  No significant central canal stenosis.     C2-3: Disc degeneration characterized by disc desiccation. No significant posterior disc bulge and no focal disc herniation. No central canal stenosis or neural foraminal narrowing.     C3-4: Disc degeneration characterized by disc desiccation and a shallow posterior disc bulge. Mild left and no right neural foraminal narrowing. No central canal stenosis.     C4-5: Disc degeneration characterized by disc desiccation and a broad posterior disc bulge. Left-sided uncovertebral joint hypertrophy. Mild to moderate central canal stenosis. Mild to moderate left and no right neural foraminal narrowing.     C5-6: Disc degeneration characterized by disc desiccation and a broad posterior disc bulge. Severe central canal stenosis. Encroachment on the spinal cord with a small focus of compression induced edema/myelomalacia just below the C5-6 level. Mild to   moderate right neural foraminal narrowing. Moderate to severe narrowing at the proximal portion of the left neural foramen. The remainder of the foramen is patent.     C6-7: Disc degeneration characterized by disc desiccation and a shallow posterior disc bulge. No focal disc herniation. No central canal stenosis or neural foraminal narrowing.     C7-T1: Disc degeneration characterized by minimal disc desiccation. No significant posterior disc bulge and no focal disc herniation. No central canal stenosis or neural foraminal narrowing.     IMPRESSION:   1.Multilevel disc degeneration. Vertebral body height and alignment is maintained.   2.C5-6: Severe central canal stenosis. Encroachment on the spinal cord with a small focus of compression induced edema/myelomalacia just below the C5-6 disc level. Moderate to severe narrowing of the proximal portion of the left neural foramen. Mild to   moderate right neural foraminal narrowing.   3.C4-5: Mild to moderate central canal stenosis. Mild to moderate left and no right  neural foraminal narrowing.         Prior as noted below    MRI brain with and without contrast (4/10/2023):     FINDINGS: Several 2-3 mm T2/FLAIR hyperintense, non-enhancing signal abnormalities are identified in the frontal and parietal subcortical white matter bilaterally. The cerebellum and brainstem are normal in morphology and signal. Normal morphology of the    corpus callosum. No areas of restricted diffusion are identified. No evidence of cortical infarct, edema, hemorrhage, mass, abnormal enhancement, or abnormal extra-axial fluid collection. Ventricular volumes are normal. No midline shift. The major   intracranial arterial flow voids and the dural venous sinuses appear patent. Normal size of the pituitary gland. No Chiari malformation.     Minimal mucosal thickening in bilateral maxillary sinuses. The mastoid air cells are normally aerated. A 4F synovial cyst along the anteromedial margin of the right temporomandibular joint is of questionable clinical significance. No bone lesions are   identified.     IMPRESSION:   1. Several 2-3 mm T2/FLAIR hyperintense, non-enhancing signal abnormalities in the frontal and parietal subcortical white matter bilaterally may relate to migraines or minimal chronic small vessel ischemic changes but are non-specific in appearance.     2. No evidence of acute infarct, cortical infarct, edema, hemorrhage, mass, or acute intracranial abnormality.     3. Minimal chronic-appearing maxillary sinus inflammatory changes.     Labs:   Prior as noted below  Component      Latest Ref Rng & Units 4/16/2022   Vitamin B12      193 - 986 pg/mL 1,014 (H)   FOLATE (FOLIC ACID), SERUM      >=8.7 ng/mL 18.1   VITAMIN B1 (THIAMINE), WHOLE B      70 - 180 nmol/L 73   VITAMIN D, 25-OH, TOTAL      30.0 - 100.0 ng/mL 44.5    4/16/2022    1,014 (H)     SARS-CoV-2 by PCR (9./1/2022): detected      Other procedures    New  NCS/EMG (11/22/2024):     Sensory NCS      Nerve / Sites Rec. Site Onset Lat  Peak Lat NP Amp PP Amp Segments Distance Peak Diff Velocity Comment       ms ms µV µV   cm ms m/s     R Median - Dig II (Antidromic)      Wrist Index 2.71 3.59 65.2 111.3 Wrist - Index 14   52        Ref.   <=3.30 <=4.00 >=11.0 >=13.0 Ref.           R Ulnar - Dig V (Antidromic)      Wrist Dig V 2.19 3.07 48.2 86.6 Wrist - Dig V 11   50        Ref.   <=3.10 <=4.00 >=11.0 >=8.0 Ref.           R Radial - Superficial (Antidromic)      Forearm Wrist 1.61 2.14 16.8 32.8 Forearm - Wrist 10   62        Ref.   <=2.20 <=2.80 >=7.0 >=11.0 Ref.           R Median, Ulnar - Transcarpal comparison      Median Palm Wrist 1.56 2.08 82.4 74.4 Median Palm - Wrist 8   51        Ulnar Palm Wrist 1.46 2.08 23.2 28.9 Ulnar Palm - Wrist 8   55                 Median Palm - Ulnar Palm   0.00                   Ref.   <=0.40           Motor NCS      Nerve / Sites Muscle Latency Amplitude Segments Dist. Lat Diff Velocity Comments       ms mV   cm ms m/s     R Median - APB      Wrist APB 4.00 5.6 Wrist - APB 7            Ref.   <=4.40 >=4.2 Ref.              Elbow APB 8.98 4.8 Elbow - Wrist 25 4.98 50.2        Ref.       Ref.     >=51.0     R Ulnar - ADM      Wrist ADM 2.77 10.7 Wrist - ADM 7            Ref.   <=3.70 >=7.9 Ref.              B.Elbow ADM 7.17 9.8 B.Elbow - Wrist 23.5 4.40 53.5        Ref.       Ref.     >=52.0         F  Wave      Nerve M Latency F Latency     ms ms   R Median - APB 4.6 28.2   Ref.   <=28.5   R Ulnar - ADM 2.6 27.9   Ref.   <=30.2                   EMG Summary Table       Spontaneous MUAP Recruitment   Muscle IA Fib PSW Fasc H.F. Amp Dur. PPP Pattern   R. Deltoid N None None None None N N N N   R. Triceps brachii N None None None None 1+ 1+ N Reduced   R. Biceps brachii N None None None None N N N N   R. Extensor digitorum communis N None None None None N N N N   R. First dorsal interosseous N None None None None 1+ 1+ 1+ Reduced       Summary     Nerve conduction studies:  Right median sensory response was  normal.  Right ulnar sensory response was normal.  Right radial sensory response was normal.  Right median to ulnar palmar mixed nerve comparison study was normal with no significant interlatency prolongation of median relative to ulnar nerve.  Right median motor response was normal; F-wave response was normal.  Right ulnar motor response was normal; F-wave response was normal.     EMG (needle exam):  Concentric needle EMG examination was performed in 5 muscles. It was normal in 3 muscle(s): R. Deltoid, R. Biceps brachii, R. Extensor digitorum communis. The study was abnormal in 2 muscle(s), with the following distribution:  No increased insertional or spontaneous activity was noted in any of the muscles tested  Motor unit potentials demonstrated increased amplitude and duration, with reduced recruitment in the right triceps brachii muscle; in addition, motor unit potentials demonstrated increased amplitude and duration with a mild degree of polyphasia in the right FDI muscle.          Conclusion:   This is a mildly abnormal study.  There is evidence for subacute to chronic denervation changes in the right triceps brachii and right FDI muscle, which could suggest a subacute to chronic lower cervical radiculopathy.  There is, however, no evidence for a large fiber polyneuropathy, primary demyelinating neuropathy, myopathy, or median entrapment neuropathy across the right wrist as clinically questioned.    IMPRESSION AND PLAN:   Crys Carter is a 45 year old female with PMHx significant for bipolar disorder and chronic episodic migraine, who originally presented 11/3/2022, for evaluation and management of recent worsening migraines.    Neurologic exam is normal and nonfocal and patient was previously well controlled in terms of migraine, which occurred with and without aura, on preventive medication with Topamax as well as abortive therapy with sumatriptan 100 mg dose PRN.  However, she had weight loss surgery  3/2022 and had worsening migraines which did not respond to higher dose of Topamax and she developed significant side effects with cognitive changes and suicidal thoughts.  She was weaned off Topamax and had been doing better on Qulipta daily for prevention.       However, she subsequently developed a different type of headache with mainly unilateral left side headache with some associated tearing of eye - given unilateral presentation, thought this could be variant of trigeminal autonomic cephalgia (ie paroxysmal hemicrania) and was recommended to start indomethacin trial for treatment and noted improvement when she took 50 mg tid dosing with no recurrence since this time.  Patient advised to monitor for recurrence and likely plan for resuming short course of indomethacin at ~50 mg tid if this occurs.       Otherwise, continue Qulipta at current dose; for abortive therapy for migraines, she notes sumatriptan was not working and is not ideal due to risk of serotonin syndrome with concurrent psychiatric medications.     She is now on ubrogepantt (Ubrelvy) and doing well with this for abortive therapy:  continue to take 100 mg  within first 30 minutes of symptoms starting; if not improved after 2 hours, take additional dose, and then stop taking; discussed potential side effects, including but not limited to nausea, drowsiness and dry mouth        While most of her symptoms and exam findings noted previously could be attributed to cervical myelopathy, her right hand weakness, with left foraminal encroachment noted on MRI of the cervical spine was unexpected and could suggest a superimposed peripheral entrapment neuropathy such as carpal tunnel syndrome for which NCS/EMG R UE was completed but did not show any R carpal tunnel syndrome only showing some chronic denervation changes likely related to cervical myelopathy; she is now improved s/p ACDF C4-6 11/27/2024 and migraines are well controlled - will continue Qulipta  60 mg daily    1. Episodic migraine  As noted above   - Atogepant (QULIPTA) 30 MG Oral Tab; Take 1 tablet by mouth daily.  Dispense: 90 tablet; Refill: 1    2. Episodic paroxysmal hemicrania, not intractable  As noted above    Return in about 4 months (around 6/25/2025).    Albert Galicia MD, Neurology  [facility]  Pager [phone]  2/25/2025

## 2025-02-25 NOTE — PATIENT INSTRUCTIONS
Refill policies:    Allow 2-3 business days for refills; controlled substances may take longer.  Contact your pharmacy at least 5 days prior to running out of medication and have them send an electronic request or submit request through the “request refill” option in your Currently account.  Refills are not addressed on weekends; covering physicians do not authorize routine medications on weekends.  No narcotics or controlled substances are refilled after noon on Fridays or by on call physicians.  By law, narcotics must be electronically prescribed.  A 30 day supply with no refills is the maximum allowed.  If your prescription is due for a refill, you may be due for a follow up appointment.  To best provide you care, patients receiving routine medications need to be seen at least once a year.  Patients receiving narcotic/controlled substance medications need to be seen at least once every 3 months.  In the event that your preferred pharmacy does not have the requested medication in stock (e.g. Backordered), it is your responsibility to find another pharmacy that has the requested medication available.  We will gladly send a new prescription to that pharmacy at your request.    Scheduling Tests:    If your physician has ordered radiology tests such as MRI or CT scans, please contact Central Scheduling at [phone] right away to schedule the test.  Once scheduled, the [facility] Centralized Referral Team will work with your insurance carrier to obtain pre-certification or prior authorization.  Depending on your insurance carrier, approval may take 3-10 days.  It is highly recommended patients assure they have received an authorization before having a test performed.  If test is done without insurance authorization, patient may be responsible for the entire amount billed.      Precertification and Prior Authorizations:  If your physician has recommended that you have a procedure or additional testing performed the [facility]  Centralized Referral Team will contact your insurance carrier to obtain pre-certification or prior authorization.    You are strongly encouraged to contact your insurance carrier to verify that your procedure/test has been approved and is a COVERED benefit.  Although the [facility] Centralized Referral Team does its due diligence, the insurance carrier gives the disclaimer that \"Although the procedure is authorized, this does not guarantee payment.\"    Ultimately the patient is responsible for payment.   Thank you for your understanding in this matter.  Paperwork Completion:  If you require FMLA or disability paperwork for your recovery, please make sure to either drop it off or have it faxed to our office at [phone]. Be sure the form has your name and date of birth on it.  The form will be faxed to our Forms Department and they will complete it for you.  There is a 25$ fee for all forms that need to be filled out.  Please be aware there is a 10-14 day turnaround time.  You will need to sign a release of information (RONNY) form if your paperwork does not come with one.  You may call the Forms Department with any questions at [phone].  Their fax number is [phone].

## 2025-02-26 RX ORDER — LEVOTHYROXINE SODIUM 75 UG/1
75 TABLET ORAL
Qty: 90 TABLET | Refills: 0 | Status: SHIPPED | OUTPATIENT
Start: 2025-02-26

## 2025-02-26 RX ORDER — PANTOPRAZOLE SODIUM 20 MG/1
20 TABLET, DELAYED RELEASE ORAL
Qty: 90 TABLET | Refills: 0 | Status: SHIPPED | OUTPATIENT
Start: 2025-02-26

## 2025-02-26 NOTE — TELEPHONE ENCOUNTER
MCM sent   Due for cpx     Protocol failed     LOV: 11/11/24   RTC: 3 months  Filled: 11/19/24   Labs: 1/27/24   Future Appointments   Date Time Provider Department Center   3/8/2025  8:00 AM BBK SOULEYMANE RM1 [facility] [place]   5/18/2025  8:30 AM BBK XR RM1 [facility] XRAY [place]   5/20/2025  8:30 AM Allen Dickson MD ENINAPER2 [facility] [place]   6/26/2025  1:00 PM Albert Galicia MD ENIWARREN [facility] [place]

## 2025-02-26 NOTE — TELEPHONE ENCOUNTER
Last seen by Dr. Barrios in 11/2024 for pre-op  Last seen by Dr. Tirado in 1/2024 who is PCP  Never seen by Dr. Slaughter    Previous [facility] Pitts patient.    Forwarding to Dr. Tirado     Also sending to front office staff to call patient to schedule annual physical

## 2025-03-29 ENCOUNTER — HOSPITAL ENCOUNTER (OUTPATIENT)
Age: 45
Discharge: HOME | End: 2025-03-29
Payer: COMMERCIAL

## 2025-03-29 VITALS
OXYGEN SATURATION: 98 % | TEMPERATURE: 98 F | RESPIRATION RATE: 20 BRPM | BODY MASS INDEX: 44.41 KG/M2 | SYSTOLIC BLOOD PRESSURE: 99 MMHG | HEIGHT: 68 IN | HEART RATE: 89 BPM | WEIGHT: 293 LBS | DIASTOLIC BLOOD PRESSURE: 71 MMHG

## 2025-03-29 DIAGNOSIS — R51.9 TEMPORAL PAIN: Primary | ICD-10-CM

## 2025-03-29 PROCEDURE — 99213 OFFICE O/P EST LOW 20 MIN: CPT

## 2025-03-29 PROCEDURE — 99214 OFFICE O/P EST MOD 30 MIN: CPT

## 2025-03-29 RX ORDER — PREDNISONE 20 MG/1
40 TABLET ORAL DAILY
Qty: 10 TABLET | Refills: 0 | Status: SHIPPED | OUTPATIENT
Start: 2025-03-29 | End: 2025-04-03

## 2025-03-29 NOTE — ED PROVIDER NOTES
Patient Seen in: [facility]      History     Chief Complaint   Patient presents with    Ear Problem Pain     Stated Complaint: Pain left ear    Subjective:   This is a 45-year-old female with below stated medical history.  Presents to immediate care for left temporal pain.  Symptoms started upon awakening this morning.  No trauma or injury.  Reports pain intensifies when she moves her head.  Pain is not worsened by opening and closing of the jaw.  No pain inside the ear.  No dental pain.  Reports the left side of her scalp was tender to touch upon awakening this morning.  No visual loss or visual disturbance.  No numbness, tingling, or weakness to the extremities.  No facial droop.  Took Tylenol with little relief.     The history is provided by the patient.             Objective:     Past Medical History:    Allergic rhinitis    Anxiety    Arthritis    Asthma (HCC)    Back problem    Bipolar disorder (HCC)    Calculus of kidney    Cancer (HCC)    Basal cell carcinoma over the left eyebrow    Depression    Disorder of thyroid    Esophageal reflux    Hypothyroidism    Migraine    Migraines    Obesity, unspecified    PONV (postoperative nausea and vomiting)    S/P laparoscopic sleeve gastrectomy    Sleep apnea    history of, improved with weight loss surgery, no longer needs cpap    Stroke (HCC)    TIA, possible not completely diagonosed    Visual impairment    glasses              Past Surgical History:   Procedure Laterality Date    Other  2009    benign tumor reomoved  from left inner thigh    Other 2014    basal carcinoma removed from upper lid of left eye    Other surgical history  03/07/2022    Gastric sleeve    Other surgical history  11/27/2024    CERVICAL 4 - CERVICAL 5, CERVICAL 5 - CERVICAL 6 ANTERIOR CERVICAL DISCECTOMY AND FUSION WITH INSTRUMENTATION, ALLOGRAFT    Skin surgery  Oct 2014    tumor removed from eyebrow ridge    Tonsillectomy                  Social History      Socioeconomic History    Marital status: Single   Tobacco Use    Smoking status: Former     Current packs/day: 0.00     Average packs/day: 1 pack/day for 23.9 years (23.9 ttl pk-yrs)     Types: Cigarettes     Start date: 1985     Quit date: 12/15/2008     Years since quittin.2     Passive exposure: Never    Smokeless tobacco: Never   Vaping Use    Vaping status: Never Used   Substance and Sexual Activity    Alcohol use: Not Currently     Comment: Last week in 2023    Drug use: Never    Sexual activity: Not Currently     Partners: Male     Birth control/protection: Condom   Other Topics Concern    Caffeine Concern Yes     Comment: 2 cups a day    Stress Concern Yes    Weight Concern Yes    Special Diet Yes     Comment: Bariatric    Exercise Yes     Comment: 15 mins evevry other day    Seat Belt Yes     Social Drivers of Health     Food Insecurity: No Food Insecurity (2024)    Food Insecurity     Food Insecurity: Never true   Transportation Needs: No Transportation Needs (2024)    Transportation Needs     Lack of Transportation: No   Housing Stability: Low Risk  (2024)    Housing Stability     Housing Instability: No              Review of Systems   Constitutional:  Negative for chills and fever.   HENT:  Negative for congestion, dental problem, ear discharge, ear pain, facial swelling and sore throat.    Respiratory:  Negative for cough.    Cardiovascular:  Negative for chest pain.   Gastrointestinal:  Negative for abdominal pain.   Genitourinary:  Negative for dysuria.   Musculoskeletal:  Negative for back pain, neck pain and neck stiffness.   Skin:  Negative for rash.   Neurological:  Positive for headaches. Negative for dizziness, tremors, seizures, syncope, facial asymmetry, speech difficulty, weakness, light-headedness and numbness.   Hematological:  Does not bruise/bleed easily.       Positive for stated complaint: Pain left ear  Other systems are as noted in  HPI.  Constitutional and vital signs reviewed.      All other systems reviewed and negative except as noted above.    Physical Exam     ED Triage Vitals [03/29/25 1222]   BP 99/71   Pulse 89   Resp 20   Temp 98.3 °F (36.8 °C)   Temp src Oral   SpO2 98 %   O2 Device None (Room air)       Current Vitals:   Vital Signs  BP: 99/71  Pulse: 89  Resp: 20  Temp: 98.3 °F (36.8 °C)  Temp src: Oral    Oxygen Therapy  SpO2: 98 %  O2 Device: None (Room air)        Physical Exam  Vitals and nursing note reviewed.   Constitutional:       General: She is not in acute distress.     Appearance: Normal appearance. She is not ill-appearing, toxic-appearing or diaphoretic.   HENT:      Head: Normocephalic and atraumatic.      Right Ear: External ear normal.      Left Ear: External ear normal.      Nose: Nose normal.      Mouth/Throat:      Mouth: Mucous membranes are moist.      Pharynx: Oropharynx is clear.   Eyes:      General:         Right eye: No discharge.         Left eye: No discharge.      Extraocular Movements: Extraocular movements intact.      Conjunctiva/sclera: Conjunctivae normal.   Cardiovascular:      Rate and Rhythm: Normal rate.      Heart sounds: Normal heart sounds.   Pulmonary:      Effort: Pulmonary effort is normal.      Breath sounds: Normal breath sounds.   Musculoskeletal:      Cervical back: Neck supple.      Right lower leg: No edema.      Left lower leg: No edema.   Skin:     Capillary Refill: Capillary refill takes less than 2 seconds.      Findings: No rash.   Neurological:      Mental Status: She is alert and oriented to person, place, and time.   Psychiatric:         Mood and Affect: Mood normal.         Behavior: Behavior normal.             ED Course   Labs Reviewed - No data to display         Dc home.        MDM        Vital signs stable.  Patient is well-appearing and nontoxic looking.  Presents to immediate care for left temporal pain.    Differential diagnosis includes but is not limited to  temporal arteritis, giant cell arteritis, migraine, dental infection, TMJ, ear infection. Tension headache.     Patient's neurological exam is unremarkable with no focal deficits.  She is tender through the left temporal area.  There is no evidence of ear infection or dental infection on exam.    Clinical impression is possible early temporal arteritis    Patient states she was placed on a course of prednisone 20 mg a day for 7 days.  We discussed increasing prednisone to 40 mg for the first 5 days.    DC home.  We discussed close follow-up with her neurologist to call on Monday for close follow-up appointment.  We also discussed following up with her dentist.  ER precautions including loss of vision and other neurological symptoms reviewed.  She verbalized understanding, and agreed with plan of care.  All questions answered.    Medical Decision Making      Disposition and Plan     Clinical Impression:  1. Temporal pain         Disposition:  Discharge  3/29/2025 12:57 pm    Follow-up:  Your neurologist    In 3 days            Medications Prescribed:  Discharge Medication List as of 3/29/2025 12:58 PM        START taking these medications    Details   !! predniSONE 20 MG Oral Tab Take 2 tablets (40 mg total) by mouth daily for 5 days., Normal, Disp-10 tablet, R-0       !! - Potential duplicate medications found. Please discuss with provider.              Supplementary Documentation:

## 2025-03-29 NOTE — ED INITIAL ASSESSMENT (HPI)
Patient reports that she has a sharp pain in her left ear since today.  Patient reports when she moves her head the pain intensifies.

## 2025-03-29 NOTE — DISCHARGE INSTRUCTIONS
Please take 40 mg of prednisone once a day for the next 5 days.  Call your neurologist on Monday morning for close follow-up.  If you have worsening neurological symptoms or vision loss please go directly to the emergency department as discussed.

## 2025-03-30 ENCOUNTER — HOSPITAL ENCOUNTER (EMERGENCY)
Facility: HOSPITAL | Age: 45
Discharge: HOME | End: 2025-03-30
Attending: STUDENT IN AN ORGANIZED HEALTH CARE EDUCATION/TRAINING PROGRAM
Payer: COMMERCIAL

## 2025-03-30 ENCOUNTER — APPOINTMENT (OUTPATIENT)
Dept: CT IMAGING | Facility: HOSPITAL | Age: 45
End: 2025-03-30
Attending: STUDENT IN AN ORGANIZED HEALTH CARE EDUCATION/TRAINING PROGRAM
Payer: COMMERCIAL

## 2025-03-30 ENCOUNTER — APPOINTMENT (OUTPATIENT)
Dept: MRI IMAGING | Facility: HOSPITAL | Age: 45
End: 2025-03-30
Attending: STUDENT IN AN ORGANIZED HEALTH CARE EDUCATION/TRAINING PROGRAM
Payer: COMMERCIAL

## 2025-03-30 VITALS
HEIGHT: 66 IN | OXYGEN SATURATION: 94 % | TEMPERATURE: 98 F | SYSTOLIC BLOOD PRESSURE: 114 MMHG | RESPIRATION RATE: 18 BRPM | WEIGHT: 293 LBS | DIASTOLIC BLOOD PRESSURE: 74 MMHG | BODY MASS INDEX: 47.09 KG/M2 | HEART RATE: 61 BPM

## 2025-03-30 DIAGNOSIS — R51.9 TENDERNESS OF TEMPLE REGION: ICD-10-CM

## 2025-03-30 DIAGNOSIS — G43.909 MIGRAINE WITHOUT STATUS MIGRAINOSUS, NOT INTRACTABLE, UNSPECIFIED MIGRAINE TYPE: Primary | ICD-10-CM

## 2025-03-30 LAB
ALBUMIN SERPL-MCNC: 4.4 G/DL (ref 3.2–4.8)
ALBUMIN/GLOB SERPL: 1.5 {RATIO} (ref 1–2)
ALP LIVER SERPL-CCNC: 90 U/L
ALT SERPL-CCNC: 12 U/L
ANION GAP SERPL CALC-SCNC: 9 MMOL/L (ref 0–18)
AST SERPL-CCNC: 15 U/L (ref ?–34)
BASOPHILS # BLD AUTO: 0.03 X10(3) UL (ref 0–0.2)
BASOPHILS NFR BLD AUTO: 0.2 %
BILIRUB SERPL-MCNC: 0.3 MG/DL (ref 0.3–1.2)
BUN BLD-MCNC: 7 MG/DL (ref 9–23)
CALCIUM BLD-MCNC: 9.6 MG/DL (ref 8.7–10.6)
CHLORIDE SERPL-SCNC: 105 MMOL/L (ref 98–112)
CO2 SERPL-SCNC: 26 MMOL/L (ref 21–32)
CREAT BLD-MCNC: 0.87 MG/DL
CRP SERPL-MCNC: 1.2 MG/DL (ref ?–0.5)
EGFRCR SERPLBLD CKD-EPI 2021: 83 ML/MIN/1.73M2 (ref 60–?)
EOSINOPHIL # BLD AUTO: 0.02 X10(3) UL (ref 0–0.7)
EOSINOPHIL NFR BLD AUTO: 0.1 %
ERYTHROCYTE [DISTWIDTH] IN BLOOD BY AUTOMATED COUNT: 13.2 %
ERYTHROCYTE [SEDIMENTATION RATE] IN BLOOD: 45 MM/HR
GLOBULIN PLAS-MCNC: 3 G/DL (ref 2–3.5)
GLUCOSE BLD-MCNC: 96 MG/DL (ref 70–99)
HCT VFR BLD AUTO: 41.2 %
HGB BLD-MCNC: 13.9 G/DL
IMM GRANULOCYTES # BLD AUTO: 0.07 X10(3) UL (ref 0–1)
IMM GRANULOCYTES NFR BLD: 0.5 %
LYMPHOCYTES # BLD AUTO: 2.85 X10(3) UL (ref 1–4)
LYMPHOCYTES NFR BLD AUTO: 19.9 %
MCH RBC QN AUTO: 29.4 PG (ref 26–34)
MCHC RBC AUTO-ENTMCNC: 33.7 G/DL (ref 31–37)
MCV RBC AUTO: 87.3 FL
MONOCYTES # BLD AUTO: 0.83 X10(3) UL (ref 0.1–1)
MONOCYTES NFR BLD AUTO: 5.8 %
NEUTROPHILS # BLD AUTO: 10.53 X10 (3) UL (ref 1.5–7.7)
NEUTROPHILS # BLD AUTO: 10.53 X10(3) UL (ref 1.5–7.7)
NEUTROPHILS NFR BLD AUTO: 73.5 %
OSMOLALITY SERPL CALC.SUM OF ELEC: 288 MOSM/KG (ref 275–295)
PLATELET # BLD AUTO: 272 10(3)UL (ref 150–450)
POTASSIUM SERPL-SCNC: 3.9 MMOL/L (ref 3.5–5.1)
PROT SERPL-MCNC: 7.4 G/DL (ref 5.7–8.2)
RBC # BLD AUTO: 4.72 X10(6)UL
SODIUM SERPL-SCNC: 140 MMOL/L (ref 136–145)
WBC # BLD AUTO: 14.3 X10(3) UL (ref 4–11)

## 2025-03-30 PROCEDURE — 85652 RBC SED RATE AUTOMATED: CPT | Performed by: STUDENT IN AN ORGANIZED HEALTH CARE EDUCATION/TRAINING PROGRAM

## 2025-03-30 PROCEDURE — 86140 C-REACTIVE PROTEIN: CPT | Performed by: STUDENT IN AN ORGANIZED HEALTH CARE EDUCATION/TRAINING PROGRAM

## 2025-03-30 PROCEDURE — 80053 COMPREHEN METABOLIC PANEL: CPT | Performed by: STUDENT IN AN ORGANIZED HEALTH CARE EDUCATION/TRAINING PROGRAM

## 2025-03-30 PROCEDURE — 99285 EMERGENCY DEPT VISIT HI MDM: CPT

## 2025-03-30 PROCEDURE — 70496 CT ANGIOGRAPHY HEAD: CPT | Performed by: STUDENT IN AN ORGANIZED HEALTH CARE EDUCATION/TRAINING PROGRAM

## 2025-03-30 PROCEDURE — 96375 TX/PRO/DX INJ NEW DRUG ADDON: CPT

## 2025-03-30 PROCEDURE — 70553 MRI BRAIN STEM W/O & W/DYE: CPT | Performed by: STUDENT IN AN ORGANIZED HEALTH CARE EDUCATION/TRAINING PROGRAM

## 2025-03-30 PROCEDURE — A9575 INJ GADOTERATE MEGLUMI 0.1ML: HCPCS | Performed by: STUDENT IN AN ORGANIZED HEALTH CARE EDUCATION/TRAINING PROGRAM

## 2025-03-30 PROCEDURE — 96361 HYDRATE IV INFUSION ADD-ON: CPT

## 2025-03-30 PROCEDURE — 96365 THER/PROPH/DIAG IV INF INIT: CPT

## 2025-03-30 PROCEDURE — 85025 COMPLETE CBC W/AUTO DIFF WBC: CPT | Performed by: STUDENT IN AN ORGANIZED HEALTH CARE EDUCATION/TRAINING PROGRAM

## 2025-03-30 PROCEDURE — 96366 THER/PROPH/DIAG IV INF ADDON: CPT

## 2025-03-30 RX ORDER — GADOTERATE MEGLUMINE 376.9 MG/ML
20 INJECTION INTRAVENOUS
Status: COMPLETED | OUTPATIENT
Start: 2025-03-30 | End: 2025-03-30

## 2025-03-30 RX ORDER — KETOROLAC TROMETHAMINE 15 MG/ML
15 INJECTION, SOLUTION INTRAMUSCULAR; INTRAVENOUS ONCE
Status: COMPLETED | OUTPATIENT
Start: 2025-03-30 | End: 2025-03-30

## 2025-03-30 RX ORDER — HALOPERIDOL 5 MG/ML
2.5 INJECTION INTRAMUSCULAR ONCE
Status: COMPLETED | OUTPATIENT
Start: 2025-03-30 | End: 2025-03-30

## 2025-03-30 RX ORDER — DIPHENHYDRAMINE HYDROCHLORIDE 50 MG/ML
25 INJECTION, SOLUTION INTRAMUSCULAR; INTRAVENOUS ONCE
Status: COMPLETED | OUTPATIENT
Start: 2025-03-30 | End: 2025-03-30

## 2025-03-30 RX ORDER — PREDNISONE 20 MG/1
60 TABLET ORAL DAILY
Qty: 15 TABLET | Refills: 0 | Status: SHIPPED | OUTPATIENT
Start: 2025-03-30 | End: 2025-04-04

## 2025-03-30 RX ORDER — DEXAMETHASONE SODIUM PHOSPHATE 10 MG/ML
10 INJECTION, SOLUTION INTRAMUSCULAR; INTRAVENOUS ONCE
Status: COMPLETED | OUTPATIENT
Start: 2025-03-30 | End: 2025-03-30

## 2025-03-30 RX ORDER — MAGNESIUM SULFATE HEPTAHYDRATE 40 MG/ML
2 INJECTION, SOLUTION INTRAVENOUS ONCE
Status: COMPLETED | OUTPATIENT
Start: 2025-03-30 | End: 2025-03-30

## 2025-03-30 RX ORDER — METOCLOPRAMIDE HYDROCHLORIDE 5 MG/ML
10 INJECTION INTRAMUSCULAR; INTRAVENOUS ONCE
Status: COMPLETED | OUTPATIENT
Start: 2025-03-30 | End: 2025-03-30

## 2025-03-30 NOTE — ED PROVIDER NOTES
Patient Seen in: [facility] Emergency Department      History     Chief Complaint   Patient presents with    Headache     Stated Complaint: headache    Subjective:   HPI    Patient is a 45-year-old female who presents to the emergency department with a left-sided headache.  Patient has previous history of migraine headaches managed on a preventative medication that did not response to her acute management treatment and for which she was seen at the immediate care yesterday.  Patient reports that at that time she was having significant pain in the left temple and unlike with her typical migraines which typically feel better when she massages her left temple it hurt when her left temple was examined.  She states that she was diagnosed with temporal arteritis.  She had been given prednisone and was told to follow-up with neurology on Monday.  Since then the headache improved initially patient went to bed and woke up with headache worsening.  The headache is now severe prompting the visit to the ER.  She reports that coming into the ER she feels like her vision has gotten a little bit blurry in both eyes.  She has had associated nausea.  Pain worsens with loud noises and lights.    Objective:   No pertinent past medical history.            No pertinent past surgical history.              No pertinent social history.            Review of Systems    Positive for stated complaint: headache  Other systems are as noted in HPI.  Constitutional and vital signs reviewed.      All other systems reviewed and negative except as noted above.    Physical Exam     ED Triage Vitals [03/30/25 0410]   /89   Pulse 75   Resp 20   Temp 98 °F (36.7 °C)   Temp src Temporal   SpO2 97 %   O2 Device None (Room air)       Current:/74   Pulse 61   Temp 98 °F (36.7 °C) (Temporal)   Resp 18   Ht 167.6 cm (5' 6\")   Wt (!) 142.9 kg   LMP 03/23/2024 (Approximate)   SpO2 94%   BMI 50.84 kg/m²     Right Eye Chart Acuity:  20/40, Uncorrected  Left Eye Chart Acuity: 20/50,      Physical Exam    Constitutional: The patient is oriented to person, place, and time, appears well-developed and well-nourished.  On gross inspection patient was asked to cover each eye and compare her vision and states that it seems completely the same in each eye.  HENT:   Head: Normocephalic.  No tenderness elicited on palpation of the temporal regions bilaterally.  Nose: Nose normal.   Mouth/Throat: Oropharynx is clear and moist.   Eyes: Conjunctivae and EOM are normal. Pupils are equal, round, and reactive to light.   Neck: Normal range of motion and full passive range of motion without pain.  No meningismus.   Cardiovascular: Normal rate, regular rhythm, normal heart sounds and intact distal pulses.    Pulmonary/Chest: Effort normal and breath sounds normal, no tenderness.   Abdominal: Soft. Bowel sounds are normal. There is no tenderness.   Musculoskeletal: Normal range of motion.   Neurological: alert and oriented to person, place, and time, normal strength and normal reflexes, No cranial nerve deficit or sensory deficit.   Skin: Skin is warm and dry.          ED Course/ My interpretations:     Labs Reviewed   COMP METABOLIC PANEL (14) - Abnormal; Notable for the following components:       Result Value    BUN 7 (*)     All other components within normal limits   CBC WITH DIFFERENTIAL WITH PLATELET - Abnormal; Notable for the following components:    WBC 14.3 (*)     Neutrophil Absolute Prelim 10.53 (*)     Neutrophil Absolute 10.53 (*)     All other components within normal limits   SED RATE, WESTERGREN (AUTOMATED) - Abnormal; Notable for the following components:    Sed Rate 45 (*)     All other components within normal limits   C-REACTIVE PROTEIN - Abnormal; Notable for the following components:    C-Reactive Protein 1.20 (*)     All other components within normal limits   RAINBOW DRAW LAVENDER   RAINBOW DRAW LIGHT GREEN   RAINBOW DRAW BLUE   RAINBOW  DRAW GOLD         My independent imaging interpretation:      Procedures    Comp Metabolic Panel (14)    CBC With Differential With Platelet    US SUPERFICIAL TEMPORAL ARTERY, LEFT (CPT=93882)    MRI BRAIN (W+WO) (CPT=70553)        All available radiology reports for this visit reviewed.      Medications given:  Orders Placed This Encounter    Comp Metabolic Panel (14)    CBC With Differential With Platelet    Sed Rate, Westergren (Automated)    C-Reactive Protein    metoclopramide (Reglan) 5 mg/mL injection 10 mg    diphenhydrAMINE (Benadryl) 50 mg/mL  injection 25 mg    magnesium sulfate in sterile water for injection 2 g/50mL IVPB premix 2 g    gadoterate meglumine (Dotarem) 10 MMOL/20ML injection 20 mL    ketorolac (Toradol) 15 MG/ML injection 15 mg    haloperidol lactate (Haldol) 5 MG/ML injection 2.5 mg    dexAMETHasone PF (Decadron) 10 MG/ML injection 10 mg    sodium chloride 0.9 % IV bolus 1,000 mL    iopamidol 76% (ISOVUE-370) injection for power injector    predniSONE 20 MG Oral Tab                         MDM      Extensive differential diagnosis was considered for the patient including temporal arteritis, migraine headache, tension headache, intracranial mass, and other etiology.  I do not suspect subarachnoid hemorrhage as the patient's headache came on gradually and was not associated with exertion.  I do not suspect meningitis in the absence of neck stiffness on exam.    Case was discussed over the phone with Dr. Collins, neurology, he recommended temporal artery ultrasound and suggested that MRI may also be helpful given the patient's headache.    Unfortunately temporal artery ultrasound is not available.  More routinely definitive diagnosis of temporal arteritis is pursued with outpatient temporal artery biopsy.  Patient continued on steroids.    Treated in the emergency department as a migraine with resolution of headache.  Both on my initial evaluation and on my recheck patient does not have  tenderness however she had tenderness on exam yesterday therefore in the setting of elevated ESR and sed rate I do recommend the patient undergo temporal artery biopsy.  Patient had been on 20 mg of prednisone due to orthopedic issue.  She will be taking 60 mg daily for the next 5 days.  She will follow-up with neurology, ophthalmology and general surgery for the temporal artery biopsy.    Patient was advised on the importance of follow-up and reasons to seek emergency medical care if worsening symptoms were developed.  She demonstrated understanding, she is comfortable with the plan and will follow-up as directed.    Disposition and Plan     Clinical Impression:  1. Migraine without status migrainosus, not intractable, unspecified migraine type    2. Tenderness of temple region         Disposition:  Discharge  3/30/2025 11:22 am    Follow-up:  Rajendra Jimeenz MD  [address]  [facility] [id number] [phone]    Follow up      Yasmani Hunter MD  [address] [facility] [id number] [phone]    Schedule an appointment as soon as possible for a visit  for temporal artery biopsy call [phone] to schedule    Albert Galicia MD  [address] [facility] [id number] [phone]    Schedule an appointment as soon as possible for a visit  For recheck          Medications Prescribed:  Discharge Medication List as of 3/30/2025 11:29 AM        START taking these medications    Details   !! predniSONE 20 MG Oral Tab Take 3 tablets (60 mg total) by mouth daily for 5 days., Normal, Disp-15 tablet, R-0       !! - Potential duplicate medications found. Please discuss with provider.              Supplementary Documentation:                                                            Documentation created with the aid of Dragon voice recognition software.  Although efforts were made to ensure the accuracy of the note, some inaccuracies may persist.

## 2025-03-30 NOTE — ED INITIAL ASSESSMENT (HPI)
Pt c/o on and off HA all day. She was seen at [facility] yesterday, dx Temporal Arteritis. Pt started on Prednisone. Reports nausea, no vomiting, (+) sensitivity to light and noise

## 2025-03-31 ENCOUNTER — PATIENT MESSAGE (OUTPATIENT)
Dept: SURGERY | Facility: CLINIC | Age: 45
End: 2025-03-31

## 2025-03-31 NOTE — TELEPHONE ENCOUNTER
Dr. Baumann does not wish to complete this.     His recommendation is to have the patient reach out to general surgery.     According to chart review, the patient was given this number on her AVS from her most recent ER visit.     \"Schedule an appointment as soon as possible for a visit for temporal artery biopsy call [phone] to schedule.\"    Please advise patient to follow the information that she was previously given.     Thank you!

## 2025-03-31 NOTE — TELEPHONE ENCOUNTER
Received feedback from Neurosurgery and [facility] providers. Awaiting feedback from Dr. Baumann.

## 2025-03-31 NOTE — TELEPHONE ENCOUNTER
Dr. Jo said to check with Jorge to see if he performs temporal artery biopsies or if he knows who does - will route this message to him as well.

## 2025-03-31 NOTE — TELEPHONE ENCOUNTER
Noted that patient has messaged, asking about a possible procedure that was recommended by an ED physician. Patient stated that:    -she was seen in ED and UC over the weekend for evaluation of \"severe head pain\".   -she was informed that she had \"temporal ateritis and migraine\".  -she was told she needs a temporal artery biopsy and she would like to know who does this type of procedure.   -imaging is available in her chart.     Patient saw DIANE Rae on 2.20.25:    (Patient) \"presents today for: Cervical follow up, post op state   S/p 11/27/24: Dr. Dickson: ACDF C4-6  The patient endorses she is doing very well.  No incisional complaints.  Still some cervical muscle fatigue, which can be expected.  No new neurologic complaints.  Endorses good relief with the Medrol Dosepak.  PLAN:   1. Medication: None prescribed  2. Imaging:                 - Reviewed today:                              - XR cervical:                                            - Hardware appears stable                - Ordered today:                              - XR cervical:                                            - Monitor fusion   3. Activity:                 - Gradually increase activity overtime   4. Follow up in 3 months for 6 month post op visit or call or follow up sooner or go to the ED for any new, worsening or concerning signs or symptoms\"    Routed to N-Trig and to JJ Seals.

## 2025-03-31 NOTE — TELEPHONE ENCOUNTER
This is not done by [facility].     I am not 100% sure who does this. I would check with the neurosurgery APPs as well.     Please advise patient.     Thank you!

## 2025-03-31 NOTE — TELEPHONE ENCOUNTER
Messaged patient with feedback to contact general surgery. Contact information for general surgery provided to patient via [employer] message.

## 2025-04-09 ENCOUNTER — OFFICE VISIT (OUTPATIENT)
Dept: NEUROLOGY | Facility: CLINIC | Age: 45
End: 2025-04-09
Payer: COMMERCIAL

## 2025-04-09 ENCOUNTER — TELEPHONE (OUTPATIENT)
Dept: NEUROLOGY | Facility: CLINIC | Age: 45
End: 2025-04-09

## 2025-04-09 VITALS
BODY MASS INDEX: 47.09 KG/M2 | SYSTOLIC BLOOD PRESSURE: 143 MMHG | WEIGHT: 293 LBS | HEIGHT: 66 IN | DIASTOLIC BLOOD PRESSURE: 83 MMHG | RESPIRATION RATE: 16 BRPM | HEART RATE: 99 BPM

## 2025-04-09 DIAGNOSIS — R70.0 ELEVATED SEDIMENTATION RATE: ICD-10-CM

## 2025-04-09 DIAGNOSIS — G44.039 EPISODIC PAROXYSMAL HEMICRANIA, NOT INTRACTABLE: ICD-10-CM

## 2025-04-09 DIAGNOSIS — G43.909 EPISODIC MIGRAINE: ICD-10-CM

## 2025-04-09 DIAGNOSIS — R51.9 TEMPORAL PAIN: ICD-10-CM

## 2025-04-09 DIAGNOSIS — G43.109 MIGRAINE WITH AURA AND WITHOUT STATUS MIGRAINOSUS, NOT INTRACTABLE: Primary | ICD-10-CM

## 2025-04-09 PROCEDURE — 3079F DIAST BP 80-89 MM HG: CPT | Performed by: OTHER

## 2025-04-09 PROCEDURE — 3077F SYST BP >= 140 MM HG: CPT | Performed by: OTHER

## 2025-04-09 PROCEDURE — 99214 OFFICE O/P EST MOD 30 MIN: CPT | Performed by: OTHER

## 2025-04-09 PROCEDURE — 3008F BODY MASS INDEX DOCD: CPT | Performed by: OTHER

## 2025-04-09 RX ORDER — INDOMETHACIN 50 MG/1
50 CAPSULE ORAL
Qty: 21 CAPSULE | Refills: 0 | Status: SHIPPED | OUTPATIENT
Start: 2025-04-09

## 2025-04-09 RX ORDER — UBROGEPANT 100 MG/1
TABLET ORAL
Qty: 10 TABLET | Refills: 11 | Status: SHIPPED | OUTPATIENT
Start: 2025-04-09 | End: 2026-04-09

## 2025-04-09 NOTE — TELEPHONE ENCOUNTER
Received fax from [employer]   Approval received for patient use of Ubrelvy           Pt notified

## 2025-04-09 NOTE — PATIENT INSTRUCTIONS
Refill policies:    Allow 2-3 business days for refills; controlled substances may take longer.  Contact your pharmacy at least 5 days prior to running out of medication and have them send an electronic request or submit request through the “request refill” option in your [employer] account.  Refills are not addressed on weekends; covering physicians do not authorize routine medications on weekends.  No narcotics or controlled substances are refilled after noon on Fridays or by on call physicians.  By law, narcotics must be electronically prescribed.  A 30 day supply with no refills is the maximum allowed.  If your prescription is due for a refill, you may be due for a follow up appointment.  To best provide you care, patients receiving routine medications need to be seen at least once a year.  Patients receiving narcotic/controlled substance medications need to be seen at least once every 3 months.  In the event that your preferred pharmacy does not have the requested medication in stock (e.g. Backordered), it is your responsibility to find another pharmacy that has the requested medication available.  We will gladly send a new prescription to that pharmacy at your request.    Scheduling Tests:    If your physician has ordered radiology tests such as MRI or CT scans, please contact Central Scheduling at [phone] right away to schedule the test.  Once scheduled, the [facility] Centralized Referral Team will work with your insurance carrier to obtain pre-certification or prior authorization.  Depending on your insurance carrier, approval may take 3-10 days.  It is highly recommended patients assure they have received an authorization before having a test performed.  If test is done without insurance authorization, patient may be responsible for the entire amount billed.      Precertification and Prior Authorizations:  If your physician has recommended that you have a procedure or additional testing performed the [facility]  Centralized Referral Team will contact your insurance carrier to obtain pre-certification or prior authorization.    You are strongly encouraged to contact your insurance carrier to verify that your procedure/test has been approved and is a COVERED benefit.  Although the [facility] Centralized Referral Team does its due diligence, the insurance carrier gives the disclaimer that \"Although the procedure is authorized, this does not guarantee payment.\"    Ultimately the patient is responsible for payment.   Thank you for your understanding in this matter.  Paperwork Completion:  If you require FMLA or disability paperwork for your recovery, please make sure to either drop it off or have it faxed to our office at [phone]. Be sure the form has your name and date of birth on it.  The form will be faxed to our Forms Department and they will complete it for you.  There is a 25$ fee for all forms that need to be filled out.  Please be aware there is a 10-14 day turnaround time.  You will need to sign a release of information (RONNY) form if your paperwork does not come with one.  You may call the Forms Department with any questions at [phone].  Their fax number is [phone].

## 2025-04-12 ENCOUNTER — LAB ENCOUNTER (OUTPATIENT)
Dept: LAB | Age: 45
End: 2025-04-12
Attending: Other
Payer: COMMERCIAL

## 2025-04-12 DIAGNOSIS — R51.9 TEMPORAL PAIN: ICD-10-CM

## 2025-04-12 DIAGNOSIS — R70.0 ELEVATED SEDIMENTATION RATE: ICD-10-CM

## 2025-04-12 LAB
BASOPHILS # BLD AUTO: 0.06 X10(3) UL (ref 0–0.2)
BASOPHILS NFR BLD AUTO: 0.5 %
CRP SERPL-MCNC: 1.7 MG/DL (ref ?–0.5)
EOSINOPHIL # BLD AUTO: 0.08 X10(3) UL (ref 0–0.7)
EOSINOPHIL NFR BLD AUTO: 0.7 %
ERYTHROCYTE [DISTWIDTH] IN BLOOD BY AUTOMATED COUNT: 13.7 %
ERYTHROCYTE [SEDIMENTATION RATE] IN BLOOD: 35 MM/HR (ref 0–20)
HCT VFR BLD AUTO: 39.1 % (ref 35–48)
HGB BLD-MCNC: 12.4 G/DL (ref 12–16)
IMM GRANULOCYTES # BLD AUTO: 0.04 X10(3) UL (ref 0–1)
IMM GRANULOCYTES NFR BLD: 0.4 %
LYMPHOCYTES # BLD AUTO: 3.43 X10(3) UL (ref 1–4)
LYMPHOCYTES NFR BLD AUTO: 30.9 %
MCH RBC QN AUTO: 28.9 PG (ref 26–34)
MCHC RBC AUTO-ENTMCNC: 31.7 G/DL (ref 31–37)
MCV RBC AUTO: 91.1 FL (ref 80–100)
MONOCYTES # BLD AUTO: 0.64 X10(3) UL (ref 0.1–1)
MONOCYTES NFR BLD AUTO: 5.8 %
NEUTROPHILS # BLD AUTO: 6.86 X10 (3) UL (ref 1.5–7.7)
NEUTROPHILS # BLD AUTO: 6.86 X10(3) UL (ref 1.5–7.7)
NEUTROPHILS NFR BLD AUTO: 61.7 %
PLATELET # BLD AUTO: 239 10(3)UL (ref 150–450)
RBC # BLD AUTO: 4.29 X10(6)UL (ref 3.8–5.3)
WBC # BLD AUTO: 11.1 X10(3) UL (ref 4–11)

## 2025-04-12 PROCEDURE — 85025 COMPLETE CBC W/AUTO DIFF WBC: CPT | Performed by: OTHER

## 2025-04-12 PROCEDURE — 86140 C-REACTIVE PROTEIN: CPT | Performed by: OTHER

## 2025-04-12 PROCEDURE — 85652 RBC SED RATE AUTOMATED: CPT | Performed by: OTHER

## 2025-04-22 ENCOUNTER — OFFICE VISIT (OUTPATIENT)
Dept: INTERNAL MEDICINE CLINIC | Facility: CLINIC | Age: 45
End: 2025-04-22
Payer: COMMERCIAL

## 2025-04-22 VITALS
SYSTOLIC BLOOD PRESSURE: 134 MMHG | WEIGHT: 293 LBS | HEART RATE: 64 BPM | BODY MASS INDEX: 45.45 KG/M2 | TEMPERATURE: 98 F | HEIGHT: 67.5 IN | OXYGEN SATURATION: 96 % | DIASTOLIC BLOOD PRESSURE: 70 MMHG

## 2025-04-22 DIAGNOSIS — Z12.11 SCREENING FOR COLON CANCER: ICD-10-CM

## 2025-04-22 DIAGNOSIS — H53.9 VISION CHANGES: ICD-10-CM

## 2025-04-22 DIAGNOSIS — I10 HYPERTENSION, UNSPECIFIED TYPE: ICD-10-CM

## 2025-04-22 DIAGNOSIS — E03.9 HYPOTHYROIDISM, UNSPECIFIED TYPE: ICD-10-CM

## 2025-04-22 DIAGNOSIS — Z00.00 ANNUAL PHYSICAL EXAM: Primary | ICD-10-CM

## 2025-04-22 DIAGNOSIS — Z98.84 S/P LAPAROSCOPIC SLEEVE GASTRECTOMY: ICD-10-CM

## 2025-04-22 PROBLEM — F31.77 BIPOLAR DISORDER, IN PARTIAL REMISSION, MOST RECENT EPISODE MIXED (HCC): Status: RESOLVED | Noted: 2017-03-06 | Resolved: 2025-04-22

## 2025-04-22 PROCEDURE — 3078F DIAST BP <80 MM HG: CPT | Performed by: INTERNAL MEDICINE

## 2025-04-22 PROCEDURE — 99214 OFFICE O/P EST MOD 30 MIN: CPT | Performed by: INTERNAL MEDICINE

## 2025-04-22 PROCEDURE — 99396 PREV VISIT EST AGE 40-64: CPT | Performed by: INTERNAL MEDICINE

## 2025-04-22 PROCEDURE — 3075F SYST BP GE 130 - 139MM HG: CPT | Performed by: INTERNAL MEDICINE

## 2025-04-22 PROCEDURE — 3008F BODY MASS INDEX DOCD: CPT | Performed by: INTERNAL MEDICINE

## 2025-04-22 RX ORDER — AMLODIPINE BESYLATE 5 MG/1
5 TABLET ORAL AS DIRECTED
Qty: 90 TABLET | Refills: 0 | Status: SHIPPED | OUTPATIENT
Start: 2025-04-22

## 2025-04-22 NOTE — PROGRESS NOTES
Subjective:   Crys Carter is a 45 year old female  who presents for Physical (Reviewed Preventative/Wellness form with patient. )       The following individual(s) verbally consented to be recorded using ambient AI listening technology and understand that they can each withdraw their consent to this listening technology at any point by asking the clinician to turn off or pause the recording:    Patient name: Crys Carter      Pap smear NIL and neg for HPV 12/2021  History of Present Illness  The patient, with a history of gastric bypass surgery, migraines, bipolar disorder, and thyroid issues, presents for a routine check-up. The patient is due for a colonoscopy but has been delayed due to other health concerns. The patient has been experiencing nausea and vomiting, which she attributes to her medication, Latuda. The patient reports that these symptoms occur when she does not consume enough calories with the medication.    The patient was recently hospitalized for a severe migraine and has been prescribed new medication by her neurologist. The patient reports that her migraines have been unusually severe and have been accompanied by temporal proximal hemocranial headaches. The patient is currently experiencing headaches, but they are not as severe as during the recent hospitalization.  Has neuro follow-up; also getting work-up to test for temporal arteritis but per report that does seem like a likely cause of symptoms.     The patient also reports changes in vision and has been referred to an ophthalmologist. The patient's blood pressure has been high, which she attributes to pain and medication (prednisone). The patient is also due for a mammogram; UTD with pap smear.     History/Other:    Chief Complaint Reviewed and Verified  Nursing Notes Reviewed and   Verified  Tobacco Reviewed  Allergies Reviewed  Medications Reviewed    Problem List Reviewed  Medical History Reviewed  Surgical History    Reviewed  OB Status Reviewed  Family History Reviewed  Social History   Reviewed         Current Medications[1]    Review of Systems:  Pertinent items are noted in HPI.  A comprehensive 10 point review of systems was completed.  Pertinent positives and negatives noted in the the HPI.        Objective:   /70 (BP Location: Left arm, Patient Position: Sitting, Cuff Size: large)   Pulse 64   Temp 97.6 °F (36.4 °C) (Temporal)   Ht 5' 7.5\" (1.715 m)   Wt (!) 312 lb (141.5 kg)   LMP  (LMP Unknown)   SpO2 96%   BMI 48.14 kg/m²  Estimated body mass index is 48.14 kg/m² as calculated from the following:    Height as of this encounter: 5' 7.5\" (1.715 m).    Weight as of this encounter: 312 lb (141.5 kg).  PHYSICAL EXAM:   General: no acute distress   Eyes: pupils equal and reactive; EOMI; sclera normal; conjunctiva normal   ENT:sinuses non-tender on palpation; without erythema or exudate; temples wo pain   Neck: trachea midline; no adenopathy; thyroid not enlarged   Hematologic/lymphatic:no cervical lymphadenopathy; no supraclavicular adenopathy   Respiratory: no increased work of breathing; good air exchange; CTAB; no crackles or wheezing   Cardiovascular: RRR; S1, S2; no murmurs; no carotid bruits; no edema   Gastrointestinal: normal bowel sounds; soft; non-distended; non-tender  Neurological: awake, alert, oriented x3; CNII-XII grossly intact;  MSK: knee stiffness-chronic-has followed with ortho  Behavioral/Psych: euthymic; denies SI/HI. appropriate affect   Breast exam deferred   Physical Exam      Assessment & Plan:       Assessment & Plan  Migraine  - Continue current migraine management plan.  - Follow up with neurologist and general surgeon for temporal artery biopsy assessment. (Temporal arteritis seems less likely vs migraine flare given provided hx)  - Provide referral for ophthalmology for vision changes.    Hypertension  Blood pressure elevated likely due to pain and medication use. Today's  reading 140 mmHg.  - Prescribe amlodipine 5 mg with instructions to start if blood pressure persistently above 130/80 mmHg persistently.  - to monitor blood pressure daily and maintain a log.  -close follow-up     Hypothyroidism  Managed with levothyroxine. Thyroid function to be assessed.  - Order thyroid function tests.      Knee pain  Chronic knee pain, worse with straightening.  - plans to do swimming as a low-impact exercise.    Bipolar disorder  Well-managed with medication and therapy. Reports stability with minor fluctuations.  - Continue current medication regimen and therapy sessions/psychiatry     Wellness Visit  Routine visit for ongoing health concerns and preventive care. Overdue for colonoscopy. History of gastric bypass requires vitamin monitoring.  - Order blood work including B12, B1, folate, zinc, and vitamin D levels.  - routine labs   - Provide referral for colonoscopy.  - Provide referral for ophthalmology for vision changes.  - mammogram is scheduled.  - UTD with pap smear; due 12/2026 per report      This note was created utilizing [employer] speech recognition software which may lead to grammatical errors/typos.   If any word or phrase is confusing, it is likely due to recognition error. Please ask the provider for clarification.      Vkii Pinto MD         [1]   Current Outpatient Medications   Medication Sig Dispense Refill    amLODIPine 5 MG Oral Tab Take 1 tablet (5 mg total) by mouth As Directed. Take one tablet daily if blood pressure is persistently above 130s/80s. Monitor blood pressure closely. 90 tablet 0    ubrogepant (UBRELVY) 100 MG Oral Tab Take one tablet at onset of migraine.  May take additional tablet in 2 hours if needed.  Do not exceed two tablets per 24 hour period. 10 tablet 11    indomethacin 50 MG Oral Cap Take 1 capsule (50 mg total) by mouth 3 (three) times daily with meals. During flare up of severe headaches only 21 capsule 0    levothyroxine 75 MCG Oral Tab Take  1 tablet (75 mcg total) by mouth before breakfast. 90 tablet 0    pantoprazole 20 MG Oral Tab EC Take 1 tablet (20 mg total) by mouth before breakfast. 90 tablet 0    Atogepant (QULIPTA) 30 MG Oral Tab Take 1 tablet by mouth daily. 90 tablet 1    Naloxone HCl 4 MG/0.1ML Nasal Liquid 4 mg by Nasal route as needed. If patient remains unresponsive, repeat dose in other nostril 2-5 minutes after first dose. 1 kit 0    Ketorolac Tromethamine 10 MG Oral Tab Take 2 tablets (20 mg total) by mouth As Directed. For migraines: take 20mg once at the onset of the migraine. HOLD till cleared by neurosurgery clinic      acetaminophen 500 MG Oral Tab Take 2 tablets (1,000 mg total) by mouth every 6 (six) hours as needed for Pain.      ondansetron 4 MG Oral Tablet Dispersible Take 1 tablet (4 mg total) by mouth every 8 (eight) hours as needed for Nausea. 30 tablet 0    lurasidone 80 MG Oral Tab Take 1 tablet (80 mg total) by mouth at bedtime.      hydrOXYzine 25 MG Oral Tab Take 1 tablet (25 mg total) by mouth nightly as needed for Anxiety.      LORazepam 0.5 MG Oral Tab Take 1 tablet (0.5 mg total) by mouth nightly as needed for Anxiety.      lamoTRIgine 100 MG Oral Tab Take 1 tablet (100 mg total) by mouth daily.      buPROPion  MG Oral Tablet 24 Hr Take 1 tablet (150 mg total) by mouth daily. 30 tablet 0    propranolol 20 MG Oral Tab Take 1 tablet (20 mg total) by mouth 2 (two) times daily. 60 tablet 0    naltrexone 50 MG Oral Tab Take 1 tablet (50 mg total) by mouth daily. 30 tablet 0    sertraline 100 MG Oral Tab Take 1 tablet (100 mg total) by mouth daily. 30 tablet 0    traZODone 100 MG Oral Tab Take 1 tablet (100 mg total) by mouth nightly as needed for Sleep. 30 tablet 0    meclizine 25 MG Oral Tab Take 1 tablet (25 mg total) by mouth 3 (three) times daily as needed for Dizziness. 15 tablet 0    albuterol (PROAIR HFA) 108 (90 Base) MCG/ACT Inhalation Aero Soln SHAKE WELL AND INHALE 1 TO 2 PUFFS INTO THE LUNGS EVERY 4  HOURS AS NEEDED FOR WHEEZING( COUGH) 3 each 1    Multiple Vitamins-Minerals (MULTIVITAL OR) Take 1 tablet by mouth daily. Bariatric vitamin

## 2025-04-23 ENCOUNTER — OFFICE VISIT (OUTPATIENT)
Facility: LOCATION | Age: 45
End: 2025-04-23
Payer: COMMERCIAL

## 2025-04-23 VITALS
TEMPERATURE: 98 F | DIASTOLIC BLOOD PRESSURE: 74 MMHG | HEART RATE: 81 BPM | OXYGEN SATURATION: 99 % | SYSTOLIC BLOOD PRESSURE: 112 MMHG

## 2025-04-23 DIAGNOSIS — M31.6 TEMPORAL ARTERITIS (HCC): Primary | ICD-10-CM

## 2025-04-23 NOTE — H&P
New Patient  Crys Carter  1/4/1980 4/23/2025    This patient was referred by Viki Pinto MD for evaluation and consultation for temporal arteritis.    Chief Complaint: headaches    History of Present Illness:   Pt with history of severe headache at end of March, she was treated with prednisone for presumptive diagnosis of temporal arteritis. She has alternate diagnoses entertained by her neurologist per April 9th note. Pt states still having headaches, though not was bad, adding, \"that was the worst headache of my life.\" Pt had associated left temple tenderness that she states rapidly progressed. She has had daily pain in the left temple.   Pt is morbidly obese with BMI 48 & weight of 312lb. She is s/p sleeve gastrectomy in 2022. She was 400lb by pt report prior to surgery & her lowest postop weight was 260lb. She states she has had headaches since before her weight loss surgery. She does not recall if there was ever any discussion of normal pressure hydrocephalous as a potential cause of her headaches, .     Review of Systems:  Constitutional: No fevers or chills or recent weight loss, no recent sick contacts  Respiratory: Denies any shortness of breath, productive cough, dyspnea  Cardiovascular: No chest pain  Gastrointestinal: No nausea, vomiting, diarrhea, abdominal pain  Urologic: Denies any dysuria    Past Medical History:    Allergic rhinitis    Anxiety    Arthritis    Asthma (HCC)    Back problem    Bipolar disorder (HCC)    Calculus of kidney    Cancer (HCC)    Basal cell carcinoma over the left eyebrow    Depression    Disorder of thyroid    Esophageal reflux    Hypothyroidism    Migraine    Migraines    Obesity, unspecified    PONV (postoperative nausea and vomiting)    S/P laparoscopic sleeve gastrectomy    Sleep apnea    history of, improved with weight loss surgery, no longer needs cpap    Stroke (HCC)    TIA, possible not completely diagonosed    Visual impairment    glasses        Past Surgical History:   Procedure Laterality Date    Abdominal surgery  2022    Gastric Sleeve    Other  2009    benign tumor reomoved  from left inner thigh    Other      basal carcinoma removed from upper lid of left eye    Other surgical history  2022    Gastric sleeve    Other surgical history  2024    CERVICAL 4 - CERVICAL 5, CERVICAL 5 - CERVICAL 6 ANTERIOR CERVICAL DISCECTOMY AND FUSION WITH INSTRUMENTATION, ALLOGRAFT    Skin surgery  Oct 2014    tumor removed from eyebrow ridge    Tonsillectomy         Social History     Socioeconomic History    Marital status: Single     Spouse name: Not on file    Number of children: Not on file    Years of education: Not on file    Highest education level: Not on file   Occupational History    Not on file   Tobacco Use    Smoking status: Former     Current packs/day: 0.00     Average packs/day: 1 pack/day for 23.9 years (23.9 ttl pk-yrs)     Types: Cigarettes     Start date: 1985     Quit date: 12/15/2008     Years since quittin.3     Passive exposure: Never    Smokeless tobacco: Never   Vaping Use    Vaping status: Never Used   Substance and Sexual Activity    Alcohol use: Not Currently     Comment: Very Rare    Drug use: Not Currently     Types: Cannabis    Sexual activity: Not Currently     Partners: Male     Birth control/protection: Condom   Other Topics Concern     Service Not Asked    Blood Transfusions Not Asked    Caffeine Concern Yes    Occupational Exposure Not Asked    Hobby Hazards Not Asked    Sleep Concern Not Asked    Stress Concern Yes    Weight Concern Yes    Special Diet Yes     Comment: Bariatric    Back Care Not Asked    Exercise Yes    Bike Helmet Not Asked    Seat Belt Yes    Self-Exams Not Asked   Social History Narrative    Not on file     Social Drivers of Health     Food Insecurity: No Food Insecurity (2025)    NCSS - Food Insecurity     Worried About Running Out of Food in the Last Year: No      Ran Out of Food in the Last Year: No   Transportation Needs: No Transportation Needs (4/22/2025)    NCSS - Transportation     Lack of Transportation: No   Stress: Not on file   Housing Stability: Not At Risk (4/22/2025)    NCSS - Housing/Utilities     Has Housing: Yes     Worried About Losing Housing: No     Unable to Get Utilities: No         Current Outpatient Medications:     amLODIPine 5 MG Oral Tab, Take 1 tablet (5 mg total) by mouth As Directed. Take one tablet daily if blood pressure is persistently above 130s/80s. Monitor blood pressure closely., Disp: 90 tablet, Rfl: 0    ubrogepant (UBRELVY) 100 MG Oral Tab, Take one tablet at onset of migraine.  May take additional tablet in 2 hours if needed.  Do not exceed two tablets per 24 hour period., Disp: 10 tablet, Rfl: 11    indomethacin 50 MG Oral Cap, Take 1 capsule (50 mg total) by mouth 3 (three) times daily with meals. During flare up of severe headaches only, Disp: 21 capsule, Rfl: 0    levothyroxine 75 MCG Oral Tab, Take 1 tablet (75 mcg total) by mouth before breakfast., Disp: 90 tablet, Rfl: 0    pantoprazole 20 MG Oral Tab EC, Take 1 tablet (20 mg total) by mouth before breakfast., Disp: 90 tablet, Rfl: 0    Atogepant (QULIPTA) 30 MG Oral Tab, Take 1 tablet by mouth daily., Disp: 90 tablet, Rfl: 1    Naloxone HCl 4 MG/0.1ML Nasal Liquid, 4 mg by Nasal route as needed. If patient remains unresponsive, repeat dose in other nostril 2-5 minutes after first dose., Disp: 1 kit, Rfl: 0    Ketorolac Tromethamine 10 MG Oral Tab, Take 2 tablets (20 mg total) by mouth As Directed. For migraines: take 20mg once at the onset of the migraine. HOLD till cleared by neurosurgery clinic, Disp: , Rfl:     acetaminophen 500 MG Oral Tab, Take 2 tablets (1,000 mg total) by mouth every 6 (six) hours as needed for Pain., Disp: , Rfl:     ondansetron 4 MG Oral Tablet Dispersible, Take 1 tablet (4 mg total) by mouth every 8 (eight) hours as needed for Nausea., Disp: 30  tablet, Rfl: 0    lurasidone 80 MG Oral Tab, Take 1 tablet (80 mg total) by mouth at bedtime., Disp: , Rfl:     hydrOXYzine 25 MG Oral Tab, Take 1 tablet (25 mg total) by mouth nightly as needed for Anxiety., Disp: , Rfl:     LORazepam 0.5 MG Oral Tab, Take 1 tablet (0.5 mg total) by mouth nightly as needed for Anxiety., Disp: , Rfl:     lamoTRIgine 100 MG Oral Tab, Take 1 tablet (100 mg total) by mouth in the morning., Disp: , Rfl:     buPROPion  MG Oral Tablet 24 Hr, Take 1 tablet (150 mg total) by mouth daily., Disp: 30 tablet, Rfl: 0    propranolol 20 MG Oral Tab, Take 1 tablet (20 mg total) by mouth 2 (two) times daily., Disp: 60 tablet, Rfl: 0    naltrexone 50 MG Oral Tab, Take 1 tablet (50 mg total) by mouth daily., Disp: 30 tablet, Rfl: 0    sertraline 100 MG Oral Tab, Take 1 tablet (100 mg total) by mouth daily., Disp: 30 tablet, Rfl: 0    traZODone 100 MG Oral Tab, Take 1 tablet (100 mg total) by mouth nightly as needed for Sleep., Disp: 30 tablet, Rfl: 0    meclizine 25 MG Oral Tab, Take 1 tablet (25 mg total) by mouth 3 (three) times daily as needed for Dizziness., Disp: 15 tablet, Rfl: 0    albuterol (PROAIR HFA) 108 (90 Base) MCG/ACT Inhalation Aero Soln, SHAKE WELL AND INHALE 1 TO 2 PUFFS INTO THE LUNGS EVERY 4 HOURS AS NEEDED FOR WHEEZING( COUGH), Disp: 3 each, Rfl: 1    Multiple Vitamins-Minerals (MULTIVITAL OR), Take 1 tablet by mouth in the morning. Bariatric vitamin., Disp: , Rfl:     Allergies   Allergen Reactions    Clonazepam OTHER (SEE COMMENTS)     Suicidal thoughts  [place] out of sorts and has increased irritability as well as extremities feeling strange    Compazine [Prochlorperazine] OTHER (SEE COMMENTS)     Oral tablet-  Twitching;  Blanked out; nausea    Topiramate OTHER (SEE COMMENTS)     Suicidal,aggresive,brain fog       Family History   Problem Relation Age of Onset    Depression Father     Heart Disease Father     Asthma Father     Heart Attack Father     Migraines Father      Diabetes Father     Obesity Father     Diabetes Mother     Obesity Mother     Depression Mother     Alcohol and Other Disorders Associated Maternal Grandmother     Cancer Maternal Grandmother         Lung CA    Cancer Maternal Grandfather         Prostate CA    Prostate Cancer Maternal Grandfather     Hypertension Paternal Grandmother     Cancer Paternal Grandfather         Lung CA    Personality Disorder Paternal Grandfather     Breast Cancer Maternal Aunt 65        in her 60's    Substance Abuse Maternal Uncle     Stroke Neg        Objective/Physical Exam:  General: Alert, orientated x3.  Cooperative.  No apparent distress.  Vital Signs:  Blood pressure 112/74, pulse 81, temperature 98.1 °F (36.7 °C), temperature source Temporal, SpO2 99%, not currently breastfeeding. There is no height or weight on file to calculate BMI.  HEENT: NC/AT, poorly visible L temporal artery, no tenderness   Lungs: Even, unlabored  Cardiac: Regular rate and rhythm. No murmur.  Abdomen: Soft, obese, ND, no R/G   Extremities:  SMAE  Skin: Warm & dry      Labs/Radiology:  CTA 3/30/25 personally reviewed, reveals patent L temporal artery    Discussed:   The potential surgical and non-surgical treatment options were discussed with the patient.  The potential risks, benefits, complications outcomes/recovery and alternatives to any proposed surgery were also fully reviewed with the patient. Any proposed surgical procedure was described in detail.  Expected postoperative pain, recuperation and postoperative course was discussed.  The patient verbalizes understanding.  All questions from the patient were discussed in detail to the patient's satisfaction.  No other questions were presented at this time.         Assessment/Plan:      Pt with question of potential temporal arteritis, will confirm with neurologist desire for biopsy prior to scheduling. Also, no ventricular enlargement, but pt curious after discussion of NPH whether this could be a  contributor.    Thank you for allowing me to participate in your patient's care.         Kalyan Menendez MD    Please note that this report has been produced using speech recognition software and may contain errors related to that system including but not limited to errors in grammar, punctuation and spelling as well as words and phrases that possibly may have been recognized inappropriately.  If there are any questions or concerns please contact the dictating provider for clarification.        Date of Surgery:   Scheduling pending discussion with patient' s neurologist.  DX:     ICD-10-CM   1. Temporal arteritis (HCC)  M31.6        Surgery Site :RIGHT / LEFT / BILATERAL: left    [x] Open  Temporal artery biopsy    Anesthesia: ANESTHESIA TYPE: MAC    Location:  [] [facility]   [] [facility]  [] [facility]  [x] [facility]   [] [facility]    Admission Status: EDW OR ADMIT LOCATION: [facility]    SA Needed: Yes/No: No      Clearance Needed:  [x]Medical Clearance   []Cardiac Clearance:   []Anticoagulation Management:    []Renal:   []Neuro:     Hx sleep apnea:    Yes/No: Yes: Resolved after bariatric surgery       Pacemaker:  Yes/No: No      Latex allergies: No evidence of latex-specific IgE     Pre-Admission Testing:  Surgeon's Personalized order Set.   Prophylactic Antibiotics Order: Prophylactic antibiotic protocol    Equipment:   [] C-Arm  Other:

## 2025-04-26 ENCOUNTER — LAB ENCOUNTER (OUTPATIENT)
Dept: LAB | Age: 45
End: 2025-04-26
Attending: INTERNAL MEDICINE
Payer: COMMERCIAL

## 2025-04-26 DIAGNOSIS — Z00.00 ANNUAL PHYSICAL EXAM: ICD-10-CM

## 2025-04-26 DIAGNOSIS — Z98.84 S/P LAPAROSCOPIC SLEEVE GASTRECTOMY: ICD-10-CM

## 2025-04-26 DIAGNOSIS — E03.9 HYPOTHYROIDISM, UNSPECIFIED TYPE: ICD-10-CM

## 2025-04-26 LAB
BASOPHILS # BLD AUTO: 0.07 X10(3) UL (ref 0–0.2)
BASOPHILS NFR BLD AUTO: 0.8 %
CHOLEST SERPL-MCNC: 224 MG/DL (ref ?–200)
EOSINOPHIL # BLD AUTO: 0.07 X10(3) UL (ref 0–0.7)
EOSINOPHIL NFR BLD AUTO: 0.8 %
ERYTHROCYTE [DISTWIDTH] IN BLOOD BY AUTOMATED COUNT: 13.4 %
EST. AVERAGE GLUCOSE BLD GHB EST-MCNC: 111 MG/DL (ref 68–126)
FASTING PATIENT LIPID ANSWER: YES
FOLATE SERPL-MCNC: 18.4 NG/ML (ref 5.4–?)
HBA1C MFR BLD: 5.5 % (ref ?–5.7)
HCT VFR BLD AUTO: 41.5 % (ref 35–48)
HDLC SERPL-MCNC: 46 MG/DL (ref 40–59)
HGB BLD-MCNC: 13.5 G/DL (ref 12–16)
IMM GRANULOCYTES # BLD AUTO: 0.03 X10(3) UL (ref 0–1)
IMM GRANULOCYTES NFR BLD: 0.3 %
IRON SATN MFR SERPL: 18 % (ref 15–50)
IRON SERPL-MCNC: 54 UG/DL (ref 50–170)
LDLC SERPL CALC-MCNC: 148 MG/DL (ref ?–100)
LYMPHOCYTES # BLD AUTO: 3.4 X10(3) UL (ref 1–4)
LYMPHOCYTES NFR BLD AUTO: 39.4 %
MCH RBC QN AUTO: 29.5 PG (ref 26–34)
MCHC RBC AUTO-ENTMCNC: 32.5 G/DL (ref 31–37)
MCV RBC AUTO: 90.8 FL (ref 80–100)
MONOCYTES # BLD AUTO: 0.53 X10(3) UL (ref 0.1–1)
MONOCYTES NFR BLD AUTO: 6.1 %
NEUTROPHILS # BLD AUTO: 4.53 X10 (3) UL (ref 1.5–7.7)
NEUTROPHILS # BLD AUTO: 4.53 X10(3) UL (ref 1.5–7.7)
NEUTROPHILS NFR BLD AUTO: 52.6 %
NONHDLC SERPL-MCNC: 178 MG/DL (ref ?–130)
PLATELET # BLD AUTO: 273 10(3)UL (ref 150–450)
RBC # BLD AUTO: 4.57 X10(6)UL (ref 3.8–5.3)
T3FREE SERPL-MCNC: 2.31 PG/ML (ref 2.4–4.2)
T4 FREE SERPL-MCNC: 1 NG/DL (ref 0.8–1.7)
TOTAL IRON BINDING CAPACITY: 295 UG/DL (ref 250–425)
TRANSFERRIN SERPL-MCNC: 229 MG/DL (ref 250–380)
TRIGL SERPL-MCNC: 164 MG/DL (ref 30–149)
TSI SER-ACNC: 2.68 UIU/ML (ref 0.55–4.78)
VIT B12 SERPL-MCNC: 840 PG/ML (ref 211–911)
VIT D+METAB SERPL-MCNC: 33.8 NG/ML (ref 30–100)
VLDLC SERPL CALC-MCNC: 31 MG/DL (ref 0–30)
WBC # BLD AUTO: 8.6 X10(3) UL (ref 4–11)

## 2025-04-26 PROCEDURE — 85025 COMPLETE CBC W/AUTO DIFF WBC: CPT

## 2025-04-26 PROCEDURE — 83540 ASSAY OF IRON: CPT

## 2025-04-26 PROCEDURE — 84481 FREE ASSAY (FT-3): CPT

## 2025-04-26 PROCEDURE — 83036 HEMOGLOBIN GLYCOSYLATED A1C: CPT

## 2025-04-26 PROCEDURE — 84425 ASSAY OF VITAMIN B-1: CPT

## 2025-04-26 PROCEDURE — 82306 VITAMIN D 25 HYDROXY: CPT

## 2025-04-26 PROCEDURE — 36415 COLL VENOUS BLD VENIPUNCTURE: CPT

## 2025-04-26 PROCEDURE — 84630 ASSAY OF ZINC: CPT

## 2025-04-26 PROCEDURE — 82607 VITAMIN B-12: CPT

## 2025-04-26 PROCEDURE — 83550 IRON BINDING TEST: CPT

## 2025-04-26 PROCEDURE — 80061 LIPID PANEL: CPT

## 2025-04-26 PROCEDURE — 84439 ASSAY OF FREE THYROXINE: CPT

## 2025-04-26 PROCEDURE — 82746 ASSAY OF FOLIC ACID SERUM: CPT

## 2025-04-26 PROCEDURE — 84443 ASSAY THYROID STIM HORMONE: CPT

## 2025-04-30 LAB — ZINC: 50 UG/DL

## 2025-05-02 LAB — VITAMIN B1 WHOLE BLD: 130.2 NMOL/L

## 2025-05-13 ENCOUNTER — OFFICE VISIT (OUTPATIENT)
Dept: NEUROLOGY | Facility: CLINIC | Age: 45
End: 2025-05-13
Payer: COMMERCIAL

## 2025-05-13 ENCOUNTER — TELEPHONE (OUTPATIENT)
Facility: LOCATION | Age: 45
End: 2025-05-13

## 2025-05-13 ENCOUNTER — LAB ENCOUNTER (OUTPATIENT)
Dept: LAB | Age: 45
End: 2025-05-13
Attending: Other
Payer: COMMERCIAL

## 2025-05-13 VITALS
SYSTOLIC BLOOD PRESSURE: 104 MMHG | HEIGHT: 67.5 IN | DIASTOLIC BLOOD PRESSURE: 68 MMHG | HEART RATE: 63 BPM | BODY MASS INDEX: 45.45 KG/M2 | WEIGHT: 293 LBS | RESPIRATION RATE: 16 BRPM

## 2025-05-13 DIAGNOSIS — R70.0 ELEVATED SEDIMENTATION RATE: ICD-10-CM

## 2025-05-13 DIAGNOSIS — R79.82 ELEVATED C-REACTIVE PROTEIN (CRP): ICD-10-CM

## 2025-05-13 DIAGNOSIS — M25.50 POLYARTHRALGIA: ICD-10-CM

## 2025-05-13 DIAGNOSIS — G43.109 MIGRAINE WITH AURA AND WITHOUT STATUS MIGRAINOSUS, NOT INTRACTABLE: Primary | ICD-10-CM

## 2025-05-13 DIAGNOSIS — M31.6 TEMPORAL ARTERITIS (HCC): Primary | ICD-10-CM

## 2025-05-13 DIAGNOSIS — G43.909 EPISODIC MIGRAINE: ICD-10-CM

## 2025-05-13 LAB
CRP SERPL-MCNC: 1 MG/DL (ref ?–0.5)
ERYTHROCYTE [SEDIMENTATION RATE] IN BLOOD: 40 MM/HR (ref 0–20)
RHEUMATOID FACT SERPL-ACNC: 21.3 IU/ML (ref ?–14)

## 2025-05-13 PROCEDURE — 86038 ANTINUCLEAR ANTIBODIES: CPT | Performed by: OTHER

## 2025-05-13 PROCEDURE — 86200 CCP ANTIBODY: CPT | Performed by: OTHER

## 2025-05-13 PROCEDURE — 3078F DIAST BP <80 MM HG: CPT | Performed by: OTHER

## 2025-05-13 PROCEDURE — 86225 DNA ANTIBODY NATIVE: CPT | Performed by: OTHER

## 2025-05-13 PROCEDURE — 3074F SYST BP LT 130 MM HG: CPT | Performed by: OTHER

## 2025-05-13 PROCEDURE — 86140 C-REACTIVE PROTEIN: CPT | Performed by: OTHER

## 2025-05-13 PROCEDURE — 99214 OFFICE O/P EST MOD 30 MIN: CPT | Performed by: OTHER

## 2025-05-13 PROCEDURE — 3008F BODY MASS INDEX DOCD: CPT | Performed by: OTHER

## 2025-05-13 PROCEDURE — 86431 RHEUMATOID FACTOR QUANT: CPT | Performed by: OTHER

## 2025-05-13 PROCEDURE — 85652 RBC SED RATE AUTOMATED: CPT | Performed by: OTHER

## 2025-05-13 NOTE — PATIENT INSTRUCTIONS
Refill policies:    Allow 2-3 business days for refills; controlled substances may take longer.  Contact your pharmacy at least 5 days prior to running out of medication and have them send an electronic request or submit request through the “request refill” option in your [employer] account.  Refills are not addressed on weekends; covering physicians do not authorize routine medications on weekends.  No narcotics or controlled substances are refilled after noon on Fridays or by on call physicians.  By law, narcotics must be electronically prescribed.  A 30 day supply with no refills is the maximum allowed.  If your prescription is due for a refill, you may be due for a follow up appointment.  To best provide you care, patients receiving routine medications need to be seen at least once a year.  Patients receiving narcotic/controlled substance medications need to be seen at least once every 3 months.  In the event that your preferred pharmacy does not have the requested medication in stock (e.g. Backordered), it is your responsibility to find another pharmacy that has the requested medication available.  We will gladly send a new prescription to that pharmacy at your request.    Scheduling Tests:    If your physician has ordered radiology tests such as MRI or CT scans, please contact Central Scheduling at [phone] right away to schedule the test.  Once scheduled, the [facility] Centralized Referral Team will work with your insurance carrier to obtain pre-certification or prior authorization.  Depending on your insurance carrier, approval may take 3-10 days.  It is highly recommended patients assure they have received an authorization before having a test performed.  If test is done without insurance authorization, patient may be responsible for the entire amount billed.      Precertification and Prior Authorizations:  If your physician has recommended that you have a procedure or additional testing performed the [facility]  Centralized Referral Team will contact your insurance carrier to obtain pre-certification or prior authorization.    You are strongly encouraged to contact your insurance carrier to verify that your procedure/test has been approved and is a COVERED benefit.  Although the [facility] Centralized Referral Team does its due diligence, the insurance carrier gives the disclaimer that \"Although the procedure is authorized, this does not guarantee payment.\"    Ultimately the patient is responsible for payment.   Thank you for your understanding in this matter.  Paperwork Completion:  If you require FMLA or disability paperwork for your recovery, please make sure to either drop it off or have it faxed to our office at [phone]. Be sure the form has your name and date of birth on it.  The form will be faxed to our Forms Department and they will complete it for you.  There is a 25$ fee for all forms that need to be filled out.  Please be aware there is a 10-14 day turnaround time.  You will need to sign a release of information (RONNY) form if your paperwork does not come with one.  You may call the Forms Department with any questions at [phone].  Their fax number is [phone].

## 2025-05-13 NOTE — PROGRESS NOTES
[facility] Progress Note    Chief Complaint   Patient presents with    Migraine     Qulipta preventative/Ubrelvy abortive, notes ~2 events since LOV, notes \"dull headaches\" daily, Ubrelvy is \"hit or miss\", has augmented w/ toradol/tylenol/naproxen w/ improvement     As per my initial H&P from 11/3/2022:  \"  Crys Carter is a 45 year old, who presents for evaluation of migraines and headaches.     Patient states she has headaches since age 15. She mainly notes headache on the right side and behind the eyes and may have visual aura and nausea associated with headaches.   She previously was having these every other week when she was younger.  She was started on Imitrex in her early 20s and did not start on preventive therapy until her 30s or 40s, after she had been having migraines 1-2 times per week.  She was on Topamax 50 mg bid and well controlled for 1-2 yrs only having migraine once every 2 months.  She had weight loss surgery 3/2022 and started to have worsening migraines after this time.  She was having once every 2-3 weeks but they became more frequent.  She was recommended to increase the dose of Topamax up to 100 mg bid but subsequently had brain fog and \"suicidal thoughts\" and is being tapered off by her PCP recently.  She is currently on 50 mg AM / 100 mg nightly.      She does have history of bipolar disorder and had been on lamotrigine as well as sertraline and Latuda when she was originally on Topamax but recently has been changed from Latuda to Abilify.      Currently, her mood is fluctuating is having intermittent suicidal thoughts but overall improved and is doing outpatient therapy program.       Currently, she is having at lest 8-10 migraines per month; these sometimes respond to sumatriptan when used but not always and overall estimates less than 50% effective at the 100 mg dose.     Otherwise, patient denies any recent weight change, fevers, chills, nausea, double vision/  blurry vision / loss of vision, chest pain, palpitations, shortness of breath, rashes, joint pains, bowel / bladder incontinence or mood issues. \"       Prior notes as per 4/13/2023.  Patient overall since last visit had been doing better in terms of migraines and was not having migraines for ~ 3 months but in the past 3-4 weeks, she has a new type of headache.        She has been having some sharp pains behind left eye, and also having some cloudy vision in the left eye.  She denies pain when moving eyes from side to side but has noted her left more than right eye was tearing with these events; she denies sweating on one side of the face; notes improvement when sitting and may worsen when lying down; these may occur a few times in a day but are briefer than her usual migraines.  She has been on Quilipta and has been having to go to ER to treat these symptoms when she does not respond to sumatriptan.        Prior notes as per 1/4/2024.  Patient last seen 4/13/2023.  She has been lost to follow up.  According to records, she has been seen multiple times in the ER for headaches as well as fall from ladder. She has been to ER multiple times and recently was in [facility] ED with migraine, with some R sided numbness 11/14/2023.  She had workup for possible stroke with CTA brain and MRI brain with no suggestion of stroke or LVO.  She has also been having medications adjusted by psychiatry and following with therapist regularly.  She currently is having migraines, which cluster for a few days when they occur.      She states in 10/2023, she was having a lot of stress and adjustment to psychiatric medications and had more frequent migraines during this time.       She is currently doing well with only 2 migraines in the past month.  In terms of her prior other type of headaches with severe pain behind left eye and tearing of left eye, she did take indomethacin and noted improvement in ~2 weeks when she was on the 50 mg tid  dosing.  She has not had recurrence of these events.        She has been taking Quilipta 60 mg daily for prevention of migraines otherwise and doing well currently. She states Imitrex does not work well for abortive therapy and takes a \"cocktail\" of tramadol, hydroxyzine and Zofran.     Prior notes as per 5/16/2024.  Patient last seen 1/4/2024.  Since last visit, she remains on Qulipta 60 mg daily for prevention of migraines.  She has been taking Ubrelvy for abortive therapy as well and denies any side effects when she takes this medication.  She notes improvement in migraines with 1 dose within ~15 minutes and states this is working better than sumatriptan.  She has migraines on average 1-2 days per month.  She has had a few brief episodes of stabbing type pain but this is not as severe as in the past and denies associate tearing of the eyes and redness in the eyes; she has not had to use indomethacin and notes these are minor overall.       Prior notes as per 11/19/2024.  Patient last seen 5/16/2024.  She has been doing well in terms of Migraines on Qulipta 60 mg daily for prevention. She only has ~ 1 migraine per month, which responds well to Ubrelvy and denies side effects on the medication.     Of note, she has pain in the back of the neck and some balance issues and some radiation to shoulders and numbness in hands and has been seen by neurosurgery with plan for cervical spinal fusion C4/5/6,. She has been dropping objects from right more than left hand as well .        Prior notes as per 2/25/2025.  Patient last seen 11/19/2024.  In terms of migraines, she has been doing well on Qulipta 60 mg daily for prevention having no migraines since last visit; previously when, migraines occurred, they would respond well to Ubrelvy and denies any side effects on the medication.   Otherwise, she had NCS/EMG since last visit and has been seen by neurosurgery with cervical spinal fusion (ACDF C4-6) done 11/27/2024 and has  been doing well since this time as well, initially having some mild tension type headaches but these have improved.        Prior notes as per 4/9/2025.  Patient last seen 2/25/2025.  She was doing well with respect to her migraines and her headaches. However ~ 2 weeks ago, she noted pain in the left side of the temple with stabbing, electric pain along with sensitivity to touching her hair, which then progressed to vision changes in left eye with \"kaleidoscope pattern\"after several hours and then progressed to nausea. She went to urgent care and was given medication but eventually went to ER and was treated with magnesium and steroid and reglan, along with zofran and toradol. She had workup which showed mild elevation in sed rate at 45 and CRP at 1.20. She did admit she was on prednisone 20 mg for \"inflamed achilles\" prior to admission and she was given 40 mg dose at urgent care prior to blood work.     When she was in the ER, MRI was done which showed some abnormalities thought to be related to migraine vs subarachnoid hemorrhage; CTA brain done was normal.     She was told she may have temporal arteritis and started on prednisone 60 mg daily, which was completed last Friday. She did not have progression to vision loss and headaches have improved. However, last night, she had some blurry vision but no \"kaleidoscope pattern\" and she took Naproxen / Tylenol with improvement.     When the symptoms started initially, she did admit to having tearing of the eye and her nose was running on the left side as well.           Patient last seen 4/9/2025. She is having joint pains in ankles and hands and hips - she is not having migraine type headaches but is having daily mild headaches over the front of the head; mom had RA and maternal grandmother had RA. She has not had temporal artery biopsy yet but was seen by surgery service. She is not on steroids at this time. She did not use indomethacin since last visit.     Past  Medical History:    Allergic rhinitis    Anxiety    Arthritis    Asthma (HCC)    Back problem    Bipolar disorder (HCC)    Calculus of kidney    Cancer (HCC)    Basal cell carcinoma over the left eyebrow    Depression    Disorder of thyroid    Esophageal reflux    Hypothyroidism    Migraine    Migraines    Obesity, unspecified    PONV (postoperative nausea and vomiting)    S/P laparoscopic sleeve gastrectomy    Sleep apnea    history of, improved with weight loss surgery, no longer needs cpap    Stroke (HCC)    TIA, possible not completely diagonosed    Visual impairment    glasses     Past Surgical History:   Procedure Laterality Date    Abdominal surgery  2022    Gastric Sleeve    Other      benign tumor reomoved  from left inner thigh    Other      basal carcinoma removed from upper lid of left eye    Other surgical history  2022    Gastric sleeve    Other surgical history  2024    CERVICAL 4 - CERVICAL 5, CERVICAL 5 - CERVICAL 6 ANTERIOR CERVICAL DISCECTOMY AND FUSION WITH INSTRUMENTATION, ALLOGRAFT    Skin surgery  Oct 2014    tumor removed from eyebrow ridge    Tonsillectomy       Social History     Socioeconomic History    Marital status: Single   Tobacco Use    Smoking status: Former     Current packs/day: 0.00     Average packs/day: 1 pack/day for 23.9 years (23.9 ttl pk-yrs)     Types: Cigarettes     Start date: 1985     Quit date: 12/15/2008     Years since quittin.4     Passive exposure: Never    Smokeless tobacco: Never   Vaping Use    Vaping status: Never Used   Substance and Sexual Activity    Alcohol use: Not Currently     Comment: Very Rare    Drug use: Not Currently     Types: Cannabis    Sexual activity: Not Currently     Partners: Male     Birth control/protection: Condom   Other Topics Concern    Caffeine Concern Yes    Stress Concern Yes    Weight Concern Yes    Special Diet Yes     Comment: Bariatric    Exercise Yes    Seat Belt Yes     Social Drivers of  Health     Food Insecurity: No Food Insecurity (4/22/2025)    NCSS - Food Insecurity     Worried About Running Out of Food in the Last Year: No     Ran Out of Food in the Last Year: No   Transportation Needs: No Transportation Needs (4/22/2025)    NCSS - Transportation     Lack of Transportation: No   Housing Stability: Not At Risk (4/22/2025)    NCSS - Housing/Utilities     Has Housing: Yes     Worried About Losing Housing: No     Unable to Get Utilities: No     Family History   Problem Relation Age of Onset    Depression Father     Heart Disease Father     Asthma Father     Heart Attack Father     Migraines Father     Diabetes Father     Obesity Father     Diabetes Mother     Obesity Mother     Depression Mother     Alcohol and Other Disorders Associated Maternal Grandmother     Cancer Maternal Grandmother         Lung CA    Cancer Maternal Grandfather         Prostate CA    Prostate Cancer Maternal Grandfather     Hypertension Paternal Grandmother     Cancer Paternal Grandfather         Lung CA    Personality Disorder Paternal Grandfather     Breast Cancer Maternal Aunt 65        in her 60's    Substance Abuse Maternal Uncle     Stroke Neg        Allergies:  Allergies   Allergen Reactions    Clonazepam OTHER (SEE COMMENTS)     Suicidal thoughts  Childress out of sorts and has increased irritability as well as extremities feeling strange    Compazine [Prochlorperazine] OTHER (SEE COMMENTS)     Oral tablet-  Twitching;  Blanked out; nausea    Topiramate OTHER (SEE COMMENTS)     Suicidal,aggresive,brain fog      Current Meds:  Current Outpatient Medications   Medication Sig Dispense Refill    amLODIPine 5 MG Oral Tab Take 1 tablet (5 mg total) by mouth As Directed. Take one tablet daily if blood pressure is persistently above 130s/80s. Monitor blood pressure closely. 90 tablet 0    ubrogepant (UBRELVY) 100 MG Oral Tab Take one tablet at onset of migraine.  May take additional tablet in 2 hours if needed.  Do not exceed  two tablets per 24 hour period. 10 tablet 11    indomethacin 50 MG Oral Cap Take 1 capsule (50 mg total) by mouth 3 (three) times daily with meals. During flare up of severe headaches only 21 capsule 0    levothyroxine 75 MCG Oral Tab Take 1 tablet (75 mcg total) by mouth before breakfast. 90 tablet 0    pantoprazole 20 MG Oral Tab EC Take 1 tablet (20 mg total) by mouth before breakfast. 90 tablet 0    Atogepant (QULIPTA) 30 MG Oral Tab Take 1 tablet by mouth daily. 90 tablet 1    Naloxone HCl 4 MG/0.1ML Nasal Liquid 4 mg by Nasal route as needed. If patient remains unresponsive, repeat dose in other nostril 2-5 minutes after first dose. 1 kit 0    Ketorolac Tromethamine 10 MG Oral Tab Take 2 tablets (20 mg total) by mouth As Directed. For migraines: take 20mg once at the onset of the migraine. HOLD till cleared by neurosurgery clinic      acetaminophen 500 MG Oral Tab Take 2 tablets (1,000 mg total) by mouth every 6 (six) hours as needed for Pain.      ondansetron 4 MG Oral Tablet Dispersible Take 1 tablet (4 mg total) by mouth every 8 (eight) hours as needed for Nausea. 30 tablet 0    lurasidone 80 MG Oral Tab Take 1 tablet (80 mg total) by mouth at bedtime.      hydrOXYzine 25 MG Oral Tab Take 1 tablet (25 mg total) by mouth nightly as needed for Anxiety.      LORazepam 0.5 MG Oral Tab Take 1 tablet (0.5 mg total) by mouth nightly as needed for Anxiety.      lamoTRIgine 100 MG Oral Tab Take 1 tablet (100 mg total) by mouth in the morning.      buPROPion  MG Oral Tablet 24 Hr Take 1 tablet (150 mg total) by mouth daily. 30 tablet 0    propranolol 20 MG Oral Tab Take 1 tablet (20 mg total) by mouth 2 (two) times daily. 60 tablet 0    naltrexone 50 MG Oral Tab Take 1 tablet (50 mg total) by mouth daily. 30 tablet 0    sertraline 100 MG Oral Tab Take 1 tablet (100 mg total) by mouth daily. 30 tablet 0    traZODone 100 MG Oral Tab Take 1 tablet (100 mg total) by mouth nightly as needed for Sleep. 30 tablet 0     meclizine 25 MG Oral Tab Take 1 tablet (25 mg total) by mouth 3 (three) times daily as needed for Dizziness. 15 tablet 0    albuterol (PROAIR HFA) 108 (90 Base) MCG/ACT Inhalation Aero Soln SHAKE WELL AND INHALE 1 TO 2 PUFFS INTO THE LUNGS EVERY 4 HOURS AS NEEDED FOR WHEEZING( COUGH) 3 each 1    Multiple Vitamins-Minerals (MULTIVITAL OR) Take 1 tablet by mouth in the morning. Bariatric vitamin.            ROS:   A comprehensive 10 point review of systems was completed.  Pertinent positives and negatives noted in the the HPI.      PHYSICAL EXAM:   /68 (BP Location: Left arm, Patient Position: Sitting, Cuff Size: adult)   Pulse 63   Resp 16   Ht 67.5\"   Wt (!) 312 lb (141.5 kg)   LMP  (LMP Unknown)   BMI 48.14 kg/m²   Estimated body mass index is 48.14 kg/m² as calculated from the following:    Height as of this encounter: 67.5\".    Weight as of this encounter: 312 lb (141.5 kg).      Gen: well developed, well nourished, no acute distress  HEENT: normocephalic  Heart; normal S1/S2, regular rate and rhythm  Lungs: clear to auscultation bilaterally  Extremities: no edema, peripheral pulses intact    Neck: supple, full range of motion; no carotid bruits    Fundoscopic Exam: optic discs sharp bilaterally    Neuro:  Mental status:  Orientation: Alert and oriented to person, place, time  Speech Fluent and conversational    CN: PERRL, EOMI with no nystagmus, VFF, smile symmetric, sensation intact, tongue and palate midline, SCM intact, otherwise, CN 2-12 intact  Motor: 5/5 strength throughout, tone normal  DTR: brisker on left UE with +benavides's; 4+; otherwise, intact throughout, toes downgoing; no clonus  Sensory: intact to light touch throughout; pin intact today  Coord: FNF intact with no tremor or dysmetria; rapid alternating movements intact bilaterally  Romberg: absent  Gait: casual gait, less stiff and able to move arm better today on R side; overall more fluid       TEST RESULTS/DATA REVIEWED:    Imaging  None new    Prior as noted below    MRI cervical spine w/o contrast (10/31/2024):     FINDINGS: Vertebral body height and alignment is maintained. Multilevel disc degeneration with a small amount of discogenic reactive marrow change. Subcentimeter focus of T2/STIR signal in the spinal cord just below the C5-6 level. Spinal cord is   otherwise normal in appearance. The cerebellum, elissa, skull base are unremarkable insofar as visualized. Paraspinous soft tissues unremarkable.     Occiput-C2:  No significant central canal stenosis.     C2-3: Disc degeneration characterized by disc desiccation. No significant posterior disc bulge and no focal disc herniation. No central canal stenosis or neural foraminal narrowing.     C3-4: Disc degeneration characterized by disc desiccation and a shallow posterior disc bulge. Mild left and no right neural foraminal narrowing. No central canal stenosis.     C4-5: Disc degeneration characterized by disc desiccation and a broad posterior disc bulge. Left-sided uncovertebral joint hypertrophy. Mild to moderate central canal stenosis. Mild to moderate left and no right neural foraminal narrowing.     C5-6: Disc degeneration characterized by disc desiccation and a broad posterior disc bulge. Severe central canal stenosis. Encroachment on the spinal cord with a small focus of compression induced edema/myelomalacia just below the C5-6 level. Mild to   moderate right neural foraminal narrowing. Moderate to severe narrowing at the proximal portion of the left neural foramen. The remainder of the foramen is patent.     C6-7: Disc degeneration characterized by disc desiccation and a shallow posterior disc bulge. No focal disc herniation. No central canal stenosis or neural foraminal narrowing.     C7-T1: Disc degeneration characterized by minimal disc desiccation. No significant posterior disc bulge and no focal disc herniation. No central canal stenosis or neural foraminal narrowing.      IMPRESSION:   1.Multilevel disc degeneration. Vertebral body height and alignment is maintained.   2.C5-6: Severe central canal stenosis. Encroachment on the spinal cord with a small focus of compression induced edema/myelomalacia just below the C5-6 disc level. Moderate to severe narrowing of the proximal portion of the left neural foramen. Mild to   moderate right neural foraminal narrowing.   3.C4-5: Mild to moderate central canal stenosis. Mild to moderate left and no right neural foraminal narrowing.         Prior as noted below    MRI brain with and without contrast (4/10/2023):     FINDINGS: Several 2-3 mm T2/FLAIR hyperintense, non-enhancing signal abnormalities are identified in the frontal and parietal subcortical white matter bilaterally. The cerebellum and brainstem are normal in morphology and signal. Normal morphology of the    corpus callosum. No areas of restricted diffusion are identified. No evidence of cortical infarct, edema, hemorrhage, mass, abnormal enhancement, or abnormal extra-axial fluid collection. Ventricular volumes are normal. No midline shift. The major   intracranial arterial flow voids and the dural venous sinuses appear patent. Normal size of the pituitary gland. No Chiari malformation.     Minimal mucosal thickening in bilateral maxillary sinuses. The mastoid air cells are normally aerated. A 4F synovial cyst along the anteromedial margin of the right temporomandibular joint is of questionable clinical significance. No bone lesions are   identified.     IMPRESSION:   1. Several 2-3 mm T2/FLAIR hyperintense, non-enhancing signal abnormalities in the frontal and parietal subcortical white matter bilaterally may relate to migraines or minimal chronic small vessel ischemic changes but are non-specific in appearance.     2. No evidence of acute infarct, cortical infarct, edema, hemorrhage, mass, or acute intracranial abnormality.     3. Minimal chronic-appearing maxillary sinus  inflammatory changes.     Labs:   New    Component      Latest Ref Rng 4/12/2025   SED RATE      0 - 20 mm/Hr 35 (H)    C-REACTIVE PROTEIN      <=0.50 mg/dL 1.70 (H)       Prior as noted below    Component      Latest Ref Rng 3/30/2025   WBC      4.0 - 11.0 x10(3) uL 14.3 (H)    RBC      3.80 - 5.30 x10(6)uL 4.72    Hemoglobin      12.0 - 16.0 g/dL 13.9    Hematocrit      35.0 - 48.0 % 41.2    Platelet Count      150.0 - 450.0 10(3)uL 272.0    MCV      80.0 - 100.0 fL 87.3    MCH      26.0 - 34.0 pg 29.4    MCHC      31.0 - 37.0 g/dL 33.7    RDW      % 13.2    Prelim Neutrophil Abs      1.50 - 7.70 x10 (3) uL 10.53 (H)    Neutrophils Absolute      1.50 - 7.70 x10(3) uL 10.53 (H)    Lymphocytes Absolute      1.00 - 4.00 x10(3) uL 2.85    Monocytes Absolute      0.10 - 1.00 x10(3) uL 0.83    Eosinophils Absolute      0.00 - 0.70 x10(3) uL 0.02    Basophils Absolute      0.00 - 0.20 x10(3) uL 0.03    Immature Granulocyte Absolute      0.00 - 1.00 x10(3) uL 0.07    Neutrophils %      % 73.5    Lymphocytes %      % 19.9    Monocytes %      % 5.8    Eosinophils %      % 0.1    Basophils %      % 0.2    Immature Granulocyte %      % 0.5    Glucose      70 - 99 mg/dL 96    Sodium      136 - 145 mmol/L 140    Potassium      3.5 - 5.1 mmol/L 3.9    Chloride      98 - 112 mmol/L 105    Carbon Dioxide, Total      21.0 - 32.0 mmol/L 26.0    ANION GAP      0 - 18 mmol/L 9    BUN      9 - 23 mg/dL 7 (L)    CREATININE      0.55 - 1.02 mg/dL 0.87    CALCIUM      8.7 - 10.6 mg/dL 9.6    CALCULATED OSMOLALITY      275 - 295 mOsm/kg 288    EGFR      >=60 mL/min/1.73m2 83    AST (SGOT)      <34 U/L 15    ALT (SGPT)      10 - 49 U/L 12    ALKALINE PHOSPHATASE      39 - 100 U/L 90    Total Bilirubin      0.3 - 1.2 mg/dL 0.3    PROTEIN, TOTAL      5.7 - 8.2 g/dL 7.4    Albumin      3.2 - 4.8 g/dL 4.4    Globulin      2.0 - 3.5 g/dL 3.0    A/G Ratio      1.0 - 2.0  1.5    SED RATE      0 - 20 mm/Hr 45 (H)    C-REACTIVE PROTEIN      <=0.50  mg/dL 1.20 (H)         Prior as noted below  Component      Latest Ref Rng & Units 4/16/2022   Vitamin B12      193 - 986 pg/mL 1,014 (H)   FOLATE (FOLIC ACID), SERUM      >=8.7 ng/mL 18.1   VITAMIN B1 (THIAMINE), WHOLE B      70 - 180 nmol/L 73   VITAMIN D, 25-OH, TOTAL      30.0 - 100.0 ng/mL 44.5    4/16/2022    1,014 (H)     SARS-CoV-2 by PCR (9./1/2022): detected      Other procedures    None new    Prior as noted below    NCS/EMG (11/22/2024):     Sensory NCS      Nerve / Sites Rec. Site Onset Lat Peak Lat NP Amp PP Amp Segments Distance Peak Diff Velocity Comment       ms ms µV µV   cm ms m/s     R Median - Dig II (Antidromic)      Wrist Index 2.71 3.59 65.2 111.3 Wrist - Index 14   52        Ref.   <=3.30 <=4.00 >=11.0 >=13.0 Ref.           R Ulnar - Dig V (Antidromic)      Wrist Dig V 2.19 3.07 48.2 86.6 Wrist - Dig V 11   50        Ref.   <=3.10 <=4.00 >=11.0 >=8.0 Ref.           R Radial - Superficial (Antidromic)      Forearm Wrist 1.61 2.14 16.8 32.8 Forearm - Wrist 10   62        Ref.   <=2.20 <=2.80 >=7.0 >=11.0 Ref.           R Median, Ulnar - Transcarpal comparison      Median Palm Wrist 1.56 2.08 82.4 74.4 Median Palm - Wrist 8   51        Ulnar Palm Wrist 1.46 2.08 23.2 28.9 Ulnar Palm - Wrist 8   55                 Median Palm - Ulnar Palm   0.00                   Ref.   <=0.40           Motor NCS      Nerve / Sites Muscle Latency Amplitude Segments Dist. Lat Diff Velocity Comments       ms mV   cm ms m/s     R Median - APB      Wrist APB 4.00 5.6 Wrist - APB 7            Ref.   <=4.40 >=4.2 Ref.              Elbow APB 8.98 4.8 Elbow - Wrist 25 4.98 50.2        Ref.       Ref.     >=51.0     R Ulnar - ADM      Wrist ADM 2.77 10.7 Wrist - ADM 7            Ref.   <=3.70 >=7.9 Ref.              B.Elbow ADM 7.17 9.8 B.Elbow - Wrist 23.5 4.40 53.5        Ref.       Ref.     >=52.0         F  Wave      Nerve M Latency F Latency     ms ms   R Median - APB 4.6 28.2   Ref.   <=28.5   R Ulnar - ADM 2.6  27.9   Ref.   <=30.2                   EMG Summary Table       Spontaneous MUAP Recruitment   Muscle IA Fib PSW Fasc H.F. Amp Dur. PPP Pattern   R. Deltoid N None None None None N N N N   R. Triceps brachii N None None None None 1+ 1+ N Reduced   R. Biceps brachii N None None None None N N N N   R. Extensor digitorum communis N None None None None N N N N   R. First dorsal interosseous N None None None None 1+ 1+ 1+ Reduced       Summary     Nerve conduction studies:  Right median sensory response was normal.  Right ulnar sensory response was normal.  Right radial sensory response was normal.  Right median to ulnar palmar mixed nerve comparison study was normal with no significant interlatency prolongation of median relative to ulnar nerve.  Right median motor response was normal; F-wave response was normal.  Right ulnar motor response was normal; F-wave response was normal.     EMG (needle exam):  Concentric needle EMG examination was performed in 5 muscles. It was normal in 3 muscle(s): R. Deltoid, R. Biceps brachii, R. Extensor digitorum communis. The study was abnormal in 2 muscle(s), with the following distribution:  No increased insertional or spontaneous activity was noted in any of the muscles tested  Motor unit potentials demonstrated increased amplitude and duration, with reduced recruitment in the right triceps brachii muscle; in addition, motor unit potentials demonstrated increased amplitude and duration with a mild degree of polyphasia in the right FDI muscle.          Conclusion:   This is a mildly abnormal study.  There is evidence for subacute to chronic denervation changes in the right triceps brachii and right FDI muscle, which could suggest a subacute to chronic lower cervical radiculopathy.  There is, however, no evidence for a large fiber polyneuropathy, primary demyelinating neuropathy, myopathy, or median entrapment neuropathy across the right wrist as clinically questioned.    IMPRESSION AND  PLAN:   Crys Carter is a 45 year old female with PMHx significant for bipolar disorder and chronic episodic migraine, who originally presented 11/3/2022, for evaluation and management of recent worsening migraines.    Neurologic exam is normal and nonfocal and patient was previously well controlled in terms of migraine, which occurred with and without aura, on preventive medication with Topamax as well as abortive therapy with sumatriptan 100 mg dose PRN.  However, she had weight loss surgery 3/2022 and had worsening migraines which did not respond to higher dose of Topamax and she developed significant side effects with cognitive changes and suicidal thoughts.  She was weaned off Topamax and had been doing better on Qulipta daily for prevention.     However, she subsequently developed a different type of headache with mainly unilateral left side headache with some associated tearing of eye - given unilateral presentation, thought this could be variant of trigeminal autonomic cephalgia (ie paroxysmal hemicrania).While most of her symptoms and exam findings noted previously could be attributed to cervical myelopathy, her right hand weakness, with left foraminal encroachment noted on MRI of the cervical spine was unexpected and could suggest a superimposed peripheral entrapment neuropathy such as carpal tunnel syndrome for which NCS/EMG R UE was completed but did not show any R carpal tunnel syndrome only showing some chronic denervation changes likely related to cervical myelopathy; she is now improved s/p ACDF C4-6 11/27/2024.        Previously, she was doing well with respect to her migraines and her headaches. However ~ late March 2025, she had new issues as she noted pain in the left side of the temple with stabbing, electric pain along with sensitivity to touching her hair, which then progressed to vision changes in left eye with \"kaleidoscope pattern\"after several hours and then progressed to nausea. She  went to urgent care and was given medication but eventually went to ER and was treated with magnesium and steroid and reglan, along with zofran and toradol. She had workup which showed mild elevation in sed rate at 45 and CRP at 1.20. She did admit she was on prednisone 20 mg for \"inflamed achilles\" prior to admission and she was given 40 mg dose at urgent care prior to blood work.     When she was in the ER, MRI was done which showed some abnormalities thought to be related to migraine vs subarachnoid hemorrhage; CTA brain done was normal.     She was told she may have temporal arteritis and started on prednisone 60 mg daily, which was completed.  She did not have progression to vision loss and headaches have improved.      When the symptoms started initially, she did admit to having tearing of the eye and her nose was running on the left side as well.      Her symptoms of headache with left temporal pain could be temporal arteritis but visual disturbance and associated nasal congestion with symptoms of trigeminal neuropathy could suggest recurrence of paroxysmal hemicrania - she has not had any signs of vision loss to suggest progression of temporal arteritis and she has already been treated with steroids -ESR, CRP were still elevated and she is now off steroids - she has been having polyarthralgias as well and has family history of RA - will check ESR, CRP, VON, RF and CCP and recommend referral to rheumatology - for workup of possible temporal arteritis, recommend biopsy for diagnostic workup as well     should headaches recur, recommend trial indomethacin 50 mg tid to start.       Otherwise, continue Qulipta at current dose; for abortive therapy for migraines, she notes sumatriptan was not working and is not ideal due to risk of serotonin syndrome with concurrent psychiatric medications.     She is now on ubrogepantt (Ubrelvy) and doing well with this for abortive therapy:  continue to take 100 mg  within first  30 minutes of symptoms starting; if not improved after 2 hours, take additional dose, and then stop taking; discussed potential side effects, including but not limited to nausea, drowsiness and dry mouth    1. Migraine with aura and without status migrainosus, not intractable  As noted above     2. Episodic migraine  As noted above     3. Elevated C-reactive protein (CRP)  As noted above  - Rheumatoid Arthritis Factor; Future  - CCP Antibodies IgG/IgA; Future  - Connective Tissue Disease (VON) Screen, Reflex Specific Antibody; Future  - Sed Rate, Westergren (Automated); Future  - C-Reactive Protein; Future  - Rheumatology Referral - In Network    4. Polyarthralgia  As noted above   - Rheumatoid Arthritis Factor; Future  - CCP Antibodies IgG/IgA; Future  - Connective Tissue Disease (VON) Screen, Reflex Specific Antibody; Future  - Sed Rate, Westergren (Automated); Future  - C-Reactive Protein; Future  - Rheumatology Referral - In Network    5. Elevated sedimentation rate  As noted above   - Rheumatology Referral - In Network    No follow-ups on file.    Albert Galicia MD, Neurology  [facility]  Pager [phone]  5/13/2025

## 2025-05-14 NOTE — ED INITIAL ASSESSMENT (HPI)
Pt presents tonight with c/o non-productive cough and sore throat since yesterday. Pt states that tonight she began having increasing JAKY. Pt denies any fevers. 17

## 2025-05-16 ENCOUNTER — PATIENT MESSAGE (OUTPATIENT)
Dept: INTERNAL MEDICINE CLINIC | Facility: CLINIC | Age: 45
End: 2025-05-16

## 2025-05-16 DIAGNOSIS — Z01.89 PATIENT REQUEST FOR DIAGNOSTIC TESTING: Primary | ICD-10-CM

## 2025-05-16 DIAGNOSIS — F31.9 BIPOLAR 1 DISORDER (HCC): ICD-10-CM

## 2025-05-16 DIAGNOSIS — E03.9 HYPOTHYROIDISM, UNSPECIFIED TYPE: ICD-10-CM

## 2025-05-16 LAB
CCP IGG SERPL-ACNC: 1 U/ML (ref 0–6.9)
DSDNA IGG SERPL IA-ACNC: 0.6 IU/ML (ref ?–10)
ENA AB SER QL IA: 0.3 UG/L (ref ?–0.7)
ENA AB SER QL IA: NEGATIVE

## 2025-05-18 ENCOUNTER — HOSPITAL ENCOUNTER (OUTPATIENT)
Dept: GENERAL RADIOLOGY | Age: 45
Discharge: HOME | End: 2025-05-18
Attending: PHYSICIAN ASSISTANT
Payer: COMMERCIAL

## 2025-05-18 ENCOUNTER — HOSPITAL ENCOUNTER (OUTPATIENT)
Dept: GENERAL RADIOLOGY | Age: 45
End: 2025-05-18
Attending: PHYSICIAN ASSISTANT
Payer: COMMERCIAL

## 2025-05-18 DIAGNOSIS — Z98.890 POST-OPERATIVE STATE: ICD-10-CM

## 2025-05-18 DIAGNOSIS — Z98.1 S/P CERVICAL SPINAL FUSION: ICD-10-CM

## 2025-05-18 PROCEDURE — 72040 X-RAY EXAM NECK SPINE 2-3 VW: CPT | Performed by: PHYSICIAN ASSISTANT

## 2025-05-19 NOTE — TELEPHONE ENCOUNTER
The request is non-specific but in addition to thyroid labs already ordered can also check FSH/LH.

## 2025-05-20 PROBLEM — Z78.9 VEGAN DIET: Status: RESOLVED | Noted: 2020-08-29 | Resolved: 2025-05-20

## 2025-05-20 PROBLEM — G47.00 INSOMNIA: Status: RESOLVED | Noted: 2018-11-12 | Resolved: 2025-05-20

## 2025-05-21 ENCOUNTER — LAB ENCOUNTER (OUTPATIENT)
Dept: LAB | Age: 45
End: 2025-05-21
Attending: INTERNAL MEDICINE
Payer: COMMERCIAL

## 2025-05-21 DIAGNOSIS — F31.9 BIPOLAR 1 DISORDER (HCC): ICD-10-CM

## 2025-05-21 DIAGNOSIS — E03.9 HYPOTHYROIDISM, UNSPECIFIED TYPE: ICD-10-CM

## 2025-05-21 DIAGNOSIS — Z01.89 PATIENT REQUEST FOR DIAGNOSTIC TESTING: ICD-10-CM

## 2025-05-21 LAB
FSH SERPL-ACNC: 11.8 MIU/ML
LH SERPL-ACNC: 12.5 MIU/ML

## 2025-05-21 PROCEDURE — 83001 ASSAY OF GONADOTROPIN (FSH): CPT | Performed by: INTERNAL MEDICINE

## 2025-05-21 PROCEDURE — 83002 ASSAY OF GONADOTROPIN (LH): CPT | Performed by: INTERNAL MEDICINE

## 2025-05-22 ENCOUNTER — HOSPITAL ENCOUNTER (OUTPATIENT)
Dept: MAMMOGRAPHY | Age: 45
Discharge: HOME | End: 2025-05-22
Attending: INTERNAL MEDICINE
Payer: COMMERCIAL

## 2025-05-22 DIAGNOSIS — Z12.31 ENCOUNTER FOR SCREENING MAMMOGRAM FOR MALIGNANT NEOPLASM OF BREAST: ICD-10-CM

## 2025-05-22 PROCEDURE — 77067 SCR MAMMO BI INCL CAD: CPT | Performed by: INTERNAL MEDICINE

## 2025-05-22 PROCEDURE — 77063 BREAST TOMOSYNTHESIS BI: CPT | Performed by: INTERNAL MEDICINE

## 2025-05-27 ENCOUNTER — OFFICE VISIT (OUTPATIENT)
Dept: INTERNAL MEDICINE CLINIC | Facility: CLINIC | Age: 45
End: 2025-05-27
Payer: COMMERCIAL

## 2025-05-27 VITALS
WEIGHT: 293 LBS | TEMPERATURE: 98 F | BODY MASS INDEX: 48 KG/M2 | SYSTOLIC BLOOD PRESSURE: 120 MMHG | OXYGEN SATURATION: 96 % | DIASTOLIC BLOOD PRESSURE: 68 MMHG | HEART RATE: 78 BPM

## 2025-05-27 DIAGNOSIS — Z12.11 SCREEN FOR COLON CANCER: ICD-10-CM

## 2025-05-27 DIAGNOSIS — E03.9 HYPOTHYROIDISM, UNSPECIFIED TYPE: ICD-10-CM

## 2025-05-27 DIAGNOSIS — R76.8 RHEUMATOID FACTOR POSITIVE: ICD-10-CM

## 2025-05-27 DIAGNOSIS — R94.6 ABNORMAL THYROID FUNCTION TEST: ICD-10-CM

## 2025-05-27 DIAGNOSIS — R03.0 BORDERLINE HIGH BLOOD PRESSURE: Primary | ICD-10-CM

## 2025-05-27 DIAGNOSIS — E78.5 HYPERLIPIDEMIA, UNSPECIFIED HYPERLIPIDEMIA TYPE: ICD-10-CM

## 2025-05-27 PROBLEM — I10 HYPERTENSION: Status: ACTIVE | Noted: 2025-05-27

## 2025-05-27 PROCEDURE — 99214 OFFICE O/P EST MOD 30 MIN: CPT | Performed by: INTERNAL MEDICINE

## 2025-05-27 PROCEDURE — G2211 COMPLEX E/M VISIT ADD ON: HCPCS | Performed by: INTERNAL MEDICINE

## 2025-05-27 PROCEDURE — 3074F SYST BP LT 130 MM HG: CPT | Performed by: INTERNAL MEDICINE

## 2025-05-27 PROCEDURE — 3078F DIAST BP <80 MM HG: CPT | Performed by: INTERNAL MEDICINE

## 2025-05-27 NOTE — PROGRESS NOTES
Subjective:   Crys Carter is a 45 year old female  who presents for Follow - Up       The following individual(s) verbally consented to be recorded using ambient AI listening technology and understand that they can each withdraw their consent to this listening technology at any point by asking the clinician to turn off or pause the recording:    Patient name: Crys Carter        History of Present Illness  presents for follow-up of her blood pressure and recent lab results.    Her blood pressure was elevated to the 130s and 140s for about a week but has since normalized without the need for medication; has not needed amlodipine. She continues to monitor her blood pressure, which remains stable. Recent lab work showed slightly elevated cholesterol levels. She is no longer prediabetic. Thyroid function tests revealed a low free T3 with normal TSH and T4 levels. Iron levels were borderline, and she has a referral for a GI evaluation, including a colonoscopy.      She was recently tested for allergies to tree nuts, chocolate, and coffee, causing stomach irritation but not severe reactions. She experiences hip stiffness that improves with movement and has a rheumatology referral due to a positive rheumatoid factor and joint pain, with an appointment scheduled for July.    History/Other:    Chief Complaint Reviewed and Verified  No Further Nursing Notes to   Review  Allergies Reviewed  Medications Reviewed  OB Status Reviewed         Current Medications[1]    Review of Systems:  Pertinent items are noted in HPI.  A comprehensive 10 point review of systems was completed.  Pertinent positives and negatives noted in the the HPI.        Objective:   /68 (BP Location: Left arm, Patient Position: Sitting, Cuff Size: large)   Pulse 78   Temp 97.5 °F (36.4 °C) (Temporal)   Wt (!) 309 lb 9.6 oz (140.4 kg)   LMP  (LMP Unknown)   SpO2 96%   BMI 47.77 kg/m²  Estimated body mass index is 47.77 kg/m² as  calculated from the following:    Height as of 5/13/25: 5' 7.5\" (1.715 m).    Weight as of this encounter: 309 lb 9.6 oz (140.4 kg).  PHYSICAL EXAM:   General: no acute distress   Neck: trachea midline;   Respiratory: no increased work of breathing; good air exchange; CTAB; no crackles or wheezing   Cardiovascular: RRR; S1, S2; no murmurs appreciated   Neurological: awake, alert, oriented x3; CNII-XII grossly intact;  Behavioral/Psych: euthymic; appropriate affect     Physical Exam      Assessment & Plan:     Assessment & Plan  Rheumatoid factor positive  Positive rheumatoid factor with joint stiffness suggests possible rheumatoid condition. Referral to rheumatology for further evaluation.  - Attend rheumatology appointment in July.    Borderline hypertension  Blood pressure well-controlled, previously elevated due to stress and pain, now resolved.  -continue to monitor. To start amlodipine if BP >130s/80s    Hyperlipidemia  Cholesterol  above goal. Emphasized monitoring and lifestyle modifications  - Repeat fasting lipid panel in six months.    Thyroid function abnormality  Slight thyroid abnormality with low free T3, normal TSH and T4.  - Order thyroid ultrasound.  - Repeat thyroid labs in mid-June.  -continue levothyroxine             This note was created utilizing [employer] speech recognition software which may lead to grammatical errors/typos.   If any word or phrase is confusing, it is likely due to recognition error. Please ask the provider for clarification.      Viki Pinto MD         [1]   Current Outpatient Medications   Medication Sig Dispense Refill    amLODIPine 5 MG Oral Tab Take 1 tablet (5 mg total) by mouth As Directed. Take one tablet daily if blood pressure is persistently above 130s/80s. Monitor blood pressure closely. (Patient not taking: Reported on 5/27/2025) 90 tablet 0    ubrogepant (UBRELVY) 100 MG Oral Tab Take one tablet at onset of migraine.  May take additional tablet in 2 hours if  needed.  Do not exceed two tablets per 24 hour period. 10 tablet 11    indomethacin 50 MG Oral Cap Take 1 capsule (50 mg total) by mouth 3 (three) times daily with meals. During flare up of severe headaches only 21 capsule 0    levothyroxine 75 MCG Oral Tab Take 1 tablet (75 mcg total) by mouth before breakfast. 90 tablet 0    pantoprazole 20 MG Oral Tab EC Take 1 tablet (20 mg total) by mouth before breakfast. 90 tablet 0    Atogepant (QULIPTA) 30 MG Oral Tab Take 1 tablet by mouth daily. 90 tablet 1    Naloxone HCl 4 MG/0.1ML Nasal Liquid 4 mg by Nasal route as needed. If patient remains unresponsive, repeat dose in other nostril 2-5 minutes after first dose. 1 kit 0    Ketorolac Tromethamine 10 MG Oral Tab Take 2 tablets (20 mg total) by mouth As Directed. For migraines: take 20mg once at the onset of the migraine. HOLD till cleared by neurosurgery clinic      acetaminophen 500 MG Oral Tab Take 2 tablets (1,000 mg total) by mouth every 6 (six) hours as needed for Pain.      ondansetron 4 MG Oral Tablet Dispersible Take 1 tablet (4 mg total) by mouth every 8 (eight) hours as needed for Nausea. 30 tablet 0    lurasidone 80 MG Oral Tab Take 1 tablet (80 mg total) by mouth at bedtime.      hydrOXYzine 25 MG Oral Tab Take 1 tablet (25 mg total) by mouth nightly as needed for Anxiety.      LORazepam 0.5 MG Oral Tab Take 1 tablet (0.5 mg total) by mouth nightly as needed for Anxiety.      lamoTRIgine 100 MG Oral Tab Take 1 tablet (100 mg total) by mouth in the morning.      buPROPion  MG Oral Tablet 24 Hr Take 1 tablet (150 mg total) by mouth daily. 30 tablet 0    propranolol 20 MG Oral Tab Take 1 tablet (20 mg total) by mouth 2 (two) times daily. 60 tablet 0    naltrexone 50 MG Oral Tab Take 1 tablet (50 mg total) by mouth daily. 30 tablet 0    sertraline 100 MG Oral Tab Take 1 tablet (100 mg total) by mouth daily. 30 tablet 0    traZODone 100 MG Oral Tab Take 1 tablet (100 mg total) by mouth nightly as needed  for Sleep. 30 tablet 0    meclizine 25 MG Oral Tab Take 1 tablet (25 mg total) by mouth 3 (three) times daily as needed for Dizziness. 15 tablet 0    albuterol (PROAIR HFA) 108 (90 Base) MCG/ACT Inhalation Aero Soln SHAKE WELL AND INHALE 1 TO 2 PUFFS INTO THE LUNGS EVERY 4 HOURS AS NEEDED FOR WHEEZING( COUGH) 3 each 1    Multiple Vitamins-Minerals (MULTIVITAL OR) Take 1 tablet by mouth in the morning. Bariatric vitamin.

## 2025-06-02 ENCOUNTER — HOSPITAL ENCOUNTER (OUTPATIENT)
Dept: ULTRASOUND IMAGING | Age: 45
Discharge: HOME | End: 2025-06-02
Attending: INTERNAL MEDICINE
Payer: COMMERCIAL

## 2025-06-02 ENCOUNTER — HOSPITAL ENCOUNTER (OUTPATIENT)
Dept: MAMMOGRAPHY | Age: 45
Discharge: HOME | End: 2025-06-02
Attending: INTERNAL MEDICINE
Payer: COMMERCIAL

## 2025-06-02 DIAGNOSIS — R92.2 INCONCLUSIVE MAMMOGRAM: ICD-10-CM

## 2025-06-02 PROCEDURE — 77061 BREAST TOMOSYNTHESIS UNI: CPT | Performed by: INTERNAL MEDICINE

## 2025-06-02 PROCEDURE — 77065 DX MAMMO INCL CAD UNI: CPT | Performed by: INTERNAL MEDICINE

## 2025-06-02 PROCEDURE — 76642 ULTRASOUND BREAST LIMITED: CPT | Performed by: INTERNAL MEDICINE

## 2025-06-02 NOTE — IMAGING NOTE
Crys Carter is recommended for a stereotactic/tomosynthesis guided biopsy of the right breast by Dr.Stromberg.    History Inconclusive mammogram   Findings- well delineated focal architectural distortion within the posterior aspect of the right breast in the slightly outer slightly lower quadrant.  There is subtle distortion seen with sonographic technique at the 8 o'clock position 13 cm from the right nipple this finding is much better delineated with tomosynthesis.   Recommendation- STEREOTACTIC BIOPSY WITH 3D/NATO RIGHT BREAST See EMR for complete imaging report    Medications and allergies reviewed:Current Medications[1]    The following allergies were reported-  ClonazepamOTHER (SEE COMMENTS)  ChocolateOTHER (SEE COMMENTS)  Compazine [Prochlorperazine]OTHER (SEE COMMENTS)  TopiramateOTHER (SEE COMMENTS)  Tree NutsOTHER (SEE COMMENTS)    Crys Carter denies the use of prescribed anticoagulants, denies known bleeding disorders and/or liver disease, denies chemotherapy    Procedure explained and questions answered.   Crys Carter provided with written educational material.     Ms. Carter instructed to take 1000 mg of acetaminophen on the day of the biopsy, eat a light meal, and bring or wear a sport bra.  Post biopsy care and instruction reviewed: including no lifting more than five pounds, no upper body exercise, icing of biopsy site, no submerging in water.  Crys Carter verbalized understanding.    [facility]. [facility] informed [facility] sched[facility] would be contacting her to schedule an appointment.          [1]   Current Outpatient Medications   Medication Sig Dispense Refill    PEG 3350-KCl-Na Bicarb-NaCl 420 g Oral Recon Soln Take as directed by physician 4000 mL 0    amLODIPine 5 MG Oral Tab Take 1 tablet (5 mg total) by mouth As Directed. Take one tablet daily if blood pressure is persistently above 130s/80s. Monitor blood pressure closely. (Patient not taking: Reported on 5/27/2025) 90 tablet 0     ubrogepant (UBRELVY) 100 MG Oral Tab Take one tablet at onset of migraine.  May take additional tablet in 2 hours if needed.  Do not exceed two tablets per 24 hour period. 10 tablet 11    indomethacin 50 MG Oral Cap Take 1 capsule (50 mg total) by mouth 3 (three) times daily with meals. During flare up of severe headaches only 21 capsule 0    levothyroxine 75 MCG Oral Tab Take 1 tablet (75 mcg total) by mouth before breakfast. 90 tablet 0    pantoprazole 20 MG Oral Tab EC Take 1 tablet (20 mg total) by mouth before breakfast. 90 tablet 0    Atogepant (QULIPTA) 30 MG Oral Tab Take 1 tablet by mouth daily. 90 tablet 1    Naloxone HCl 4 MG/0.1ML Nasal Liquid 4 mg by Nasal route as needed. If patient remains unresponsive, repeat dose in other nostril 2-5 minutes after first dose. 1 kit 0    Ketorolac Tromethamine 10 MG Oral Tab Take 2 tablets (20 mg total) by mouth As Directed. For migraines: take 20mg once at the onset of the migraine. HOLD till cleared by neurosurgery clinic      acetaminophen 500 MG Oral Tab Take 2 tablets (1,000 mg total) by mouth every 6 (six) hours as needed for Pain.      ondansetron 4 MG Oral Tablet Dispersible Take 1 tablet (4 mg total) by mouth every 8 (eight) hours as needed for Nausea. 30 tablet 0    lurasidone 80 MG Oral Tab Take 1 tablet (80 mg total) by mouth at bedtime.      hydrOXYzine 25 MG Oral Tab Take 1 tablet (25 mg total) by mouth nightly as needed for Anxiety.      LORazepam 0.5 MG Oral Tab Take 1 tablet (0.5 mg total) by mouth nightly as needed for Anxiety.      lamoTRIgine 100 MG Oral Tab Take 1 tablet (100 mg total) by mouth in the morning.      buPROPion  MG Oral Tablet 24 Hr Take 1 tablet (150 mg total) by mouth daily. 30 tablet 0    propranolol 20 MG Oral Tab Take 1 tablet (20 mg total) by mouth 2 (two) times daily. 60 tablet 0    naltrexone 50 MG Oral Tab Take 1 tablet (50 mg total) by mouth daily. 30 tablet 0    sertraline 100 MG Oral Tab Take 1 tablet (100 mg  total) by mouth daily. 30 tablet 0    traZODone 100 MG Oral Tab Take 1 tablet (100 mg total) by mouth nightly as needed for Sleep. 30 tablet 0    meclizine 25 MG Oral Tab Take 1 tablet (25 mg total) by mouth 3 (three) times daily as needed for Dizziness. 15 tablet 0    albuterol (PROAIR HFA) 108 (90 Base) MCG/ACT Inhalation Aero Soln SHAKE WELL AND INHALE 1 TO 2 PUFFS INTO THE LUNGS EVERY 4 HOURS AS NEEDED FOR WHEEZING( COUGH) 3 each 1    Multiple Vitamins-Minerals (MULTIVITAL OR) Take 1 tablet by mouth in the morning. Bariatric vitamin.

## 2025-06-09 ENCOUNTER — APPOINTMENT (OUTPATIENT)
Dept: ADMINISTRATIVE | Facility: HOSPITAL | Age: 45
End: 2025-06-09
Payer: COMMERCIAL

## 2025-06-16 ENCOUNTER — TELEPHONE (OUTPATIENT)
Facility: LOCATION | Age: 45
End: 2025-06-16

## 2025-06-16 NOTE — TELEPHONE ENCOUNTER
spoke with patient about her insurance. states that she paid for the cobra on 6/11 and reports that [facility] sent the information over to [facility] on 6/13. she did mention that it could take up to 7 buisness days to receive the information from [employer] to [facility]. Patient states that she will be calling [facility] again tomorrow to expedite that process and will be giving me a call back. Did let her know that if we are unable to obtain the prior authorization then we would be needing to get her rescheduled.

## 2025-06-17 ENCOUNTER — TELEPHONE (OUTPATIENT)
Facility: LOCATION | Age: 45
End: 2025-06-17

## 2025-06-17 NOTE — TELEPHONE ENCOUNTER
No prior authorization required for cpt code 41318. Ref #: [id number]. Spoke with Abhi @ 2:41pm on 6/17

## 2025-06-18 ENCOUNTER — HOSPITAL ENCOUNTER (OUTPATIENT)
Dept: ULTRASOUND IMAGING | Age: 45
Discharge: HOME | End: 2025-06-18
Attending: INTERNAL MEDICINE
Payer: COMMERCIAL

## 2025-06-18 ENCOUNTER — APPOINTMENT (OUTPATIENT)
Dept: MAMMOGRAPHY | Facility: HOSPITAL | Age: 45
End: 2025-06-18
Attending: INTERNAL MEDICINE
Payer: COMMERCIAL

## 2025-06-18 DIAGNOSIS — R94.6 ABNORMAL THYROID FUNCTION TEST: ICD-10-CM

## 2025-06-18 DIAGNOSIS — E03.9 HYPOTHYROIDISM, UNSPECIFIED TYPE: ICD-10-CM

## 2025-06-18 PROCEDURE — 76536 US EXAM OF HEAD AND NECK: CPT | Performed by: INTERNAL MEDICINE

## 2025-06-21 DIAGNOSIS — G43.909 EPISODIC MIGRAINE: ICD-10-CM

## 2025-06-22 ENCOUNTER — ANESTHESIA EVENT (OUTPATIENT)
Dept: SURGERY | Facility: HOSPITAL | Age: 45
End: 2025-06-22
Payer: COMMERCIAL

## 2025-06-23 ENCOUNTER — ANESTHESIA (OUTPATIENT)
Dept: SURGERY | Facility: HOSPITAL | Age: 45
End: 2025-06-23
Payer: COMMERCIAL

## 2025-06-23 ENCOUNTER — HOSPITAL ENCOUNTER (OUTPATIENT)
Facility: HOSPITAL | Age: 45
Setting detail: HOSPITAL OUTPATIENT SURGERY
Discharge: HOME | End: 2025-06-23
Attending: SURGERY | Admitting: SURGERY
Payer: COMMERCIAL

## 2025-06-23 VITALS
HEART RATE: 76 BPM | OXYGEN SATURATION: 96 % | WEIGHT: 293 LBS | RESPIRATION RATE: 18 BRPM | SYSTOLIC BLOOD PRESSURE: 111 MMHG | TEMPERATURE: 98 F | BODY MASS INDEX: 45.45 KG/M2 | HEIGHT: 67.5 IN | DIASTOLIC BLOOD PRESSURE: 79 MMHG

## 2025-06-23 DIAGNOSIS — M31.6 TEMPORAL ARTERITIS (HCC): ICD-10-CM

## 2025-06-23 DIAGNOSIS — G89.18 POSTOPERATIVE PAIN: Primary | ICD-10-CM

## 2025-06-23 LAB — B-HCG UR QL: NEGATIVE

## 2025-06-23 PROCEDURE — 76942 ECHO GUIDE FOR BIOPSY: CPT | Performed by: SURGERY

## 2025-06-23 PROCEDURE — 37609 LIGATION/BX TEMPORAL ARTERY: CPT | Performed by: SURGERY

## 2025-06-23 RX ORDER — HYDROCODONE BITARTRATE AND ACETAMINOPHEN 5; 325 MG/1; MG/1
2 TABLET ORAL ONCE AS NEEDED
Status: DISCONTINUED | OUTPATIENT
Start: 2025-06-23 | End: 2025-06-23

## 2025-06-23 RX ORDER — ACETAMINOPHEN 500 MG
1000 TABLET ORAL ONCE AS NEEDED
Status: DISCONTINUED | OUTPATIENT
Start: 2025-06-23 | End: 2025-06-23

## 2025-06-23 RX ORDER — LIDOCAINE HYDROCHLORIDE 10 MG/ML
INJECTION, SOLUTION INFILTRATION; PERINEURAL AS NEEDED
Status: DISCONTINUED | OUTPATIENT
Start: 2025-06-23 | End: 2025-06-23 | Stop reason: HOSPADM

## 2025-06-23 RX ORDER — ONDANSETRON 2 MG/ML
INJECTION INTRAMUSCULAR; INTRAVENOUS
Status: DISCONTINUED
Start: 2025-06-23 | End: 2025-06-23 | Stop reason: WASHOUT

## 2025-06-23 RX ORDER — DEXAMETHASONE SODIUM PHOSPHATE 4 MG/ML
VIAL (ML) INJECTION AS NEEDED
Status: DISCONTINUED | OUTPATIENT
Start: 2025-06-23 | End: 2025-06-23 | Stop reason: SURG

## 2025-06-23 RX ORDER — CEFAZOLIN SODIUM IN 0.9 % NACL 3 G/100 ML
3 INTRAVENOUS SOLUTION, PIGGYBACK (ML) INTRAVENOUS ONCE
Status: COMPLETED | OUTPATIENT
Start: 2025-06-23 | End: 2025-06-23

## 2025-06-23 RX ORDER — SODIUM CHLORIDE, SODIUM LACTATE, POTASSIUM CHLORIDE, CALCIUM CHLORIDE 600; 310; 30; 20 MG/100ML; MG/100ML; MG/100ML; MG/100ML
INJECTION, SOLUTION INTRAVENOUS CONTINUOUS
Status: DISCONTINUED | OUTPATIENT
Start: 2025-06-23 | End: 2025-06-23

## 2025-06-23 RX ORDER — SCOPOLAMINE 1 MG/3D
1 PATCH, EXTENDED RELEASE TRANSDERMAL ONCE
Status: DISCONTINUED | OUTPATIENT
Start: 2025-06-23 | End: 2025-06-23 | Stop reason: HOSPADM

## 2025-06-23 RX ORDER — HEPARIN SODIUM 5000 [USP'U]/ML
INJECTION, SOLUTION INTRAVENOUS; SUBCUTANEOUS
Status: COMPLETED
Start: 2025-06-23 | End: 2025-06-23

## 2025-06-23 RX ORDER — PANTOPRAZOLE SODIUM 20 MG/1
20 TABLET, DELAYED RELEASE ORAL
Qty: 90 TABLET | Refills: 3 | Status: SHIPPED | OUTPATIENT
Start: 2025-06-23

## 2025-06-23 RX ORDER — HYDROCODONE BITARTRATE AND ACETAMINOPHEN 5; 325 MG/1; MG/1
1 TABLET ORAL ONCE AS NEEDED
Status: DISCONTINUED | OUTPATIENT
Start: 2025-06-23 | End: 2025-06-23

## 2025-06-23 RX ORDER — NALOXONE HYDROCHLORIDE 0.4 MG/ML
0.08 INJECTION, SOLUTION INTRAMUSCULAR; INTRAVENOUS; SUBCUTANEOUS AS NEEDED
Status: DISCONTINUED | OUTPATIENT
Start: 2025-06-23 | End: 2025-06-23

## 2025-06-23 RX ORDER — CEFAZOLIN SODIUM IN 0.9 % NACL 3 G/100 ML
INTRAVENOUS SOLUTION, PIGGYBACK (ML) INTRAVENOUS
Status: DISCONTINUED
Start: 2025-06-23 | End: 2025-06-23

## 2025-06-23 RX ORDER — LIDOCAINE HYDROCHLORIDE 10 MG/ML
INJECTION, SOLUTION EPIDURAL; INFILTRATION; INTRACAUDAL; PERINEURAL AS NEEDED
Status: DISCONTINUED | OUTPATIENT
Start: 2025-06-23 | End: 2025-06-23 | Stop reason: SURG

## 2025-06-23 RX ORDER — KETOROLAC TROMETHAMINE 30 MG/ML
INJECTION, SOLUTION INTRAMUSCULAR; INTRAVENOUS AS NEEDED
Status: DISCONTINUED | OUTPATIENT
Start: 2025-06-23 | End: 2025-06-23 | Stop reason: SURG

## 2025-06-23 RX ORDER — HEPARIN SODIUM 5000 [USP'U]/ML
5000 INJECTION, SOLUTION INTRAVENOUS; SUBCUTANEOUS ONCE
Status: COMPLETED | OUTPATIENT
Start: 2025-06-23 | End: 2025-06-23

## 2025-06-23 RX ORDER — ATOGEPANT 30 MG/1
1 TABLET ORAL DAILY
Qty: 90 TABLET | Refills: 1 | Status: SHIPPED | OUTPATIENT
Start: 2025-06-23

## 2025-06-23 RX ORDER — LEVOTHYROXINE SODIUM 75 UG/1
75 TABLET ORAL
Qty: 90 TABLET | Refills: 3 | Status: SHIPPED | OUTPATIENT
Start: 2025-06-23

## 2025-06-23 RX ORDER — ONDANSETRON 2 MG/ML
4 INJECTION INTRAMUSCULAR; INTRAVENOUS EVERY 6 HOURS PRN
Status: DISCONTINUED | OUTPATIENT
Start: 2025-06-23 | End: 2025-06-23

## 2025-06-23 RX ORDER — OXYCODONE HYDROCHLORIDE 5 MG/1
5 TABLET ORAL EVERY 6 HOURS PRN
Qty: 15 TABLET | Refills: 0 | Status: SHIPPED | OUTPATIENT
Start: 2025-06-23 | End: 2025-06-23

## 2025-06-23 RX ORDER — ACETAMINOPHEN 500 MG
1000 TABLET ORAL ONCE
Status: DISCONTINUED | OUTPATIENT
Start: 2025-06-23 | End: 2025-06-23 | Stop reason: HOSPADM

## 2025-06-23 RX ORDER — ONDANSETRON 2 MG/ML
INJECTION INTRAMUSCULAR; INTRAVENOUS AS NEEDED
Status: DISCONTINUED | OUTPATIENT
Start: 2025-06-23 | End: 2025-06-23 | Stop reason: SURG

## 2025-06-23 RX ORDER — MIDAZOLAM HYDROCHLORIDE 1 MG/ML
1 INJECTION INTRAMUSCULAR; INTRAVENOUS EVERY 5 MIN PRN
Status: DISCONTINUED | OUTPATIENT
Start: 2025-06-23 | End: 2025-06-23

## 2025-06-23 RX ADMIN — KETOROLAC TROMETHAMINE 30 MG: 30 INJECTION, SOLUTION INTRAMUSCULAR; INTRAVENOUS at 08:36:00

## 2025-06-23 RX ADMIN — ONDANSETRON 4 MG: 2 INJECTION INTRAMUSCULAR; INTRAVENOUS at 08:36:00

## 2025-06-23 RX ADMIN — SODIUM CHLORIDE, SODIUM LACTATE, POTASSIUM CHLORIDE, CALCIUM CHLORIDE: 600; 310; 30; 20 INJECTION, SOLUTION INTRAVENOUS at 07:34:00

## 2025-06-23 RX ADMIN — DEXAMETHASONE SODIUM PHOSPHATE 4 MG: 4 MG/ML VIAL (ML) INJECTION at 07:47:00

## 2025-06-23 RX ADMIN — CEFAZOLIN SODIUM IN 0.9 % NACL 3 G: 3 G/100 ML INTRAVENOUS SOLUTION, PIGGYBACK (ML) INTRAVENOUS at 07:40:00

## 2025-06-23 RX ADMIN — LIDOCAINE HYDROCHLORIDE 50 MG: 10 INJECTION, SOLUTION EPIDURAL; INFILTRATION; INTRACAUDAL; PERINEURAL at 07:37:00

## 2025-06-23 NOTE — TELEPHONE ENCOUNTER
Medication: Qulipta 30 mg     Date of last refill: 02/25/25 # 90/1  [place]      Last office visit: 05/13/25  Due back to clinic per last office note:    Date next office visit scheduled:    Future Appointments   Date Time Provider Department Center   6/25/2025  8:30 AM [facility]   6/26/2025  1:00 PM Albert Galicia MD [facility]   7/10/2025  1:30 PM Allen Dickson MD [facility]2 [facility] [place]   7/18/2025  9:15 AM ANTONIO DOBBINS [facility] None   3/11/2026  9:00 AM Nkechi Geronimo DO [facility] [place]   3/27/2026 10:15 AM Nkechi Geronimo DO [facility] [place]           Last OV note recommendation:  IMPRESSION AND PLAN:   Crys Carter is a 45 year old female with PMHx significant for bipolar disorder and chronic episodic migraine, who originally presented 11/3/2022, for evaluation and management of recent worsening migraines.    Neurologic exam is normal and nonfocal and patient was previously well controlled in terms of migraine, which occurred with and without aura, on preventive medication with Topamax as well as abortive therapy with sumatriptan 100 mg dose PRN.  However, she had weight loss surgery 3/2022 and had worsening migraines which did not respond to higher dose of Topamax and she developed significant side effects with cognitive changes and suicidal thoughts.  She was weaned off Topamax and had been doing better on Qulipta daily for prevention.     However, she subsequently developed a different type of headache with mainly unilateral left side headache with some associated tearing of eye - given unilateral presentation, thought this could be variant of trigeminal autonomic cephalgia (ie paroxysmal hemicrania).While most of her symptoms and exam findings noted previously could be attributed to cervical myelopathy, her right hand weakness, with left foraminal encroachment noted on MRI of the cervical spine was  unexpected and could suggest a superimposed peripheral entrapment neuropathy such as carpal tunnel syndrome for which NCS/EMG R UE was completed but did not show any R carpal tunnel syndrome only showing some chronic denervation changes likely related to cervical myelopathy; she is now improved s/p ACDF C4-6 11/27/2024.         Previously, she was doing well with respect to her migraines and her headaches. However ~ late March 2025, she had new issues as she noted pain in the left side of the temple with stabbing, electric pain along with sensitivity to touching her hair, which then progressed to vision changes in left eye with \"kaleidoscope pattern\"after several hours and then progressed to nausea. She went to urgent care and was given medication but eventually went to ER and was treated with magnesium and steroid and reglan, along with zofran and toradol. She had workup which showed mild elevation in sed rate at 45 and CRP at 1.20. She did admit she was on prednisone 20 mg for \"inflamed achilles\" prior to admission and she was given 40 mg dose at urgent care prior to blood work.     When she was in the ER, MRI was done which showed some abnormalities thought to be related to migraine vs subarachnoid hemorrhage; CTA brain done was normal.     She was told she may have temporal arteritis and started on prednisone 60 mg daily, which was completed.  She did not have progression to vision loss and headaches have improved.       When the symptoms started initially, she did admit to having tearing of the eye and her nose was running on the left side as well.      Her symptoms of headache with left temporal pain could be temporal arteritis but visual disturbance and associated nasal congestion with symptoms of trigeminal neuropathy could suggest recurrence of paroxysmal hemicrania - she has not had any signs of vision loss to suggest progression of temporal arteritis and she has already been treated with steroids -ESR,  CRP were still elevated and she is now off steroids - she has been having polyarthralgias as well and has family history of RA - will check ESR, CRP, VON, RF and CCP and recommend referral to rheumatology - for workup of possible temporal arteritis, recommend biopsy for diagnostic workup as well      should headaches recur, recommend trial indomethacin 50 mg tid to start.        Otherwise, continue Qulipta at current dose; for abortive therapy for migraines, she notes sumatriptan was not working and is not ideal due to risk of serotonin syndrome with concurrent psychiatric medications.     She is now on ubrogepantt (Ubrelvy) and doing well with this for abortive therapy:  continue to take 100 mg  within first 30 minutes of symptoms starting; if not improved after 2 hours, take additional dose, and then stop taking; discussed potential side effects, including but not limited to nausea, drowsiness and dry mouth     1. Migraine with aura and without status migrainosus, not intractable  As noted above      2. Episodic migraine  As noted above      3. Elevated C-reactive protein (CRP)  As noted above  - Rheumatoid Arthritis Factor; Future  - CCP Antibodies IgG/IgA; Future  - Connective Tissue Disease (VON) Screen, Reflex Specific Antibody; Future  - Sed Rate, Westergren (Automated); Future  - C-Reactive Protein; Future  - Rheumatology Referral - In Network     4. Polyarthralgia  As noted above   - Rheumatoid Arthritis Factor; Future  - CCP Antibodies IgG/IgA; Future  - Connective Tissue Disease (VON) Screen, Reflex Specific Antibody; Future  - Sed Rate, Westergren (Automated); Future  - C-Reactive Protein; Future  - Rheumatology Referral - In Network     5. Elevated sedimentation rate  As noted above   - Rheumatology Referral - In Network     No follow-ups on file.     Albert Galicia MD, Neurology  [facility]  Pager [phone]  5/13/2025

## 2025-06-23 NOTE — ANESTHESIA PREPROCEDURE EVALUATION
PRE-OP EVALUATION    Patient Name: Crys Carter    Admit Diagnosis: Temporal arteritis (HCC) [M31.6]    Pre-op Diagnosis: Temporal arteritis (HCC) [M31.6]    LEFT TEMPORAL ARTERY BIOPSY    Anesthesia Procedure: LEFT TEMPORAL ARTERY BIOPSY (Left: Face)    Surgeons and Role:     * Kalyan Menendez MD - Primary    Pre-op vitals reviewed.  Temp: 97 °F (36.1 °C)  Pulse: 80  Resp: 18  BP: 119/79  SpO2: 98 %  Body mass index is 46.6 kg/m².    Current medications reviewed.  Hospital Medications:  Current Medications[1]    Outpatient Medications:   Prescriptions Prior to Admission[2]    Allergies: Bees, Clonazepam, Chocolate, Compazine [prochlorperazine], Topiramate, and Tree nuts      Anesthesia Evaluation    Patient summary reviewed.    Anesthetic Complications  (+) history of anesthetic complications  History of: PONV       GI/Hepatic/Renal      (+) GERD and well controlled                          Cardiovascular      ECG reviewed.  Exercise tolerance: good         (+) obesity (Morbid obesity)  (+) hypertension and well controlled                                    Endo/Other           (+) hypothyroidism                       Pulmonary  Comment: Former tobacco abuse    (+) asthma              (+) sleep apnea and noncompliant      Neuro/Psych      (+) depression    (+) CVA and no residual deficits      (+) neuromuscular disease (Cervical radiculopathy)  (+) headaches (Migraines)                   Past Surgical History[3]  Social Hx on file[4]  History   Drug Use Unknown     Available pre-op labs reviewed.  Lab Results   Component Value Date    WBC 8.6 04/26/2025    RBC 4.57 04/26/2025    HGB 13.5 04/26/2025    HCT 41.5 04/26/2025    MCV 90.8 04/26/2025    MCH 29.5 04/26/2025    MCHC 32.5 04/26/2025    RDW 13.4 04/26/2025    .0 04/26/2025     Lab Results   Component Value Date     03/30/2025    K 3.9 03/30/2025     03/30/2025    CO2 26.0 03/30/2025    BUN 7 (L) 03/30/2025    CREATSERUM 0.87 03/30/2025     GLU 96 03/30/2025    CA 9.6 03/30/2025            Airway      Mallampati: III  Mouth opening: 3 FB  TM distance: 4 - 6 cm  Neck ROM: full Cardiovascular    Cardiovascular exam normal.  Rhythm: regular  Rate: normal  (-) murmur   Dental    Dentition appears grossly intact         Pulmonary    Pulmonary exam normal.  Breath sounds clear to auscultation bilaterally.               Other findings              ASA: 3   Plan: general  NPO status verified and patient meets guidelines.  Patient has taken beta blockers in last 24 hours. (Last dose 6/23/25 at 0300.)  Post-procedure pain management plan discussed with surgeon and patient.      Plan/risks discussed with: patient                Present on Admission:  **None**             [1]    acetaminophen (Tylenol Extra Strength) tab 1,000 mg  1,000 mg Oral Once    scopolamine (Transderm-Scop) 1 MG/3DAYS patch 1 patch  1 patch Transdermal Once    lactated ringers infusion   Intravenous Continuous    [COMPLETED] heparin (Porcine) 5000 UNIT/ML injection 5,000 Units  5,000 Units Subcutaneous Once    ceFAZolin (Ancef) 3 g in sodium chloride 0.9% 100mL IVPB premix  3 g Intravenous Once    ceFAZolin in sodium chloride 0.9% (Ancef) 3-0.9 GM/100ML-% IVPB premix       [2]   Medications Prior to Admission   Medication Sig Dispense Refill Last Dose/Taking    levothyroxine 75 MCG Oral Tab Take 1 tablet (75 mcg total) by mouth before breakfast. 90 tablet 0 6/23/2025 at  3:00 AM    pantoprazole 20 MG Oral Tab EC Take 1 tablet (20 mg total) by mouth before breakfast. 90 tablet 0 6/23/2025 at  3:00 AM    Atogepant (QULIPTA) 30 MG Oral Tab Take 1 tablet by mouth daily. 90 tablet 1 6/23/2025 at  3:00 AM    Ketorolac Tromethamine 10 MG Oral Tab Take 2 tablets (20 mg total) by mouth As Directed. For migraines: take 20mg once at the onset of the migraine. HOLD till cleared by neurosurgery clinic   6/9/2025    lurasidone 80 MG Oral Tab Take 1 tablet (80 mg total) by mouth at bedtime.   6/22/2025  Bedtime    hydrOXYzine 25 MG Oral Tab Take 1 tablet (25 mg total) by mouth at bedtime.   6/22/2025 Bedtime    LORazepam 0.5 MG Oral Tab Take 1 tablet (0.5 mg total) by mouth nightly as needed for Anxiety.   Past Month    lamoTRIgine 100 MG Oral Tab Take 1 tablet (100 mg total) by mouth in the morning.   6/23/2025 at  3:00 AM    buPROPion  MG Oral Tablet 24 Hr Take 1 tablet (150 mg total) by mouth daily. 30 tablet 0 6/23/2025 at  3:00 AM    propranolol 20 MG Oral Tab Take 1 tablet (20 mg total) by mouth 2 (two) times daily. (Patient taking differently: Take 1 tablet (20 mg total) by mouth in the morning and 1 tablet (20 mg total) before bedtime. Essential tremors.) 60 tablet 0 6/23/2025 at  3:00 AM    naltrexone 50 MG Oral Tab Take 1 tablet (50 mg total) by mouth daily. 30 tablet 0 6/23/2025 at  3:00 AM    sertraline 100 MG Oral Tab Take 1 tablet (100 mg total) by mouth daily. 30 tablet 0 6/23/2025 at  3:00 AM    traZODone 100 MG Oral Tab Take 1 tablet (100 mg total) by mouth nightly as needed for Sleep. (Patient taking differently: Take 1 tablet (100 mg total) by mouth nightly.) 30 tablet 0 6/22/2025 Bedtime    Multiple Vitamins-Minerals (MULTIVITAL OR) Take 1 tablet by mouth in the morning. Bariatric vitamin.   6/9/2025    ubrogepant (UBRELVY) 100 MG Oral Tab Take one tablet at onset of migraine.  May take additional tablet in 2 hours if needed.  Do not exceed two tablets per 24 hour period. 10 tablet 11 6/2/2025    indomethacin 50 MG Oral Cap Take 1 capsule (50 mg total) by mouth 3 (three) times daily with meals. During flare up of severe headaches only 21 capsule 0     Naloxone HCl 4 MG/0.1ML Nasal Liquid 4 mg by Nasal route as needed. If patient remains unresponsive, repeat dose in other nostril 2-5 minutes after first dose. 1 kit 0     ondansetron 4 MG Oral Tablet Dispersible Take 1 tablet (4 mg total) by mouth every 8 (eight) hours as needed for Nausea. 30 tablet 0 More than a month    meclizine 25 MG  Oral Tab Take 1 tablet (25 mg total) by mouth 3 (three) times daily as needed for Dizziness. 15 tablet 0 More than a month    albuterol (PROAIR HFA) 108 (90 Base) MCG/ACT Inhalation Aero Soln SHAKE WELL AND INHALE 1 TO 2 PUFFS INTO THE LUNGS EVERY 4 HOURS AS NEEDED FOR WHEEZING( COUGH) 3 each 1 More than a month   [3]   Past Surgical History:  Procedure Laterality Date    Abdominal surgery  2022    Gastric Sleeve    Other      benign tumor reomoved  from left inner thigh    2014    basal carcinoma removed from upper lid of left eye    Other surgical history  2022    Gastric sleeve    Other surgical history  2024    CERVICAL 4 - CERVICAL 5, CERVICAL 5 - CERVICAL 6 ANTERIOR CERVICAL DISCECTOMY AND FUSION WITH INSTRUMENTATION, ALLOGRAFT    Skin surgery  Oct 2014    tumor removed from eyebrow ridge    Tonsillectomy     [4]   Social History  Socioeconomic History    Marital status: Single   Tobacco Use    Smoking status: Former     Current packs/day: 0.00     Average packs/day: 1 pack/day for 23.9 years (23.9 ttl pk-yrs)     Types: Cigarettes     Start date: 1985     Quit date: 12/15/2008     Years since quittin.5     Passive exposure: Never    Smokeless tobacco: Never   Vaping Use    Vaping status: Never Used   Substance and Sexual Activity    Alcohol use: Not Currently     Comment: Very Rare    Drug use: Not Currently     Types: Cannabis    Sexual activity: Not Currently     Partners: Male     Birth control/protection: Condom   Other Topics Concern    Caffeine Concern Yes    Stress Concern Yes    Weight Concern Yes    Special Diet Yes     Comment: Bariatric    Exercise Yes    Seat Belt Yes

## 2025-06-23 NOTE — DISCHARGE INSTRUCTIONS
Home Care Instructions  Temporal Artery Biopsy  Dr. Kalyan Menendez  MEDICATIONS  You should anticipate minor to moderate pain for the first few days.  You can use Tylenol or Motrin for pain.      DIET  Your diet should be as you tolerate.    ACTIVITY  You may shower in 24 hours. Your incision can get wet but do not soak your incision in a tub of water.  You may return to work as instructed by your surgeon.      CARE OF THE SURGICAL SITE          If you experience fever, chills, inability to urinate, nausea with vomiting, severe diarrhea or severe, cramping  abdominal pain please contact your surgeon.  Your incision has skin glue. This will dissolve on its own in 10-14 days. Do not put any lotions or ointments on them. You may shower just let the soap and water run over your incision. Don't scrub or submerge in water.     APPOINTMENT  You should make an appointment to see your surgeon as directed  Should you develop any problems prior to your scheduled appointment, do not hesitate to call the office.       You received a drug called Toradol which is an Anti Inflammatory at:8:36 am  If you are allowed to take Anti inflammatories:    Do not take any Anti Inflammatory like Motrin, Aleve or Ibuprophen until after:2:36 pm  Please report any suspected allergic reactions or bleeding issues to your doctor

## 2025-06-23 NOTE — TELEPHONE ENCOUNTER
Requesting    Name from pharmacy: PANTOPRAZOLE SODIUM DR TABS 20MG         Will file in chart as: PANTOPRAZOLE 20 MG Oral Tab EC    Sig: TAKE 1 TABLET BEFORE BREAKFAST    Disp: 90 tablet    Refills: 3    Start: 6/21/2025    Class: Normal    Non-formulary    Last ordered: 3 months ago (2/26/2025) by Viki Pinto MD    Last refill: 2/27/2025    Rx #: [id number]    Gastrointestional Medication Protocol [id number]/21/2025 11:55 AM   Protocol Details In person appointment or virtual visit in the past 12 mos or appointment in next 3 mos    Medication is active on med list       Name from pharmacy: L-THYROXINE TABS 75MCG         Will file in chart as: LEVOTHYROXINE 75 MCG Oral Tab    Sig: TAKE 1 TABLET BEFORE BREAKFAST    Disp: 90 tablet    Refills: 3    Start: 6/21/2025    Class: Normal    Last ordered: 3 months ago (2/26/2025) by Viki Pinto MD    Last refill: 2/27/2025    Rx #: [id number]    Thyroid Medication Protocol [id number]/21/2025 11:55 AM   Protocol Details TSH in past 12 months    Last TSH value is normal    In person appointment or virtual visit in the past 12 mos or appointment in next 3 mos    Medication is active on med list      To be filled at: [facility] [address] [phone], [phone]        LOV: 05/27/25 w/ DVF  RTC: 11/27/25   Last Relevant Labs: 05/2025   Future Appointments   Date Time Provider Department Center   6/25/2025  8:30 AM [facility]   6/26/2025  1:00 PM Albert Galicia MD [facility] [place]   7/10/2025  1:30 PM Allen Dickson MD ENINAPER2 [facility] [place]   7/18/2025  9:15 AM ANTONIO DOBBINS SGIEDW None   3/11/2026  9:00 AM Nkechi Geronimo DO [facility] [place]   3/27/2026 10:15 AM Nkechi Geronimo DO [facility] [place]

## 2025-06-23 NOTE — H&P
History and Physical     Crys Carter Patient Status:  Hospital Outpatient Surgery    1980 MRN [id number]   [facility] PERIOPERATIVE SERVICE Attending Kalyan Menendez MD   Hosp Day # 0 PCP Viki Pinto MD     Chief Complaint: headaches    Subjective:    Crys Carter is a 45 year old female with concern for temporal arteritis. Plan for L temporal artery biopsy under MAC.    History/Other:      Past Medical History:  Past Medical History:    Allergic rhinitis    Anxiety    Arthritis    Asthma (HCC)    Back problem    Bipolar disorder (HCC)    Calculus of kidney    Cancer (HCC)    basal cell eyelid    Depression    Disorder of thyroid    Esophageal reflux    Hypothyroidism    Migraine    Migraines    Obesity, unspecified    PONV (postoperative nausea and vomiting)    S/P laparoscopic sleeve gastrectomy    Sleep apnea    history of, improved with weight loss surgery, no longer needs cpap    Stroke (HCC)    TIA, possible not completely diagonosed    Visual impairment    glasses        Past Surgical History:   Past Surgical History:   Procedure Laterality Date    Abdominal surgery  2022    Gastric Sleeve    Other      benign tumor reomoved  from left inner thigh    2014    basal carcinoma removed from upper lid of left eye    Other surgical history  2022    Gastric sleeve    Other surgical history  2024    CERVICAL 4 - CERVICAL 5, CERVICAL 5 - CERVICAL 6 ANTERIOR CERVICAL DISCECTOMY AND FUSION WITH INSTRUMENTATION, ALLOGRAFT    Skin surgery  Oct 2014    tumor removed from eyebrow ridge    Tonsillectomy         Social History:  reports that she quit smoking about 16 years ago. Her smoking use included cigarettes. She started smoking about 40 years ago. She has a 23.9 pack-year smoking history. She has never been exposed to tobacco smoke. She has never used smokeless tobacco. She reports that she does not currently use alcohol. She reports that she does not  currently use drugs after having used the following drugs: Cannabis.    Family History:   Family History   Problem Relation Age of Onset    Depression Father     Heart Disease Father     Asthma Father     Heart Attack Father     Migraines Father     Diabetes Father     Obesity Father     Diabetes Mother     Obesity Mother     Depression Mother     Alcohol and Other Disorders Associated Maternal Grandmother     Cancer Maternal Grandmother         Lung    Cancer Maternal Grandfather         Prostate CA    Prostate Cancer Maternal Grandfather     Hypertension Paternal Grandmother     Cancer Paternal Grandfather         Lung CA    Personality Disorder Paternal Grandfather     Breast Cancer Maternal Aunt 65        in her 60's    Substance Abuse Maternal Uncle     Stroke Neg        Allergies:   Allergies   Allergen Reactions    Bees HIVES    Clonazepam OTHER (SEE COMMENTS)     Suicidal thoughts  [place] out of sorts and has increased irritability as well as extremities feeling strange    Chocolate OTHER (SEE COMMENTS)     Allergy     Compazine [Prochlorperazine] OTHER (SEE COMMENTS)     Oral tablet-  Twitching;  Blanked out; nausea    Topiramate OTHER (SEE COMMENTS)     Suicidal,aggresive,brain fog    Tree Nuts OTHER (SEE COMMENTS)     Allergy testing        Medications:    No current facility-administered medications on file prior to encounter.     Current Outpatient Medications on File Prior to Encounter   Medication Sig Dispense Refill    levothyroxine 75 MCG Oral Tab Take 1 tablet (75 mcg total) by mouth before breakfast. 90 tablet 0    pantoprazole 20 MG Oral Tab EC Take 1 tablet (20 mg total) by mouth before breakfast. 90 tablet 0    Atogepant (QULIPTA) 30 MG Oral Tab Take 1 tablet by mouth daily. 90 tablet 1    Ketorolac Tromethamine 10 MG Oral Tab Take 2 tablets (20 mg total) by mouth As Directed. For migraines: take 20mg once at the onset of the migraine. HOLD till cleared by neurosurgery clinic      lurasidone 80  MG Oral Tab Take 1 tablet (80 mg total) by mouth at bedtime.      hydrOXYzine 25 MG Oral Tab Take 1 tablet (25 mg total) by mouth at bedtime.      LORazepam 0.5 MG Oral Tab Take 1 tablet (0.5 mg total) by mouth nightly as needed for Anxiety.      lamoTRIgine 100 MG Oral Tab Take 1 tablet (100 mg total) by mouth in the morning.      buPROPion  MG Oral Tablet 24 Hr Take 1 tablet (150 mg total) by mouth daily. 30 tablet 0    propranolol 20 MG Oral Tab Take 1 tablet (20 mg total) by mouth 2 (two) times daily. (Patient taking differently: Take 1 tablet (20 mg total) by mouth in the morning and 1 tablet (20 mg total) before bedtime. Essential tremors.) 60 tablet 0    naltrexone 50 MG Oral Tab Take 1 tablet (50 mg total) by mouth daily. 30 tablet 0    sertraline 100 MG Oral Tab Take 1 tablet (100 mg total) by mouth daily. 30 tablet 0    traZODone 100 MG Oral Tab Take 1 tablet (100 mg total) by mouth nightly as needed for Sleep. (Patient taking differently: Take 1 tablet (100 mg total) by mouth nightly.) 30 tablet 0    Multiple Vitamins-Minerals (MULTIVITAL OR) Take 1 tablet by mouth in the morning. Bariatric vitamin.      ubrogepant (UBRELVY) 100 MG Oral Tab Take one tablet at onset of migraine.  May take additional tablet in 2 hours if needed.  Do not exceed two tablets per 24 hour period. 10 tablet 11    indomethacin 50 MG Oral Cap Take 1 capsule (50 mg total) by mouth 3 (three) times daily with meals. During flare up of severe headaches only 21 capsule 0    Naloxone HCl 4 MG/0.1ML Nasal Liquid 4 mg by Nasal route as needed. If patient remains unresponsive, repeat dose in other nostril 2-5 minutes after first dose. 1 kit 0    ondansetron 4 MG Oral Tablet Dispersible Take 1 tablet (4 mg total) by mouth every 8 (eight) hours as needed for Nausea. 30 tablet 0    meclizine 25 MG Oral Tab Take 1 tablet (25 mg total) by mouth 3 (three) times daily as needed for Dizziness. 15 tablet 0    albuterol (PROAIR HFA) 108 (90  Base) MCG/ACT Inhalation Aero Soln SHAKE WELL AND INHALE 1 TO 2 PUFFS INTO THE LUNGS EVERY 4 HOURS AS NEEDED FOR WHEEZING( COUGH) 3 each 1       Review of Systems:   A comprehensive 14 point review of systems was completed.    Pertinent positives and negatives noted in the HPI.    Objective:   Vital Signs:  Blood pressure 119/79, pulse 80, temperature 97 °F (36.1 °C), temperature source Esophageal, resp. rate 18, height 67.5\", weight (!) 302 lb (137 kg), SpO2 98%, not currently breastfeeding. Body mass index is 46.6 kg/m².  General: Alert, orientated x3.  Cooperative.  No apparent distress.  HEENT: NC/AT   Lungs: Even, unlabored  Cardiac: Regular rate    Abdomen: Obese, ND  Extremities:  SMAE  Skin: Warm & dry         Assessment & Plan:    #Pt w/ chronic headaches, concern for temporal arteritis, plan for L temporal artery biopsy under MAC    Quality:  DVT Mechanical Prophylaxis:        DVT Pharmacologic Prophylaxis   Medication   None                Code Status: Prior  [place]: No urinary catheter in place  [place] Duration (in days):   Central line:    BORIS:     Plan of care discussed with Pt and mother.     Kalyan Menendez MD  6/23/2025    Supplementary Documentation:

## 2025-06-23 NOTE — ANESTHESIA POSTPROCEDURE EVALUATION
[facility]    Crys Carter Patient Status:  Hospital Outpatient Surgery   Age/Gender 45 year old female MRN [id number]   Location [facility] SURGERY Attending Kalyan Menendez MD   Hosp Day # 0 PCP Viki Pinto MD       Anesthesia Post-op Note    LEFT TEMPORAL ARTERY BIOPSY    Procedure Summary       Date: 06/23/25 Room / Location:  MAIN OR 12 /  MAIN OR    Anesthesia Start: 0733 Anesthesia Stop: 0850    Procedure: LEFT TEMPORAL ARTERY BIOPSY (Left: Face) Diagnosis:       Temporal arteritis (HCC)      (Temporal arteritis (HCC) [M31.6])    Surgeons: Kalyan Menendez MD Anesthesiologist: Nghia Kaur MD    Anesthesia Type: general ASA Status: 3            Anesthesia Type: general    Vitals Value Taken Time   BP 93/68 06/23/25 08:51   Temp 97.7 06/23/25 08:56   Pulse 73 06/23/25 08:55   Resp 14 06/23/25 08:55   SpO2 100 % 06/23/25 08:55   Vitals shown include unfiled device data.        Patient Location: PACU    Anesthesia Type: general    Airway Patency: patent    Postop Pain Control: adequate    Mental Status: mildly sedated but able to meaningfully participate in the post-anesthesia evaluation    Nausea/Vomiting: none    Cardiopulmonary/Hydration status: stable euvolemic    Complications: no apparent anesthesia related complications    Postop vital signs: stable    Dental Exam: Unchanged from Preop    Patient to be discharged from PACU when criteria met.

## 2025-06-23 NOTE — SPIRITUAL CARE NOTE
Spiritual Care Visit Note    Patient Name: Crys Carter Date of Spiritual Care Visit: 25   : 1980 Primary Dx: <principal problem not specified>       Referred By:      Spiritual Care Taxonomy:    Intended Effects: Promote a sense of peace    Methods: Offer support    Interventions: Active listening, Ask guided questions, Explain  role    Visit Type/Summary:     - Spiritual Care:  visited with patient and mother while rounding. Patient declined spiritual care support. Patient confirmed she is having outpatient surgery with no need of further spiritual care support.  remains available as needed for follow up.    Spiritual Care support can be requested via an [facility] consult. For urgent/immediate needs, please contact the On Call  at: Edward: ext [phone]    Min. Yvrose Rebolledo Mdiv.

## 2025-06-23 NOTE — OPERATIVE REPORT
[facility]   part of [facility]    Operative Note         Crys Carter Location: OR   [facility] [id number] MRN [id number]   Admission Date 6/23/2025 Operation Date 6/23/2025   Attending Physician Kalyan Menendez MD       Patient Name: Crys Carter     Preoperative Diagnosis: Temporal arteritis (HCC) [M31.6]     Postoperative Diagnosis: Temporal arteritis (HCC) [M31.6]     Procedure(s):  LEFT TEMPORAL ARTERY BIOPSY, POINT OF CARE ULTRASOUND OF THE FACE/HEAD FOR LOCALIZATION OF TEMPORAL ARTERY     Primary Surgeon: Kalyan Menendez MD           Anesthesia: MAC     Specimen:   ID Type Source Tests Collected by Time Destination   1 : Left Temporal Artery Tissue Artery SURGICAL PATHOLOGY TISSUE Kalyan Menendez MD 6/23/2025  8:28 AM         Estimated Blood Loss: Blood Output: 5 mL (6/23/2025  8:40 AM)       Complications: none      Indications for procedure: Chronic headaches, concern for temporal arteritis     Surgical Findings: Patent left temporal artery with several small branches        Operative Summary:    The patient was placed in the supine position with the head turned so the operative side was up. An ultrasound probe was used to identify the temporal artery and walter it's course.  The temporal artery was outlined using a marker & a incision was planned over a straight segment.  The left temporal area was prepped and draped in the usual sterile fashion. An incision was then made using a #15 blade scalpel. Needle tip cautery was used to achieve skin hemostasis, a fine tip hemostat was used to dissect the superficial layers of the superficial temporal fascia and identify the vessel. The clamp was used to dissect beneath the vessel.     A 2-0 silk suture was used to manipulate the vessel without grasping it. A fine branch was noted medially, this was clipped far from the main vessel. 2-0 silk sutures were applied on either side of the main vessel & tied, then clipped onto the vessel. The vessel was then transected  using scissors. The cavity was irrigated, then inspected for hemostasis before closure.     A field block using 0.25% lidocaine without epinephrine was performed.  The subcutaneous tissues were closed using interrupted 3-0 Vicryl sutures. The skin was closed with a running subcuticular 4-0 monocryl suture and dressed with Dermabond.     The specimen was kept moist in normal saline and sent to pathology for fresh examination.    The patient tolerated well the procedure, emerged from anesthesia and was transported to the recovery room in stable condition.       Implants: * No implants in log *     Drains: None     Condition: Stable       Kalyan Menendez MD

## 2025-06-23 NOTE — ANESTHESIA PROCEDURE NOTES
Airway  Date/Time: 6/23/2025 7:39 AM  Reason: elective      General Information and Staff   Patient location during procedure: OR  Anesthesiologist: Nghia Kaur MD  Resident/CRNA: Trevor Scott CRNA  Performed: CRNA   Performed by: Trevor Scott CRNA  Authorized by: Nghia Kaur MD        Indications and Patient Condition  Indications for airway management: anesthesia  Sedation level: deep      Preoxygenated: yesPatient position: sniffing    Mask difficulty assessment: 1 - vent by mask    Final Airway Details    Final airway type: supraglottic airway      Successful airway: intubating  Size: 4     Number of attempts at approach: 1

## 2025-06-25 ENCOUNTER — HOSPITAL ENCOUNTER (OUTPATIENT)
Dept: MAMMOGRAPHY | Facility: HOSPITAL | Age: 45
Discharge: HOME | End: 2025-06-25
Attending: INTERNAL MEDICINE
Payer: COMMERCIAL

## 2025-06-25 DIAGNOSIS — N63.10 MASS OF RIGHT BREAST, UNSPECIFIED QUADRANT: ICD-10-CM

## 2025-06-25 PROCEDURE — 19499 UNLISTED PROCEDURE BREAST: CPT | Performed by: INTERNAL MEDICINE

## 2025-06-25 PROCEDURE — 88344 IMHCHEM/IMCYTCHM EA MLT ANTB: CPT | Performed by: INTERNAL MEDICINE

## 2025-06-25 PROCEDURE — 88305 TISSUE EXAM BY PATHOLOGIST: CPT | Performed by: INTERNAL MEDICINE

## 2025-06-25 NOTE — DISCHARGE INSTRUCTIONS
Discharge Instructions  Stereotactic / Ultrasound / MRI Core Biopsy     Place an ice pack on top of the biopsy site in your bra OR the folds of the Ace wrap for 10-15 minutes every hour until bedtime for your comfort and to decrease bleeding.     Keep your supportive bra OR the Ace wrap in place for 24 hours after your biopsy. This compression decreases bleeding and breast movement for your comfort. Wear a supportive bra for the next couple days for comfort (as well as for sleeping)     No bath or shower during the first 24 hours after biopsy. After this time, you may take a bath or shower. It’s okay if the steri-strips get wet but don’t soak them.   No saunas, hot tubs, or swimming pools until the steri-strips fall off (5-7days).  This prevents infection and allows time for the area to completely close and heal.     DO NOT remove the steri-strips. They will fall off in 5 days to two weeks. If any type of irritation (redness, itching, OR blisters) develops in the area around the steri-strips, remove them gently. Keep the area clean and dry.     It is normal to have mild discomfort and bruising at the biopsy site.      You may take Tylenol as needed for discomfort.     DO NOT participate in strenuous activity (aerobics, heavy lifting, housework, gardening, etc.) 48 hours after your biopsy to prevent bleeding.     You will receive results typically within 2-3 business days. Occasionally, the specimen is sent off-site for a 2nd opinion. You will be notified when this occurs as this will delay your results.     If you have any questions about the procedure or your results, please contact the Breast Care Coordinator Nurse at [phone]. (M-F 7:30-4)    If you are having a MRI Breast Biopsy or an Ultrasound guided biopsy, you will be billed for the biopsy and a unilateral mammogram separately.    A 6-month or one year follow-up Diagnostic Mammogram/Ultrasound will be recommended to document stability of the biopsy  site. It may be scheduled at [facility] or [facility] by calling [phone]. You will need an order for this exam from your referring physician.       5/2024

## 2025-06-26 ENCOUNTER — OFFICE VISIT (OUTPATIENT)
Dept: NEUROLOGY | Facility: CLINIC | Age: 45
End: 2025-06-26
Payer: COMMERCIAL

## 2025-06-26 VITALS
HEIGHT: 67.5 IN | WEIGHT: 293 LBS | OXYGEN SATURATION: 98 % | DIASTOLIC BLOOD PRESSURE: 66 MMHG | BODY MASS INDEX: 45.45 KG/M2 | SYSTOLIC BLOOD PRESSURE: 104 MMHG | RESPIRATION RATE: 16 BRPM | HEART RATE: 73 BPM

## 2025-06-26 DIAGNOSIS — M25.50 POLYARTHRALGIA: ICD-10-CM

## 2025-06-26 DIAGNOSIS — R76.8 ELEVATED RHEUMATOID FACTOR: ICD-10-CM

## 2025-06-26 DIAGNOSIS — G43.109 MIGRAINE WITH AURA AND WITHOUT STATUS MIGRAINOSUS, NOT INTRACTABLE: Primary | ICD-10-CM

## 2025-06-26 DIAGNOSIS — R70.0 ELEVATED SEDIMENTATION RATE: ICD-10-CM

## 2025-06-26 DIAGNOSIS — G43.909 EPISODIC MIGRAINE: ICD-10-CM

## 2025-06-26 PROCEDURE — 3074F SYST BP LT 130 MM HG: CPT | Performed by: OTHER

## 2025-06-26 PROCEDURE — 99213 OFFICE O/P EST LOW 20 MIN: CPT | Performed by: OTHER

## 2025-06-26 PROCEDURE — 3078F DIAST BP <80 MM HG: CPT | Performed by: OTHER

## 2025-06-26 PROCEDURE — 3008F BODY MASS INDEX DOCD: CPT | Performed by: OTHER

## 2025-06-26 NOTE — PROGRESS NOTES
[facility] Progress Note    Chief Complaint   Patient presents with    Migraine     F/u 4 month Migraines- Temporal artery biopsy caused mild migraine but other than that have not had any.     As per my initial H&P from 11/3/2022:  \"  Crys Carter is a 45 year old, who presents for evaluation of migraines and headaches.     Patient states she has headaches since age 15. She mainly notes headache on the right side and behind the eyes and may have visual aura and nausea associated with headaches.   She previously was having these every other week when she was younger.  She was started on Imitrex in her early 20s and did not start on preventive therapy until her 30s or 40s, after she had been having migraines 1-2 times per week.  She was on Topamax 50 mg bid and well controlled for 1-2 yrs only having migraine once every 2 months.  She had weight loss surgery 3/2022 and started to have worsening migraines after this time.  She was having once every 2-3 weeks but they became more frequent.  She was recommended to increase the dose of Topamax up to 100 mg bid but subsequently had brain fog and \"suicidal thoughts\" and is being tapered off by her PCP recently.  She is currently on 50 mg AM / 100 mg nightly.      She does have history of bipolar disorder and had been on lamotrigine as well as sertraline and Latuda when she was originally on Topamax but recently has been changed from Latuda to Abilify.      Currently, her mood is fluctuating is having intermittent suicidal thoughts but overall improved and is doing outpatient therapy program.       Currently, she is having at lest 8-10 migraines per month; these sometimes respond to sumatriptan when used but not always and overall estimates less than 50% effective at the 100 mg dose.     Otherwise, patient denies any recent weight change, fevers, chills, nausea, double vision/ blurry vision / loss of vision, chest pain, palpitations, shortness of  breath, rashes, joint pains, bowel / bladder incontinence or mood issues. \"       Prior notes as per 4/13/2023.  Patient overall since last visit had been doing better in terms of migraines and was not having migraines for ~ 3 months but in the past 3-4 weeks, she has a new type of headache.        She has been having some sharp pains behind left eye, and also having some cloudy vision in the left eye.  She denies pain when moving eyes from side to side but has noted her left more than right eye was tearing with these events; she denies sweating on one side of the face; notes improvement when sitting and may worsen when lying down; these may occur a few times in a day but are briefer than her usual migraines.  She has been on Quilipta and has been having to go to ER to treat these symptoms when she does not respond to sumatriptan.        Prior notes as per 1/4/2024.  Patient last seen 4/13/2023.  She has been lost to follow up.  According to records, she has been seen multiple times in the ER for headaches as well as fall from ladder. She has been to ER multiple times and recently was in [facility] ED with migraine, with some R sided numbness 11/14/2023.  She had workup for possible stroke with CTA brain and MRI brain with no suggestion of stroke or LVO.  She has also been having medications adjusted by psychiatry and following with therapist regularly.  She currently is having migraines, which cluster for a few days when they occur.      She states in 10/2023, she was having a lot of stress and adjustment to psychiatric medications and had more frequent migraines during this time.       She is currently doing well with only 2 migraines in the past month.  In terms of her prior other type of headaches with severe pain behind left eye and tearing of left eye, she did take indomethacin and noted improvement in ~2 weeks when she was on the 50 mg tid dosing.  She has not had recurrence of these events.        She has been  taking Quilipta 60 mg daily for prevention of migraines otherwise and doing well currently. She states Imitrex does not work well for abortive therapy and takes a \"cocktail\" of tramadol, hydroxyzine and Zofran.     Prior notes as per 5/16/2024.  Patient last seen 1/4/2024.  Since last visit, she remains on Qulipta 60 mg daily for prevention of migraines.  She has been taking Ubrelvy for abortive therapy as well and denies any side effects when she takes this medication.  She notes improvement in migraines with 1 dose within ~15 minutes and states this is working better than sumatriptan.  She has migraines on average 1-2 days per month.  She has had a few brief episodes of stabbing type pain but this is not as severe as in the past and denies associate tearing of the eyes and redness in the eyes; she has not had to use indomethacin and notes these are minor overall.       Prior notes as per 11/19/2024.  Patient last seen 5/16/2024.  She has been doing well in terms of Migraines on Qulipta 60 mg daily for prevention. She only has ~ 1 migraine per month, which responds well to Ubrelvy and denies side effects on the medication.     Of note, she has pain in the back of the neck and some balance issues and some radiation to shoulders and numbness in hands and has been seen by neurosurgery with plan for cervical spinal fusion C4/5/6,. She has been dropping objects from right more than left hand as well .        Prior notes as per 2/25/2025.  Patient last seen 11/19/2024.  In terms of migraines, she has been doing well on Qulipta 60 mg daily for prevention having no migraines since last visit; previously when, migraines occurred, they would respond well to Ubrelvy and denies any side effects on the medication.   Otherwise, she had NCS/EMG since last visit and has been seen by neurosurgery with cervical spinal fusion (ACDF C4-6) done 11/27/2024 and has been doing well since this time as well, initially having some mild  tension type headaches but these have improved.        Prior notes as per 4/9/2025.  Patient last seen 2/25/2025.  She was doing well with respect to her migraines and her headaches. However ~ 2 weeks ago, she noted pain in the left side of the temple with stabbing, electric pain along with sensitivity to touching her hair, which then progressed to vision changes in left eye with \"kaleidoscope pattern\"after several hours and then progressed to nausea. She went to urgent care and was given medication but eventually went to ER and was treated with magnesium and steroid and reglan, along with zofran and toradol. She had workup which showed mild elevation in sed rate at 45 and CRP at 1.20. She did admit she was on prednisone 20 mg for \"inflamed achilles\" prior to admission and she was given 40 mg dose at urgent care prior to blood work.     When she was in the ER, MRI was done which showed some abnormalities thought to be related to migraine vs subarachnoid hemorrhage; CTA brain done was normal.     She was told she may have temporal arteritis and started on prednisone 60 mg daily, which was completed last Friday. She did not have progression to vision loss and headaches have improved. However, last night, she had some blurry vision but no \"kaleidoscope pattern\" and she took Naproxen / Tylenol with improvement.     When the symptoms started initially, she did admit to having tearing of the eye and her nose was running on the left side as well.           Prior notes as per 5/13/2025.  Patient last seen 4/9/2025. She is having joint pains in ankles and hands and hips - she is not having migraine type headaches but is having daily mild headaches over the front of the head; mom had RA and maternal grandmother had RA. She has not had temporal artery biopsy yet but was seen by surgery service. She is not on steroids at this time. She did not use indomethacin since last visit.     Patient last seen 5/13/2025. She is doing well  in terms of migraine. She was laid off from her job 2025 and states since this time, she has not had severe headaches. She had temporal artery biopsy this past 2025 and noted after the procedure, she had migraine with aura, with left sided throbbing but his improved with Ubrelvy. She had labs done since last visit which showed elevated sed rate and elevated rheumatoid factor.     Past Medical History:    Allergic rhinitis    Anxiety    Arthritis    Asthma (HCC)    Back problem    Bipolar disorder (HCC)    Calculus of kidney    Cancer (HCC)    basal cell eyelid    Depression    Disorder of thyroid    Esophageal reflux    Hypothyroidism    Migraine    Migraines    Obesity, unspecified    PONV (postoperative nausea and vomiting)    S/P laparoscopic sleeve gastrectomy    Sleep apnea    history of, improved with weight loss surgery, no longer needs cpap    Stroke (HCC)    TIA, possible not completely diagonosed    Visual impairment    glasses     Past Surgical History:   Procedure Laterality Date    Abdominal surgery  2022    Gastric Sleeve    Other      benign tumor reomoved  from left inner thigh    2014    basal carcinoma removed from upper lid of left eye    Other surgical history  2022    Gastric sleeve    Other surgical history  2024    CERVICAL 4 - CERVICAL 5, CERVICAL 5 - CERVICAL 6 ANTERIOR CERVICAL DISCECTOMY AND FUSION WITH INSTRUMENTATION, ALLOGRAFT    Skin surgery  Oct 2014    tumor removed from eyebrow ridge    Tonsillectomy       Social History     Socioeconomic History    Marital status: Single   Tobacco Use    Smoking status: Former     Current packs/day: 0.00     Average packs/day: 1 pack/day for 23.9 years (23.9 ttl pk-yrs)     Types: Cigarettes     Start date: 1985     Quit date: 12/15/2008     Years since quittin.5     Passive exposure: Never    Smokeless tobacco: Never   Vaping Use    Vaping status: Never Used   Substance and Sexual Activity     Alcohol use: Not Currently     Comment: Very Rare    Drug use: Not Currently     Types: Cannabis    Sexual activity: Not Currently     Partners: Male     Birth control/protection: Condom   Other Topics Concern    Caffeine Concern Yes     Comment: occas    Stress Concern Yes    Weight Concern Yes    Special Diet Yes     Comment: Bariatric    Exercise Yes    Seat Belt Yes     Social Drivers of Health     Food Insecurity: No Food Insecurity (4/22/2025)    NCSS - Food Insecurity     Worried About Running Out of Food in the Last Year: No     Ran Out of Food in the Last Year: No   Transportation Needs: No Transportation Needs (4/22/2025)    NCSS - Transportation     Lack of Transportation: No   Housing Stability: Not At Risk (4/22/2025)    NCSS - Housing/Utilities     Has Housing: Yes     Worried About Losing Housing: No     Unable to Get Utilities: No     Family History   Problem Relation Age of Onset    Depression Father     Heart Disease Father     Asthma Father     Heart Attack Father     Migraines Father     Diabetes Father     Obesity Father     Diabetes Mother     Obesity Mother     Depression Mother     Alcohol and Other Disorders Associated Maternal Grandmother     Cancer Maternal Grandmother         Lung    Cancer Maternal Grandfather         Prostate CA    Prostate Cancer Maternal Grandfather     Hypertension Paternal Grandmother     Cancer Paternal Grandfather         Lung CA    Personality Disorder Paternal Grandfather     Breast Cancer Maternal Aunt 65        in her 60's    Substance Abuse Maternal Uncle     Stroke Neg        Allergies:  Allergies   Allergen Reactions    Bees HIVES    Clonazepam OTHER (SEE COMMENTS)     Suicidal thoughts  [place] out of sorts and has increased irritability as well as extremities feeling strange    Chocolate OTHER (SEE COMMENTS)     Allergy     Compazine [Prochlorperazine] OTHER (SEE COMMENTS)     Oral tablet-  Twitching;  Blanked out; nausea    Topiramate OTHER (SEE COMMENTS)      Suicidal,aggresive,brain fog    Tree Nuts OTHER (SEE COMMENTS)     Allergy testing     Coffee Bean Extract [Coffea Arabica] NAUSEA ONLY     Pt reports nausea and bloating after drinking coffee       Current Meds:  Current Outpatient Medications   Medication Sig Dispense Refill    PANTOPRAZOLE 20 MG Oral Tab EC TAKE 1 TABLET BEFORE BREAKFAST 90 tablet 3    QULIPTA 30 MG Oral Tab TAKE 1 TABLET DAILY 90 tablet 1    LEVOTHYROXINE 75 MCG Oral Tab TAKE 1 TABLET BEFORE BREAKFAST 90 tablet 3    ubrogepant (UBRELVY) 100 MG Oral Tab Take one tablet at onset of migraine.  May take additional tablet in 2 hours if needed.  Do not exceed two tablets per 24 hour period. 10 tablet 11    indomethacin 50 MG Oral Cap Take 1 capsule (50 mg total) by mouth 3 (three) times daily with meals. During flare up of severe headaches only 21 capsule 0    Naloxone HCl 4 MG/0.1ML Nasal Liquid 4 mg by Nasal route as needed. If patient remains unresponsive, repeat dose in other nostril 2-5 minutes after first dose. 1 kit 0    Ketorolac Tromethamine 10 MG Oral Tab Take 2 tablets (20 mg total) by mouth As Directed. For migraines: take 20mg once at the onset of the migraine. HOLD till cleared by neurosurgery clinic      ondansetron 4 MG Oral Tablet Dispersible Take 1 tablet (4 mg total) by mouth every 8 (eight) hours as needed for Nausea. 30 tablet 0    lurasidone 80 MG Oral Tab Take 1 tablet (80 mg total) by mouth at bedtime.      hydrOXYzine 25 MG Oral Tab Take 1 tablet (25 mg total) by mouth at bedtime.      LORazepam 0.5 MG Oral Tab Take 1 tablet (0.5 mg total) by mouth nightly as needed for Anxiety.      lamoTRIgine 100 MG Oral Tab Take 1 tablet (100 mg total) by mouth in the morning.      buPROPion  MG Oral Tablet 24 Hr Take 1 tablet (150 mg total) by mouth daily. 30 tablet 0    propranolol 20 MG Oral Tab Take 1 tablet (20 mg total) by mouth 2 (two) times daily. 60 tablet 0    naltrexone 50 MG Oral Tab Take 1 tablet (50 mg total) by  mouth daily. 30 tablet 0    sertraline 100 MG Oral Tab Take 1 tablet (100 mg total) by mouth daily. 30 tablet 0    traZODone 100 MG Oral Tab Take 1 tablet (100 mg total) by mouth nightly as needed for Sleep. 30 tablet 0    meclizine 25 MG Oral Tab Take 1 tablet (25 mg total) by mouth 3 (three) times daily as needed for Dizziness. 15 tablet 0    albuterol (PROAIR HFA) 108 (90 Base) MCG/ACT Inhalation Aero Soln SHAKE WELL AND INHALE 1 TO 2 PUFFS INTO THE LUNGS EVERY 4 HOURS AS NEEDED FOR WHEEZING( COUGH) 3 each 1    Multiple Vitamins-Minerals (MULTIVITAL OR) Take 1 tablet by mouth in the morning. Bariatric vitamin.            ROS:   A comprehensive 10 point review of systems was completed.  Pertinent positives and negatives noted in the the HPI.      PHYSICAL EXAM:   /66 (BP Location: Left arm, Patient Position: Sitting, Cuff Size: adult)   Pulse 73   Resp 16   Ht 67.5\"   Wt (!) 302 lb (137 kg)   LMP  (LMP Unknown)   SpO2 98%   BMI 46.60 kg/m²   Estimated body mass index is 46.6 kg/m² as calculated from the following:    Height as of this encounter: 67.5\".    Weight as of this encounter: 302 lb (137 kg).      Gen: well developed, well nourished, no acute distress  HEENT: normocephalic  Heart; normal S1/S2, regular rate and rhythm  Lungs: clear to auscultation bilaterally  Extremities: no edema, peripheral pulses intact    Neck: supple, full range of motion; no carotid bruits    Fundoscopic Exam: optic discs sharp bilaterally    Neuro:  Mental status:  Orientation: Alert and oriented to person, place, time  Speech Fluent and conversational    CN: PERRL, EOMI with no nystagmus, VFF, smile symmetric, sensation intact, tongue and palate midline, SCM intact, otherwise, CN 2-12 intact  Motor: 5/5 strength throughout, tone normal  DTR: brisker on left UE with +benavides's; 4+; otherwise, intact throughout, toes downgoing; no clonus  Sensory: intact to light touch throughout; pin intact today  Coord: FNF intact  with no tremor or dysmetria; rapid alternating movements intact bilaterally  Romberg: absent  Gait: casual gait, less stiff and able to move arm better today on R side; overall more fluid       TEST RESULTS/DATA REVIEWED:   Imaging  None new    Prior as noted below    MRI cervical spine w/o contrast (10/31/2024):     FINDINGS: Vertebral body height and alignment is maintained. Multilevel disc degeneration with a small amount of discogenic reactive marrow change. Subcentimeter focus of T2/STIR signal in the spinal cord just below the C5-6 level. Spinal cord is   otherwise normal in appearance. The cerebellum, elissa, skull base are unremarkable insofar as visualized. Paraspinous soft tissues unremarkable.     Occiput-C2:  No significant central canal stenosis.     C2-3: Disc degeneration characterized by disc desiccation. No significant posterior disc bulge and no focal disc herniation. No central canal stenosis or neural foraminal narrowing.     C3-4: Disc degeneration characterized by disc desiccation and a shallow posterior disc bulge. Mild left and no right neural foraminal narrowing. No central canal stenosis.     C4-5: Disc degeneration characterized by disc desiccation and a broad posterior disc bulge. Left-sided uncovertebral joint hypertrophy. Mild to moderate central canal stenosis. Mild to moderate left and no right neural foraminal narrowing.     C5-6: Disc degeneration characterized by disc desiccation and a broad posterior disc bulge. Severe central canal stenosis. Encroachment on the spinal cord with a small focus of compression induced edema/myelomalacia just below the C5-6 level. Mild to   moderate right neural foraminal narrowing. Moderate to severe narrowing at the proximal portion of the left neural foramen. The remainder of the foramen is patent.     C6-7: Disc degeneration characterized by disc desiccation and a shallow posterior disc bulge. No focal disc herniation. No central canal stenosis or  neural foraminal narrowing.     C7-T1: Disc degeneration characterized by minimal disc desiccation. No significant posterior disc bulge and no focal disc herniation. No central canal stenosis or neural foraminal narrowing.     IMPRESSION:   1.Multilevel disc degeneration. Vertebral body height and alignment is maintained.   2.C5-6: Severe central canal stenosis. Encroachment on the spinal cord with a small focus of compression induced edema/myelomalacia just below the C5-6 disc level. Moderate to severe narrowing of the proximal portion of the left neural foramen. Mild to   moderate right neural foraminal narrowing.   3.C4-5: Mild to moderate central canal stenosis. Mild to moderate left and no right neural foraminal narrowing.         Prior as noted below    MRI brain with and without contrast (4/10/2023):     FINDINGS: Several 2-3 mm T2/FLAIR hyperintense, non-enhancing signal abnormalities are identified in the frontal and parietal subcortical white matter bilaterally. The cerebellum and brainstem are normal in morphology and signal. Normal morphology of the    corpus callosum. No areas of restricted diffusion are identified. No evidence of cortical infarct, edema, hemorrhage, mass, abnormal enhancement, or abnormal extra-axial fluid collection. Ventricular volumes are normal. No midline shift. The major   intracranial arterial flow voids and the dural venous sinuses appear patent. Normal size of the pituitary gland. No Chiari malformation.     Minimal mucosal thickening in bilateral maxillary sinuses. The mastoid air cells are normally aerated. A 4F synovial cyst along the anteromedial margin of the right temporomandibular joint is of questionable clinical significance. No bone lesions are   identified.     IMPRESSION:   1. Several 2-3 mm T2/FLAIR hyperintense, non-enhancing signal abnormalities in the frontal and parietal subcortical white matter bilaterally may relate to migraines or minimal chronic small  vessel ischemic changes but are non-specific in appearance.     2. No evidence of acute infarct, cortical infarct, edema, hemorrhage, mass, or acute intracranial abnormality.     3. Minimal chronic-appearing maxillary sinus inflammatory changes.     Labs:   New    Component      Latest Ref Rng 5/13/2025   Expanded KORIN Antibody Screen, IGG      <0.7 ug/l 0.30    Anti-dsDNA antibody      <10 IU/mL 0.6    Connective Tissue Disease Screen Interpretation      Negative  Negative    RHEUMATOID FACTOR      <14.0 IU/mL 21.3 (H)    C-Citrullinated Peptide IgG AB      0.0 - 6.9 U/mL 1.0    SED RATE      0 - 20 mm/Hr 40 (H)    C-REACTIVE PROTEIN      <=0.50 mg/dL 1.00 (H)       Prior as noted below    Component      Latest Ref Rng 4/12/2025   SED RATE      0 - 20 mm/Hr 35 (H)    C-REACTIVE PROTEIN      <=0.50 mg/dL 1.70 (H)       Prior as noted below    Component      Latest Ref Rng 3/30/2025   WBC      4.0 - 11.0 x10(3) uL 14.3 (H)    RBC      3.80 - 5.30 x10(6)uL 4.72    Hemoglobin      12.0 - 16.0 g/dL 13.9    Hematocrit      35.0 - 48.0 % 41.2    Platelet Count      150.0 - 450.0 10(3)uL 272.0    MCV      80.0 - 100.0 fL 87.3    MCH      26.0 - 34.0 pg 29.4    MCHC      31.0 - 37.0 g/dL 33.7    RDW      % 13.2    Prelim Neutrophil Abs      1.50 - 7.70 x10 (3) uL 10.53 (H)    Neutrophils Absolute      1.50 - 7.70 x10(3) uL 10.53 (H)    Lymphocytes Absolute      1.00 - 4.00 x10(3) uL 2.85    Monocytes Absolute      0.10 - 1.00 x10(3) uL 0.83    Eosinophils Absolute      0.00 - 0.70 x10(3) uL 0.02    Basophils Absolute      0.00 - 0.20 x10(3) uL 0.03    Immature Granulocyte Absolute      0.00 - 1.00 x10(3) uL 0.07    Neutrophils %      % 73.5    Lymphocytes %      % 19.9    Monocytes %      % 5.8    Eosinophils %      % 0.1    Basophils %      % 0.2    Immature Granulocyte %      % 0.5    Glucose      70 - 99 mg/dL 96    Sodium      136 - 145 mmol/L 140    Potassium      3.5 - 5.1 mmol/L 3.9    Chloride      98 - 112 mmol/L  105    Carbon Dioxide, Total      21.0 - 32.0 mmol/L 26.0    ANION GAP      0 - 18 mmol/L 9    BUN      9 - 23 mg/dL 7 (L)    CREATININE      0.55 - 1.02 mg/dL 0.87    CALCIUM      8.7 - 10.6 mg/dL 9.6    CALCULATED OSMOLALITY      275 - 295 mOsm/kg 288    EGFR      >=60 mL/min/1.73m2 83    AST (SGOT)      <34 U/L 15    ALT (SGPT)      10 - 49 U/L 12    ALKALINE PHOSPHATASE      39 - 100 U/L 90    Total Bilirubin      0.3 - 1.2 mg/dL 0.3    PROTEIN, TOTAL      5.7 - 8.2 g/dL 7.4    Albumin      3.2 - 4.8 g/dL 4.4    Globulin      2.0 - 3.5 g/dL 3.0    A/G Ratio      1.0 - 2.0  1.5    SED RATE      0 - 20 mm/Hr 45 (H)    C-REACTIVE PROTEIN      <=0.50 mg/dL 1.20 (H)         Prior as noted below  Component      Latest Ref Rng & Units 4/16/2022   Vitamin B12      193 - 986 pg/mL 1,014 (H)   FOLATE (FOLIC ACID), SERUM      >=8.7 ng/mL 18.1   VITAMIN B1 (THIAMINE), WHOLE B      70 - 180 nmol/L 73   VITAMIN D, 25-OH, TOTAL      30.0 - 100.0 ng/mL 44.5    4/16/2022    1,014 (H)     SARS-CoV-2 by PCR (9./1/2022): detected      Other procedures    None new    Prior as noted below    NCS/EMG (11/22/2024):     Sensory NCS      Nerve / Sites Rec. Site Onset Lat Peak Lat NP Amp PP Amp Segments Distance Peak Diff Velocity Comment       ms ms µV µV   cm ms m/s     R Median - Dig II (Antidromic)      Wrist Index 2.71 3.59 65.2 111.3 Wrist - Index 14   52        Ref.   <=3.30 <=4.00 >=11.0 >=13.0 Ref.           R Ulnar - Dig V (Antidromic)      Wrist Dig V 2.19 3.07 48.2 86.6 Wrist - Dig V 11   50        Ref.   <=3.10 <=4.00 >=11.0 >=8.0 Ref.           R Radial - Superficial (Antidromic)      Forearm Wrist 1.61 2.14 16.8 32.8 Forearm - Wrist 10   62        Ref.   <=2.20 <=2.80 >=7.0 >=11.0 Ref.           R Median, Ulnar - Transcarpal comparison      Median Palm Wrist 1.56 2.08 82.4 74.4 Median Palm - Wrist 8   51        Ulnar Palm Wrist 1.46 2.08 23.2 28.9 Ulnar Palm - Wrist 8   55                 Median Palm - Ulnar Palm   0.00                    Ref.   <=0.40           Motor NCS      Nerve / Sites Muscle Latency Amplitude Segments Dist. Lat Diff Velocity Comments       ms mV   cm ms m/s     R Median - APB      Wrist APB 4.00 5.6 Wrist - APB 7            Ref.   <=4.40 >=4.2 Ref.              Elbow APB 8.98 4.8 Elbow - Wrist 25 4.98 50.2        Ref.       Ref.     >=51.0     R Ulnar - ADM      Wrist ADM 2.77 10.7 Wrist - ADM 7            Ref.   <=3.70 >=7.9 Ref.              B.Elbow ADM 7.17 9.8 B.Elbow - Wrist 23.5 4.40 53.5        Ref.       Ref.     >=52.0         F  Wave      Nerve M Latency F Latency     ms ms   R Median - APB 4.6 28.2   Ref.   <=28.5   R Ulnar - ADM 2.6 27.9   Ref.   <=30.2                   EMG Summary Table       Spontaneous MUAP Recruitment   Muscle IA Fib PSW Fasc H.F. Amp Dur. PPP Pattern   R. Deltoid N None None None None N N N N   R. Triceps brachii N None None None None 1+ 1+ N Reduced   R. Biceps brachii N None None None None N N N N   R. Extensor digitorum communis N None None None None N N N N   R. First dorsal interosseous N None None None None 1+ 1+ 1+ Reduced       Summary     Nerve conduction studies:  Right median sensory response was normal.  Right ulnar sensory response was normal.  Right radial sensory response was normal.  Right median to ulnar palmar mixed nerve comparison study was normal with no significant interlatency prolongation of median relative to ulnar nerve.  Right median motor response was normal; F-wave response was normal.  Right ulnar motor response was normal; F-wave response was normal.     EMG (needle exam):  Concentric needle EMG examination was performed in 5 muscles. It was normal in 3 muscle(s): R. Deltoid, R. Biceps brachii, R. Extensor digitorum communis. The study was abnormal in 2 muscle(s), with the following distribution:  No increased insertional or spontaneous activity was noted in any of the muscles tested  Motor unit potentials demonstrated increased amplitude and  duration, with reduced recruitment in the right triceps brachii muscle; in addition, motor unit potentials demonstrated increased amplitude and duration with a mild degree of polyphasia in the right FDI muscle.          Conclusion:   This is a mildly abnormal study.  There is evidence for subacute to chronic denervation changes in the right triceps brachii and right FDI muscle, which could suggest a subacute to chronic lower cervical radiculopathy.  There is, however, no evidence for a large fiber polyneuropathy, primary demyelinating neuropathy, myopathy, or median entrapment neuropathy across the right wrist as clinically questioned.    IMPRESSION AND PLAN:   Crys Carter is a 45 year old female with PMHx significant for bipolar disorder and chronic episodic migraine, who originally presented 11/3/2022, for evaluation and management of recent worsening migraines.    Neurologic exam is normal and nonfocal and patient was previously well controlled in terms of migraine, which occurred with and without aura, on preventive medication with Topamax as well as abortive therapy with sumatriptan 100 mg dose PRN.  However, she had weight loss surgery 3/2022 and had worsening migraines which did not respond to higher dose of Topamax and she developed significant side effects with cognitive changes and suicidal thoughts.  She was weaned off Topamax and had been doing better on Qulipta daily for prevention.     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        Previously, she was doing well with respect to her migraines and her headaches. However ~ late March 2025, she had new issues as she noted pain in the left side of the temple with stabbing, electric pain along with sensitivity to touching her hair, which then progressed to vision changes in left eye with \"kaleidoscope pattern\"after several hours and then progressed to nausea. She went to urgent care and was given medication but eventually went to ER and was treated with magnesium and steroid and reglan, along with zofran and toradol. She had workup which showed mild elevation in sed rate at 45 and CRP at 1.20. She did admit she was on prednisone 20 mg for \"inflamed achilles\" prior to admission and she was given 40 mg dose at urgent care prior to blood work.     When she was in the ER, MRI was done which showed some abnormalities thought to be related to migraine vs subarachnoid hemorrhage; CTA brain done was normal.     She was told she may have temporal arteritis and started on prednisone 60 mg daily, which was completed.  She did not have progression to vision loss and headaches have improved.      When the symptoms started initially, she did admit to having tearing of the eye and her nose was running on the left side as well.      Her symptoms of headache with left temporal pain could be temporal arteritis but visual disturbance and associated nasal congestion with symptoms of trigeminal neuropathy could suggest recurrence of paroxysmal hemicrania - she has not had any signs of vision loss to suggest progression of temporal arteritis and she has already been treated with steroids -ESR, CRP were still elevated and she is now off  steroids - she has been having polyarthralgias as well and has family history of RA -ESR, CRP, and RF elevated but normal VON and CCP- temporal artery biopsy done 6/23/2025 showed no gross evidence of temporal arteritis and will await final pathology but given +RF and polyarthralgia, recommend see rheumatology for giovanna;      should headaches recur, recommend trial indomethacin 50 mg tid to start.       Otherwise, continue Qulipta at current dose; for abortive therapy for migraines, she notes sumatriptan was not working and is not ideal due to risk of serotonin syndrome with concurrent psychiatric medications.     She is now on ubrogepantt (Ubrelvy) and doing well with this for abortive therapy:  continue to take 100 mg  within first 30 minutes of symptoms starting; if not improved after 2 hours, take additional dose, and then stop taking; discussed potential side effects, including but not limited to nausea, drowsiness and dry mouth    1. Migraine with aura and without status migrainosus, not intractable  As noted above     2. Episodic migraine  As noted above     3. Elevated sedimentation rate  As noted above     4. Polyarthralgia  As noted above     5. Elevated rheumatoid factor  As noted above     Return in about 3 months (around 9/26/2025).    Albert Galicia MD, Neurology  [facility]  Pager [phone]  6/26/2025

## 2025-06-26 NOTE — PATIENT INSTRUCTIONS
Refill policies:    Allow 2-3 business days for refills; controlled substances may take longer.  Contact your pharmacy at least 5 days prior to running out of medication and have them send an electronic request or submit request through the “request refill” option in your [employer] account.  Refills are not addressed on weekends; covering physicians do not authorize routine medications on weekends.  No narcotics or controlled substances are refilled after noon on Fridays or by on call physicians.  By law, narcotics must be electronically prescribed.  A 30 day supply with no refills is the maximum allowed.  If your prescription is due for a refill, you may be due for a follow up appointment.  To best provide you care, patients receiving routine medications need to be seen at least once a year.  Patients receiving narcotic/controlled substance medications need to be seen at least once every 3 months.  In the event that your preferred pharmacy does not have the requested medication in stock (e.g. Backordered), it is your responsibility to find another pharmacy that has the requested medication available.  We will gladly send a new prescription to that pharmacy at your request.    Scheduling Tests:    If your physician has ordered radiology tests such as MRI or CT scans, please contact Central Scheduling at [phone] right away to schedule the test.  Once scheduled, the [facility] Centralized Referral Team will work with your insurance carrier to obtain pre-certification or prior authorization.  Depending on your insurance carrier, approval may take 3-10 days.  It is highly recommended patients assure they have received an authorization before having a test performed.  If test is done without insurance authorization, patient may be responsible for the entire amount billed.      Precertification and Prior Authorizations:  If your physician has recommended that you have a procedure or additional testing performed the [facility]  Centralized Referral Team will contact your insurance carrier to obtain pre-certification or prior authorization.    You are strongly encouraged to contact your insurance carrier to verify that your procedure/test has been approved and is a COVERED benefit.  Although the Count includes the [facility] Centralized Referral Team does its due diligence, the insurance carrier gives the disclaimer that \"Although the procedure is authorized, this does not guarantee payment.\"    Ultimately the patient is responsible for payment.   Thank you for your understanding in this matter.  Paperwork Completion:  If you require FMLA or disability paperwork for your recovery, please make sure to either drop it off or have it faxed to our office at [phone]. Be sure the form has your name and date of birth on it.  The form will be faxed to our Forms Department and they will complete it for you.  There is a 25$ fee for all forms that need to be filled out.  Please be aware there is a 10-14 day turnaround time.  You will need to sign a release of information (RONNY) form if your paperwork does not come with one.  You may call the Forms Department with any questions at [phone].  Their fax number is [phone].

## 2025-06-27 ENCOUNTER — TELEPHONE (OUTPATIENT)
Dept: GENERAL RADIOLOGY | Facility: HOSPITAL | Age: 45
End: 2025-06-27

## 2025-06-27 ENCOUNTER — TELEPHONE (OUTPATIENT)
Dept: INTERNAL MEDICINE CLINIC | Facility: CLINIC | Age: 45
End: 2025-06-27

## 2025-06-27 DIAGNOSIS — R89.7 ABNORMAL BREAST BIOPSY: Primary | ICD-10-CM

## 2025-06-27 NOTE — IMAGING NOTE
5293: Spoke with NJ Marin in Dr. Barrios's office.  Pathology results for Crys Carter's right breast biopsy reported as follows:   Final Diagnosis:   Right breast distortion 8:00, tomosynthesis guided 9 gauge needle core biopsies:  -Radial scar changes with background fibrocystic changes and calcifications.  -Longest portion of radial scar nidus approximately 5 mm.  - Additional portions of corona demonstrate columnar cell alteration present, no atypia or malignancy in the submitted material.   See EMR for complete pathology report    Surgical referral requested.    NJ Nieto will discuss the above with Dr. Barrios and follow up with this nurse-phone number provided.    Per epic-referral placed to breast surgeon Dr. Amisha Mcdonough  5825- this nurse contacted NJ Marin in Dr. Barrios's office to discuss surgical referral. Tania confirmed Dr. Barrios's preferred surgeon-Dr. Mcdonough    Informed NJ Marin that this nurse would discuss above pathology results with Crys Carter and instruct Ms. Carter to make an appointment with Dr. Mcdonough

## 2025-06-27 NOTE — TELEPHONE ENCOUNTER
SV: Taina called from radiology department to relay message:   patient will need surgical referral for biopsy management of radial scar. Please see mammogram results please advise. Jose Her nurse coordinator.   Ph. [phone]

## 2025-06-27 NOTE — IMAGING NOTE
1609: Spoke with Crys Carter post tomosynthesis guided right breast biopsy.  Introduced myself as one of the breast nurse coordinators at [facility] and informed Ms. Carter of the purpose of my call.  Name and date of birth verified by patient.    Crys Saldanazak confirmed awareness of breast pathology results as below-MyChart notification    Pathology results and recommendations discussed as follows:   Final Diagnosis:   Right breast distortion 8:00, tomosynthesis guided 9 gauge needle core biopsies:  -Radial scar changes with background fibrocystic changes and calcifications.  -Longest portion of radial scar nidus approximately 5 mm.  - Additional portions of corona demonstrate columnar cell alteration present, no atypia or malignancy in the submitted material.   See EMR for complete pathology report    Dr. Barrios's office referring to Dr. Amisha Mcdonough-see epic for referral  Crys Carter instructed to make an appointment with Dr.Lapkus Dr. Mcdonough' office phone number provided.  Crys Carter reported that she has an appointment scheduled with Dr. Mcdonough on Wednesday, July 16    Crys Saldanazak verbalized understanding and agreement to the above.    Reinforced post biopsy care and instruction.  Crys Saldanazak denies any issues with biopsy site- bleeding, drainage, redness, tenderness.

## 2025-06-28 ENCOUNTER — LAB ENCOUNTER (OUTPATIENT)
Dept: LAB | Age: 45
End: 2025-06-28
Attending: INTERNAL MEDICINE
Payer: COMMERCIAL

## 2025-06-28 DIAGNOSIS — R94.6 ABNORMAL THYROID FUNCTION TEST: ICD-10-CM

## 2025-06-28 LAB
T3FREE SERPL-MCNC: 2.43 PG/ML (ref 2.4–4.2)
T4 FREE SERPL-MCNC: 1.2 NG/DL (ref 0.8–1.7)
TSI SER-ACNC: 1.35 UIU/ML (ref 0.55–4.78)

## 2025-06-28 PROCEDURE — 84481 FREE ASSAY (FT-3): CPT | Performed by: INTERNAL MEDICINE

## 2025-06-28 PROCEDURE — 84439 ASSAY OF FREE THYROXINE: CPT | Performed by: INTERNAL MEDICINE

## 2025-06-28 PROCEDURE — 84443 ASSAY THYROID STIM HORMONE: CPT | Performed by: INTERNAL MEDICINE

## 2025-07-01 ENCOUNTER — TELEPHONE (OUTPATIENT)
Facility: LOCATION | Age: 45
End: 2025-07-01

## 2025-07-01 NOTE — TELEPHONE ENCOUNTER
Patient called to advise that her incision has been hurting and she now feels a\" bump or knot\" to the side of it.    Please advise   # [phone]

## 2025-07-01 NOTE — TELEPHONE ENCOUNTER
LVM for patient advising to schedule a Post op visit with PA and additionally she may apply warm/cold compress to area for comfort.  Patient further advised to monitor for fever, chills, erythema and drainage and to contact the office if they develop.

## 2025-07-07 ENCOUNTER — APPOINTMENT (OUTPATIENT)
Dept: ADMINISTRATIVE | Facility: HOSPITAL | Age: 45
End: 2025-07-07
Payer: COMMERCIAL

## 2025-07-10 ENCOUNTER — OFFICE VISIT (OUTPATIENT)
Dept: SURGERY | Facility: CLINIC | Age: 45
End: 2025-07-10
Payer: COMMERCIAL

## 2025-07-10 ENCOUNTER — LAB ENCOUNTER (OUTPATIENT)
Dept: LAB | Age: 45
End: 2025-07-10
Attending: INTERNAL MEDICINE
Payer: COMMERCIAL

## 2025-07-10 VITALS
WEIGHT: 293 LBS | OXYGEN SATURATION: 99 % | HEIGHT: 67 IN | HEART RATE: 78 BPM | SYSTOLIC BLOOD PRESSURE: 120 MMHG | DIASTOLIC BLOOD PRESSURE: 82 MMHG | BODY MASS INDEX: 45.99 KG/M2

## 2025-07-10 DIAGNOSIS — G99.2 MYELOPATHY CONCURRENT WITH AND DUE TO SPINAL STENOSIS OF CERVICAL REGION (HCC): ICD-10-CM

## 2025-07-10 DIAGNOSIS — M48.02 MYELOPATHY CONCURRENT WITH AND DUE TO SPINAL STENOSIS OF CERVICAL REGION (HCC): ICD-10-CM

## 2025-07-10 DIAGNOSIS — R76.0 ELEVATED ANTIBODY LEVELS: Primary | ICD-10-CM

## 2025-07-10 DIAGNOSIS — Z98.1 S/P CERVICAL SPINAL FUSION: ICD-10-CM

## 2025-07-10 DIAGNOSIS — G95.89 MYELOMALACIA OF CERVICAL CORD (HCC): ICD-10-CM

## 2025-07-10 DIAGNOSIS — M54.12 CERVICAL RADICULOPATHY: Primary | ICD-10-CM

## 2025-07-10 LAB
C3 SERPL-MCNC: 180.7 MG/DL (ref 90–170)
C4 SERPL-MCNC: 38.6 MG/DL (ref 12–36)
CRP SERPL-MCNC: 1.1 MG/DL (ref ?–0.5)
ERYTHROCYTE [SEDIMENTATION RATE] IN BLOOD: 35 MM/HR (ref 0–20)

## 2025-07-10 PROCEDURE — 86038 ANTINUCLEAR ANTIBODIES: CPT

## 2025-07-10 PROCEDURE — 99212 OFFICE O/P EST SF 10 MIN: CPT | Performed by: NEUROLOGICAL SURGERY

## 2025-07-10 PROCEDURE — 36415 COLL VENOUS BLD VENIPUNCTURE: CPT

## 2025-07-10 PROCEDURE — 86235 NUCLEAR ANTIGEN ANTIBODY: CPT

## 2025-07-10 PROCEDURE — 86160 COMPLEMENT ANTIGEN: CPT

## 2025-07-10 PROCEDURE — 3079F DIAST BP 80-89 MM HG: CPT | Performed by: NEUROLOGICAL SURGERY

## 2025-07-10 PROCEDURE — 85652 RBC SED RATE AUTOMATED: CPT

## 2025-07-10 PROCEDURE — 3074F SYST BP LT 130 MM HG: CPT | Performed by: NEUROLOGICAL SURGERY

## 2025-07-10 PROCEDURE — 86140 C-REACTIVE PROTEIN: CPT

## 2025-07-10 PROCEDURE — 3008F BODY MASS INDEX DOCD: CPT | Performed by: NEUROLOGICAL SURGERY

## 2025-07-10 RX ORDER — GABAPENTIN 100 MG/1
CAPSULE ORAL
COMMUNITY
Start: 2025-07-09

## 2025-07-10 NOTE — PROGRESS NOTES
Established patient:  Reason for follow up:   7 month post op, sx 11/27/24  Pt is doing well, denies any new pain or symptoms    Numeric Rating Scale:   Pain at Present: 0/10       New imaging or testing since your last office visit:    XR cervical spine DOS 05/18/25

## 2025-07-10 NOTE — PROGRESS NOTES
Neurosurgery Clinic Visit  7/10/2025    Crys Carter PCP:  Viki Pinto MD    1980 MRN [id number]     HISTORY OF PRESENT ILLNESS:  Crys Carter is a(n) 45 year old female presents for follow-up status post ACDF back in November  She is 8 months out  She is doing really well  Denies any issues      PHYSICAL EXAMINATION:  Vital Signs:  /82   Pulse 78   Ht 67\"   Wt (!) 302 lb (137 kg)   LMP  (LMP Unknown)   SpO2 99%   BMI 47.30 kg/m²   Awake and alert, orient x 3  Follows commands x 4  Incision well-healed    REVIEW OF STUDIES:    X-rays look good      ASSESSMENT and PLAN:  45-year-old female status post two-level ACDF  She is doing great  Will see her back in November with repeat x-rays  That will be her 1 year visit  She is doing well  All questions were answered  Patient appreciative        Time spent on counseling/coordination of care:  10 minutes    Total time spent with patient:  10 minutes      Allen Dickson MD   [facility]  7/10/2025  1:43 PM   Dictated but not proofread

## 2025-07-10 NOTE — PATIENT INSTRUCTIONS
Refill policies:    Allow 2-3 business days for refills; controlled substances may take longer.  Contact your pharmacy at least 5 days prior to running out of medication and have them send an electronic request or submit request through the “request refill” option in your [employer] account.  Refills are not addressed on weekends; covering physicians do not authorize routine medications on weekends.  No narcotics or controlled substances are refilled after noon on Fridays or by on call physicians.  By law, narcotics must be electronically prescribed.  A 30 day supply with no refills is the maximum allowed.  If your prescription is due for a refill, you may be due for a follow up appointment.  To best provide you care, patients receiving routine medications need to be seen at least once a year.  Patients receiving narcotic/controlled substance medications need to be seen at least once every 3 months.  In the event that your preferred pharmacy does not have the requested medication in stock (e.g. Backordered), it is your responsibility to find another pharmacy that has the requested medication available.  We will gladly send a new prescription to that pharmacy at your request.    Scheduling Tests:    If your physician has ordered radiology tests such as MRI or CT scans, please contact Central Scheduling at [phone] right away to schedule the test.  Once scheduled, the [facility] Centralized Referral Team will work with your insurance carrier to obtain pre-certification or prior authorization.  Depending on your insurance carrier, approval may take 3-10 days.  It is highly recommended patients assure they have received an authorization before having a test performed.  If test is done without insurance authorization, patient may be responsible for the entire amount billed.      Precertification and Prior Authorizations:  If your physician has recommended that you have a procedure or additional testing performed the [facility]  Centralized Referral Team will contact your insurance carrier to obtain pre-certification or prior authorization.    You are strongly encouraged to contact your insurance carrier to verify that your procedure/test has been approved and is a COVERED benefit.  Although the [facility] Centralized Referral Team does its due diligence, the insurance carrier gives the disclaimer that \"Although the procedure is authorized, this does not guarantee payment.\"    Ultimately the patient is responsible for payment.   Thank you for your understanding in this matter.  Paperwork Completion:  If you require FMLA or disability paperwork for your recovery, please make sure to either drop it off or have it faxed to our office at [phone]. Be sure the form has your name and date of birth on it.  The form will be faxed to our Forms Department and they will complete it for you.  There is a 25$ fee for all forms that need to be filled out.  Please be aware there is a 10-14 day turnaround time.  You will need to sign a release of information (RONNY) form if your paperwork does not come with one.  You may call the Forms Department with any questions at [phone].  Their fax number is [phone].

## 2025-07-11 LAB — ENA SS-A IGG SER IA-ACNC: 0.6 U/ML (ref ?–7)

## 2025-07-15 LAB — NUCLEAR IGG TITR SER IF: NEGATIVE {TITER}

## 2025-07-16 ENCOUNTER — OFFICE VISIT (OUTPATIENT)
Facility: CLINIC | Age: 45
End: 2025-07-16
Payer: COMMERCIAL

## 2025-07-16 VITALS
WEIGHT: 293 LBS | DIASTOLIC BLOOD PRESSURE: 79 MMHG | SYSTOLIC BLOOD PRESSURE: 108 MMHG | BODY MASS INDEX: 48 KG/M2 | RESPIRATION RATE: 16 BRPM | OXYGEN SATURATION: 96 % | HEART RATE: 79 BPM

## 2025-07-16 DIAGNOSIS — N64.89 RADIAL SCAR OF BREAST: Primary | ICD-10-CM

## 2025-07-16 PROCEDURE — 99205 OFFICE O/P NEW HI 60 MIN: CPT | Performed by: SURGERY

## 2025-07-16 PROCEDURE — 3078F DIAST BP <80 MM HG: CPT | Performed by: SURGERY

## 2025-07-16 PROCEDURE — 3074F SYST BP LT 130 MM HG: CPT | Performed by: SURGERY

## 2025-07-16 NOTE — PATIENT INSTRUCTIONS
Dr. Amisha Mcdonough  Tel: [phone]  Fax: [phone] [place] [address]  [phone]     Surgery/Procedure: Right breast chace  localized excisional biopsy     Anesthesia:   MAC  Surgery Length:   45 min CPT:  55607   Wire LOC:   No   Nuc Med:   No   Chace Seed:  Yes     Dx & ICD-10: Radial scar of breast [N64.89]    Radiology Instructions: Localize: right breast, 8 o'clock, 13 cm from the nipple, Q clip, biopsy proven radial scar    _______________________________________________________________________________    Someone must accompany you the day of the procedure to drive you home safely, because of anesthesia.  You will need an adult  to stay with you the first night following your surgery.  You must remove any kind of makeup, acrylic nails, lotions, powders, creams or deodorant.  [facility] ONLY: Pre-admission will give instructions on when to take Gatorade and Tylenol/acetaminophen prior to your surgery, purchase 2 - 12oz bottles of regular Gatorade (NOT RED/SUGAR FREE). Otherwise, you may not eat or drink anything else after 11PM the night before surgery.    Wear comfortable clothing that can be easily removed.  If you wear dentures, contacts lenses, or any prosthesis, you will be asked to remove them.  Do not drink alcohol or smoke 24 hours prior to your procedure.  Bring a picture ID and your insurance card.  Covid-19 testing is no longer required before surgery unless you are experiencing symptoms such as fever, cough, congestion, etc.  The Pre-Admission Testing Department will call the day before to confirm your procedure, give you the time you need to arrive by and directions on where to go. They begin making calls after 2pm, if you are not contacted by 4pm, please call the surgeon's office listed above.  Do not take any blood thinners at least one week prior to the procedure/surgery. This includes aspirin, baby aspirin, Motrin, Ibuprofen, herbal  supplements, diet medications, vitamin E, fish oil and green tea supplements. Please check other supplements for these ingredients. *TYLENOL or acetaminophen is acceptable*  If you take Coumadin, Plavix, Xarelto, or Eliquis, please contact your prescribing physician for special instructions on how long to hold. If you take insulin contact your primary care physician for special instructions.  Our Surgery scheduler, Jeannette, will be contacting you to discuss surgery dates. If you have any questions related to scheduling your surgery, please reach out to her at [phone].  ___________________________________________  PRE-OPERATIVE TESTING IF INDICATED BELOW  Please Complete ASAP ( at least 14-21 days prior to surgery)  Please call Central Scheduling to schedule an appointment for pre-operative labs/tests @ [phone]  [x] CBC [x] CMP [x] Medical Clearance [x] EKG   __________________________________  Does the patient have a pacemaker or ICD? [] Yes [x] No  Does the patient have sleep apnea? [] Yes [x] No

## 2025-07-16 NOTE — CONSULTS
Breast Surgery New Patient Consultation    Crys Carter is a 45 year old patient, referred by Dr. Tirado, who presents for evaluation of right breast radial scar.    History of Present Illness:   Ms. Crys Carter is a 45 year old woman with a past history significant for asthma, bipolar disorder, basal cell cancer, hypothyroidism, and gastric sleeve surgery who presents for evaluation of right breast radial scar.     Screening mammogram on 2025 showed density a breast tissue and there was an asymmetry in the right breast.  Diagnostic views on 2025 showed a distortion at the 8 o'clock position, 13 cm from the nipple.  Biopsy was performed on 2025 and a Q clip was placed.  Pathology showed radial scar with background fibrocystic changes and calcifications.  There was no atypia or malignancy.  This was concordant with the imaging.    She denies any palpable masses, nipple discharge, skin changes, or axillary adenopathy. She does not have breast pain. She does not have significant past history for breast diseases or breast biopsy. She does have family history of breast cancer. Her maternal aunt had breast cancer in her 60s. A cousin on her maternal side had breast cancer in her 50s. The patient does not have a history of genetic testing.    At this time she is not working.          Past Medical History[1]    Past Surgical History[2]    Gynecological History:  Menarche at age 11 and LMP  (IUD)  Pt is a   Pt was n/a years old at time of first pregnancy.    She denies any cumulative breastfeeding history .  Age of Menopause: n/a  Type: n/a  She denies any history of hormone replacement therapy, substantial OCP use, or infertility treatment    Medications:    Current Medications[3]    Allergies:    Allergies[4]    Family History:   Family History[5]    She is not of Ashkenazi Anabaptist ancestry.    Social History:  History   Alcohol Use Not Currently     Comment: Very Rare       History    Smoking Status    Former    Types: Cigarettes   Smokeless Tobacco    Never       Review of Systems:    Review of Systems   Constitutional:  Negative for chills and fever.   HENT:  Negative for sore throat and trouble swallowing.    Eyes:  Negative for visual disturbance.   Respiratory:  Negative for cough, chest tightness and shortness of breath.    Cardiovascular:  Negative for chest pain, palpitations and leg swelling.   Gastrointestinal:  Negative for nausea, vomiting, abdominal pain, diarrhea, constipation and blood in stool.   Genitourinary:  Negative for dysuria, hematuria and difficulty urinating.   Skin:  Negative for rash.   Neurological:  Negative for numbness and headaches.      Otherwise as per HPI.    Physical Exam:    LMP  (LMP Unknown)     Physical Exam  Vitals reviewed.   Constitutional:       Appearance: Normal appearance.   HENT:      Head: Normocephalic and atraumatic.   Eyes:      Extraocular Movements: Extraocular movements intact.      Pupils: Pupils are equal, round, and reactive to light.   Cardiovascular:      Rate and Rhythm: Normal rate.   Pulmonary:      Effort: Pulmonary effort is normal.   Chest:   Breasts:     Right: Normal. No mass, nipple discharge, skin change or tenderness.      Left: Normal. No mass, nipple discharge, skin change or tenderness.          Comments: Bilateral lateral breast rash, patient states chronic from sweating  Abdominal:      General: Abdomen is flat.      Palpations: Abdomen is soft.   Musculoskeletal:         General: Normal range of motion.      Cervical back: Normal range of motion and neck supple.   Lymphadenopathy:      Upper Body:      Right upper body: No supraclavicular or axillary adenopathy.      Left upper body: No supraclavicular or axillary adenopathy.   Skin:     General: Skin is warm and dry.   Neurological:      General: No focal deficit present.      Mental Status: She is alert and oriented to person, place, and time.   Psychiatric:          Mood and Affect: Mood normal.          Bra size: XXXL     Labs/imaging: reviewed in EPIC    Impression:   Ms. Crys Carter is a 45 year old woman that presents for right breast radial scar    Discussion and Plan:  I had a discussion with the Patient regarding her breast exam. On exam today I found no evidence of disease besides the biopsy site. I personally reviewed her recent imaging and pathology and we discussed this.    Radial scar, also known as complex sclerosing lesion, is usually a benign,incidental finding on imaging.  Although usually benign, there is a 1-17% upgrade rate of radial scars on final pathology (newer data shows upgrade rates of 1-3%). In the past, most were surgically removed but now some guidelines state that imaging follow-up can be appropriate when the radial scar is small, diagnosed as an incidental finding, mostly removed with vacuum-assisted biopsy, and shows radiologic-pathologic concordance. The risk of subsequent breast cancer after excision is low and chemoprevention is not recommended. After discussion with the patient I do recommend excisional biopsy. Because the lesion is not palpable, I will require Liz-localization.     The risks, benefits, and alternatives were discussed including, but not limited to, seroma development, infection, and bleeding. We also discussed the possibility for having to return to the operating room on another day based on final pathology. We discussed this is an outpatient procedure, lifting restrictions, pain management, and return to work/daily activities.        Plan: Right breast liz localized excisional biopsy      Localize: right breast, 8:00, 13 cmFN, Q clip, biopsy proven radial scar        She was given ample opportunity for questions and those questions were answered to her satisfaction. She has been encouraged to contact the office with any questions or concerns. This encounter lasted a total of 55 minutes, more than 50% of which was  dedicated to the discussion of management options.     Amisha Mcdonough MD  Breast Surgical Oncology    CC: Dr. Tirado            [1]   Past Medical History:   Allergic rhinitis    Anxiety    Arthritis    Asthma (HCC)    Back problem    Bipolar disorder (HCC)    Calculus of kidney    Cancer (HCC)    basal cell eyelid    Depression    Disorder of thyroid    Esophageal reflux    Hypothyroidism    Migraine    Migraines    Obesity, unspecified    PONV (postoperative nausea and vomiting)    S/P laparoscopic sleeve gastrectomy    Sleep apnea    history of, improved with weight loss surgery, no longer needs cpap    Stroke (HCC)    TIA, possible not completely diagonosed    Visual impairment    glasses   [2]   Past Surgical History:  Procedure Laterality Date    Abdominal surgery  March 2022    Gastric Sleeve    Other  2009    benign tumor reomoved  from left inner thigh    Other  2014    basal carcinoma removed from upper lid of left eye    Other surgical history  03/07/2022    Gastric sleeve    Other surgical history  11/27/2024    CERVICAL 4 - CERVICAL 5, CERVICAL 5 - CERVICAL 6 ANTERIOR CERVICAL DISCECTOMY AND FUSION WITH INSTRUMENTATION, ALLOGRAFT    Other surgical history  06/2025    Temporal artery biopsy    Skin surgery  Oct 2014    tumor removed from eyebrow ridge    Tonsillectomy     [3]   No outpatient medications have been marked as taking for the 7/16/25 encounter (Appointment) with Amisha Mcdonough MD.   [4]   Allergies  Allergen Reactions    Bees HIVES    Clonazepam OTHER (SEE COMMENTS)     Suicidal thoughts  [place] out of sorts and has increased irritability as well as extremities feeling strange    Chocolate OTHER (SEE COMMENTS)     Allergy     Compazine [Prochlorperazine] OTHER (SEE COMMENTS)     Oral tablet-  Twitching;  Blanked out; nausea    Topiramate OTHER (SEE COMMENTS)     Suicidal,aggresive,brain fog    Tree Nuts OTHER (SEE COMMENTS)     Allergy testing     Coffee Bean Extract [Coffea Arabica] NAUSEA ONLY      Pt reports nausea and bloating after drinking coffee    [5]   Family History  Problem Relation Age of Onset    Depression Father     Heart Disease Father     Asthma Father     Heart Attack Father     Migraines Father     Diabetes Father     Obesity Father     Diabetes Mother     Obesity Mother     Depression Mother     Alcohol and Other Disorders Associated Maternal Grandmother     Cancer Maternal Grandmother         Lung    Cancer Maternal Grandfather         Prostate CA    Prostate Cancer Maternal Grandfather     Hypertension Paternal Grandmother     Cancer Paternal Grandfather         Lung CA    Personality Disorder Paternal Grandfather     Breast Cancer Maternal Aunt 65        in her 60's    Substance Abuse Maternal Uncle     Stroke Neg

## 2025-07-17 ENCOUNTER — TELEPHONE (OUTPATIENT)
Dept: SURGERY | Facility: CLINIC | Age: 45
End: 2025-07-17

## 2025-07-17 DIAGNOSIS — N64.89 RADIAL SCAR OF BREAST: Primary | ICD-10-CM

## 2025-07-17 NOTE — TELEPHONE ENCOUNTER
Calling pt in regards to scheduling surgery.  Informed pt that I have 08/28/2025 available at [facility] with Dr. Mcdonough.  Pt verbalized understanding and in agreement with date and location.  All questions answered.   Encouraged pt to call or Atmailt message office with any other questions or concerns.

## 2025-07-18 ENCOUNTER — ANESTHESIA (OUTPATIENT)
Dept: ENDOSCOPY | Facility: HOSPITAL | Age: 45
End: 2025-07-18
Payer: COMMERCIAL

## 2025-07-18 ENCOUNTER — HOSPITAL ENCOUNTER (OUTPATIENT)
Facility: HOSPITAL | Age: 45
Setting detail: HOSPITAL OUTPATIENT SURGERY
Discharge: HOME | End: 2025-07-18
Attending: INTERNAL MEDICINE | Admitting: INTERNAL MEDICINE
Payer: COMMERCIAL

## 2025-07-18 ENCOUNTER — ANESTHESIA EVENT (OUTPATIENT)
Dept: ENDOSCOPY | Facility: HOSPITAL | Age: 45
End: 2025-07-18
Payer: COMMERCIAL

## 2025-07-18 VITALS
HEIGHT: 67.5 IN | OXYGEN SATURATION: 99 % | HEART RATE: 65 BPM | DIASTOLIC BLOOD PRESSURE: 76 MMHG | RESPIRATION RATE: 16 BRPM | TEMPERATURE: 98 F | SYSTOLIC BLOOD PRESSURE: 114 MMHG | WEIGHT: 293 LBS | BODY MASS INDEX: 45.45 KG/M2

## 2025-07-18 DIAGNOSIS — Z12.11 COLON CANCER SCREENING: ICD-10-CM

## 2025-07-18 RX ORDER — ONDANSETRON 2 MG/ML
4 INJECTION INTRAMUSCULAR; INTRAVENOUS EVERY 6 HOURS PRN
Status: DISCONTINUED | OUTPATIENT
Start: 2025-07-18 | End: 2025-07-18

## 2025-07-18 RX ORDER — HYDROMORPHONE HYDROCHLORIDE 1 MG/ML
0.6 INJECTION, SOLUTION INTRAMUSCULAR; INTRAVENOUS; SUBCUTANEOUS EVERY 5 MIN PRN
Status: DISCONTINUED | OUTPATIENT
Start: 2025-07-18 | End: 2025-07-18

## 2025-07-18 RX ORDER — HYDROMORPHONE HYDROCHLORIDE 1 MG/ML
0.2 INJECTION, SOLUTION INTRAMUSCULAR; INTRAVENOUS; SUBCUTANEOUS EVERY 5 MIN PRN
Status: DISCONTINUED | OUTPATIENT
Start: 2025-07-18 | End: 2025-07-18

## 2025-07-18 RX ORDER — NALOXONE HYDROCHLORIDE 0.4 MG/ML
0.08 INJECTION, SOLUTION INTRAMUSCULAR; INTRAVENOUS; SUBCUTANEOUS AS NEEDED
Status: DISCONTINUED | OUTPATIENT
Start: 2025-07-18 | End: 2025-07-18

## 2025-07-18 RX ORDER — SODIUM CHLORIDE, SODIUM LACTATE, POTASSIUM CHLORIDE, CALCIUM CHLORIDE 600; 310; 30; 20 MG/100ML; MG/100ML; MG/100ML; MG/100ML
INJECTION, SOLUTION INTRAVENOUS CONTINUOUS
Status: DISCONTINUED | OUTPATIENT
Start: 2025-07-18 | End: 2025-07-18

## 2025-07-18 RX ORDER — PROCHLORPERAZINE EDISYLATE 5 MG/ML
5 INJECTION INTRAMUSCULAR; INTRAVENOUS EVERY 8 HOURS PRN
Status: DISCONTINUED | OUTPATIENT
Start: 2025-07-18 | End: 2025-07-18

## 2025-07-18 RX ORDER — HYDROMORPHONE HYDROCHLORIDE 1 MG/ML
0.4 INJECTION, SOLUTION INTRAMUSCULAR; INTRAVENOUS; SUBCUTANEOUS EVERY 5 MIN PRN
Status: DISCONTINUED | OUTPATIENT
Start: 2025-07-18 | End: 2025-07-18

## 2025-07-18 RX ORDER — LIDOCAINE HYDROCHLORIDE 10 MG/ML
INJECTION, SOLUTION EPIDURAL; INFILTRATION; INTRACAUDAL; PERINEURAL AS NEEDED
Status: DISCONTINUED | OUTPATIENT
Start: 2025-07-18 | End: 2025-07-18 | Stop reason: SURG

## 2025-07-18 RX ADMIN — SODIUM CHLORIDE, SODIUM LACTATE, POTASSIUM CHLORIDE, CALCIUM CHLORIDE: 600; 310; 30; 20 INJECTION, SOLUTION INTRAVENOUS at 08:35:00

## 2025-07-18 RX ADMIN — SODIUM CHLORIDE, SODIUM LACTATE, POTASSIUM CHLORIDE, CALCIUM CHLORIDE: 600; 310; 30; 20 INJECTION, SOLUTION INTRAVENOUS at 08:50:00

## 2025-07-18 RX ADMIN — LIDOCAINE HYDROCHLORIDE 25 MG: 10 INJECTION, SOLUTION EPIDURAL; INFILTRATION; INTRACAUDAL; PERINEURAL at 08:41:00

## 2025-07-18 NOTE — DISCHARGE INSTRUCTIONS
Home Care Instructions for Colonoscopy with Sedation    Diet:  - Resume your regular diet as tolerated unless otherwise instructed.  - Start with light meals to minimize bloating.  - Do not drink alcohol today.    Medication:  - If you have questions about resuming your normal medications, please contact your Primary Care Physician.    Activities:  - Take it easy today. Do not return to work today.  - Do not drive today.  - Do not operate any machinery today (including kitchen equipment).    Colonoscopy:  - You may notice some rectal \"spotting\" (a little blood on the toilet tissue) for a day or two after the exam. This is normal.  - If you experience any rectal bleeding (not spotting), persistent tenderness or sharp severe abdominal pains, oral temperature over 100 degrees Fahrenheit, light-headedness or dizziness, or any other problems, contact your doctor.    **If unable to reach your doctor, please go to the [facility] Emergency Room**    - Your referring physician will receive a full report of your examination.  - If you do not hear from your doctor's office within two weeks of your biopsy, please call them for your results.    You may be able to see your laboratory results in [employer] between 4 and 7 business days.  In some cases, your physician may not have viewed the results before they are released to [employer].  If you have questions regarding your results contact the physician who ordered the test/exam by phone or via [employer] by choosing \"Ask a Medical Question.\"

## 2025-07-18 NOTE — ANESTHESIA PREPROCEDURE EVALUATION
PRE-OP EVALUATION    Patient Name: Crys Carter    Admit Diagnosis: Colon cancer screening [Z12.11]    Pre-op Diagnosis: Colon cancer screening [Z12.11]    COLONOSCOPY    Anesthesia Procedure: COLONOSCOPY    Surgeons and Role:     * Mike Tian, DO - Primary    Pre-op vitals reviewed.        Body mass index is 46.29 kg/m².    Current medications reviewed.  Hospital Medications:  Current Medications[1]    Outpatient Medications:   Prescriptions Prior to Admission[2]    Allergies: Bees, Clonazepam, Chocolate, Compazine [prochlorperazine], Topiramate, Tree nuts, and Coffee bean extract [coffea arabica]      Anesthesia Evaluation    Patient summary reviewed.    Anesthetic Complications  (+) history of anesthetic complications  History of: PONV       GI/Hepatic/Renal      (+) GERD                           Cardiovascular                (+) obesity  (+) hypertension                                     Endo/Other                                  Pulmonary      (+) asthma              (+) sleep apnea       Neuro/Psych      (+) depression    (+) CVA       (+) neuromuscular disease                     Past Surgical History[3]  Social Hx on file[4]  History   Drug Use Unknown     Available pre-op labs reviewed.  Lab Results   Component Value Date    WBC 8.6 04/26/2025    RBC 4.57 04/26/2025    HGB 13.5 04/26/2025    HCT 41.5 04/26/2025    MCV 90.8 04/26/2025    MCH 29.5 04/26/2025    MCHC 32.5 04/26/2025    RDW 13.4 04/26/2025    .0 04/26/2025               Airway      Mallampati: III  Mouth opening: <3 FB  TM distance: < 4 cm  Neck ROM: limited Cardiovascular    Cardiovascular exam normal.  Rhythm: regular  Rate: normal     Dental             Pulmonary    Pulmonary exam normal.                 Other findings              ASA: 3   Plan: MAC  NPO status verified and patient meets guidelines.          Plan/risks discussed with: patient and significant other                Present on Admission:  **None**              [1]    [COMPLETED] heparin (Porcine) 5000 UNIT/ML injection 5,000 Units  5,000 Units Subcutaneous Once    [COMPLETED] ceFAZolin (Ancef) 3 g in sodium chloride 0.9% 100mL IVPB premix  3 g Intravenous Once   [2]   Medications Prior to Admission   Medication Sig Dispense Refill Last Dose/Taking    PANTOPRAZOLE 20 MG Oral Tab EC TAKE 1 TABLET BEFORE BREAKFAST 90 tablet 3 Taking    QULIPTA 30 MG Oral Tab TAKE 1 TABLET DAILY 90 tablet 1 Taking    LEVOTHYROXINE 75 MCG Oral Tab TAKE 1 TABLET BEFORE BREAKFAST 90 tablet 3 Taking    ubrogepant (UBRELVY) 100 MG Oral Tab Take one tablet at onset of migraine.  May take additional tablet in 2 hours if needed.  Do not exceed two tablets per 24 hour period. 10 tablet 11 Taking    Ketorolac Tromethamine 10 MG Oral Tab Take 2 tablets (20 mg total) by mouth As Directed. For migraines: take 20mg once at the onset of the migraine. HOLD till cleared by neurosurgery clinic   Taking    ondansetron 4 MG Oral Tablet Dispersible Take 1 tablet (4 mg total) by mouth every 8 (eight) hours as needed for Nausea. 30 tablet 0 Taking As Needed    lurasidone 80 MG Oral Tab Take 1 tablet (80 mg total) by mouth at bedtime.   Taking    hydrOXYzine 25 MG Oral Tab Take 1 tablet (25 mg total) by mouth at bedtime.   Taking    LORazepam 0.5 MG Oral Tab Take 1 tablet (0.5 mg total) by mouth nightly as needed for Anxiety.   Taking As Needed    lamoTRIgine 100 MG Oral Tab Take 1 tablet (100 mg total) by mouth in the morning.   Taking    buPROPion  MG Oral Tablet 24 Hr Take 1 tablet (150 mg total) by mouth daily. 30 tablet 0 Taking    propranolol 20 MG Oral Tab Take 1 tablet (20 mg total) by mouth 2 (two) times daily. 60 tablet 0 Taking    naltrexone 50 MG Oral Tab Take 1 tablet (50 mg total) by mouth daily. 30 tablet 0 Taking    sertraline 100 MG Oral Tab Take 1 tablet (100 mg total) by mouth daily. 30 tablet 0 Taking    traZODone 100 MG Oral Tab Take 1 tablet (100 mg total) by mouth nightly as needed  for Sleep. 30 tablet 0 Taking As Needed    meclizine 25 MG Oral Tab Take 1 tablet (25 mg total) by mouth 3 (three) times daily as needed for Dizziness. 15 tablet 0 Taking As Needed    albuterol (PROAIR HFA) 108 (90 Base) MCG/ACT Inhalation Aero Soln SHAKE WELL AND INHALE 1 TO 2 PUFFS INTO THE LUNGS EVERY 4 HOURS AS NEEDED FOR WHEEZING( COUGH) 3 each 1 Taking    Multiple Vitamins-Minerals (MULTIVITAL OR) Take 1 tablet by mouth in the morning. Bariatric vitamin.   Taking    gabapentin 100 MG Oral Cap        indomethacin 50 MG Oral Cap Take 1 capsule (50 mg total) by mouth 3 (three) times daily with meals. During flare up of severe headaches only 21 capsule 0     Naloxone HCl 4 MG/0.1ML Nasal Liquid 4 mg by Nasal route as needed. If patient remains unresponsive, repeat dose in other nostril 2-5 minutes after first dose. 1 kit 0    [3]   Past Surgical History:  Procedure Laterality Date    Abdominal surgery  2022    Gastric Sleeve    Other      benign tumor reomoved  from left inner thigh    Other      basal carcinoma removed from upper lid of left eye    Other surgical history  2022    Gastric sleeve    Other surgical history  2024    CERVICAL 4 - CERVICAL 5, CERVICAL 5 - CERVICAL 6 ANTERIOR CERVICAL DISCECTOMY AND FUSION WITH INSTRUMENTATION, ALLOGRAFT    Other surgical history  2025    Temporal artery biopsy    Skin surgery  Oct 2014    tumor removed from eyebrow ridge    Tonsillectomy     [4]   Social History  Socioeconomic History    Marital status: Single   Tobacco Use    Smoking status: Former     Current packs/day: 0.00     Average packs/day: 1 pack/day for 23.9 years (23.9 ttl pk-yrs)     Types: Cigarettes     Start date: 1985     Quit date: 12/15/2008     Years since quittin.6     Passive exposure: Never    Smokeless tobacco: Never   Vaping Use    Vaping status: Never Used   Substance and Sexual Activity    Alcohol use: Not Currently     Comment: Very Rare    Drug use:  Not Currently     Types: Cannabis    Sexual activity: Not Currently     Partners: Male     Birth control/protection: Condom   Other Topics Concern    Caffeine Concern Yes     Comment: occas    Stress Concern Yes    Weight Concern Yes    Special Diet Yes     Comment: Bariatric    Exercise Yes    Seat Belt Yes

## 2025-07-18 NOTE — H&P
History & Physical Examination    Patient Name: Crys Carter  MRN: [id number]  CSN: [id number]  YOB: 1980    Diagnosis: colorectal cancer screen    Present Illness: 46 y/o F history as above presents for Colonoscopy.     Prescriptions Prior to Admission[1]  Current Hospital Medications[2]    Allergies: Allergies[3]    Past Medical History[4]  Past Surgical History[5]  Family History[6]  Social History     Tobacco Use    Smoking status: Former     Current packs/day: 0.00     Average packs/day: 1 pack/day for 23.9 years (23.9 ttl pk-yrs)     Types: Cigarettes     Start date: 1985     Quit date: 12/15/2008     Years since quittin.6     Passive exposure: Never    Smokeless tobacco: Never   Substance Use Topics    Alcohol use: Not Currently     Comment: Very Rare       SYSTEM Check if Review is Normal Check if Physical Exam is Normal If not normal, please explain:   HEENT [ x] [x ]    NECK & BACK [ x] [ x]    HEART [ x] [x ]    LUNGS [ x] [ x]    ABDOMEN [ x] [x ]    UROGENITAL [ n/a] [ n/a]    EXTREMITIES [ x] [x ]    OTHER        [ x ] I have discussed the risks and benefits and alternatives with the patient/family.  They understand and agree to proceed with plan of care.  [ x ] I have reviewed the History and Physical done within the last 30 days.  Any changes noted above.    Mike Tian DO  2025  8:39 AM             [1]   Medications Prior to Admission   Medication Sig Dispense Refill Last Dose/Taking    PANTOPRAZOLE 20 MG Oral Tab EC TAKE 1 TABLET BEFORE BREAKFAST 90 tablet 3 2025    QULIPTA 30 MG Oral Tab TAKE 1 TABLET DAILY 90 tablet 1 Taking    LEVOTHYROXINE 75 MCG Oral Tab TAKE 1 TABLET BEFORE BREAKFAST 90 tablet 3 2025    ubrogepant (UBRELVY) 100 MG Oral Tab Take one tablet at onset of migraine.  May take additional tablet in 2 hours if needed.  Do not exceed two tablets per 24 hour period. 10 tablet 11 Taking    Ketorolac Tromethamine 10 MG Oral Tab Take 2  tablets (20 mg total) by mouth As Directed. For migraines: take 20mg once at the onset of the migraine. HOLD till cleared by neurosurgery clinic   Taking    ondansetron 4 MG Oral Tablet Dispersible Take 1 tablet (4 mg total) by mouth every 8 (eight) hours as needed for Nausea. 30 tablet 0 Taking As Needed    lurasidone 80 MG Oral Tab Take 1 tablet (80 mg total) by mouth at bedtime.   7/17/2025    hydrOXYzine 25 MG Oral Tab Take 1 tablet (25 mg total) by mouth at bedtime.   7/17/2025    LORazepam 0.5 MG Oral Tab Take 1 tablet (0.5 mg total) by mouth nightly as needed for Anxiety.   Taking As Needed    lamoTRIgine 100 MG Oral Tab Take 1 tablet (100 mg total) by mouth in the morning.   7/17/2025    buPROPion  MG Oral Tablet 24 Hr Take 1 tablet (150 mg total) by mouth daily. 30 tablet 0 7/17/2025    propranolol 20 MG Oral Tab Take 1 tablet (20 mg total) by mouth 2 (two) times daily. 60 tablet 0 7/17/2025    naltrexone 50 MG Oral Tab Take 1 tablet (50 mg total) by mouth daily. 30 tablet 0 Taking    sertraline 100 MG Oral Tab Take 1 tablet (100 mg total) by mouth daily. 30 tablet 0 7/17/2025    traZODone 100 MG Oral Tab Take 1 tablet (100 mg total) by mouth nightly as needed for Sleep. 30 tablet 0 Taking As Needed    meclizine 25 MG Oral Tab Take 1 tablet (25 mg total) by mouth 3 (three) times daily as needed for Dizziness. 15 tablet 0 Taking As Needed    albuterol (PROAIR HFA) 108 (90 Base) MCG/ACT Inhalation Aero Soln SHAKE WELL AND INHALE 1 TO 2 PUFFS INTO THE LUNGS EVERY 4 HOURS AS NEEDED FOR WHEEZING( COUGH) 3 each 1 Taking    Multiple Vitamins-Minerals (MULTIVITAL OR) Take 1 tablet by mouth in the morning. Bariatric vitamin.   Taking    gabapentin 100 MG Oral Cap        indomethacin 50 MG Oral Cap Take 1 capsule (50 mg total) by mouth 3 (three) times daily with meals. During flare up of severe headaches only 21 capsule 0     Naloxone HCl 4 MG/0.1ML Nasal Liquid 4 mg by Nasal route as needed. If patient remains  unresponsive, repeat dose in other nostril 2-5 minutes after first dose. 1 kit 0    [2]   Current Facility-Administered Medications   Medication Dose Route Frequency    lactated ringers infusion   Intravenous Continuous   [3]   Allergies  Allergen Reactions    Bees HIVES    Clonazepam OTHER (SEE COMMENTS)     Suicidal thoughts  [place] out of sorts and has increased irritability as well as extremities feeling strange    Chocolate OTHER (SEE COMMENTS)     Allergy     Compazine [Prochlorperazine] OTHER (SEE COMMENTS)     Oral tablet-  Twitching;  Blanked out; nausea    Topiramate OTHER (SEE COMMENTS)     Suicidal,aggresive,brain fog    Tree Nuts OTHER (SEE COMMENTS)     Allergy testing     Coffee Bean Extract [Coffea Arabica] NAUSEA ONLY     Pt reports nausea and bloating after drinking coffee    [4]   Past Medical History:   Allergic rhinitis    Anxiety    Arthritis    Asthma (HCC)    Back problem    Bipolar disorder (HCC)    Calculus of kidney    Cancer (HCC)    basal cell eyelid    Depression    Disorder of thyroid    Esophageal reflux    Hypothyroidism    Migraine    Migraines    Obesity, unspecified    PONV (postoperative nausea and vomiting)    S/P laparoscopic sleeve gastrectomy    Sleep apnea    history of, improved with weight loss surgery, no longer needs cpap    Stroke (HCC)    TIA, possible not completely diagonosed    Visual impairment    glasses   [5]   Past Surgical History:  Procedure Laterality Date    Abdominal surgery  March 2022    Gastric Sleeve    Other  2009    benign tumor reomoved  from left inner thigh    Other 2014    basal carcinoma removed from upper lid of left eye    Other surgical history  03/07/2022    Gastric sleeve    Other surgical history  11/27/2024    CERVICAL 4 - CERVICAL 5, CERVICAL 5 - CERVICAL 6 ANTERIOR CERVICAL DISCECTOMY AND FUSION WITH INSTRUMENTATION, ALLOGRAFT    Other surgical history  06/2025    Temporal artery biopsy    Skin surgery  Oct 2014    tumor removed from  eyebrow ridge    Tonsillectomy     [6]   Family History  Problem Relation Age of Onset    Depression Father     Heart Disease Father     Asthma Father     Heart Attack Father     Migraines Father     Diabetes Father     Obesity Father     Diabetes Mother     Obesity Mother     Depression Mother     Alcohol and Other Disorders Associated Maternal Grandmother     Cancer Maternal Grandmother         Lung    Cancer Maternal Grandfather         Prostate CA    Prostate Cancer Maternal Grandfather     Hypertension Paternal Grandmother     Cancer Paternal Grandfather         Lung CA    Personality Disorder Paternal Grandfather     Breast Cancer Maternal Aunt 65        in her 60's    Substance Abuse Maternal Uncle     Stroke Neg

## 2025-07-18 NOTE — ANESTHESIA POSTPROCEDURE EVALUATION
[facility]    Crys Carter Patient Status:  Hospital Outpatient Surgery   Age/Gender 45 year old female MRN [id number]   Location [facility] ENDOSCOPY [facility]IN [facility] Attending Mike Tian DO   Hosp Day # 0 PCP Viki Pinto MD       Anesthesia Post-op Note    COLONOSCOPY    Procedure Summary       Date: 07/18/25 Room / Location:  ENDOSCOPY 02 /  ENDOSCOPY    Anesthesia Start: 0841 Anesthesia Stop: 0852    Procedure: COLONOSCOPY Diagnosis:       Colon cancer screening      (Incomplete colonoscopy- poor prep)    Surgeons: Mike Tian DO Anesthesiologist: Taqueria Kwan MD    Anesthesia Type: MAC ASA Status: 3            Anesthesia Type: MAC    Vitals Value Taken Time   /50 07/18/25 08:52   Temp 98.3 °F (36.8 °C) 07/18/25 08:52   Pulse 48 07/18/25 08:52   Resp 16 07/18/25 08:52   SpO2 95 % 07/18/25 08:52   Vitals shown include unfiled device data.        Patient Location: Endoscopy    Anesthesia Type: MAC    Airway Patency: patent    Postop Pain Control: adequate    Mental Status: mildly sedated but able to meaningfully participate in the post-anesthesia evaluation    Nausea/Vomiting: none    Cardiopulmonary/Hydration status: stable euvolemic    Complications: no apparent anesthesia related complications    Postop vital signs: stable    Dental Exam: Unchanged from Preop    Patient to be discharged home when criteria met.

## 2025-07-18 NOTE — OPERATIVE REPORT
Crys Carter Patient Status:  Hospital Outpatient Surgery    1980 MRN [id number]   [facility] ENDOSCOPY PAIN CENTER Attending Mike Tian DO   Hosp Day # 0 PCP Viki Pinto MD       PREOPERATIVE DIAGNOSIS/INDICATION: Colorectal Cancer Screen  POSTOPERTATIVE DIAGNOSIS: See Impression  PROCEDURE PERFORMED: INCOMPLETE COLONOSCOPY  DATE: 25  SEDATION: MAC sedation provided by General Anesthesia    TIME OUT WAS PERFORMED    INFORMED CONSENT: I have discussed the risks, benefits, and alternatives to colonoscopy with the patient including but not limited to the risks of bleeding, infection, pain, as well as the risks of anesthesia and perforation all possibly leading to prolonged hospitalization, surgical intervention. I explained that 5-10 % of polyps may be missed even in the best of all circumstances. All questions were answered to the patient’s satisfaction. The patient elected to proceed with colonoscopy with intervention as indicated.  PROCEDURE DESCRIPTION: After careful digital rectal examination a  colonoscope was introduced into the patients rectum, advanced beyond the recto sigmoid junction, into the descending colon, splenic flexure, transverse colon, hepatic flexure, ascending colon. The patient tolerated the procedure well.  There were no immediate complications immediately following the procedure.  The patient was transferred to recovery in stable condition.  QUALITY OF PREPARATION: Parrish Bowel Preparation Scale:    Right colon 0, Transverse colon 1, Left colon 3   FINDINGS:  Copious adherent stool in the right colon.     IMPRESSION:   1. Inadequate prep.     RECOMMENDATIONS:   1. Discuss repeat colonoscopy with two day extended prep vs. Stool based screening.       BAYLEE Tian  Gastroenterology/Advanced Endoscopy  [facility]

## 2025-07-24 ENCOUNTER — OFFICE VISIT (OUTPATIENT)
Dept: INTERNAL MEDICINE CLINIC | Facility: CLINIC | Age: 45
End: 2025-07-24
Payer: COMMERCIAL

## 2025-07-24 VITALS
RESPIRATION RATE: 16 BRPM | BODY MASS INDEX: 46.53 KG/M2 | WEIGHT: 293 LBS | SYSTOLIC BLOOD PRESSURE: 96 MMHG | DIASTOLIC BLOOD PRESSURE: 66 MMHG | TEMPERATURE: 98 F | HEIGHT: 66.5 IN | HEART RATE: 71 BPM | OXYGEN SATURATION: 98 %

## 2025-07-24 DIAGNOSIS — Z01.818 PREOPERATIVE EXAMINATION: Primary | ICD-10-CM

## 2025-07-24 DIAGNOSIS — I48.19 PERSISTENT ATRIAL FIBRILLATION (HCC): ICD-10-CM

## 2025-07-24 DIAGNOSIS — J45.20 MILD INTERMITTENT ASTHMA WITHOUT COMPLICATION (HCC): ICD-10-CM

## 2025-07-24 DIAGNOSIS — E03.9 HYPOTHYROIDISM, UNSPECIFIED TYPE: ICD-10-CM

## 2025-07-24 DIAGNOSIS — I10 PRIMARY HYPERTENSION: ICD-10-CM

## 2025-07-24 DIAGNOSIS — E78.2 MIXED HYPERLIPIDEMIA: ICD-10-CM

## 2025-07-24 DIAGNOSIS — N64.89 RADIAL SCAR OF RIGHT BREAST: ICD-10-CM

## 2025-07-24 LAB
ATRIAL RATE: 61 BPM
Q-T INTERVAL: 422 MS
QRS DURATION: 84 MS
QTC CALCULATION (BEZET): 448 MS
R AXIS: 77 DEGREES
T AXIS: 28 DEGREES
VENTRICULAR RATE: 68 BPM

## 2025-07-24 PROCEDURE — 99214 OFFICE O/P EST MOD 30 MIN: CPT | Performed by: INTERNAL MEDICINE

## 2025-07-24 PROCEDURE — 93000 ELECTROCARDIOGRAM COMPLETE: CPT | Performed by: INTERNAL MEDICINE

## 2025-07-24 PROCEDURE — 3078F DIAST BP <80 MM HG: CPT | Performed by: INTERNAL MEDICINE

## 2025-07-24 PROCEDURE — 3008F BODY MASS INDEX DOCD: CPT | Performed by: INTERNAL MEDICINE

## 2025-07-24 PROCEDURE — 3074F SYST BP LT 130 MM HG: CPT | Performed by: INTERNAL MEDICINE

## 2025-07-24 RX ORDER — PROPRANOLOL HYDROCHLORIDE 10 MG/1
10 TABLET ORAL 2 TIMES DAILY
COMMUNITY
Start: 2025-07-12

## 2025-07-24 RX ORDER — LEVONORGESTREL 52 MG/1
1 INTRAUTERINE DEVICE INTRAUTERINE ONCE
COMMUNITY

## 2025-07-24 NOTE — PROGRESS NOTES
Crys Carter is a 45 year old female who presents for a pre-operative physical exam.   Crys Carter is scheduled for a right breast excisional biopsy procedure at [facility] on 7/24/25 performed by Dr. Mcdonough, who has requested that I provide pre-operative consultation before this procedure. Indication: Radial scar of right breast, Primary hypertension, Mixed hyperlipidemia, Mild intermittent asthma without complication (HCC), Hypothyroidism, unspecified type  HPI related to surgery:     She  has had previous anesthesia:  Yes.  Previous complications:  No  Patient has good functional status (>4 METS).  No active anginal symptoms, no history of arrhythmias, coronary artery disease, valvular disease.    Patient has radial scar in right breast.  Has been following with Breast Surgery.  Will now be having excisional biopsy.    Pertinent chronic medical issues:    Hypertension - at goal blood pressure.  Hyperlipidemia - lifestyle controlled.  Asthma - no recent exacerbations  Hypothyroidism - no new symptoms.     REVIEW OF SYSTEMS:   GENERAL/ const: no fevers/chills. feels well, no weight loss  EYES:no vision problems  HEENT: denies sinus pain or sinus tenderness  LUNGS: denies shortness of breath   CARDIOVASCULAR: denies chest pain  GI: denies nausea/emesis/ abdominal pain diarrhea constipation  : denies dysuria   MUSCULOSKELETAL: no acute arthralgias  NEURO: chronic headaches  PSYCHIATRIC: denies issues  ENDOCRINE: no hot/cold intolerance  ALLERGY: Allergies[1]  PAST HISTORY:     Medications - Current[2]  Medical:  has a past medical history of Allergic rhinitis (1988), Anxiety, Arthritis, Asthma (HCC), Back problem, Bipolar disorder (HCC), Calculus of kidney (05/2021), Cancer ([facility]) (2018), Depression, Disorder of thyroid, Esophageal reflux, Hypothyroidism (2021), Migraine, Migraines, Obesity, unspecified, PONV (postoperative nausea and vomiting), S/P laparoscopic sleeve gastrectomy (03/16/2022),  Sleep apnea, Stroke (HCC), and Visual impairment.  Surgical:  has a past surgical history that includes tonsillectomy; other (2009); other (2014); other surgical history (03/07/2022); skin surgery (Oct 2014); other surgical history (11/27/2024); Abdominal Surgery (March 2022); other surgical history (06/2025); and colonoscopy (N/A, 7/18/2025).  Family: family history includes Alcohol and Other Disorders Associated in her maternal grandmother; Asthma in her father; Breast Cancer (age of onset: 65) in her maternal aunt; Cancer in her maternal grandfather, maternal grandmother, and paternal grandfather; Depression in her father and mother; Diabetes in her father and mother; Heart Attack in her father; Heart Disease in her father; Hypertension in her paternal grandmother; Migraines in her father; Obesity in her father and mother; Personality Disorder in her paternal grandfather; Prostate Cancer in her maternal grandfather; Substance Abuse in her maternal uncle.  Social: Short Social Hx on File[3]    PHYSICAL EXAM:   BP 96/66 (BP Location: Left arm, Patient Position: Sitting, Cuff Size: large)   Pulse 71   Temp 97.5 °F (36.4 °C) (Temporal)   Resp 16   Ht 5' 6.5\" (1.689 m)   Wt (!) 302 lb 3.2 oz (137.1 kg)   LMP  (LMP Unknown)   SpO2 98%   Breastfeeding No   BMI 48.05 kg/m²    GENERAL: Alert and oriented, well developed, well nourished,in no apparent distress  SKIN: no rashes,no suspicious lesions  HEENT: atraumatic, PERRLA, EOMI, normal lid and conjunctiva  NECK: supple, no jvd, no thyromegaly  LUNGS: clear to auscultation bilaterally, no wheezing/rubs  CARDIO: irregularly irregular without murmurs.  No clubbing, cyanosis or edema.  GI: soft non tender nondistended no hepatosplenomegaly, bowel sounds throughout  NEURO: CN II-XII intact  MS: Full ROM  PSYCH: pleasant, appropriate mood and affect  LABORATORY DATA:   EKG: atrial fibrillation  Labs pending  ASSESSMENT AND PLAN:   1.  Pre-operative  examination  Patient presents for pre-operative examination at the request of performing surgeon.  Patient has good functional status (>4 METS).  No active anginal symptoms, no history of arrhythmias, coronary artery disease, valvular disease; however today on EKG she has new onset atrial fibrillation.  Regular rate.  RCRI score of 1,  Class I risk, 3.9% risk of major cardiac event.  She is considered an intermediate risk patient undergoing a low risk procedure; however, with her new onset atrial fibrillation, she will need to see Cardiology and receive cardiac clearance first before proceeding with surgery.  - EKG with interpretation and Report -IN OFFICE [[id number]]  - CBC With Differential With Platelet; Future  - Comp Metabolic Panel (14); Future    2. Radial scar of right breast  To have excisional surgery as above.    3. Primary hypertension  At goal blood pressure.  Continue to monitor.  - CBC With Differential With Platelet; Future  - Comp Metabolic Panel (14); Future    4. Mixed hyperlipidemia  Lifestyle controlled.  Lipids mildly elevated in April.    5. Mild intermittent asthma without complication (HCC)  No recent exacerbations.  Lungs clear to auscultation today.  Continue PRN albuterol inhaler.    6. Hypothyroidism, unspecified type  No new symptoms.  Normal TSH last month.  Continue levothyroxine 75 mcg qAM.    7. Persistent atrial fibrillation (HCC)  New diagnosis.  Irregularly irregular rhythm to auscultation today.  EKG done which shows atrial fibrillation.  Normal EKG's prior.  [id number] score of 2.  This puts her in borderline range to start anticoagulation.  However, she gets severe reaction/asthma attacks with aspirin.  Will have patient follow up with electrophysiology; will defer anticoagulation to EP recommendations.  Advised patient to go to emergency department with any palpitations/rapid heart beat, chest pains, or shortness of breath.    Patient Care Team:  Viki Molina MD as PCP -  General (Internal Medicine)  Radha Downing APRN (Nurse Practitioner)  Geraldine Orourke MD (Internal Medicine)  Otilia Gutierrez APRN as Obstetrician (Nurse Practitioner Family)  Dutch Donato MD as Psychiatrist/APN (Psychiatry)  Suly Carbajal LCPC as Clinical Therapist  Otilia Garcia PA-C (Physician Assistant)  Allen Dickson MD (NEUROSURGERY)  Albert Galicia MD (NEUROLOGY)  The patient indicates understanding of these issues and agrees to the plan.  The patient is asked to return to clinic as needed/scheduled/due with Dr. Tirado.  Marjorie Lugo MD          [1]   Allergies  Allergen Reactions    Bees HIVES    Clonazepam OTHER (SEE COMMENTS)     Suicidal thoughts  [place] out of sorts and has increased irritability as well as extremities feeling strange    Chocolate OTHER (SEE COMMENTS)     Allergy     Compazine [Prochlorperazine] OTHER (SEE COMMENTS)     Oral tablet-  Twitching;  Blanked out; nausea    Topiramate OTHER (SEE COMMENTS)     Suicidal,aggresive,brain fog    Tree Nuts OTHER (SEE COMMENTS)     Allergy testing     Coffee Bean Extract [Coffea Arabica] NAUSEA ONLY     Pt reports nausea and bloating after drinking coffee    [2]   Current Outpatient Medications:     propranolol 10 MG Oral Tab, Take 1 tablet (10 mg total) by mouth 2 (two) times daily., Disp: , Rfl:     Levonorgestrel (MIRENA, 52 MG,) 20 MCG/DAY Intrauterine IUD, 20 mcg (1 each total) by Intrauterine route one time., Disp: , Rfl:     gabapentin 100 MG Oral Cap, , Disp: , Rfl:     PANTOPRAZOLE 20 MG Oral Tab EC, TAKE 1 TABLET BEFORE BREAKFAST, Disp: 90 tablet, Rfl: 3    QULIPTA 30 MG Oral Tab, TAKE 1 TABLET DAILY, Disp: 90 tablet, Rfl: 1    LEVOTHYROXINE 75 MCG Oral Tab, TAKE 1 TABLET BEFORE BREAKFAST, Disp: 90 tablet, Rfl: 3    ubrogepant (UBRELVY) 100 MG Oral Tab, Take one tablet at onset of migraine.  May take additional tablet in 2 hours if needed.  Do not exceed two tablets per 24 hour period., Disp: 10 tablet, Rfl: 11     indomethacin 50 MG Oral Cap, Take 1 capsule (50 mg total) by mouth 3 (three) times daily with meals. During flare up of severe headaches only, Disp: 21 capsule, Rfl: 0    Naloxone HCl 4 MG/0.1ML Nasal Liquid, 4 mg by Nasal route as needed. If patient remains unresponsive, repeat dose in other nostril 2-5 minutes after first dose., Disp: 1 kit, Rfl: 0    Ketorolac Tromethamine 10 MG Oral Tab, Take 2 tablets (20 mg total) by mouth As Directed. For migraines: take 20mg once at the onset of the migraine. HOLD till cleared by neurosurgery clinic, Disp: , Rfl:     ondansetron 4 MG Oral Tablet Dispersible, Take 1 tablet (4 mg total) by mouth every 8 (eight) hours as needed for Nausea., Disp: 30 tablet, Rfl: 0    lurasidone 80 MG Oral Tab, Take 1 tablet (80 mg total) by mouth at bedtime., Disp: , Rfl:     hydrOXYzine 25 MG Oral Tab, Take 1 tablet (25 mg total) by mouth at bedtime., Disp: , Rfl:     LORazepam 0.5 MG Oral Tab, Take 1 tablet (0.5 mg total) by mouth nightly as needed for Anxiety., Disp: , Rfl:     lamoTRIgine 100 MG Oral Tab, Take 1 tablet (100 mg total) by mouth in the morning., Disp: , Rfl:     buPROPion  MG Oral Tablet 24 Hr, Take 1 tablet (150 mg total) by mouth daily., Disp: 30 tablet, Rfl: 0    naltrexone 50 MG Oral Tab, Take 1 tablet (50 mg total) by mouth daily., Disp: 30 tablet, Rfl: 0    sertraline 100 MG Oral Tab, Take 1 tablet (100 mg total) by mouth daily., Disp: 30 tablet, Rfl: 0    traZODone 100 MG Oral Tab, Take 1 tablet (100 mg total) by mouth nightly as needed for Sleep., Disp: 30 tablet, Rfl: 0    meclizine 25 MG Oral Tab, Take 1 tablet (25 mg total) by mouth 3 (three) times daily as needed for Dizziness., Disp: 15 tablet, Rfl: 0    albuterol (PROAIR HFA) 108 (90 Base) MCG/ACT Inhalation Aero Soln, SHAKE WELL AND INHALE 1 TO 2 PUFFS INTO THE LUNGS EVERY 4 HOURS AS NEEDED FOR WHEEZING( COUGH), Disp: 3 each, Rfl: 1    Multiple Vitamins-Minerals (MULTIVITAL OR), Take 1 tablet by mouth in  the morning. Bariatric vitamin., Disp: , Rfl:   [3]   Social History  Socioeconomic History    Marital status: Single   Tobacco Use    Smoking status: Former     Current packs/day: 0.00     Average packs/day: 1 pack/day for 23.9 years (23.9 ttl pk-yrs)     Types: Cigarettes     Start date: 1985     Quit date: 12/15/2008     Years since quittin.6     Passive exposure: Never    Smokeless tobacco: Never   Vaping Use    Vaping status: Never Used   Substance and Sexual Activity    Alcohol use: Not Currently     Comment: Very Rare    Drug use: Not Currently     Types: Cannabis    Sexual activity: Not Currently     Partners: Male     Birth control/protection: Condom   Other Topics Concern    Caffeine Concern Yes     Comment: 12 oz of Red Bull daily.    Stress Concern Yes    Weight Concern Yes    Special Diet Yes     Comment: Bariatric    Exercise No    Seat Belt Yes     Social Drivers of Health     Food Insecurity: No Food Insecurity (2025)    NCSS - Food Insecurity     Worried About Running Out of Food in the Last Year: No     Ran Out of Food in the Last Year: No   Transportation Needs: No Transportation Needs (2025)    NCSS - Transportation     Lack of Transportation: No   Housing Stability: Not At Risk (2025)    NCSS - Housing/Utilities     Has Housing: Yes     Worried About Losing Housing: No     Unable to Get Utilities: No

## 2025-07-24 NOTE — PATIENT INSTRUCTIONS
- You have an abnormal heart rhythm (atrial fibrillation) on your EKG.  I would like you to schedule with an electrophysiologist (heart rhythm specialist) to make sure you are ok for surgery:  [facility]  Jd Tian, jodie Thurman  [address]  [address]  [place] [address]  Phone [phone]    [facility]  Dr. Armenta   [address]  [address]  [place], [place] [id number] [phone]    - Get pre-op blood tests done today  - Hold multivitamin at least 1 week before surgery  - Go to the emergency department with any chest pain, shortness of breath, or palpitations (rapid heartbeat).    It was a pleasure seeing you in the clinic today.  Thank you for choosing the [facility] [place] office for your healthcare needs. Please call at [phone] with any questions or concerns.    Marjorie Lugo MD

## 2025-07-29 ENCOUNTER — LAB ENCOUNTER (OUTPATIENT)
Dept: LAB | Age: 45
End: 2025-07-29
Attending: INTERNAL MEDICINE
Payer: COMMERCIAL

## 2025-07-29 DIAGNOSIS — Z01.818 PREOPERATIVE EXAMINATION: ICD-10-CM

## 2025-07-29 DIAGNOSIS — I10 PRIMARY HYPERTENSION: ICD-10-CM

## 2025-07-29 LAB
ALBUMIN SERPL-MCNC: 4.3 G/DL (ref 3.2–4.8)
ALBUMIN/GLOB SERPL: 1.6 (ref 1–2)
ALP LIVER SERPL-CCNC: 86 U/L (ref 39–100)
ALT SERPL-CCNC: 11 U/L (ref 10–49)
ANION GAP SERPL CALC-SCNC: 7 MMOL/L (ref 0–18)
AST SERPL-CCNC: 14 U/L (ref ?–34)
BASOPHILS # BLD AUTO: 0.07 X10(3) UL (ref 0–0.2)
BASOPHILS NFR BLD AUTO: 0.7 %
BILIRUB SERPL-MCNC: 0.4 MG/DL (ref 0.3–1.2)
BUN BLD-MCNC: 8 MG/DL (ref 9–23)
CALCIUM BLD-MCNC: 9.3 MG/DL (ref 8.7–10.6)
CHLORIDE SERPL-SCNC: 105 MMOL/L (ref 98–112)
CO2 SERPL-SCNC: 28 MMOL/L (ref 21–32)
CREAT BLD-MCNC: 1.16 MG/DL (ref 0.55–1.02)
EGFRCR SERPLBLD CKD-EPI 2021: 59 ML/MIN/1.73M2 (ref 60–?)
EOSINOPHIL # BLD AUTO: 0.06 X10(3) UL (ref 0–0.7)
EOSINOPHIL NFR BLD AUTO: 0.6 %
ERYTHROCYTE [DISTWIDTH] IN BLOOD BY AUTOMATED COUNT: 13.2 %
FASTING STATUS PATIENT QL REPORTED: YES
GLOBULIN PLAS-MCNC: 2.7 G/DL (ref 2–3.5)
GLUCOSE BLD-MCNC: 92 MG/DL (ref 70–99)
HCT VFR BLD AUTO: 40.6 % (ref 35–48)
HGB BLD-MCNC: 13.6 G/DL (ref 12–16)
IMM GRANULOCYTES # BLD AUTO: 0.02 X10(3) UL (ref 0–1)
IMM GRANULOCYTES NFR BLD: 0.2 %
LYMPHOCYTES # BLD AUTO: 3.64 X10(3) UL (ref 1–4)
LYMPHOCYTES NFR BLD AUTO: 37.4 %
MCH RBC QN AUTO: 29.1 PG (ref 26–34)
MCHC RBC AUTO-ENTMCNC: 33.5 G/DL (ref 31–37)
MCV RBC AUTO: 86.9 FL (ref 80–100)
MONOCYTES # BLD AUTO: 0.5 X10(3) UL (ref 0.1–1)
MONOCYTES NFR BLD AUTO: 5.1 %
NEUTROPHILS # BLD AUTO: 5.44 X10 (3) UL (ref 1.5–7.7)
NEUTROPHILS # BLD AUTO: 5.44 X10(3) UL (ref 1.5–7.7)
NEUTROPHILS NFR BLD AUTO: 56 %
OSMOLALITY SERPL CALC.SUM OF ELEC: 288 MOSM/KG (ref 275–295)
PLATELET # BLD AUTO: 276 10(3)UL (ref 150–450)
POTASSIUM SERPL-SCNC: 4.1 MMOL/L (ref 3.5–5.1)
PROT SERPL-MCNC: 7 G/DL (ref 5.7–8.2)
RBC # BLD AUTO: 4.67 X10(6)UL (ref 3.8–5.3)
SODIUM SERPL-SCNC: 140 MMOL/L (ref 136–145)
WBC # BLD AUTO: 9.7 X10(3) UL (ref 4–11)

## 2025-07-29 PROCEDURE — 85025 COMPLETE CBC W/AUTO DIFF WBC: CPT | Performed by: INTERNAL MEDICINE

## 2025-07-29 PROCEDURE — 80053 COMPREHEN METABOLIC PANEL: CPT | Performed by: INTERNAL MEDICINE

## 2025-08-05 ENCOUNTER — LAB ENCOUNTER (OUTPATIENT)
Dept: LAB | Age: 45
End: 2025-08-05
Attending: INTERNAL MEDICINE

## 2025-08-05 DIAGNOSIS — M35.00 SICCA SYNDROME (HCC): Primary | ICD-10-CM

## 2025-08-05 PROCEDURE — 86235 NUCLEAR ANTIGEN ANTIBODY: CPT

## 2025-08-05 PROCEDURE — 82787 IGG 1 2 3 OR 4 EACH: CPT

## 2025-08-05 PROCEDURE — 36415 COLL VENOUS BLD VENIPUNCTURE: CPT

## 2025-08-05 PROCEDURE — 82164 ANGIOTENSIN I ENZYME TEST: CPT

## 2025-08-06 LAB
ACE: 33 U/L
ENA SS-A IGG SER IA-ACNC: 0.8 U/ML (ref ?–7)
IGG SUBCLASS 1: 470 MG/DL
IGG SUBCLASS 2: 599 MG/DL
IGG SUBCLASS 3: 54 MG/DL
IGG SUBCLASS 4: 32 MG/DL

## 2025-08-07 ENCOUNTER — HOSPITAL ENCOUNTER (EMERGENCY)
Facility: HOSPITAL | Age: 45
Discharge: HOME | End: 2025-08-08
Attending: STUDENT IN AN ORGANIZED HEALTH CARE EDUCATION/TRAINING PROGRAM

## 2025-08-07 ENCOUNTER — APPOINTMENT (OUTPATIENT)
Dept: GENERAL RADIOLOGY | Facility: HOSPITAL | Age: 45
End: 2025-08-07

## 2025-08-07 DIAGNOSIS — G43.909 MIGRAINE WITHOUT STATUS MIGRAINOSUS, NOT INTRACTABLE, UNSPECIFIED MIGRAINE TYPE: Primary | ICD-10-CM

## 2025-08-07 LAB
ALBUMIN SERPL-MCNC: 4.3 G/DL (ref 3.2–4.8)
ALBUMIN/GLOB SERPL: 1.4 (ref 1–2)
ALP LIVER SERPL-CCNC: 92 U/L (ref 39–100)
ALT SERPL-CCNC: 13 U/L (ref 10–49)
ANION GAP SERPL CALC-SCNC: 11 MMOL/L (ref 0–18)
AST SERPL-CCNC: 17 U/L (ref ?–34)
B-HCG UR QL: NEGATIVE
BASOPHILS # BLD AUTO: 0.07 X10(3) UL (ref 0–0.2)
BASOPHILS NFR BLD AUTO: 0.7 %
BILIRUB SERPL-MCNC: 0.2 MG/DL (ref 0.3–1.2)
BUN BLD-MCNC: 9 MG/DL (ref 9–23)
CALCIUM BLD-MCNC: 9.3 MG/DL (ref 8.7–10.6)
CHLORIDE SERPL-SCNC: 103 MMOL/L (ref 98–112)
CO2 SERPL-SCNC: 25 MMOL/L (ref 21–32)
CREAT BLD-MCNC: 1.21 MG/DL (ref 0.55–1.02)
EGFRCR SERPLBLD CKD-EPI 2021: 56 ML/MIN/1.73M2 (ref 60–?)
EOSINOPHIL # BLD AUTO: 0.07 X10(3) UL (ref 0–0.7)
EOSINOPHIL NFR BLD AUTO: 0.7 %
ERYTHROCYTE [DISTWIDTH] IN BLOOD BY AUTOMATED COUNT: 12.8 %
GLOBULIN PLAS-MCNC: 3 G/DL (ref 2–3.5)
GLUCOSE BLD-MCNC: 96 MG/DL (ref 70–99)
HCT VFR BLD AUTO: 40.3 % (ref 35–48)
HGB BLD-MCNC: 13.4 G/DL (ref 12–16)
IMM GRANULOCYTES # BLD AUTO: 0.03 X10(3) UL (ref 0–1)
IMM GRANULOCYTES NFR BLD: 0.3 %
LYMPHOCYTES # BLD AUTO: 3.61 X10(3) UL (ref 1–4)
LYMPHOCYTES NFR BLD AUTO: 34.4 %
MCH RBC QN AUTO: 29.6 PG (ref 26–34)
MCHC RBC AUTO-ENTMCNC: 33.3 G/DL (ref 31–37)
MCV RBC AUTO: 89 FL (ref 80–100)
MONOCYTES # BLD AUTO: 0.59 X10(3) UL (ref 0.1–1)
MONOCYTES NFR BLD AUTO: 5.6 %
NEUTROPHILS # BLD AUTO: 6.13 X10 (3) UL (ref 1.5–7.7)
NEUTROPHILS # BLD AUTO: 6.13 X10(3) UL (ref 1.5–7.7)
NEUTROPHILS NFR BLD AUTO: 58.3 %
OSMOLALITY SERPL CALC.SUM OF ELEC: 287 MOSM/KG (ref 275–295)
PLATELET # BLD AUTO: 267 10(3)UL (ref 150–450)
POTASSIUM SERPL-SCNC: 4.4 MMOL/L (ref 3.5–5.1)
PROT SERPL-MCNC: 7.3 G/DL (ref 5.7–8.2)
RBC # BLD AUTO: 4.53 X10(6)UL (ref 3.8–5.3)
SODIUM SERPL-SCNC: 139 MMOL/L (ref 136–145)
TROPONIN I SERPL HS-MCNC: <3 NG/L (ref ?–34)
WBC # BLD AUTO: 10.5 X10(3) UL (ref 4–11)

## 2025-08-07 PROCEDURE — 96375 TX/PRO/DX INJ NEW DRUG ADDON: CPT

## 2025-08-07 PROCEDURE — 80053 COMPREHEN METABOLIC PANEL: CPT | Performed by: STUDENT IN AN ORGANIZED HEALTH CARE EDUCATION/TRAINING PROGRAM

## 2025-08-07 PROCEDURE — 71045 X-RAY EXAM CHEST 1 VIEW: CPT

## 2025-08-07 PROCEDURE — 80053 COMPREHEN METABOLIC PANEL: CPT

## 2025-08-07 PROCEDURE — 99285 EMERGENCY DEPT VISIT HI MDM: CPT

## 2025-08-07 PROCEDURE — 84484 ASSAY OF TROPONIN QUANT: CPT

## 2025-08-07 PROCEDURE — 93005 ELECTROCARDIOGRAM TRACING: CPT

## 2025-08-07 PROCEDURE — 96374 THER/PROPH/DIAG INJ IV PUSH: CPT

## 2025-08-07 PROCEDURE — 93010 ELECTROCARDIOGRAM REPORT: CPT

## 2025-08-07 PROCEDURE — 85025 COMPLETE CBC W/AUTO DIFF WBC: CPT | Performed by: STUDENT IN AN ORGANIZED HEALTH CARE EDUCATION/TRAINING PROGRAM

## 2025-08-07 PROCEDURE — 84484 ASSAY OF TROPONIN QUANT: CPT | Performed by: STUDENT IN AN ORGANIZED HEALTH CARE EDUCATION/TRAINING PROGRAM

## 2025-08-07 PROCEDURE — 85025 COMPLETE CBC W/AUTO DIFF WBC: CPT

## 2025-08-07 PROCEDURE — 81025 URINE PREGNANCY TEST: CPT

## 2025-08-07 RX ORDER — METOCLOPRAMIDE HYDROCHLORIDE 5 MG/ML
10 INJECTION INTRAMUSCULAR; INTRAVENOUS ONCE
Status: COMPLETED | OUTPATIENT
Start: 2025-08-07 | End: 2025-08-08

## 2025-08-07 RX ORDER — DIPHENHYDRAMINE HYDROCHLORIDE 50 MG/ML
25 INJECTION, SOLUTION INTRAMUSCULAR; INTRAVENOUS ONCE
Status: COMPLETED | OUTPATIENT
Start: 2025-08-07 | End: 2025-08-08

## 2025-08-08 VITALS
OXYGEN SATURATION: 100 % | HEIGHT: 66 IN | BODY MASS INDEX: 47.09 KG/M2 | DIASTOLIC BLOOD PRESSURE: 76 MMHG | RESPIRATION RATE: 18 BRPM | WEIGHT: 293 LBS | SYSTOLIC BLOOD PRESSURE: 116 MMHG | HEART RATE: 63 BPM | TEMPERATURE: 97 F

## 2025-08-08 LAB
ATRIAL RATE: 70 BPM
P AXIS: 63 DEGREES
P-R INTERVAL: 190 MS
Q-T INTERVAL: 384 MS
QRS DURATION: 86 MS
QTC CALCULATION (BEZET): 414 MS
R AXIS: 72 DEGREES
T AXIS: 52 DEGREES
VENTRICULAR RATE: 70 BPM

## 2025-08-12 ENCOUNTER — HOSPITAL ENCOUNTER (OUTPATIENT)
Age: 45
Discharge: HOME | End: 2025-08-12
Attending: EMERGENCY MEDICINE

## 2025-08-12 VITALS
RESPIRATION RATE: 18 BRPM | OXYGEN SATURATION: 97 % | HEART RATE: 88 BPM | HEIGHT: 66 IN | DIASTOLIC BLOOD PRESSURE: 80 MMHG | TEMPERATURE: 98 F | SYSTOLIC BLOOD PRESSURE: 107 MMHG | BODY MASS INDEX: 47.09 KG/M2 | WEIGHT: 293 LBS

## 2025-08-12 DIAGNOSIS — G43.009 MIGRAINE WITHOUT AURA AND WITHOUT STATUS MIGRAINOSUS, NOT INTRACTABLE: Primary | ICD-10-CM

## 2025-08-12 PROCEDURE — 96374 THER/PROPH/DIAG INJ IV PUSH: CPT

## 2025-08-12 PROCEDURE — 96375 TX/PRO/DX INJ NEW DRUG ADDON: CPT

## 2025-08-12 PROCEDURE — 99213 OFFICE O/P EST LOW 20 MIN: CPT

## 2025-08-12 PROCEDURE — 99214 OFFICE O/P EST MOD 30 MIN: CPT

## 2025-08-12 RX ORDER — KETOROLAC TROMETHAMINE 30 MG/ML
30 INJECTION, SOLUTION INTRAMUSCULAR; INTRAVENOUS ONCE
Status: COMPLETED | OUTPATIENT
Start: 2025-08-12 | End: 2025-08-12

## 2025-08-12 RX ORDER — DIPHENHYDRAMINE HYDROCHLORIDE 50 MG/ML
25 INJECTION, SOLUTION INTRAMUSCULAR; INTRAVENOUS ONCE
Status: COMPLETED | OUTPATIENT
Start: 2025-08-12 | End: 2025-08-12

## 2025-08-12 RX ORDER — ONDANSETRON 2 MG/ML
4 INJECTION INTRAMUSCULAR; INTRAVENOUS ONCE
Status: COMPLETED | OUTPATIENT
Start: 2025-08-12 | End: 2025-08-12

## 2025-08-15 ENCOUNTER — APPOINTMENT (OUTPATIENT)
Dept: ADMINISTRATIVE | Facility: HOSPITAL | Age: 45
End: 2025-08-15

## 2025-08-19 ENCOUNTER — PATIENT MESSAGE (OUTPATIENT)
Facility: CLINIC | Age: 45
End: 2025-08-19

## 2025-08-25 ENCOUNTER — HOSPITAL ENCOUNTER (OUTPATIENT)
Dept: MAMMOGRAPHY | Facility: HOSPITAL | Age: 45
Discharge: HOME | End: 2025-08-25
Attending: SURGERY

## 2025-08-25 DIAGNOSIS — N64.89 RADIAL SCAR OF BREAST: ICD-10-CM

## 2025-08-25 PROCEDURE — 19281 PERQ DEVICE BREAST 1ST IMAG: CPT | Performed by: SURGERY

## 2025-08-28 ENCOUNTER — HOSPITAL ENCOUNTER (OUTPATIENT)
Dept: MAMMOGRAPHY | Facility: HOSPITAL | Age: 45
Setting detail: HOSPITAL OUTPATIENT SURGERY
Discharge: HOME | End: 2025-08-28
Attending: SURGERY | Admitting: SURGERY

## 2025-08-28 ENCOUNTER — ANESTHESIA EVENT (OUTPATIENT)
Dept: SURGERY | Facility: HOSPITAL | Age: 45
End: 2025-08-28

## 2025-08-28 ENCOUNTER — HOSPITAL ENCOUNTER (OUTPATIENT)
Facility: HOSPITAL | Age: 45
Setting detail: HOSPITAL OUTPATIENT SURGERY
Discharge: HOME | End: 2025-08-28
Attending: SURGERY | Admitting: SURGERY

## 2025-08-28 ENCOUNTER — ANESTHESIA (OUTPATIENT)
Dept: SURGERY | Facility: HOSPITAL | Age: 45
End: 2025-08-28

## 2025-08-28 VITALS
RESPIRATION RATE: 18 BRPM | WEIGHT: 293 LBS | DIASTOLIC BLOOD PRESSURE: 67 MMHG | BODY MASS INDEX: 47.09 KG/M2 | SYSTOLIC BLOOD PRESSURE: 95 MMHG | TEMPERATURE: 97 F | HEIGHT: 66 IN | OXYGEN SATURATION: 97 % | HEART RATE: 79 BPM

## 2025-08-28 DIAGNOSIS — N64.89 RADIAL SCAR OF BREAST: ICD-10-CM

## 2025-08-28 LAB — B-HCG UR QL: NEGATIVE

## 2025-08-28 PROCEDURE — 76098 X-RAY EXAM SURGICAL SPECIMEN: CPT | Performed by: SURGERY

## 2025-08-28 PROCEDURE — 88305 TISSUE EXAM BY PATHOLOGIST: CPT | Performed by: SURGERY

## 2025-08-28 PROCEDURE — 81025 URINE PREGNANCY TEST: CPT

## 2025-08-28 RX ORDER — HEPARIN SODIUM 5000 [USP'U]/ML
5000 INJECTION, SOLUTION INTRAVENOUS; SUBCUTANEOUS ONCE
Status: DISCONTINUED | OUTPATIENT
Start: 2025-08-28 | End: 2025-08-28

## 2025-08-28 RX ORDER — ONDANSETRON 2 MG/ML
4 INJECTION INTRAMUSCULAR; INTRAVENOUS EVERY 6 HOURS PRN
Status: DISCONTINUED | OUTPATIENT
Start: 2025-08-28 | End: 2025-08-28

## 2025-08-28 RX ORDER — BUPIVACAINE HYDROCHLORIDE 5 MG/ML
INJECTION, SOLUTION EPIDURAL; INTRACAUDAL; PERINEURAL AS NEEDED
Status: DISCONTINUED | OUTPATIENT
Start: 2025-08-28 | End: 2025-08-28 | Stop reason: HOSPADM

## 2025-08-28 RX ORDER — ONDANSETRON 2 MG/ML
INJECTION INTRAMUSCULAR; INTRAVENOUS AS NEEDED
Status: DISCONTINUED | OUTPATIENT
Start: 2025-08-28 | End: 2025-08-28 | Stop reason: SURG

## 2025-08-28 RX ORDER — LIDOCAINE HYDROCHLORIDE AND EPINEPHRINE 10; 10 MG/ML; UG/ML
INJECTION, SOLUTION INFILTRATION; PERINEURAL AS NEEDED
Status: DISCONTINUED | OUTPATIENT
Start: 2025-08-28 | End: 2025-08-28 | Stop reason: HOSPADM

## 2025-08-28 RX ORDER — ACETAMINOPHEN 500 MG
1000 TABLET ORAL EVERY 6 HOURS PRN
Qty: 50 TABLET | Refills: 0 | Status: SHIPPED | COMMUNITY
Start: 2025-08-28

## 2025-08-28 RX ORDER — SCOPOLAMINE 1 MG/3D
1 PATCH, EXTENDED RELEASE TRANSDERMAL ONCE
Status: DISCONTINUED | OUTPATIENT
Start: 2025-08-28 | End: 2025-08-28 | Stop reason: HOSPADM

## 2025-08-28 RX ORDER — DEXAMETHASONE SODIUM PHOSPHATE 4 MG/ML
VIAL (ML) INJECTION AS NEEDED
Status: DISCONTINUED | OUTPATIENT
Start: 2025-08-28 | End: 2025-08-28 | Stop reason: SURG

## 2025-08-28 RX ORDER — NALOXONE HYDROCHLORIDE 0.4 MG/ML
0.08 INJECTION, SOLUTION INTRAMUSCULAR; INTRAVENOUS; SUBCUTANEOUS AS NEEDED
Status: DISCONTINUED | OUTPATIENT
Start: 2025-08-28 | End: 2025-08-28

## 2025-08-28 RX ORDER — CEFAZOLIN SODIUM IN 0.9 % NACL 3 G/100 ML
3 INTRAVENOUS SOLUTION, PIGGYBACK (ML) INTRAVENOUS ONCE
Status: DISCONTINUED | OUTPATIENT
Start: 2025-08-28 | End: 2025-08-28 | Stop reason: HOSPADM

## 2025-08-28 RX ORDER — HYDROMORPHONE HYDROCHLORIDE 1 MG/ML
0.4 INJECTION, SOLUTION INTRAMUSCULAR; INTRAVENOUS; SUBCUTANEOUS EVERY 5 MIN PRN
Status: DISCONTINUED | OUTPATIENT
Start: 2025-08-28 | End: 2025-08-28

## 2025-08-28 RX ORDER — ONDANSETRON 2 MG/ML
INJECTION INTRAMUSCULAR; INTRAVENOUS
Status: COMPLETED
Start: 2025-08-28 | End: 2025-08-28

## 2025-08-28 RX ORDER — LIDOCAINE HYDROCHLORIDE 10 MG/ML
INJECTION, SOLUTION EPIDURAL; INFILTRATION; INTRACAUDAL; PERINEURAL AS NEEDED
Status: DISCONTINUED | OUTPATIENT
Start: 2025-08-28 | End: 2025-08-28 | Stop reason: SURG

## 2025-08-28 RX ORDER — EPHEDRINE SULFATE 50 MG/ML
INJECTION INTRAVENOUS AS NEEDED
Status: DISCONTINUED | OUTPATIENT
Start: 2025-08-28 | End: 2025-08-28 | Stop reason: SURG

## 2025-08-28 RX ORDER — LABETALOL HYDROCHLORIDE 5 MG/ML
5 INJECTION, SOLUTION INTRAVENOUS EVERY 5 MIN PRN
Status: DISCONTINUED | OUTPATIENT
Start: 2025-08-28 | End: 2025-08-28

## 2025-08-28 RX ORDER — HYDROMORPHONE HYDROCHLORIDE 1 MG/ML
0.2 INJECTION, SOLUTION INTRAMUSCULAR; INTRAVENOUS; SUBCUTANEOUS EVERY 5 MIN PRN
Status: DISCONTINUED | OUTPATIENT
Start: 2025-08-28 | End: 2025-08-28

## 2025-08-28 RX ORDER — ACETAMINOPHEN 500 MG
1000 TABLET ORAL ONCE
Status: DISCONTINUED | OUTPATIENT
Start: 2025-08-28 | End: 2025-08-28 | Stop reason: HOSPADM

## 2025-08-28 RX ORDER — CEFAZOLIN SODIUM IN 0.9 % NACL 3 G/100 ML
3 INTRAVENOUS SOLUTION, PIGGYBACK (ML) INTRAVENOUS ONCE
Status: DISCONTINUED | OUTPATIENT
Start: 2025-08-28 | End: 2025-08-28

## 2025-08-28 RX ORDER — SODIUM CHLORIDE, SODIUM LACTATE, POTASSIUM CHLORIDE, CALCIUM CHLORIDE 600; 310; 30; 20 MG/100ML; MG/100ML; MG/100ML; MG/100ML
INJECTION, SOLUTION INTRAVENOUS CONTINUOUS
Status: DISCONTINUED | OUTPATIENT
Start: 2025-08-28 | End: 2025-08-28

## 2025-08-28 RX ORDER — MIDAZOLAM HYDROCHLORIDE 1 MG/ML
INJECTION INTRAMUSCULAR; INTRAVENOUS AS NEEDED
Status: DISCONTINUED | OUTPATIENT
Start: 2025-08-28 | End: 2025-08-28 | Stop reason: SURG

## 2025-08-28 RX ORDER — PHENYLEPHRINE HCL 10 MG/ML
VIAL (ML) INJECTION AS NEEDED
Status: DISCONTINUED | OUTPATIENT
Start: 2025-08-28 | End: 2025-08-28 | Stop reason: SURG

## 2025-08-28 RX ORDER — KETOROLAC TROMETHAMINE 30 MG/ML
INJECTION, SOLUTION INTRAMUSCULAR; INTRAVENOUS AS NEEDED
Status: DISCONTINUED | OUTPATIENT
Start: 2025-08-28 | End: 2025-08-28 | Stop reason: SURG

## 2025-08-28 RX ORDER — CEFAZOLIN SODIUM 1 G/3ML
INJECTION, POWDER, FOR SOLUTION INTRAMUSCULAR; INTRAVENOUS AS NEEDED
Status: DISCONTINUED | OUTPATIENT
Start: 2025-08-28 | End: 2025-08-28 | Stop reason: SURG

## 2025-08-28 RX ORDER — IBUPROFEN 600 MG/1
600 TABLET, FILM COATED ORAL EVERY 6 HOURS PRN
Qty: 50 TABLET | Refills: 0 | Status: SHIPPED | COMMUNITY
Start: 2025-08-28

## 2025-08-28 RX ORDER — HYDROMORPHONE HYDROCHLORIDE 1 MG/ML
0.6 INJECTION, SOLUTION INTRAMUSCULAR; INTRAVENOUS; SUBCUTANEOUS EVERY 5 MIN PRN
Status: DISCONTINUED | OUTPATIENT
Start: 2025-08-28 | End: 2025-08-28

## 2025-08-28 RX ADMIN — PHENYLEPHRINE HCL 50 MCG: 10 MG/ML VIAL (ML) INJECTION at 11:55:00

## 2025-08-28 RX ADMIN — SODIUM CHLORIDE, SODIUM LACTATE, POTASSIUM CHLORIDE, CALCIUM CHLORIDE: 600; 310; 30; 20 INJECTION, SOLUTION INTRAVENOUS at 11:30:00

## 2025-08-28 RX ADMIN — KETOROLAC TROMETHAMINE 30 MG: 30 INJECTION, SOLUTION INTRAMUSCULAR; INTRAVENOUS at 12:13:00

## 2025-08-28 RX ADMIN — EPHEDRINE SULFATE 5 MG: 50 INJECTION INTRAVENOUS at 12:01:00

## 2025-08-28 RX ADMIN — CEFAZOLIN SODIUM 3 G: 1 INJECTION, POWDER, FOR SOLUTION INTRAMUSCULAR; INTRAVENOUS at 11:45:00

## 2025-08-28 RX ADMIN — PHENYLEPHRINE HCL 100 MCG: 10 MG/ML VIAL (ML) INJECTION at 11:49:00

## 2025-08-28 RX ADMIN — LIDOCAINE HYDROCHLORIDE 50 MG: 10 INJECTION, SOLUTION EPIDURAL; INFILTRATION; INTRACAUDAL; PERINEURAL at 11:34:00

## 2025-08-28 RX ADMIN — ONDANSETRON 4 MG: 2 INJECTION INTRAMUSCULAR; INTRAVENOUS at 12:13:00

## 2025-08-28 RX ADMIN — EPHEDRINE SULFATE 5 MG: 50 INJECTION INTRAVENOUS at 12:04:00

## 2025-08-28 RX ADMIN — DEXAMETHASONE SODIUM PHOSPHATE 4 MG: 4 MG/ML VIAL (ML) INJECTION at 11:35:00

## 2025-08-28 RX ADMIN — MIDAZOLAM HYDROCHLORIDE 1 MG: 1 INJECTION INTRAMUSCULAR; INTRAVENOUS at 11:33:00

## (undated) DEVICE — TROCAR: Brand: KII FIOS FIRST ENTRY

## (undated) DEVICE — VIOLET BRAIDED (POLYGLACTIN 910), SYNTHETIC ABSORBABLE SUTURE: Brand: COATED VICRYL

## (undated) DEVICE — KIT MFLD FOR SPEC COLL

## (undated) DEVICE — SOLUTION IRRIG 1000ML 0.9% NACL USP BTL

## (undated) DEVICE — ELECTRODE MONIT RED DOT 3PK

## (undated) DEVICE — ANTIBACTERIAL UNDYED BRAIDED (POLYGLACTIN 910), SYNTHETIC ABSORBABLE SUTURE: Brand: COATED VICRYL

## (undated) DEVICE — #15 STERILE STAINLESS BLADE: Brand: STERILE STAINLESS BLADES

## (undated) DEVICE — HOVERMATT 34IN SINGLE USE

## (undated) DEVICE — MARKER SKIN PREP-RESISTANT ST: Brand: MEDLINE INDUSTRIES, INC.

## (undated) DEVICE — LAMINECTOMY CDS: Brand: MEDLINE INDUSTRIES, INC.

## (undated) DEVICE — SYRINGE MED 10ML LL TIP W/O SFTY DISP

## (undated) DEVICE — Device

## (undated) DEVICE — SUT MCRYL 5-0 18IN P-3 ABSRB UD 13MM 3/8 CIR

## (undated) DEVICE — ADHESIVE SKIN TOP FOR WND CLSR DERMBND ADV

## (undated) DEVICE — ENDOPATH ECHELON ENDOSCOPIC LINEAR CUTTER RELOADS, GREEN, 60MM: Brand: ECHELON ENDOPATH

## (undated) DEVICE — SUT COAT VCRL+ 3-0 18IN RB-1 ABSRB VLT ANTIBA

## (undated) DEVICE — Device: Brand: JELCO

## (undated) DEVICE — MARKER SKIN 2 TIP

## (undated) DEVICE — C-ARM: Brand: UNBRANDED

## (undated) DEVICE — DRAPE,LAPAROTOMY,PCH,STERILE: Brand: MEDLINE

## (undated) DEVICE — GLOVE,SURG,SENSICARE SLT,LF,PF,8: Brand: MEDLINE

## (undated) DEVICE — SUT PERMA- 2-0 30IN NABSRB BLK TIE SILK

## (undated) DEVICE — EXOFIN PRECISION PEN HIGH VISCOSITY TOPICAL SKIN ADHESIVE: Brand: EXOFIN PRECISION PEN, 1G

## (undated) DEVICE — COVER,TABLE,44X90,STERILE: Brand: MEDLINE

## (undated) DEVICE — PACK CDS BREAST-HERNIA-PORT

## (undated) DEVICE — CATHETER IV 14GA L5.25IN ANGIOCATH STR FEP

## (undated) DEVICE — SOLUTION RUBBING 4OZ 70% ISO ALC CLR

## (undated) DEVICE — ECHELON FLEX 60 ARTICULATING ENDOSCOPIC LINEAR CUTTER (NO CARTRIDGE): Brand: ECHELON FLEX ENDOPATH

## (undated) DEVICE — TZ MEDICAL TITANIUM DISTRACTION PINS 14MM: Brand: TZ MEDICAL TITANIUM DISTRACTION PINS

## (undated) DEVICE — GLOVE SUR 6.5 SENSICARE PI PIP CRM PWD F

## (undated) DEVICE — UNDYED BRAIDED (POLYGLACTIN 910), SYNTHETIC ABSORBABLE SUTURE: Brand: COATED VICRYL

## (undated) DEVICE — Device: Brand: INTELLICART™

## (undated) DEVICE — ELECTRODE ES 2.75IN PTFE BLDE MOD E-Z CLN

## (undated) DEVICE — PAD,NON-ADHERENT,3X8,STERILE,LF,1/PK: Brand: CURAD

## (undated) DEVICE — TISSUE RETRIEVAL SYSTEM: Brand: INZII RETRIEVAL SYSTEM

## (undated) DEVICE — SUT PERMA- 3-0 30IN SH NABSRB BLK 26MM 1/2

## (undated) DEVICE — ECHELON FLEX POWERED PLUS LONG ARTICULATING ENDOSCOPIC LINEAR CUTTER, 60MM: Brand: ECHELON FLEX

## (undated) DEVICE — TROCAR: Brand: KII® SLEEVE

## (undated) DEVICE — ELECTRODE ES L10.2CM BLDE L4IN EXT MPLR OPN

## (undated) DEVICE — ABSORBABLE HEMOSTAT (OXIDIZED REGENERATED CELLULOSE, U.S.P.): Brand: SURGICEL

## (undated) DEVICE — CONTAINER,SPECIMEN,PNEU TUBE,4OZ,OR STRL: Brand: MEDLINE

## (undated) DEVICE — SUTURE PDS II 1 CT-1

## (undated) DEVICE — BANDAGE,GAUZE,BULKEE II,4.5"X4.1YD,STRL: Brand: MEDLINE

## (undated) DEVICE — LAP CHOLE: Brand: MEDLINE INDUSTRIES, INC.

## (undated) DEVICE — TZ MEDICAL TITANIUM DISTRACTION PINS 12MM: Brand: TZ MEDICAL TITANIUM DISTRACTION PINS

## (undated) DEVICE — BANDAGE,ADHESIVE,FABRIC,1"X3",STRL,LF: Brand: CURAD

## (undated) DEVICE — PROXIMATE SKIN STAPLERS (35 WIDE) CONTAINS 35 STAINLESS STEEL STAPLES (FIXED HEAD): Brand: PROXIMATE

## (undated) DEVICE — SUT MCRYL 4-0 18IN PS-2 ABSRB UD 19MM 3/8 CIR

## (undated) DEVICE — UNDERPAD 30X36 300LB W CAP

## (undated) DEVICE — GAMMEX® PI HYBRID SIZE 7, STERILE POWDER-FREE SURGICAL GLOVE, POLYISOPRENE AND NEOPRENE BLEND: Brand: GAMMEX

## (undated) DEVICE — ENDOPATH ECHELON ENDOSCOPIC LINEAR CUTTER RELOADS, BLACK, 60MM: Brand: ECHELON ENDOPATH

## (undated) DEVICE — SOL  .9 1000ML BTL

## (undated) DEVICE — DRAPE SHEET LG

## (undated) DEVICE — STERILE ULTRASOUND GEL, SAFEWRAP: Brand: ECOVUE

## (undated) DEVICE — DRAPE PACK CHEST AND U BAR

## (undated) DEVICE — WECK HORIZON MED BLUE CLIP DISP

## (undated) DEVICE — SUT PERMA- 2-0 18IN FS NABSRB BLK 26MM 3/8

## (undated) DEVICE — EXOFIN TISSUE ADHESIVE 1.0ML

## (undated) DEVICE — COVER MAYOSTAND 23X54IN NWVN FAB

## (undated) DEVICE — MINI LAP PACK-LF: Brand: MEDLINE INDUSTRIES, INC.

## (undated) DEVICE — SEAM GUARD 60 ECHELON

## (undated) DEVICE — GLOVE SUR 7.5 SENSICARE PI PIP GRN PWD F

## (undated) DEVICE — 3M™ STERI-DRAPE™ INSTRUMENT POUCH 1018L: Brand: STERI-DRAPE™

## (undated) DEVICE — KIT VLV 5 PC AIR H2O SUCT BX ENDOGATOR CONN

## (undated) DEVICE — WECK HORIZON SMALL YELLOW CLIP DISP

## (undated) DEVICE — [HIGH FLOW INSUFFLATOR,  DO NOT USE IF PACKAGE IS DAMAGED,  KEEP DRY,  KEEP AWAY FROM SUNLIGHT,  PROTECT FROM HEAT AND RADIOACTIVE SOURCES.]: Brand: PNEUMOSURE

## (undated) DEVICE — CANISTER SUCT 1200CC LID BLU HRD ADPT AUTO

## (undated) DEVICE — GLOVE SUR 8 SENSICARE PI PIP CRM PWD F

## (undated) DEVICE — GLOVE SUR 7 SENSICARE PI PIP GRN PWD F

## (undated) DEVICE — DRAPE,THYROID,SOFT,STERILE: Brand: MEDLINE

## (undated) DEVICE — DRAPE,TOWEL,LARGE,INVISISHIELD: Brand: MEDLINE

## (undated) DEVICE — COVER,LIGHT,CAMERA,HARD,1/PK,STRL: Brand: MEDLINE

## (undated) DEVICE — SLEEVE COMPR MD KNEE LEN SGL USE KENDALL SCD

## (undated) DEVICE — LIGACLIP EXTRA LIGATING CLIP CARTRIDGES: 6 TITANIUM CLIPS/ CARTRIDGE (SMALL): Brand: LIGACLIP

## (undated) DEVICE — PAD SACRAL SPAN AID

## (undated) DEVICE — ENCORE® LATEX MICRO SIZE 7.5, STERILE LATEX POWDER-FREE SURGICAL GLOVE: Brand: ENCORE

## (undated) DEVICE — CANNULA NSL AD W/ FLTR 14FT O2/CO2

## (undated) DEVICE — VISIGI 3D®  CALIBRATION SYSTEM  SIZE 40FR STD W/ BULB: Brand: BOEHRINGER® VISIGI 3D™ SLEEVE GASTRECTOMY CALIBRATION SYSTEM, SIZE 40FR W/BULB

## (undated) DEVICE — INSULATED NEEDLE ELECTRODE: Brand: EDGE

## (undated) DEVICE — TONGUE BLADE 6IN ADULT: Brand: CARDINAL HEALTH

## (undated) DEVICE — GLOVE SUR 7.5 SENSICARE PI PIP CRM PWD F

## (undated) DEVICE — SUT PERMA- 3-0 30IN NABSRB BLK TIE SILK

## (undated) DEVICE — GOWN SUR 2XL LEV 4 BLU W/ WHT V NK BND AERO

## (undated) DEVICE — SUT COAT VCRL 0 27IN CT-1 ABSRB UD 36MM 1/2

## (undated) DEVICE — SUTURE PASSOR WITH GUIDE

## (undated) DEVICE — KIT CUSTOM ENDOPROCEDURE STERIS

## (undated) DEVICE — E-Z CLEAN, NON-STICK, PTFE COATED, ELECTROSURGICAL BLADE ELECTRODE, MODIFIED EXTENDED INSULATION, 4 INCH (10.2 CM): Brand: MEGADYNE

## (undated) NOTE — Clinical Note
ASTHMA ACTION PLAN for Isai Jefferson     : 1980     Date: 3/6/2017  Provider:  Shelby Gomez MD  Phone for doctor or clinic: [address]  [address] [address]  [phone]    ACT Score:

## (undated) NOTE — Clinical Note
Thank you for referring Crys to the [facility]. I met with her in consultation today. I have ordered labs, set up a nutrition consultation with our dietician, and completed an EKG.   She was started on Metformin ER for medication therapy a

## (undated) NOTE — LETTER
01/11/21        [place] [id number]      Dear Reyes Escudero,    [facility] records indicate that you have outstanding lab work and or testing that was ordered for you and has not yet been completed:      SOULEYMANE DUTTON 2D+3D SCREENING

## (undated) NOTE — LETTER
Date: 6/4/2019    Patient Name: Pro Summers          To Whom it may concern: This letter has been written at the patient's request. The above patient was seen at the [facility] for treatment of a medical condition.      The patient may re

## (undated) NOTE — LETTER
Crys Carter, :1980    CONSENT FOR PROCEDURE/SEDATION    1. I authorize the performance upon Crys Carter  the following: Intrauterine Device Mirena    2. I authorize Dr. Jed Alexander MD (and whomever is designated as the doctor’s assistant), to perform the above-mentioned procedures.    3. If any unforeseen conditions arise during this procedure calling for additional  procedures, operations, or medications (including anesthesia and blood transfusion), I further request and authorize the doctor to do whatever he/she deems advisable in my interest.    4. I consent to the taking and reproduction of any photographs in the course of this procedure for professional purposes.    5. I consent to the administration of such sedation as may be considered necessary or advisable by the physician responsible for this service, with the exception of ______________________________________________________    6. I have been informed by my doctor of the nature and purpose of this procedure sedation, possible alternative methods of treatment, risk involved and possible complications.    7. If I have a Do Not Resuscitate (DNR) order in place, the physician and I (or the individual authorized to consent on my behalf) will discuss and agree as to whether the Do Not Resuscitate (DNR) order will remain in effect or will be discontinued during the performance of the procedure and the applicable recovery period. If the Do Not Resuscitate (DNR) order is discontinued and is to be reinstated following the procedure/recovery period, the physician will determine when the applicable recovery period ends for purposes of reinstating the Do Not Resuscitate (DNR) order.    Signature of Patient:_______________________________________________    Signature of person authorized to consent for patient:  _______________________________________________________________    Relationship to patient:  ____________________________________________    Witness: _________________________________________ Date:___________     Physician Signature: _______________________________ Date:___________

## (undated) NOTE — Clinical Note
Patient was seen for their 1 month f/u at [facility] with a total weight loss of 0# since initial consult.

## (undated) NOTE — LETTER
Date & Time: 3/6/2023, 3:34 PM  Patient: Esmeralda Baumgarten  Encounter Provider(s):    DIANE Eddy       To Whom It May Concern:    Lobo Rodriguez was seen and treated in our department on 3/6/2023. Patient will return to work on Thursday if feeling better and fever free.     If you have any questions or concerns, please do not hesitate to call.        _____________________________  Physician/APC Signature

## (undated) NOTE — LETTER
11/21/18        Lula Whyte [id number]      Dear Rohit Ambrocio,    Our records indicate that you have outstanding lab work and or testing that was ordered for you and has not yet been completed:  Orders Placed This Encounter

## (undated) NOTE — Clinical Note
Thank you for allowing me to care for your patient! We will plan for excisional biopsy. I will ask for your assistance with preop clearance once we have a date set. Thanks, Amisha Mcdonough

## (undated) NOTE — LETTER
ASTHMA ACTION PLAN for Crys Carter     : 1980     Date: 2025  Provider:  Viki Pinto MD  Phone for doctor or clinic: [facility], [address] [address] [phone]    ACT Score: 22      You can use the colors of a traffic light to help learn about your asthma medicines.      1. Green - Go! % of Personal Best Peak Flow Use controller medicine.   Breathing is good  No cough or wheeze  Can work and play Medicine How much to take When to take it    Taking Daily:  No daily asthma medication.       2. Yellow - Caution. 50-79% Personal Best Peak  Flow.  Use reliever medicine to keep an asthma attack from getting bad.   Cough  Wheezing  Tight Chest  Wake up at night Medicine How much to take When to take it    Taking PRN:  albuterol (PROAIR HFA) 108 (90 Base) MCG/ACT Inhalation Aero Soln   Sig:   SHAKE WELL AND INHALE 1 TO 2 PUFFS INTO THE LUNGS EVERY 4 HOURS AS NEEDED FOR WHEEZING          Additional instructions   N/A        3. Red - Stop! Danger!  <50% Personal Best Peak  Flow. Take these medications until  Get help from a doctor   Medicine not helping  Breathing is hard and fast  Nose opens wide  Can't walk  Ribs show  Can't talk well Medicine How much to take When to take it      Call 911!    Go to nearest ER!     Additional Instructions If your symptoms do not improve and you cannot contact your doctor, [facility] or call 911 immediately!     [x] Asthma Action Plan reviewed with patient (and caregiver if necessary) and a copy of the plan was given to the patient/caregiver.   [] Asthma Action Plan reviewed with patient (and caregiver if necessary) on the phone and mailed copy to patient or submitted via [employer].     Signatures:  Provider  Viki Pinto MD   Patient Caretaker

## (undated) NOTE — LETTER
25          Crys Carter  :  1980      To Whom It May Concern:      This letter has been written at the patient's request. The above patient was seen at the [facility] for treatment of a medical condition.    This patient may return to work, without restrictions, on 25.    If there are any additional questions, please contact our office. [phone], option 2.       Sincerely,  MICHELL White Dr.

## (undated) NOTE — LETTER
ASTHMA ACTION PLAN for Vee Daly     : 1980     Date: 2019  Provider:  DIANE Garcia  Phone for doctor or clinic: [facility] [address], A[address]   [place] [address]  [address] [place], [place] [address] [phone]    ACT Scor

## (undated) NOTE — LETTER
Date & Time: 5/2/2023, 6:24 PM  Patient: Jes Gayle  Encounter Provider(s):    Juanita Nava PA-C       To Whom It May Concern:    Acosta Coyle was seen and treated in our department on 5/2/2023.   Please allow the patient to work from home for the next 3 to 5 days  If you have any questions or concerns, please do not hesitate to call.        _____________________________  Physician/APC Signature

## (undated) NOTE — LETTER
ASTHMA ACTION PLAN for Michael Fenton     : 1980     Date: 2021  Provider:  DIANE Campbell  Phone for doctor or clinic: [address] [place] [address]  [place] [address] [address]  [phone]    ACT Score

## (undated) NOTE — LETTER
ASTHMA ACTION PLAN for Emery Murillo     : 1980     Date: 10/17/2020  Provider:  DIANE Miranda  Phone for doctor or clinic: [facility], [address] [place] [address], [address], [address],[place] [address] [phone]    ACT Sc the phone and mailed copy to patient or submitted via [address]      Signatures:  Provider  DIANE Miranda   Patient  Kendra Barrera  Caretaker  None

## (undated) NOTE — LETTER
Date & Time: 9/1/2022, 7:45 PM  Patient: Chris Jimenez  Encounter Provider(s):    Altagracia Cerna, Jayshree Vela PA-C       To Whom It May Concern:    Mark Maldonado was seen and treated in our department on 9/1/2022. She should not return to work until 9/4/22 or symptoms are improving and fever free for 24 hours.     If you have any questions or concerns, please do not hesitate to call.        _____________________________  Physician/APC Signature

## (undated) NOTE — LETTER
01/26/22        [address]   [facility] [address]      Dear Migel Garcia,    Our records indicate that you have outstanding lab work and or testing that was ordered for you and has not yet been completed:  Orders Placed This Encounter

## (undated) NOTE — LETTER
ASTHMA ACTION PLAN for Dennise Zaman     : 1980     Date: 2018  Provider:  DIANE Mccann  Phone for doctor or clinic: [facility], [address] [place] [address], [facility] Cost  [address] [facility] [phone] [place] [phone]    ACT Sco

## (undated) NOTE — MR AVS SNAPSHOT
[place]  [address] [facility] [address]  [address] [facility] [address] [phone]               Thank you for choosing us for your health care visit with Yarelis Durbin MD.  We are glad to serve you and happy to provide you with this summa Follow-up Instructions     Return in about 1 year (around 3/6/2018). Scheduling Instructions     Monday March 06, 2017     Imaging:  [facility] NATO 2D+3D SCREENING BILAT (QWP=[id number])    Instructions:   To schedule an appointment for your radiology test TAKE 1 TABLET BY MOUTH EVERY EVENING   Commonly known as:  SINGULAIR           Pantoprazole Sodium 40 MG Tbec   TAKE 1 TABLET BY MOUTH TWICE DAILY BEFORE MEALS   Commonly known as:  PROTONIX           SUMAtriptan Succinate 25 MG Tabs   Take 1 tablet (25 mg dairy products with reduced content of saturated and total fat.    Dietary sodium reduction Reduce dietary sodium intake to <= 100 mmol per day (2.4 g sodium or 6 g sodium chloride)   Aerobic physical activity Regular aerobic physical activity (e.g., brisk Visit [facility] online at  [email or web address]

## (undated) NOTE — MR AVS SNAPSHOT
[address]  [phone]             Thank you for choosing us for your health care visit with Kelsey Yusuf MD.  We are glad to serve you and happy to provide you with this summary of your v Vital Signs  Most recent update: 11/24/2021  8:48 AM    BP   119/77    Pulse   94    Height   5' 7\" (1.702 m)    Weight   371 lb 1.6 oz (168.3 kg)             Current Medications          Accurate as of November 24, 2021 11:59 PM. Always use your most rec EVERY MORNING BEFORE BREAKFAST  Commonly known as: PROTONIX        topiramate 50 MG Tabs  Take 2 tablets (100 mg total) by mouth 2 (two) times daily.   Commonly known as: TOPAMAX        Vitamin D3 (Cholecalciferol) 125 MCG (5000 UT) Caps  Take 5,000 IU M-F

## (undated) NOTE — LETTER
ASTHMA ACTION PLAN for Destiny File     : 1980     Date: 2023  Provider:  DIANE Storey  Phone for doctor or clinic: [facility], \[facility]\"", [facility] [address] [facility] 100  [place] [address] [id number]  [phone]    ACT Score: 21      You can use the colors of a traffic light to help learn about your asthma medicines. 1. Green - Go! % of Personal Best Peak Flow Use controller medicine. Breathing is good  No cough or wheeze  Can work and play Medicine How much to take When to take it    No controller medicine. 2. Yellow - Caution. 50-79% Personal Best Peak  Flow. Use reliever medicine to keep an asthma attack from getting bad. Cough  Wheezing  Tight Chest  Wake up at night Medicine How much to take When to take it    Proair Inhaler: Inhale 1 to 2 puffs into the lungs every 4 hours as needed for wheezing (cough). Additional instructions         3. Red - Stop! Danger!  <50% Personal Best Peak  Flow. Take these medications until  Get help from a doctor   Medicine not helping  Breathing is hard and fast  Nose opens wide  Can't walk  Ribs show  Can't talk well Medicine How much to take When to take it    CALL 911! GO TO EMERGENCY ROOM! Additional Instructions If your symptoms do not improve and you cannot contact your doctor, [facility] or call 911 immediately! [x] Asthma Action Plan reviewed with patient (and caregiver if necessary) and a copy of the plan was given to the patient/caregiver. [] Asthma Action Plan reviewed with patient (and caregiver if necessary) on the phone and mailed copy to patient or submitted via [address]      Signatures: Jackie Connolly PA-C     Crys Segal ( 1980)     No caretaker   Provider  DIANE Storey   Patient Caretaker

## (undated) NOTE — LETTER
ASTHMA ACTION PLAN for Ellen Wagner     : 1980     Date: 7/15/2020  Provider:  DIANE Perera  Phone for doctor or clinic: [address] [place] [address], [facility], [address],[place] [phone]    ACT Sco Provider  DIANE Tellez   Patient Caretaker

## (undated) NOTE — Clinical Note
Dr. Guy Batista, just [facility] on this patient. She has asked to switch back to primary care for management of her conditions.   She will continue w/ [facility] as appropriate w/ her therapist.  Kriss Koch